# Patient Record
Sex: FEMALE | Race: WHITE | NOT HISPANIC OR LATINO | Employment: UNEMPLOYED | URBAN - METROPOLITAN AREA
[De-identification: names, ages, dates, MRNs, and addresses within clinical notes are randomized per-mention and may not be internally consistent; named-entity substitution may affect disease eponyms.]

---

## 2017-02-17 ENCOUNTER — OFFICE VISIT (OUTPATIENT)
Dept: FAMILY MEDICINE | Facility: CLINIC | Age: 57
End: 2017-02-17
Payer: COMMERCIAL

## 2017-02-17 VITALS
BODY MASS INDEX: 28 KG/M2 | RESPIRATION RATE: 12 BRPM | TEMPERATURE: 98.1 F | WEIGHT: 164 LBS | DIASTOLIC BLOOD PRESSURE: 62 MMHG | HEART RATE: 74 BPM | HEIGHT: 64 IN | OXYGEN SATURATION: 98 % | SYSTOLIC BLOOD PRESSURE: 112 MMHG

## 2017-02-17 DIAGNOSIS — G47.00 INSOMNIA, UNSPECIFIED TYPE: ICD-10-CM

## 2017-02-17 DIAGNOSIS — E78.5 HYPERLIPIDEMIA LDL GOAL <160: ICD-10-CM

## 2017-02-17 DIAGNOSIS — R63.5 WEIGHT GAIN: ICD-10-CM

## 2017-02-17 DIAGNOSIS — M25.561 PAIN IN LATERAL PORTION OF RIGHT KNEE: ICD-10-CM

## 2017-02-17 DIAGNOSIS — L30.8 OTHER ECZEMA: ICD-10-CM

## 2017-02-17 DIAGNOSIS — H61.23 BILATERAL IMPACTED CERUMEN: ICD-10-CM

## 2017-02-17 DIAGNOSIS — Z00.01 ENCOUNTER FOR ROUTINE ADULT HEALTH EXAMINATION WITH ABNORMAL FINDINGS: Primary | ICD-10-CM

## 2017-02-17 DIAGNOSIS — M79.641 PAIN OF RIGHT HAND: ICD-10-CM

## 2017-02-17 DIAGNOSIS — K21.9 GASTROESOPHAGEAL REFLUX DISEASE WITHOUT ESOPHAGITIS: ICD-10-CM

## 2017-02-17 DIAGNOSIS — Z11.59 NEED FOR HEPATITIS C SCREENING TEST: ICD-10-CM

## 2017-02-17 DIAGNOSIS — J01.11 ACUTE RECURRENT FRONTAL SINUSITIS: ICD-10-CM

## 2017-02-17 LAB
ERYTHROCYTE [DISTWIDTH] IN BLOOD BY AUTOMATED COUNT: 13.3 % (ref 10–15)
HCT VFR BLD AUTO: 42 % (ref 35–47)
HGB BLD-MCNC: 14.1 G/DL (ref 11.7–15.7)
MCH RBC QN AUTO: 29.9 PG (ref 26.5–33)
MCHC RBC AUTO-ENTMCNC: 33.6 G/DL (ref 31.5–36.5)
MCV RBC AUTO: 89 FL (ref 78–100)
PLATELET # BLD AUTO: 224 10E9/L (ref 150–450)
RBC # BLD AUTO: 4.71 10E12/L (ref 3.8–5.2)
WBC # BLD AUTO: 5.3 10E9/L (ref 4–11)

## 2017-02-17 PROCEDURE — 99396 PREV VISIT EST AGE 40-64: CPT | Performed by: FAMILY MEDICINE

## 2017-02-17 PROCEDURE — 80061 LIPID PANEL: CPT | Performed by: FAMILY MEDICINE

## 2017-02-17 PROCEDURE — 80053 COMPREHEN METABOLIC PANEL: CPT | Performed by: FAMILY MEDICINE

## 2017-02-17 PROCEDURE — 86803 HEPATITIS C AB TEST: CPT | Performed by: FAMILY MEDICINE

## 2017-02-17 PROCEDURE — 99214 OFFICE O/P EST MOD 30 MIN: CPT | Mod: 25 | Performed by: FAMILY MEDICINE

## 2017-02-17 PROCEDURE — 84443 ASSAY THYROID STIM HORMONE: CPT | Performed by: FAMILY MEDICINE

## 2017-02-17 PROCEDURE — 36415 COLL VENOUS BLD VENIPUNCTURE: CPT | Performed by: FAMILY MEDICINE

## 2017-02-17 PROCEDURE — 85027 COMPLETE CBC AUTOMATED: CPT | Performed by: FAMILY MEDICINE

## 2017-02-17 RX ORDER — ZOLPIDEM TARTRATE 10 MG/1
5 TABLET ORAL
Qty: 30 TABLET | Refills: 0 | Status: SHIPPED | OUTPATIENT
Start: 2017-02-17 | End: 2017-08-28

## 2017-02-17 RX ORDER — CLOBETASOL PROPIONATE 0.5 MG/G
AEROSOL, FOAM TOPICAL
Qty: 50 G | Refills: 1 | Status: SHIPPED | OUTPATIENT
Start: 2017-02-17 | End: 2017-09-29

## 2017-02-17 RX ORDER — AZITHROMYCIN 250 MG/1
TABLET, FILM COATED ORAL
Qty: 6 TABLET | Refills: 2 | Status: SHIPPED | OUTPATIENT
Start: 2017-02-17 | End: 2018-05-18

## 2017-02-17 ASSESSMENT — ANXIETY QUESTIONNAIRES
1. FEELING NERVOUS, ANXIOUS, OR ON EDGE: NOT AT ALL
3. WORRYING TOO MUCH ABOUT DIFFERENT THINGS: NOT AT ALL
IF YOU CHECKED OFF ANY PROBLEMS ON THIS QUESTIONNAIRE, HOW DIFFICULT HAVE THESE PROBLEMS MADE IT FOR YOU TO DO YOUR WORK, TAKE CARE OF THINGS AT HOME, OR GET ALONG WITH OTHER PEOPLE: SOMEWHAT DIFFICULT
2. NOT BEING ABLE TO STOP OR CONTROL WORRYING: NOT AT ALL
6. BECOMING EASILY ANNOYED OR IRRITABLE: NOT AT ALL
GAD7 TOTAL SCORE: 1
5. BEING SO RESTLESS THAT IT IS HARD TO SIT STILL: NOT AT ALL
7. FEELING AFRAID AS IF SOMETHING AWFUL MIGHT HAPPEN: NOT AT ALL

## 2017-02-17 ASSESSMENT — PATIENT HEALTH QUESTIONNAIRE - PHQ9: 5. POOR APPETITE OR OVEREATING: SEVERAL DAYS

## 2017-02-17 NOTE — PATIENT INSTRUCTIONS
"Establish an exercise regimen. Activity goal: 45 minutes 5 days a week. New exercise routine: cardiovascular workout on exercise equipment, walking and weightlifting. Diet regimen was discussed and plan is self-directed dieting: reduce calories, reduce portions, reduce processed  carbs, increase fruits/vegetables and avoid sweets and supervised diet program. Avoid artificial sweeteners and \"diet\" drinks and sodas.       Increase hydration while in the air for travel - drink 8 oz of water for every 1 hours in the air.     For your ear wax - use the carbamide peroxide drop for 3 days , then use Murine Ear wax removal system over the counter to flush out the wax - do this once a month.                   Heartburn/GERD   What is heartburn?   Heartburn refers to the symptoms you feel when acids in your stomach flow back into the esophagus. (The esophagus is the tube that carries food from your throat to your stomach.) This backward movement of stomach acid is called reflux. The acid can burn and irritate the esophagus, throat, and vocal cords.   Heartburn is a common problem. Despite its name, it has nothing to do with the heart.   When you have heartburn often, you may have a condition called gastroesophageal reflux disease, or GERD.   How does it occur?   At the bottom of the esophagus there is a ring of muscle called a sphincter. It acts like a valve. When you swallow food, the sphincter opens to let the food pass into the stomach. The ring then closes to keep the stomach contents from going back into the esophagus. If the sphincter is weak or too relaxed, stomach acid and food flow backward into the esophagus. Because the esophagus does not have the protective lining that the stomach has, the acid causes pain.   The sphincter muscle sometimes does not work properly if:   You are overweight.   You are pregnant.   You have a hiatal hernia (a condition in which part of the stomach protrudes through the diaphragm into " the chest).   You eat too much.   You lie down soon after eating.   You wear tight clothes that push on your stomach.   Foods that may make heartburn worse are:   foods high in fat   sugar   chocolate   peppermint   onions   citrus foods such as orange juice   tomato-based foods   spicy foods   coffee and other drinks with caffeine, such as tea and maria eugenia   alcohol.   Heartburn can also be made worse by:   taking certain medicines, such as aspirin   smoking cigarettes.   Anyone can have an attack of heartburn from overeating or eating foods that are high in acid. Most of the time heartburn is mild and lasts for a short time. There is usually not a problem when heartburn occurs just once in a while. You should see your healthcare provider if:   You have heartburn nearly every day for 2 weeks.   The heartburn comes back when the antacid wears off.   Heartburn wakes you up at night.   What are the symptoms?   The main symptom of heartburn is a burning pain in the lower chest, usually close to the bottom of the breastbone. Other symptoms you may have are:   acid or sour taste in your mouth   belching   a feeling of bloating or fullness in the stomach.   These symptoms tend to happen after very large meals and especially with activity such as bending or lifting after meals. The symptoms may be made worse by lying down or by wearing tight clothing.   Heartburn is very common during the last few months of pregnancy. The weight of the baby pushes on the stomach and can cause the sphincter to relax and let acid to flow back into the esophagus.   How is it diagnosed?   Usually heartburn can be diagnosed from your medical history.   If there is any question about the diagnosis, you may have the following tests to check for ulcers or other problems that might cause your symptoms:   barium swallow X-ray study of the esophagus   complete upper GI (gastrointestinal) barium X-ray study of the esophagus, stomach, and upper intestine    endoscopy, a procedure in which a thin flexible tube with a tiny camera is placed in your mouth and down into your stomach so your provider can see your esophagus and stomach.   How is it treated?   To help reduce the symptoms of heartburn you can:   Try not to put a lot of pressure on the sphincter muscle. Eating light meals and wearing loose clothing will help.   Lose weight if you are overweight.   Take nonprescription antacids (tablets or liquid) after meals and at bedtime.   Raise the head of your bed or use more than one pillow so your head is higher than your stomach. This may allow gravity to help keep food from backing up.   If you find that certain foods or drinks seem to cause your symptoms or make them worse, avoid those foods.   If the simple measures described above do not relieve the symptoms, your healthcare provider may prescribe medicine. The prescription medicines help reduce stomach acid. They also help stomach emptying. A very few people who are not helped with medicines may need surgery.   Get emergency care if the following symptoms occur with the heartburn and do not go away within 15 minutes of treatment for heartburn: shortness of breath; sweating; light-headedness, weakness; or jaw, arm, back, or chest pain.   How long will the effects last?   Heartburn symptoms are usually relieved by treatment in just a few hours or less. If you are having heartburn every day, starting treatment will usually relieve the symptoms in a few days. However, the symptoms may come back from time to time, especially if you gain weight.   Heartburn can sometimes make asthma worse. If you have asthma, preventing or controlling heartburn may help control your asthma symptoms.   How can I help prevent heartburn?   The best prevention is to:   Keep a healthy weight. Lose weight if you are overweight.   Sleep with your head elevated at least 4 to 6 inches. (It's usually most comfortable to put the head of your bed  on blocks.)   It may also help if you:   Wait an hour or longer after eating before you lie down. If you have to lie down after a meal, lie on your left side. Keep your head and shoulders slightly higher than the rest of your body. It's best to not eat for 2 to 3 hours before you go to bed.   Eat smaller, more frequent meals.   Avoid wearing tight clothing or belts.   Don't smoke. Smoking relaxes the sphincter leading to your stomach.   Avoid foods and other things that seem to cause heartburn or make it worse.   Developed by [employer].   Published by [employer].   Last modified: 2009-01-14   Last reviewed: 2008-12-02     Preventive Health Recommendations  Female Ages 50 - 64    Yearly exam: See your health care provider every year in order to  o Review health changes.   o Discuss preventive care.    o Review your medicines if your doctor has prescribed any.      Get a Pap test every three years (unless you have an abnormal result and your provider advises testing more often).    If you get Pap tests with HPV test, you only need to test every 5 years, unless you have an abnormal result.     You do not need a Pap test if your uterus was removed (hysterectomy) and you have not had cancer.    You should be tested each year for STDs (sexually transmitted diseases) if you're at risk.     Have a mammogram every 1 to 2 years.    Have a colonoscopy at age 50, or have a yearly FIT test (stool test). These exams screen for colon cancer.      Have a cholesterol test every 5 years, or more often if advised.    Have a diabetes test (fasting glucose) every three years. If you are at risk for diabetes, you should have this test more often.     If you are at risk for osteoporosis (brittle bone disease), think about having a bone density scan (DEXA).    Shots: Get a flu shot each year. Get a tetanus shot every 10 years.    Nutrition:     Eat at least 5 servings of fruits and vegetables each day.    Eat whole-grain bread,  whole-wheat pasta and brown rice instead of white grains and rice.    Talk to your provider about Calcium and Vitamin D.     Lifestyle    Exercise at least 150 minutes a week (30 minutes a day, 5 days a week). This will help you control your weight and prevent disease.    Limit alcohol to one drink per day.    No smoking.     Wear sunscreen to prevent skin cancer.     See your dentist every six months for an exam and cleaning.    See your eye doctor every 1 to 2 years.               Thank you for choosing [facility]  for your Health Care. It was a pleasure seeing you at your visit today. Please contact us with any questions or concerns you may have.                   Lisbeth Gonzalez MD                                  To reach your [facility] care team after hours call:   [phone]    Our clinic hours are:     Monday- 7:30 am - 7:00 pm                             Tuesday through Friday- 7:30 am - 5:00 pm                                        Saturday- 8:00 am - 12:00 pm                  Phone:  [phone]    Our pharmacy hours are:     Monday  8:00 am to 7:00 pm      Tuesday through Friday 8:00am to 6:00pm                        Saturday - 9:00 am to 1:00 pm      Sunday : Closed.              Phone:  [phone]      There is also information available at our web site:  [email or web address]    If your provider ordered any lab tests and you do not receive the results within 10 business days, please call the clinic.    If you need a medication refill please contact your pharmacy.  Please allow 2 business days for your refill to be completed.    Our clinic offers telephone visits and e visits.  Please ask one of your team members to explain more.      Use [facility] (secure email communication and access to your chart) to send your primary care provider a message or make an appointment. Ask someone on your Team how to sign up for [email or web address].

## 2017-02-17 NOTE — MR AVS SNAPSHOT
"              After Visit Summary   2/17/2017    Whitney Aceves    MRN: [id number]           Patient Information     Date Of Birth          1960        Visit Information        Provider Department      2/17/2017 8:15 AM Lisbeth Gonzalez MD [facility] [place]        Today's Diagnoses     Encounter for routine adult health examination with abnormal findings    -  1    Acute recurrent frontal sinusitis        Pain in lateral portion of right knee        Pain of right hand        Weight gain        Hyperlipidemia LDL goal <160        Insomnia, unspecified type        Need for hepatitis C screening test        Other eczema        Bilateral impacted cerumen          Care Instructions    Establish an exercise regimen. Activity goal: 45 minutes 5 days a week. New exercise routine: cardiovascular workout on exercise equipment, walking and weightlifting. Diet regimen was discussed and plan is self-directed dieting: reduce calories, reduce portions, reduce processed  carbs, increase fruits/vegetables and avoid sweets and supervised diet program. Avoid artificial sweeteners and \"diet\" drinks and sodas.       Increase hydration while in the air for travel - drink 8 oz of water for every 1 hours in the air.     For your ear wax - use the carbamide peroxide drop for 3 days , then use Murine Ear wax removal system over the counter to flush out the wax - do this once a month.                   Heartburn/GERD   What is heartburn?   Heartburn refers to the symptoms you feel when acids in your stomach flow back into the esophagus. (The esophagus is the tube that carries food from your throat to your stomach.) This backward movement of stomach acid is called reflux. The acid can burn and irritate the esophagus, throat, and vocal cords.   Heartburn is a common problem. Despite its name, it has nothing to do with the heart.   When you have heartburn often, you may have a condition called gastroesophageal reflux disease, " or GERD.   How does it occur?   At the bottom of the esophagus there is a ring of muscle called a sphincter. It acts like a valve. When you swallow food, the sphincter opens to let the food pass into the stomach. The ring then closes to keep the stomach contents from going back into the esophagus. If the sphincter is weak or too relaxed, stomach acid and food flow backward into the esophagus. Because the esophagus does not have the protective lining that the stomach has, the acid causes pain.   The sphincter muscle sometimes does not work properly if:   You are overweight.   You are pregnant.   You have a hiatal hernia (a condition in which part of the stomach protrudes through the diaphragm into the chest).   You eat too much.   You lie down soon after eating.   You wear tight clothes that push on your stomach.   Foods that may make heartburn worse are:   foods high in fat   sugar   chocolate   peppermint   onions   citrus foods such as orange juice   tomato-based foods   spicy foods   coffee and other drinks with caffeine, such as tea and maria eugenia   alcohol.   Heartburn can also be made worse by:   taking certain medicines, such as aspirin   smoking cigarettes.   Anyone can have an attack of heartburn from overeating or eating foods that are high in acid. Most of the time heartburn is mild and lasts for a short time. There is usually not a problem when heartburn occurs just once in a while. You should see your healthcare provider if:   You have heartburn nearly every day for 2 weeks.   The heartburn comes back when the antacid wears off.   Heartburn wakes you up at night.   What are the symptoms?   The main symptom of heartburn is a burning pain in the lower chest, usually close to the bottom of the breastbone. Other symptoms you may have are:   acid or sour taste in your mouth   belching   a feeling of bloating or fullness in the stomach.   These symptoms tend to happen after very large meals and especially with  activity such as bending or lifting after meals. The symptoms may be made worse by lying down or by wearing tight clothing.   Heartburn is very common during the last few months of pregnancy. The weight of the baby pushes on the stomach and can cause the sphincter to relax and let acid to flow back into the esophagus.   How is it diagnosed?   Usually heartburn can be diagnosed from your medical history.   If there is any question about the diagnosis, you may have the following tests to check for ulcers or other problems that might cause your symptoms:   barium swallow X-ray study of the esophagus   complete upper GI (gastrointestinal) barium X-ray study of the esophagus, stomach, and upper intestine   endoscopy, a procedure in which a thin flexible tube with a tiny camera is placed in your mouth and down into your stomach so your provider can see your esophagus and stomach.   How is it treated?   To help reduce the symptoms of heartburn you can:   Try not to put a lot of pressure on the sphincter muscle. Eating light meals and wearing loose clothing will help.   Lose weight if you are overweight.   Take nonprescription antacids (tablets or liquid) after meals and at bedtime.   Raise the head of your bed or use more than one pillow so your head is higher than your stomach. This may allow gravity to help keep food from backing up.   If you find that certain foods or drinks seem to cause your symptoms or make them worse, avoid those foods.   If the simple measures described above do not relieve the symptoms, your healthcare provider may prescribe medicine. The prescription medicines help reduce stomach acid. They also help stomach emptying. A very few people who are not helped with medicines may need surgery.   Get emergency care if the following symptoms occur with the heartburn and do not go away within 15 minutes of treatment for heartburn: shortness of breath; sweating; light-headedness, weakness; or jaw, arm,  back, or chest pain.   How long will the effects last?   Heartburn symptoms are usually relieved by treatment in just a few hours or less. If you are having heartburn every day, starting treatment will usually relieve the symptoms in a few days. However, the symptoms may come back from time to time, especially if you gain weight.   Heartburn can sometimes make asthma worse. If you have asthma, preventing or controlling heartburn may help control your asthma symptoms.   How can I help prevent heartburn?   The best prevention is to:   Keep a healthy weight. Lose weight if you are overweight.   Sleep with your head elevated at least 4 to 6 inches. (It's usually most comfortable to put the head of your bed on blocks.)   It may also help if you:   Wait an hour or longer after eating before you lie down. If you have to lie down after a meal, lie on your left side. Keep your head and shoulders slightly higher than the rest of your body. It's best to not eat for 2 to 3 hours before you go to bed.   Eat smaller, more frequent meals.   Avoid wearing tight clothing or belts.   Don't smoke. Smoking relaxes the sphincter leading to your stomach.   Avoid foods and other things that seem to cause heartburn or make it worse.   Developed by [employer].   Published by [employer].   Last modified: 2009-01-14   Last reviewed: 2008-12-02     Preventive Health Recommendations  Female Ages 50 - 64    Yearly exam: See your health care provider every year in order to  o Review health changes.   o Discuss preventive care.    o Review your medicines if your doctor has prescribed any.      Get a Pap test every three years (unless you have an abnormal result and your provider advises testing more often).    If you get Pap tests with HPV test, you only need to test every 5 years, unless you have an abnormal result.     You do not need a Pap test if your uterus was removed (hysterectomy) and you have not had cancer.    You should be tested each  year for STDs (sexually transmitted diseases) if you're at risk.     Have a mammogram every 1 to 2 years.    Have a colonoscopy at age 50, or have a yearly FIT test (stool test). These exams screen for colon cancer.      Have a cholesterol test every 5 years, or more often if advised.    Have a diabetes test (fasting glucose) every three years. If you are at risk for diabetes, you should have this test more often.     If you are at risk for osteoporosis (brittle bone disease), think about having a bone density scan (DEXA).    Shots: Get a flu shot each year. Get a tetanus shot every 10 years.    Nutrition:     Eat at least 5 servings of fruits and vegetables each day.    Eat whole-grain bread, whole-wheat pasta and brown rice instead of white grains and rice.    Talk to your provider about Calcium and Vitamin D.     Lifestyle    Exercise at least 150 minutes a week (30 minutes a day, 5 days a week). This will help you control your weight and prevent disease.    Limit alcohol to one drink per day.    No smoking.     Wear sunscreen to prevent skin cancer.     See your dentist every six months for an exam and cleaning.    See your eye doctor every 1 to 2 years.               Thank you for choosing [facility]  for your Health Care. It was a pleasure seeing you at your visit today. Please contact us with any questions or concerns you may have.                   Lisbeth Gonzalez MD                                  To reach your [facility] care team after hours call:   [phone]    Our clinic hours are:     Monday- 7:30 am - 7:00 pm                             Tuesday through Friday- 7:30 am - 5:00 pm                                        Saturday- 8:00 am - 12:00 pm                  Phone:  [phone]    Our pharmacy hours are:     Monday  8:00 am to 7:00 pm      Tuesday through Friday 8:00am to 6:00pm                        Saturday - 9:00 am to 1:00 pm      Sunday :  Closed.              Phone:  [phone]      There is also information available at our web site:  [email or web address]    If your provider ordered any lab tests and you do not receive the results within 10 business days, please call the clinic.    If you need a medication refill please contact your pharmacy.  Please allow 2 business days for your refill to be completed.    Our clinic offers telephone visits and e visits.  Please ask one of your team members to explain more.      Use StyleQt (secure email communication and access to your chart) to send your primary care provider a message or make an appointment. Ask someone on your Team how to sign up for travelmobhart.                     Follow-ups after your visit        Additional Services     ORTHO  REFERRAL       [facility] is referring you to the Orthopedic  Services at [facility].       The  Representative will assist you in the coordination of your Orthopedic and Musculoskeletal Care as prescribed by your physician.    The  Representative will call you within 1 business day to help schedule your appointment, or you may contact the  Representative at:    All areas ~ [phone]     Type of Referral : Non Surgical  Surgical / Specialist       Timeframe requested: Within 2 weeks    Coverage of these services is subject to the terms and limitations of your health insurance plan.  Please call member services at your health plan with any benefit or coverage questions.      If X-rays, CT or MRI's have been performed, please contact the facility where they were done to arrange for , prior to your scheduled appointment.  Please bring this referral request to your appointment and present it to your specialist.            PHYSICAL THERAPY REFERRAL       *This therapy referral will be filtered to a centralized scheduling office at [facility] and the patient will  receive a call to schedule an appointment at a [facility] location most convenient for them. *     [facility] provides Physical Therapy evaluation and treatment and many specialty services across the [facility] system.  If requesting a specialty program, please choose from the list below.    If you have not heard from the scheduling office within 2 business days, please call [phone] for all locations, with the exception of [place], please call [phone].  Treatment: Evaluation & Treatment  Special Instructions/Modalities: eval and treat   Special Programs: hand therapy     Please be aware that coverage of these services is subject to the terms and limitations of your health insurance plan.  Call member services at your health plan with any benefit or coverage questions.      **Note to Provider:  If you are referring outside of [facility] for the therapy appointment, please list the name of the location in the  special instructions  above, print the referral and give to the patient to schedule the appointment.                  Your next 10 appointments already scheduled     Feb 21, 2017   Procedure with Silverio Arrieta MD   [facility])    [address]  [place] [address] [phone]           [facility] is located at [address] [place]              Who to contact     If you have questions or need follow up information about today's clinic visit or your schedule please contact [facility] directly at [phone].  Normal or non-critical lab and imaging results will be communicated to you by MyChart, letter or phone within 4 business days after the clinic has received the results. If you do not hear from us within 7 days, please contact the clinic through MyChart or phone. If you have a critical or abnormal lab result, we will notify you by phone as soon as possible.  Submit refill requests  "through Bl[employer] or call your pharmacy and they will forward the refill request to us. Please allow 3 business days for your refill to be completed.          Additional Information About Your Visit        Fatwirehart Information     Blabroom gives you secure access to your electronic health record. If you see a primary care provider, you can also send messages to your care team and make appointments. If you have questions, please call your primary care clinic.  If you do not have a primary care provider, please call [phone] and they will assist you.        Care EveryWhere ID     This is your Care EveryWhere ID. This could be used by other organizations to access your [facility] medical records  [id number]        Your Vitals Were     Pulse Temperature Respirations Height Pulse Oximetry Breastfeeding?    74 98.1  F (36.7  C) (Tympanic) 12 5' 4\" (1.626 m) 98% No    BMI (Body Mass Index)                   28.15 kg/m2            Blood Pressure from Last 3 Encounters:   02/17/17 112/62   09/10/16 114/64   08/05/16 120/76    Weight from Last 3 Encounters:   02/17/17 164 lb (74.4 kg)   09/10/16 156 lb 6.4 oz (70.9 kg)   08/05/16 161 lb (73 kg)              We Performed the Following     CBC with platelets     Comprehensive metabolic panel     Hepatitis C Screen Reflex to HCV RNA Quant and Genotype     Lipid panel reflex to direct LDL     ORTHO  REFERRAL     PHYSICAL THERAPY REFERRAL     TSH with free T4 reflex          Today's Medication Changes          These changes are accurate as of: 2/17/17  9:41 AM.  If you have any questions, ask your nurse or doctor.               Start taking these medicines.        Dose/Directions    azithromycin 250 MG tablet   Commonly known as:  ZITHROMAX   Used for:  Acute recurrent frontal sinusitis   Started by:  Lisbeth Gonzalez MD        2 tabs first day, then 1 tab by mouth daily for 4 additional days   Quantity:  6 tablet   Refills:  2       carbamide peroxide 6.5 % otic " solution   Commonly known as:  DEBROX   Used for:  Bilateral impacted cerumen   Started by:  Lisbeth Gonzalez MD        Dose:  5-10 drop   Place 5-10 drops into both ears 2 times daily for 3 days   Quantity:  3 mL   Refills:  0       clobetasol propionate 0.05 % Foam   Commonly known as:  OLUX   Used for:  Other eczema   Started by:  Lisbeth Gonzalez MD        Apply to outer part of ear and scalp 1-2x/day for up to 2 weeks at a time   Quantity:  50 g   Refills:  1            Where to get your medicines      These medications were sent to [facility] [address] [facility] [address] [address] AT [facility] [address] [address], [facility] [address]    Hours:  24-hours Phone:  [phone]     azithromycin 250 MG tablet    carbamide peroxide 6.5 % otic solution    clobetasol propionate 0.05 % Foam         Some of these will need a paper prescription and others can be bought over the counter.  Ask your nurse if you have questions.     Bring a paper prescription for each of these medications     zolpidem 10 MG tablet                Primary Care Provider Office Phone # Fax #    Lisbeth Gonzalez [phone]       [address] [address]        Thank you!     Thank you for choosing [facility]  for your care. Our goal is always to provide you with excellent care. Hearing back from our patients is one way we can continue to improve our services. Please take a few minutes to complete the written survey that you may receive in the mail after your visit with us. Thank you!             Your Updated Medication List - Protect others around you: Learn how to safely use, store and throw away your medicines at www.disposemymeds.org.          This list is accurate as of: 2/17/17  9:41 AM.  Always use your most recent med list.                   Brand Name Dispense Instructions for use    azithromycin 250 MG  tablet    ZITHROMAX    6 tablet    2 tabs first day, then 1 tab by mouth daily for 4 additional days       carbamide peroxide 6.5 % otic solution    DEBROX    3 mL    Place 5-10 drops into both ears 2 times daily for 3 days       cetirizine 10 MG tablet    zyrTEC    30 tablet    Take 1 tablet (10 mg) by mouth every evening       clobetasol propionate 0.05 % Foam    OLUX    50 g    Apply to outer part of ear and scalp 1-2x/day for up to 2 weeks at a time       estradiol 0.1 MG/24HR BIW patch    VIVELLE-DOT    24 patch    Place 1 patch onto the skin twice a week       fluticasone 50 MCG/ACT spray    FLONASE    1 Bottle    Spray 1-2 sprays into both nostrils daily       ibuprofen 800 MG tablet    ADVIL/MOTRIN    90 tablet    TAKE 1 TABLET EVERY 8 HOURSAS NEEDED FOR MODERATE     PAIN (FOR BURSITIS)       zolpidem 10 MG tablet    AMBIEN    30 tablet    Take 0.5 tablets (5 mg) by mouth nightly as needed for sleep

## 2017-02-17 NOTE — PROGRESS NOTES
SUBJECTIVE:     CC: Whitney Aceves is an 56 year old woman who presents for preventive health visit.     Physical   Annual:     Getting at least 3 servings of Calcium per day::  Yes    Bi-annual eye exam::  Yes    Dental care twice a year::  Yes    Sleep apnea or symptoms of sleep apnea::  Sleep apnea    Frequency of exercise::  1 day/week    Duration of exercise::  15-30 minutes    Taking medications regularly::  Yes    Additional concerns today::  YES  having some debilitating headache that started last night - bilateral maxillary sinus area and frontal area as well. -lots of pressure.   No numbness, tingling, or weakness in upper or lower extremities. No bowel or bladder control problems.     Also has been gaining weight in the last 6 months. Has been high power stress mode that same time frame - not eating well or exercising as much.  Thyroid issues in sister.  Sister also had 3 MI's at age 55.  Dad  at age 45 from MI and CHF.    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      Getting some eczema in her ears.     A few years ago slipped and tore a ligament in her right lateral knee. Noticing signif. Pain in her right lateral knee - dull pain almost all the time. Hasn't been giving out on her.     Sees Dr. Goodwin at [facility] ob[facility] every year in  - saw her last 2016 for pap, pelvic , breast exam. Has her colonoscopy scheduled next week.       Today's PHQ-2 Score:   PHQ-2 (  [employer]) 2/15/2017   Q1: Little interest or pleasure in doing things -   Q2: Feeling down, depressed or hopeless -   PHQ-2 Score -   Little interest or pleasure in doing things Not at all   Feeling down, depressed or hopeless Not at all   PHQ-2 Score 0        Abuse: Current or Past(Physical, Sexual or Emotional)- No  Do you feel safe in your environment - Yes.     Social History   Substance Use Topics     Smoking status: Never Smoker     Smokeless tobacco: Never Used     Alcohol use No     The patient does not drink >3 drinks per day nor >7 drinks per week.    Recent Labs   Lab Test  06/13/16   0901 06/04/15   12/13/10   1026  06/21/10   1022   CHOL  217*  223*   < >  215*  238*   HDL  67  69   < >  61  63   LDL  131*  136   < >  135*  143*   TRIG  93  92   < >  96  164*   CHOLHDLRATIO   --    --    --   3.5  3.8   NHDL  150*  154   < >   --    --     < > = values in this interval not displayed.       Reviewed orders with patient.  Reviewed health maintenance and updated orders accordingly - Yes    Mammo Decision Support:  Patient over age 50, mutual decision to screen reflected in health maintenance.    Pertinent mammograms are reviewed under the imaging tab.  History of abnormal Pap smear: NO - age 30- 65 PAP every 3 years recommended  S/p benign hysterectomy.     All Histories reviewed and updated in [employer].  Health Maintenance   Topic Date Due     WELLNESS VISIT Q1 YR (NO INBASKET)  1960     HEPATITIS C SCREENING  11/08/1978     LIPID MONITORING Q1 YEAR( NO INBASKET)  06/13/2017     MAMMO Q1 YR [facility] MESSAGE  06/13/2017     PAP SCREENING Q3 YR (SYSTEM ASSIGNED)  06/20/2018     TETANUS IMMUNIZATION (SYSTEM ASSIGNED)  08/05/2019     ADVANCE DIRECTIVE PLANNING Q5 YRS (NO INBASKET)  01/15/2021     COLONOSCOPY Q10 YEARS NO [facility] MESSAGE  04/30/2022     INFLUENZA VACCINE (SYSTEM ASSIGNED)  Addressed       Patient Active Problem List   Diagnosis     Panic disorder without agoraphobia     Anxiety     Overactive bladder     HYPERLIPIDEMIA LDL GOAL <160     Advanced directives, counseling/discussion       Past Medical History   Diagnosis Date     Anxiety      on effexor in past. Off since 2010     Edema      Hormone replacement therapy      IBS (irritable bowel  syndrome)      Diarrhea predominant     Insomnia, unspecified      On Ambien     Lichen sclerosus      Pap smear of cervix not needed      Urge incontinence        Past Surgical History   Procedure Laterality Date     Colonoscopy  2012     Procedure:COLONOSCOPY; Colonoscopy ; Surgeon:DEANDRE SHORT; Location:[facility]      section       Fissurectomy rectum  01     Colposcopy, biopsy, combined  1991     Mild squamous epithelial dysplasia (SABIHA 1)     Hysterectomy total abdominal  3/4/1998     Myomatous uterus; pelvic pain. Started HRT right afterward with vasomotor sx.     Liposuction (location)         Social History     Social History     Marital status:      Spouse name: Teo     Number of children: 2     Years of education: N/A     Occupational History     [employer]     Early Childhood Education     Social History Main Topics     Smoking status: Never Smoker     Smokeless tobacco: Never Used     Alcohol use No     Drug use: No     Sexual activity: Yes     Partners: Male     Birth control/ protection: Post-menopausal     Other Topics Concern     Parent/Sibling W/ Cabg, Mi Or Angioplasty Before 65f 55m? No     Social History Narrative       Family History   Problem Relation Age of Onset     Hypertension Mother      Hyperlipidemia Mother      C.A.D. Father 45       age 45 massive MI      Heart Failure Father      C.A.D. Maternal Grandmother      C.A.D. Maternal Grandfather      C.A.D. Paternal Grandmother      C.A.D. Paternal Grandfather      CANCER Other      9 members on maternal side. Bone CA, Breast CA (m. aunt),       Prostate Cancer Brother      age 37     C.A.D. Sister 57     MI age 50- CABG     Breast Cancer Maternal Aunt      50s-Mom had 9 sisters. MAunt->50's     Current Outpatient Prescriptions   Medication Sig Dispense Refill     estradiol (VIVELLE-DOT) 0.1 MG/24HR patch Place 1 patch onto the skin twice a week 24 patch 3     ibuprofen  "(ADVIL/MOTRIN) 800 MG tablet TAKE 1 TABLET EVERY 8 HOURSAS NEEDED FOR MODERATE     PAIN (FOR BURSITIS) 90 tablet 3     fluticasone (FLONASE) 50 MCG/ACT nasal spray Spray 1-2 sprays into both nostrils daily 1 Bottle 11     zolpidem (AMBIEN) 10 MG tablet Take 0.5 tablets (5 mg) by mouth nightly as needed for sleep 30 tablet 0     cetirizine (ZYRTEC) 10 MG tablet Take 1 tablet (10 mg) by mouth every evening 30 tablet 1        Allergies   Allergen Reactions     Septra [Sulfamethoxazole W/Trimethoprim]      Sulfa Drugs Hives        ROS:  C: NEGATIVE for fever, chills, but positive for increased change in weight and fatigue.   I: NEGATIVE for worrisome rashes, moles or lesions  E: NEGATIVE for vision changes or irritation  ENT: NEGATIVE for ear, mouth and throat problems  R: NEGATIVE for significant cough or SOB  B: NEGATIVE for masses, tenderness or discharge  CV: NEGATIVE for chest pain, palpitations or peripheral edema  GI: NEGATIVE for nausea, abdominal pain,  or change in bowel habits. Has a lot of heartburn the last two months.   : NEGATIVE for unusual urinary or vaginal symptoms. No vaginal bleeding.  M: NEGATIVE for significant arthralgias or myalgia  N: NEGATIVE for weakness, dizziness or paresthesias  P: NEGATIVE for changes in mood or affect       Problem list, Medication list, Allergies, and Medical/Social/Surgical histories reviewed in [facility] and updated as appropriate.  Labs reviewed in [facility]  BP Readings from Last 3 Encounters:   02/17/17 112/62   09/10/16 114/64   08/05/16 120/76    Wt Readings from Last 3 Encounters:   02/17/17 164 lb (74.4 kg)   09/10/16 156 lb 6.4 oz (70.9 kg)   08/05/16 161 lb (73 kg)              OBJECTIVE:     /62  Pulse 74  Temp 98.1  F (36.7  C) (Tympanic)  Resp 12  Ht 5' 4\" (1.626 m)  Wt 164 lb (74.4 kg)  SpO2 98%  Breastfeeding? No  BMI 28.15 kg/m2  EXAM:  GENERAL APPEARANCE: healthy, alert and no distress  EYES: Eyes grossly normal to inspection, PERRL and conjunctivae and " sclerae normal  HENT: ear canals and TM's normal, nose= congested  And bilateral frontal sinus pain  and mouth without ulcers or lesions, oropharynx clear and oral mucous membranes moist  NECK: no adenopathy, no asymmetry, masses, or scars and thyroid normal to palpation  RESP: lungs clear to auscultation - no rales, rhonchi or wheezes  CV: regular rate and rhythm, normal S1 S2, no S3 or S4, no murmur, click or rub, no peripheral edema and peripheral pulses strong  ABDOMEN: soft, nontender, no hepatosplenomegaly, no masses and bowel sounds normal  Pelvic and breast exam done with Dr. Goodwin in 6/2016.   MS: no musculoskeletal defects are noted and gait is age appropriate without ataxia  SKIN: no suspicious lesions , but there is a slightly red, maculopapular rash around the right ear and right lower occipital scalp - no silvery or other scale at all.    NEURO: Normal strength and tone, sensory exam grossly normal, mentation intact and speech normal  PSYCH: mentation appears normal and affect normal/bright.     ASSESSMENT/PLAN:         ICD-10-CM    1. Encounter for routine adult health examination with abnormal findings Z00.01 CBC with platelets   2. Acute recurrent frontal sinusitis J01.11 azithromycin (ZITHROMAX) 250 MG tablet   3. Pain in lateral portion of right knee M25.561 ORTHO  REFERRAL   4. Pain of right hand M79.641 ORTHO  REFERRAL     PHYSICAL THERAPY REFERRAL   5. Weight gain R63.5 TSH with free T4 reflex   6. Hyperlipidemia LDL goal <160 E78.5 Comprehensive metabolic panel     Lipid panel reflex to direct LDL   7. Insomnia, unspecified type G47.00 zolpidem (AMBIEN) 10 MG tablet   8. Need for hepatitis C screening test Z11.59 Hepatitis C Screen Reflex to HCV RNA Quant and Genotype   9. Other eczema L30.8 clobetasol propionate (OLUX) 0.05 % FOAM   10. Bilateral impacted cerumen H61.23 carbamide peroxide (DEBROX) 6.5 % otic solution   11. Gastroesophageal reflux disease without  "esophagitis K21.9        COUNSELING:  Reviewed preventive health counseling, as reflected in patient instructions     reports that she has never smoked. She has never used smokeless tobacco.    Estimated body mass index is 26.43 kg/(m^2) as calculated from the following:    Height as of 9/10/16: 5' 4.5\" (1.638 m).    Weight as of 9/10/16: 156 lb 6.4 oz (70.9 kg).   Weight management plan: see next   Establish an exercise regimen. Activity goal: 45 minutes 5 days a week. New exercise routine: cardiovascular workout on exercise equipment, walking and weightlifting. Diet regimen was discussed and plan is self-directed dieting: reduce calories, reduce portions, reduce processed  carbs, increase fruits/vegetables and avoid sweets and supervised diet program. Avoid artificial sweeteners and \"diet\" drinks and sodas.        Spent 25 extra  minutes on pt care today outside of preventative care. All face to face time from 0900  to 0925 .  Greater than 50% of time spent in coordination of care/counseling today re:   Acute recurrent frontal sinusitis    Pain in lateral portion of right knee    Pain of right hand    Weight gain    Insomnia, unspecified type    Eczema , other.   gerd without esophagitis     Increase hydration while in the air for travel - drink 8 oz of water for every 1 hours in the air.   Do use your flonase nasal spray for the next 2 weeks to help with sinus symptoms.     Counseling Resources:  ATP IV Guidelines  Pooled Cohorts Equation Calculator  Breast Cancer Risk Calculator  FRAX Risk Assessment  ICSI Preventive Guidelines  Dietary Guidelines for Americans, 2010  USDA's MyPlate  ASA Prophylaxis  Lung CA Screening    Lisbeth Gonzalez MD  [facility] [place]  Answers for HPI/ROS submitted by the patient on 2/15/2017   PHQ-2 Depressed: Not at all, Not at all  PHQ-2 Score: 0    "

## 2017-02-18 ENCOUNTER — OFFICE VISIT (OUTPATIENT)
Dept: FAMILY MEDICINE | Facility: CLINIC | Age: 57
End: 2017-02-18
Payer: COMMERCIAL

## 2017-02-18 ENCOUNTER — TELEPHONE (OUTPATIENT)
Dept: FAMILY MEDICINE | Facility: CLINIC | Age: 57
End: 2017-02-18

## 2017-02-18 VITALS
OXYGEN SATURATION: 98 % | DIASTOLIC BLOOD PRESSURE: 76 MMHG | HEIGHT: 64 IN | WEIGHT: 164 LBS | TEMPERATURE: 97.6 F | SYSTOLIC BLOOD PRESSURE: 132 MMHG | RESPIRATION RATE: 14 BRPM | BODY MASS INDEX: 28 KG/M2 | HEART RATE: 88 BPM

## 2017-02-18 DIAGNOSIS — T78.40XA ALLERGIC REACTION, INITIAL ENCOUNTER: ICD-10-CM

## 2017-02-18 DIAGNOSIS — H92.03 EAR PAIN, BILATERAL: Primary | ICD-10-CM

## 2017-02-18 LAB
ALBUMIN SERPL-MCNC: 4.1 G/DL (ref 3.4–5)
ALP SERPL-CCNC: 67 U/L (ref 40–150)
ALT SERPL W P-5'-P-CCNC: 33 U/L (ref 0–50)
ANION GAP SERPL CALCULATED.3IONS-SCNC: 11 MMOL/L (ref 3–14)
AST SERPL W P-5'-P-CCNC: 14 U/L (ref 0–45)
BILIRUB SERPL-MCNC: 1 MG/DL (ref 0.2–1.3)
BUN SERPL-MCNC: 15 MG/DL (ref 7–30)
CALCIUM SERPL-MCNC: 9 MG/DL (ref 8.5–10.1)
CHLORIDE SERPL-SCNC: 108 MMOL/L (ref 94–109)
CHOLEST SERPL-MCNC: 235 MG/DL
CO2 SERPL-SCNC: 25 MMOL/L (ref 20–32)
CREAT SERPL-MCNC: 0.65 MG/DL (ref 0.52–1.04)
GFR SERPL CREATININE-BSD FRML MDRD: NORMAL ML/MIN/1.7M2
GLUCOSE SERPL-MCNC: 77 MG/DL (ref 70–99)
HCV AB SERPL QL IA: NORMAL
HDLC SERPL-MCNC: 57 MG/DL
LDLC SERPL CALC-MCNC: 136 MG/DL
NONHDLC SERPL-MCNC: 178 MG/DL
POTASSIUM SERPL-SCNC: 4.6 MMOL/L (ref 3.4–5.3)
PROT SERPL-MCNC: 7.3 G/DL (ref 6.8–8.8)
SODIUM SERPL-SCNC: 144 MMOL/L (ref 133–144)
TRIGL SERPL-MCNC: 210 MG/DL
TSH SERPL DL<=0.005 MIU/L-ACNC: 1.36 MU/L (ref 0.4–4)

## 2017-02-18 PROCEDURE — 96372 THER/PROPH/DIAG INJ SC/IM: CPT | Performed by: FAMILY MEDICINE

## 2017-02-18 PROCEDURE — 99213 OFFICE O/P EST LOW 20 MIN: CPT | Mod: 25 | Performed by: FAMILY MEDICINE

## 2017-02-18 RX ORDER — KETOROLAC TROMETHAMINE 30 MG/ML
30 INJECTION, SOLUTION INTRAMUSCULAR; INTRAVENOUS ONCE
Qty: 1 ML | Refills: 0 | OUTPATIENT
Start: 2017-02-18 | End: 2017-02-18

## 2017-02-18 RX ORDER — HYDROCODONE BITARTRATE AND ACETAMINOPHEN 5; 325 MG/1; MG/1
1-2 TABLET ORAL EVERY 4 HOURS PRN
Qty: 20 TABLET | Refills: 0 | Status: SHIPPED | OUTPATIENT
Start: 2017-02-18 | End: 2017-08-28

## 2017-02-18 RX ORDER — PREDNISONE 20 MG/1
20 TABLET ORAL DAILY
Qty: 5 TABLET | Refills: 0 | Status: SHIPPED | OUTPATIENT
Start: 2017-02-18 | End: 2017-03-01

## 2017-02-18 ASSESSMENT — PATIENT HEALTH QUESTIONNAIRE - PHQ9: SUM OF ALL RESPONSES TO PHQ QUESTIONS 1-9: 5

## 2017-02-18 ASSESSMENT — ANXIETY QUESTIONNAIRES: GAD7 TOTAL SCORE: 1

## 2017-02-18 NOTE — PROGRESS NOTES
"  SUBJECTIVE:                                                    Whitney Aceves is a 56 year old female who presents to clinic today for the following health issues:      Acute Illness   Acute illness concerns: /Ear pain  Onset: x 1 day    Fever: no    Chills/Sweats: no    Headache (location?): no    Sinus Pressure:no    Conjunctivitis:  no    Ear Pain: YES: bilateral    Rhinorrhea: no    Congestion: no    Sore Throat: no     Cough: no    Wheeze: no    Decreased Appetite: no    Nausea: no    Vomiting: no    Diarrhea:  no    Dysuria/Freq.: no    Fatigue/Achiness: no    Sick/Strep Exposure: no     Therapies Tried and outcome: Tylenol, Used Murine Ear System    Patient used Murine Ear drops this AM and had the sudden onset of severe ear pain and burning  Severe pain.    Problem list and histories reviewed & adjusted, as indicated.  Additional history: as documented    ROS:  Constitutional, HEENT, cardiovascular, pulmonary, gi and gu systems are negative, except as otherwise noted.    OBJECTIVE:                                                    /76  Pulse 88  Temp 97.6  F (36.4  C) (Tympanic)  Resp 14  Ht 5' 4\" (1.626 m)  Wt 164 lb (74.4 kg)  SpO2 98%  Breastfeeding? No  BMI 28.15 kg/m2  Body mass index is 28.15 kg/(m^2).  GENERAL: healthy, alert and no distress. Looks uncomfortable  HEENT:  TM's clear bilaterally. Ear canals erythematous and swollen.  Irrigated with water Mucous Membranes pink, moist.  Sclera nonicteric.  NECK: no adenopathy, no asymmetry, masses, or scars and thyroid normal to palpation  RESP: lungs clear to auscultation - no rales, rhonchi or wheezes  CV: regular rate and rhythm, normal S1 S2, no S3 or S4, no murmur, click or rub, no peripheral edema and peripheral pulses strong  ABDOMEN: soft, nontender, no hepatosplenomegaly, no masses and bowel sounds normal  MS: no gross musculoskeletal defects noted, no edema    Diagnostic Test Results:  none      ASSESSMENT/PLAN:                            "                           (H92.03) Ear pain, bilateral  (primary encounter diagnosis)  Comment: ?related to allergic reaction to Murine.  Patient in extreme pain. Dose of Toradol given here.    Plan: ketorolac (TORADOL) 30 MG/ML injection,         HYDROcodone-acetaminophen (NORCO) 5-325 MG per         tablet, predniSONE (DELTASONE) 20 MG tablet,         KETOROLAC TROMETHAMINE 15MG, INJECTION         INTRAMUSCULAR OR SUB-Q          (T78.40XA) Allergic reaction, initial encounter  Plan: predniSONE (DELTASONE) 20 MG tablet          Follow up if not improving.   Karen Weiler, MD  [facility] [place]

## 2017-02-18 NOTE — TELEPHONE ENCOUNTER
Pt states was seen yesterday and was given drops for here ears- she states she is having extreme pain and is hoping to be fit into any schedule today. She decline speaking with FNA. Please call her at [phone] OK to [place] Thank You.    Toshia Lucero Scheduling

## 2017-02-18 NOTE — NURSING NOTE
"Chief Complaint   Patient presents with     Ear Problem       Initial /76  Pulse 88  Temp 97.6  F (36.4  C) (Tympanic)  Resp 14  Ht 5' 4\" (1.626 m)  Wt 164 lb (74.4 kg)  SpO2 98%  Breastfeeding? No  BMI 28.15 kg/m2 Estimated body mass index is 28.15 kg/(m^2) as calculated from the following:    Height as of this encounter: 5' 4\" (1.626 m).    Weight as of this encounter: 164 lb (74.4 kg).  Medication Reconciliation: complete   Yocasta Bloedow LPN    "

## 2017-02-20 NOTE — TELEPHONE ENCOUNTER
Pt had appt on 2/18/17 with Dr Weiler for ear pain    Sherry Patrick RN, BSN   [facility]

## 2017-02-21 ENCOUNTER — HOSPITAL ENCOUNTER (OUTPATIENT)
Facility: CLINIC | Age: 57
Discharge: HOME | End: 2017-02-21
Attending: COLON & RECTAL SURGERY | Admitting: COLON & RECTAL SURGERY
Payer: COMMERCIAL

## 2017-02-21 ENCOUNTER — SURGERY (OUTPATIENT)
Age: 57
End: 2017-02-21

## 2017-02-21 VITALS
SYSTOLIC BLOOD PRESSURE: 114 MMHG | DIASTOLIC BLOOD PRESSURE: 80 MMHG | RESPIRATION RATE: 13 BRPM | OXYGEN SATURATION: 98 %

## 2017-02-21 LAB — COLONOSCOPY: NORMAL

## 2017-02-21 PROCEDURE — 45378 DIAGNOSTIC COLONOSCOPY: CPT | Performed by: COLON & RECTAL SURGERY

## 2017-02-21 PROCEDURE — G0500 MOD SEDAT ENDO SERVICE >5YRS: HCPCS | Performed by: COLON & RECTAL SURGERY

## 2017-02-21 PROCEDURE — 25000125 ZZHC RX 250: Performed by: COLON & RECTAL SURGERY

## 2017-02-21 PROCEDURE — 25000128 H RX IP 250 OP 636: Performed by: COLON & RECTAL SURGERY

## 2017-02-21 PROCEDURE — G0105 COLORECTAL SCRN; HI RISK IND: HCPCS | Performed by: COLON & RECTAL SURGERY

## 2017-02-21 RX ORDER — ONDANSETRON 2 MG/ML
4 INJECTION INTRAMUSCULAR; INTRAVENOUS
Status: DISCONTINUED | OUTPATIENT
Start: 2017-02-21 | End: 2017-02-21 | Stop reason: HOSPADM

## 2017-02-21 RX ORDER — ONDANSETRON 4 MG/1
4 TABLET, ORALLY DISINTEGRATING ORAL EVERY 6 HOURS PRN
Status: DISCONTINUED | OUTPATIENT
Start: 2017-02-21 | End: 2017-02-21 | Stop reason: HOSPADM

## 2017-02-21 RX ORDER — FLUMAZENIL 0.1 MG/ML
0.2 INJECTION, SOLUTION INTRAVENOUS
Status: DISCONTINUED | OUTPATIENT
Start: 2017-02-21 | End: 2017-02-21 | Stop reason: HOSPADM

## 2017-02-21 RX ORDER — ONDANSETRON 2 MG/ML
4 INJECTION INTRAMUSCULAR; INTRAVENOUS EVERY 6 HOURS PRN
Status: DISCONTINUED | OUTPATIENT
Start: 2017-02-21 | End: 2017-02-21 | Stop reason: HOSPADM

## 2017-02-21 RX ORDER — LIDOCAINE 40 MG/G
CREAM TOPICAL
Status: DISCONTINUED | OUTPATIENT
Start: 2017-02-21 | End: 2017-02-21 | Stop reason: HOSPADM

## 2017-02-21 RX ORDER — FENTANYL CITRATE 50 UG/ML
INJECTION, SOLUTION INTRAMUSCULAR; INTRAVENOUS PRN
Status: DISCONTINUED | OUTPATIENT
Start: 2017-02-21 | End: 2017-02-21 | Stop reason: HOSPADM

## 2017-02-21 RX ORDER — NALOXONE HYDROCHLORIDE 0.4 MG/ML
.1-.4 INJECTION, SOLUTION INTRAMUSCULAR; INTRAVENOUS; SUBCUTANEOUS
Status: DISCONTINUED | OUTPATIENT
Start: 2017-02-21 | End: 2017-02-21 | Stop reason: HOSPADM

## 2017-02-21 RX ADMIN — MIDAZOLAM HYDROCHLORIDE 2 MG: 1 INJECTION, SOLUTION INTRAMUSCULAR; INTRAVENOUS at 15:01

## 2017-02-21 RX ADMIN — FENTANYL CITRATE 100 MCG: 50 INJECTION, SOLUTION INTRAMUSCULAR; INTRAVENOUS at 15:01

## 2017-02-21 RX ADMIN — MIDAZOLAM HYDROCHLORIDE 2 MG: 1 INJECTION, SOLUTION INTRAMUSCULAR; INTRAVENOUS at 15:03

## 2017-02-21 NOTE — H&P
[facility]    History and Physical  Colon and Rectal Surgery     Date of Admission:  2017      Assessment & Plan   Whitney Aceves is a 56 year old female who presents for colonoscopy.    Indication: family history of colon polyps  Plan for Colonoscopy with possible biopsy, possible polypectomy. We discussed the risks, benefits and alternatives and the patient wished to proceed.    The above has been forwarded to the consulting provider.      Deandre Arrieta MD  [facility], [facility]  [phone]        Code Status   Full Code    Primary Care Physician   Lisbeth Gonzalez      History is obtained from the patient    History of Present Illness   Whitney Aceves is a 56 year old female who presents with a family history of colon polyps    Past Medical History    I have reviewed this patient's medical history and updated it with pertinent information if needed.   Past Medical History   Diagnosis Date     Anxiety      on effexor in past. Off since      Edema      Hormone replacement therapy      IBS (irritable bowel syndrome)      Diarrhea predominant     Insomnia, unspecified      On Ambien     Lichen sclerosus      Pap smear of cervix not needed      Urge incontinence        Past Surgical History   I have reviewed this patient's surgical history and updated it with pertinent information if needed.  Past Surgical History   Procedure Laterality Date     Colonoscopy  2012     Procedure:COLONOSCOPY; Colonoscopy ; Surgeon:DEANDRE ARRIETA; Location:[facility]      section       Fissurectomy rectum  01     Colposcopy, biopsy, combined  1991     Mild squamous epithelial dysplasia (SABIHA 1)     Hysterectomy total abdominal  3/4/1998     Myomatous uterus; pelvic pain. Started HRT right afterward with vasomotor sx.     Liposuction (location)       Hysterectomy, pap no longer indicated N/A      had for large fibroid       Prior to Admission Medications    Prior to Admission Medications   Prescriptions Last Dose Informant Patient Reported? Taking?   HYDROcodone-acetaminophen (NORCO) 5-325 MG per tablet   No No   Sig: Take 1-2 tablets by mouth every 4 hours as needed for moderate to severe pain maximum 3 tablet(s) per day   azithromycin (ZITHROMAX) 250 MG tablet   No No   Si tabs first day, then 1 tab by mouth daily for 4 additional days   carbamide peroxide (DEBROX) 6.5 % otic solution   No No   Sig: Place 5-10 drops into both ears 2 times daily for 3 days   cetirizine (ZYRTEC) 10 MG tablet prn  No No   Sig: Take 1 tablet (10 mg) by mouth every evening   clobetasol propionate (OLUX) 0.05 % FOAM   No No   Sig: Apply to outer part of ear and scalp 1-2x/day for up to 2 weeks at a time   estradiol (VIVELLE-DOT) 0.1 MG/24HR patch 2017  No Yes   Sig: Place 1 patch onto the skin twice a week   fluticasone (FLONASE) 50 MCG/ACT nasal spray prn  No No   Sig: Spray 1-2 sprays into both nostrils daily   ibuprofen (ADVIL/MOTRIN) 800 MG tablet   No No   Sig: TAKE 1 TABLET EVERY 8 HOURSAS NEEDED FOR MODERATE     PAIN (FOR BURSITIS)   predniSONE (DELTASONE) 20 MG tablet   No No   Sig: Take 1 tablet (20 mg) by mouth daily   zolpidem (AMBIEN) 10 MG tablet prn  No No   Sig: Take 0.5 tablets (5 mg) by mouth nightly as needed for sleep      Facility-Administered Medications: None     Allergies   Allergies   Allergen Reactions     Septra [Sulfamethoxazole W/Trimethoprim]      Sulfa Drugs Hives     Murine Ear [Carbamide Peroxide] Rash       Social History   I have reviewed this patient's social history and updated it with pertinent information if needed. Whitney CARRANZA Ketan  reports that she has never smoked. She has never used smokeless tobacco. She reports that she does not drink alcohol or use illicit drugs.    Family History   I have reviewed this patient's family history and updated it with pertinent information if needed.   Family History   Problem Relation Age of Onset      Hypertension Mother      Hyperlipidemia Mother      C.A.D. Father 45       age 45 massive MI      Heart Failure Father      C.A.D. Maternal Grandmother      CATERINAAEVANGELIST. Maternal Grandfather      CATERINAAEVANGELIST. Paternal Grandmother      C.AEVNAGELIST. Paternal Grandfather      CANCER Other      9 members on maternal side. Bone CA, Breast CA (m. aunt),       Prostate Cancer Brother      age 37     C.A.D. Sister 57     MI age 50- CABG     Breast Cancer Maternal Aunt      50s-Mom had 9 sisters. MAunt->50's       Review of Systems   C: NEGATIVE for fever, chills, change in weight  E/M: NEGATIVE for ear, mouth and throat problems  R: NEGATIVE for significant cough or SOB  CV: NEGATIVE for chest pain, palpitations or peripheral edema    Physical Exam       BP: 122/87   Heart Rate: 67 Resp: 16 SpO2: 97 % O2 Device: None (Room air)    Vital Signs with Ranges  Heart Rate:  [67] 67  Resp:  [16] 16  BP: (122)/(87) 122/87  SpO2:  [97 %] 97 %  0 lbs 0 oz    Constitutional: awake, alert, cooperative, no apparent distress, and appears stated age  AIRWAY EXAM: Mallampatti Class I (visualization of the soft palate, fauces, uvula, anterior and posterior pillars)  Respiratory: No increased work of breathing, good air exchange, clear to auscultation bilaterally, no crackles or wheezing  Cardiovascular: Normal apical impulse, regular rate and rhythm, normal S1 and S2, no S3 or S4, and no murmur noted  ASA Class: 2 - Mild systemic disease

## 2017-02-21 NOTE — BRIEF OP NOTE
[facility] Brief Operative Note    Pre-operative diagnosis: FAMILY HISTORY OF COLON POLYPS   Post-operative diagnosis normal colonoscopy   Procedure: Procedure(s):  COLONOSCOPY - Wound Class: II-Clean Contaminated   Surgeon(s): Surgeon(s) and Role:     * Silverio Arrieta MD - Primary   Estimated blood loss: * No values recorded between 2/21/2017 12:00 AM and 2/21/2017  3:18 PM *    Specimens: * No specimens in log *   Findings: normal colonoscopy  See provation procedure note in chart review.    Silverio Arrieta MD  [facility]  [phone]

## 2017-03-01 ENCOUNTER — OFFICE VISIT (OUTPATIENT)
Dept: ORTHOPEDICS | Facility: CLINIC | Age: 57
End: 2017-03-01
Payer: COMMERCIAL

## 2017-03-01 ENCOUNTER — RADIANT APPOINTMENT (OUTPATIENT)
Dept: GENERAL RADIOLOGY | Facility: CLINIC | Age: 57
End: 2017-03-01
Attending: ORTHOPAEDIC SURGERY
Payer: COMMERCIAL

## 2017-03-01 VITALS
WEIGHT: 164 LBS | BODY MASS INDEX: 28 KG/M2 | DIASTOLIC BLOOD PRESSURE: 60 MMHG | SYSTOLIC BLOOD PRESSURE: 102 MMHG | HEIGHT: 64 IN

## 2017-03-01 DIAGNOSIS — M79.641 RIGHT HAND PAIN: ICD-10-CM

## 2017-03-01 DIAGNOSIS — M25.561 CHRONIC PAIN OF RIGHT KNEE: ICD-10-CM

## 2017-03-01 DIAGNOSIS — G89.29 CHRONIC PAIN OF RIGHT KNEE: ICD-10-CM

## 2017-03-01 DIAGNOSIS — G89.29 CHRONIC PAIN OF RIGHT KNEE: Primary | ICD-10-CM

## 2017-03-01 DIAGNOSIS — M25.561 CHRONIC PAIN OF RIGHT KNEE: Primary | ICD-10-CM

## 2017-03-01 PROCEDURE — 73562 X-RAY EXAM OF KNEE 3: CPT | Mod: RT | Performed by: ORTHOPAEDIC SURGERY

## 2017-03-01 PROCEDURE — 73130 X-RAY EXAM OF HAND: CPT | Mod: RT | Performed by: ORTHOPAEDIC SURGERY

## 2017-03-01 PROCEDURE — 99204 OFFICE O/P NEW MOD 45 MIN: CPT | Performed by: ORTHOPAEDIC SURGERY

## 2017-03-01 NOTE — MR AVS SNAPSHOT
"              After Visit Summary   3/1/2017    Whitney Aceves    MRN: [id number]           Patient Information     Date Of Birth          1960        Visit Information        Provider Department      3/1/2017 8:00 AM Domenic Agustin MD [facility]        Today's Diagnoses     Chronic pain of right knee    -  1      Care Instructions    We discussed the problem of arthritis today. Arthritis means that the joint surfaces that were once smooth are getting rough. When the rough joint surfaces are loaded and gliding, you often have pain, swelling, catching/popping, and locking type of symptoms. It can be episodic or constant. Even though the process is slow developing and chronic in nature, the symptoms can come on rather suddenly sometimes triggered by an injury or at other times without any identifiable event.  Typically, even with arthritis, most people can  function quite well with a common sense management which include: activity modifications, OTC medications (e.g.. Acetaminophen and Ibuprofen or Naproxen), icing, stretching and muscle strengthening. In general, staying active helps because it will help maintaining joint flexibility and muscle strength although \"overdoing\" and doing repetitively loading activities could worsen the symptoms. If you carry extra weight, losing weight should be a part of management of arthritis. I recommend diet along with gentle aerobic exercises such as walking, elliptical, swimming and stationary biking. Activities like jumping, pivoting, squatting, lunging, stair climbing, and kneeling are better to be avoided.  You can also try over- the- counter braces (especially for the knee arthritis) but since the problem is an internal issue, it is not always helpful.  Formal PT is not always necessary but can be helpful if you are not sure about what exercises to do. A discussion with a dietician can be very helpful if you decided to include weight loss as a " "part of the treatment.  If these measures are not effective, we often resort to the injection treatment, either cortisone or viscous joint supplement. It has been shown that viscous joint injections are not very effective if arthritis is advanced and at this point is approved only for knee arthritis. The potential benefit from injections are not permanent and for that reason they can be repeated at a reasonable frequency. The duration of benefit is impossible to predict. Sometimes, it does not provide any noticeable benefit at all.  As you can imagine, surgical intervention is not the first line of management. It is important to remember that even with surgery one cannot cure arthritis unless joint replacement is considered. As was the case with the injection treatment, the response from arthroscopic surgeries  is unpredictable since we cannot make the joint surfaces back to perfectly smooth. It would be an option for someone who has tried all appropriate non-operative treatment modalities and is not quite ready for replacement type of permanent procedure. Arthroscopic procedures are more applicable to the knees, the shoulders and the ankles as opposed to the hips and fingers.          The diagnosis of \"patella-femoral pain syndrome\" or \"patella-femoral dysfunction\" is a broad inclusive condition that describes pain localized in  the front aspect of a knee. These terms are often interchanged with \"patella chondromalacia\" although chondromalacia specifically refers to softening of  joint surface cartilage. Often, pain is caused by chondromalacia or early arthritis, however, one can have pain even though there are no apparent damages that can be identified on the joint surfaces. In this case the pain is most likely due to mal-tracking of the patella on the femur. In this case, the pressure on the knee cap is not evenly distributed and some part of the patella gets overloaded and thus the pain. This problem is not " always easy to identify because mal-tracking is happening only when one is engaged in an activity such as jumping, running or pivoting. Static examination can be totally normal. There are many potential causes for the mal-tracking: prior injuries, knee mis-alignment, muscle imbalance/weakness, in-toeing gait, hyper-flexibility and shallow groove for the patella.    Pain is typically noted with kneeling, squatting, climbing, going up and down steps and long drives. At an early stage, pain is worse in the late afternoon or in the evening not while one is doing the activities. One can feel and hear cracking, popping and grinding with these activities.   Pain is very often felt in the back of the knee even though the pathology is in the front. This is because of muscle/tendon tightness/spasm resulting from protective compensation by hamstring structures.     Obesity is a big contributing factor to pain related to this issue. The stress under the patella is not in an one-to-one relationship. In other words, 10 Lbs extra weight is not 10 Lbs extra pressure in the patella-femoral joint. With regular activities, the extra pressure in the patella-femoral area is easily 20-40 Lbs. Thus, lies the importance of reducing weight if you carry extra weight.     As far as treatment goes, usual RIM (rest, ice, and medication) should be tried first. Patella sleeve is often helpful but not always. In general, gliding activities such as stationary biking and elliptical are better than the activities that put extra stress to the patella-femoral joint space. These include jumping, lunging, stair climbing, hiking, pivoting and kneeling etc.    In order to better control the movement of the patella on the femur, strong quadriceps muscles are very important. One can have an imbalance of the quad muscle: stronger outer quad (lateralis) compared to inner quad (medialis). In this situation, strengthening of the inner thigh muscle should be main  emphasis of the treatment. Working with a physical therapist is often utilized for this purpose.    If all appropriate non-surgical options did not result in satisfactory pain relief, surgery can be considered. Unfortunately, not all surgeries result in desired outcome. Depending on the type of operation, healing process is widely different: a couple of weeks to several months. One should remember surgery for this particular diagnosis should not be the first line of treatment armamentarium.        Follow-ups after your visit        Your next 10 appointments already scheduled     Mar 02, 2017  8:30 AM [facility]  )    [address]  [address]  [facility] [address]   [phone]              Who to contact     If you have questions or need follow up information about today's clinic visit or your schedule please contact [facility] SURG[facility] directly at [phone].  Normal or non-critical lab and imaging results will be communicated to you by EletrogÃƒÂ³eshart, letter or phone within 4 business days after the clinic has received the results. If you do not hear from us within 7 days, please contact the clinic through EletrogÃƒÂ³eshart or phone. If you have a critical or abnormal lab result, we will notify you by phone as soon as possible.  Submit refill requests through PoachIt or call your pharmacy and they will forward the refill request to us. Please allow 3 business days for your refill to be completed.          Additional Information About Your Visit        EletrogÃƒÂ³esharReferBright Information     PoachIt gives you secure access to your electronic health record. If you see a primary care provider, you can also send messages to your care team and make appointments. If you have questions, please call your primary care clinic.  If you do not have a primary care provider, please call [phone] and they will assist you.        Care EveryWhere ID     This is your Care  "EveryWhere ID. This could be used by other organizations to access your [facility] medical records  [id number]        Your Vitals Were     Height BMI (Body Mass Index)                5' 4\" (1.626 m) 28.15 kg/m2           Blood Pressure from Last 3 Encounters:   03/01/17 102/60   02/21/17 114/80   02/18/17 132/76    Weight from Last 3 Encounters:   03/01/17 164 lb (74.4 kg)   02/18/17 164 lb (74.4 kg)   02/17/17 164 lb (74.4 kg)               Primary Care Provider Office Phone # Fax #    Lisbeth Gonzalez [phone]       [address] [address]        Thank you!     Thank you for choosing [facility]  for your care. Our goal is always to provide you with excellent care. Hearing back from our patients is one way we can continue to improve our services. Please take a few minutes to complete the written survey that you may receive in the mail after your visit with us. Thank you!             Your Updated Medication List - Protect others around you: Learn how to safely use, store and throw away your medicines at www.disposemymeds.org.          This list is accurate as of: 3/1/17  8:40 AM.  Always use your most recent med list.                   Brand Name Dispense Instructions for use    azithromycin 250 MG tablet    ZITHROMAX    6 tablet    2 tabs first day, then 1 tab by mouth daily for 4 additional days       cetirizine 10 MG tablet    zyrTEC    30 tablet    Take 1 tablet (10 mg) by mouth every evening       clobetasol propionate 0.05 % Foam    OLUX    50 g    Apply to outer part of ear and scalp 1-2x/day for up to 2 weeks at a time       estradiol 0.1 MG/24HR BIW patch    VIVELLE-DOT    24 patch    Place 1 patch onto the skin twice a week       fluticasone 50 MCG/ACT spray    FLONASE    1 Bottle    Spray 1-2 sprays into both nostrils daily       HYDROcodone-acetaminophen 5-325 MG per tablet    NORCO    20 tablet    Take 1-2 " tablets by mouth every 4 hours as needed for moderate to severe pain maximum 3 tablet(s) per day       ibuprofen 800 MG tablet    ADVIL/MOTRIN    90 tablet    TAKE 1 TABLET EVERY 8 HOURSAS NEEDED FOR MODERATE     PAIN (FOR BURSITIS)       zolpidem 10 MG tablet    AMBIEN    30 tablet    Take 0.5 tablets (5 mg) by mouth nightly as needed for sleep

## 2017-03-01 NOTE — PROGRESS NOTES
"HISTORY OF PRESENT ILLNESS:    Whitney Aceves is a 56 year old female who is seen in consultation at the request of Dr. Gonzalezfor right knee pain.    Present symptoms: Pt states pain has been present for a couple years, pain started with an injury to the knee, states she tore \"something\". Subsequently, she had MRI done at the time. Degenerative medial meniscus changes were noted.  Pt states pain is over the lateral aspect of the knee, dull pain, tender to touch.  Pt states at times knee feels disconnected.  She denies swelling, loose body sensation, locking or radiculopathy symptoms  Even though she denied having heard any crepitus, it turned out that she does have crepitus at the patellofemoral joint.  She has been waking up at night occasionally. She feels unsteady when she goes up and down the steps, especially when she carries weights.    As a separate problem, she complains of inability of squeezing things with the right hand.  She denies any preceding trauma. She localizes the pain at the base of the thumb.  No specific paresthesia symptoms in the hands.  She is right-hand dominant.    Treatments tried to this point: MRI ()  Orthopedic PMH: no ortho surgeries     Past Medical History   Diagnosis Date     Anxiety      on effexor in past. Off since      Edema      Hormone replacement therapy      IBS (irritable bowel syndrome)      Diarrhea predominant     Insomnia, unspecified      On Ambien     Lichen sclerosus      Pap smear of cervix not needed      Urge incontinence        Past Surgical History   Procedure Laterality Date     Colonoscopy  2012     Procedure:COLONOSCOPY; Colonoscopy ; Surgeon:DEANDRE SHORT; Location:[facility]      section       Fissurectomy rectum  01     Colposcopy, biopsy, combined  1991     Mild squamous epithelial dysplasia (SABIHA 1)     Hysterectomy total abdominal  3/4/1998     Myomatous uterus; pelvic pain. Started HRT right afterward with " vasomotor sx.     Liposuction (location)       Hysterectomy, pap no longer indicated N/A      had for large fibroid     Colonoscopy N/A 2017     Procedure: COLONOSCOPY;  Surgeon: Silverio Arrieta MD;  Location:  GI       Family History   Problem Relation Age of Onset     Hypertension Mother      Hyperlipidemia Mother      DIABETES Mother      C.A.D. Father 45       age 45 massive MI      Heart Failure Father      C.A.D. Maternal Grandmother      C.A.D. Maternal Grandfather      C.A.D. Paternal Grandmother      C.A.D. Paternal Grandfather      CANCER Other      9 members on maternal side. Bone CA, Breast CA (m. aunt),       Prostate Cancer Brother      age 37     C.A.D. Sister 57     MI age 50- CABG     DIABETES Sister      Breast Cancer Maternal Aunt      50s-Mom had 9 sisters. 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 No     Social History Narrative       Current Outpatient Prescriptions   Medication Sig Dispense Refill     clobetasol propionate (OLUX) 0.05 % FOAM Apply to outer part of ear and scalp 1-2x/day for up to 2 weeks at a time 50 g 1     estradiol (VIVELLE-DOT) 0.1 MG/24HR patch Place 1 patch onto the skin twice a week 24 patch 3     HYDROcodone-acetaminophen (NORCO) 5-325 MG per tablet Take 1-2 tablets by mouth every 4 hours as needed for moderate to severe pain maximum 3 tablet(s) per day (Patient not taking: Reported on  "3/1/2017) 20 tablet 0     zolpidem (AMBIEN) 10 MG tablet Take 0.5 tablets (5 mg) by mouth nightly as needed for sleep (Patient not taking: Reported on 3/1/2017) 30 tablet 0     azithromycin (ZITHROMAX) 250 MG tablet 2 tabs first day, then 1 tab by mouth daily for 4 additional days (Patient not taking: Reported on 3/1/2017) 6 tablet 2     ibuprofen (ADVIL/MOTRIN) 800 MG tablet TAKE 1 TABLET EVERY 8 HOURSAS NEEDED FOR MODERATE     PAIN (FOR BURSITIS) (Patient not taking: Reported on 3/1/2017) 90 tablet 3     fluticasone (FLONASE) 50 MCG/ACT nasal spray Spray 1-2 sprays into both nostrils daily (Patient not taking: Reported on 3/1/2017) 1 Bottle 11     cetirizine (ZYRTEC) 10 MG tablet Take 1 tablet (10 mg) by mouth every evening (Patient not taking: Reported on 3/1/2017) 30 tablet 1       Allergies   Allergen Reactions     Septra [Sulfamethoxazole W/Trimethoprim]      Sulfa Drugs Hives     Murine Ear [Carbamide Peroxide] Rash       REVIEW OF SYSTEMS:  CONSTITUTIONAL:  NEGATIVE for fever, chills, change in weight  INTEGUMENTARY/SKIN:  NEGATIVE for worrisome rashes, moles or lesions  EYES:  NEGATIVE for vision changes or irritation  ENT/MOUTH:  NEGATIVE for ear, mouth and throat problems  RESP:  NEGATIVE for significant cough or SOB  BREAST:  NEGATIVE for masses, tenderness or discharge  CV:  NEGATIVE for chest pain, palpitations or peripheral edema  GI:  NEGATIVE for nausea, abdominal pain, heartburn, or change in bowel habits  :  Negative   MUSCULOSKELETAL:  See HPI above  NEURO:  Paresthesias - knee  ENDOCRINE:  NEGATIVE for temperature intolerance, skin/hair changes  HEME/ALLERGY/IMMUNE:  NEGATIVE for bleeding problems  PSYCHIATRIC:  NEGATIVE for changes in mood or affect      PHYSICAL EXAM:  /60 (BP Location: Right arm, Patient Position: Chair, Cuff Size: Adult Regular)  Ht 5' 4\" (1.626 m)  Wt 164 lb (74.4 kg)  BMI 28.15 kg/m2  Body mass index is 28.15 kg/(m^2).   GENERAL APPEARANCE: healthy, alert and no " distress   HEENT: No apparent thyroid megaly. Clear sclera with normal ocular movement  RESPIRATORY: No labored breathing  SKIN: no suspicious lesions or rashes  NEURO: Normal strength and tone, mentation intact and speech normal  VASCULAR: Good pulses, and capillary refill   LYMPH: no lymphadenopathy   PSYCH:  mentation appears normal and affect normal/bright    MUSCULOSKELETAL:  Normal gait  No significant difficulty of getting up from sitting  No effusion bilaterally  Full range of motion in the knees bilaterally  Minimal crepitus at the patellofemoral joint, left knee  Moderate crepitus on the right patellofemoral joint  Ligaments are stable throughout bilaterally   No specific medial or lateral joint line tenderness bilaterally  She has tenderness along the lateral hamstring structure of the right knee  Significant pain with patellofemoral compression    Hand examination shows slight prominence at the CMC joint of both thumbs, more noticeably on the right  Significant pain with a circumduction maneuver, hence, positive grinding test at the CMC joint of the right thumb in particular  Other digit movement is full  Interlocking with the finger movement is noted  No focal tenderness at the A1 pulleys of all digits  Gross sensations intact    ASSESSMENT:  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 We also visualized the MRI scan from November 12, 2014. She does have chondromalacia/irregularity of the lateral patella facet which corresponds to the sclerosis of the lateral facet of the patella noted on today's x-rays.  The nature of chondromalacia/DJD was thoroughly explained. Options were discussed.  No immediate invasive intervention was felt to be needed.  Quadriceps strengthening and exercise biking are  recommended.  Over-the-counter medications would be reasonable.  Future options of cortisone injection and the arthroscopy were also explained.    As far as the hand problem is concerned, she is scheduled to see a hand therapist tomorrow.  Intermittent bracing and exercises would be most appropriate at this point.  We also talked about possible cortisone injections in the future and as a last resort CMC joint arthroplasty of the thumb.    All the questions were answered.  Follow-up as needed.      Imaging Interpretation:     Three views of the right knee are essentially negative other than sclerosis of the lateral facet of the patella. Patellofemoral tracking was noted with normal limits. No fractures or other osseous pathology noted.    Three views of the right hand demonstrate mild degenerative changes at the carpometacarpal joint of the thumb along with mild subluxation at that joint and mild degenerative changes of the metacarpal phalangeal joint of the thumb. Otherwise, no fractures or other osseous pathology noted.    Domenic Agustin MD  Department of Orthopedic Surgery        Disclaimer: This note consists of symbols derived from keyboarding, dictation and/or voice recognition software. As a result, there may be errors in the script that have gone undetected. Please consider this when interpreting information found in this chart.

## 2017-03-01 NOTE — Clinical Note
Thank you very much for the opportunity of evaluating  your patient. If you have any questions about the visit,  please do not hesitate to call me at [phone] or page me at [phone]. Asa

## 2017-03-01 NOTE — NURSING NOTE
"Chief Complaint   Patient presents with     Knee Pain     Right knee pain       Initial /60 (BP Location: Right arm, Patient Position: Chair, Cuff Size: Adult Regular)  Ht 5' 4\" (1.626 m)  Wt 164 lb (74.4 kg)  BMI 28.15 kg/m2 Estimated body mass index is 28.15 kg/(m^2) as calculated from the following:    Height as of this encounter: 5' 4\" (1.626 m).    Weight as of this encounter: 164 lb (74.4 kg).  Medication Reconciliation: complete    "

## 2017-03-01 NOTE — PATIENT INSTRUCTIONS
"We discussed the problem of arthritis today. Arthritis means that the joint surfaces that were once smooth are getting rough. When the rough joint surfaces are loaded and gliding, you often have pain, swelling, catching/popping, and locking type of symptoms. It can be episodic or constant. Even though the process is slow developing and chronic in nature, the symptoms can come on rather suddenly sometimes triggered by an injury or at other times without any identifiable event.  Typically, even with arthritis, most people can  function quite well with a common sense management which include: activity modifications, OTC medications (e.g.. Acetaminophen and Ibuprofen or Naproxen), icing, stretching and muscle strengthening. In general, staying active helps because it will help maintaining joint flexibility and muscle strength although \"overdoing\" and doing repetitively loading activities could worsen the symptoms. If you carry extra weight, losing weight should be a part of management of arthritis. I recommend diet along with gentle aerobic exercises such as walking, elliptical, swimming and stationary biking. Activities like jumping, pivoting, squatting, lunging, stair climbing, and kneeling are better to be avoided.  You can also try over- the- counter braces (especially for the knee arthritis) but since the problem is an internal issue, it is not always helpful.  Formal PT is not always necessary but can be helpful if you are not sure about what exercises to do. A discussion with a dietician can be very helpful if you decided to include weight loss as a part of the treatment.  If these measures are not effective, we often resort to the injection treatment, either cortisone or viscous joint supplement. It has been shown that viscous joint injections are not very effective if arthritis is advanced and at this point is approved only for knee arthritis. The potential benefit from injections are not permanent and for that " "reason they can be repeated at a reasonable frequency. The duration of benefit is impossible to predict. Sometimes, it does not provide any noticeable benefit at all.  As you can imagine, surgical intervention is not the first line of management. It is important to remember that even with surgery one cannot cure arthritis unless joint replacement is considered. As was the case with the injection treatment, the response from arthroscopic surgeries  is unpredictable since we cannot make the joint surfaces back to perfectly smooth. It would be an option for someone who has tried all appropriate non-operative treatment modalities and is not quite ready for replacement type of permanent procedure. Arthroscopic procedures are more applicable to the knees, the shoulders and the ankles as opposed to the hips and fingers.          The diagnosis of \"patella-femoral pain syndrome\" or \"patella-femoral dysfunction\" is a broad inclusive condition that describes pain localized in  the front aspect of a knee. These terms are often interchanged with \"patella chondromalacia\" although chondromalacia specifically refers to softening of  joint surface cartilage. Often, pain is caused by chondromalacia or early arthritis, however, one can have pain even though there are no apparent damages that can be identified on the joint surfaces. In this case the pain is most likely due to mal-tracking of the patella on the femur. In this case, the pressure on the knee cap is not evenly distributed and some part of the patella gets overloaded and thus the pain. This problem is not always easy to identify because mal-tracking is happening only when one is engaged in an activity such as jumping, running or pivoting. Static examination can be totally normal. There are many potential causes for the mal-tracking: prior injuries, knee mis-alignment, muscle imbalance/weakness, in-toeing gait, hyper-flexibility and shallow groove for the patella.    Pain is " typically noted with kneeling, squatting, climbing, going up and down steps and long drives. At an early stage, pain is worse in the late afternoon or in the evening not while one is doing the activities. One can feel and hear cracking, popping and grinding with these activities.   Pain is very often felt in the back of the knee even though the pathology is in the front. This is because of muscle/tendon tightness/spasm resulting from protective compensation by hamstring structures.     Obesity is a big contributing factor to pain related to this issue. The stress under the patella is not in an one-to-one relationship. In other words, 10 Lbs extra weight is not 10 Lbs extra pressure in the patella-femoral joint. With regular activities, the extra pressure in the patella-femoral area is easily 20-40 Lbs. Thus, lies the importance of reducing weight if you carry extra weight.     As far as treatment goes, usual RIM (rest, ice, and medication) should be tried first. Patella sleeve is often helpful but not always. In general, gliding activities such as stationary biking and elliptical are better than the activities that put extra stress to the patella-femoral joint space. These include jumping, lunging, stair climbing, hiking, pivoting and kneeling etc.    In order to better control the movement of the patella on the femur, strong quadriceps muscles are very important. One can have an imbalance of the quad muscle: stronger outer quad (lateralis) compared to inner quad (medialis). In this situation, strengthening of the inner thigh muscle should be main emphasis of the treatment. Working with a physical therapist is often utilized for this purpose.    If all appropriate non-surgical options did not result in satisfactory pain relief, surgery can be considered. Unfortunately, not all surgeries result in desired outcome. Depending on the type of operation, healing process is widely different: a couple of weeks to several  months. One should remember surgery for this particular diagnosis should not be the first line of treatment armamentarium.

## 2017-03-02 ENCOUNTER — THERAPY VISIT (OUTPATIENT)
Dept: OCCUPATIONAL THERAPY | Facility: CLINIC | Age: 57
End: 2017-03-02
Payer: COMMERCIAL

## 2017-03-02 DIAGNOSIS — M79.644 PAIN OF RIGHT THUMB: Primary | ICD-10-CM

## 2017-03-02 PROBLEM — M19.049 CMC DJD(CARPOMETACARPAL DEGENERATIVE JOINT DISEASE), LOCALIZED PRIMARY: Status: ACTIVE | Noted: 2017-03-02

## 2017-03-02 PROBLEM — M19.049 CMC DJD(CARPOMETACARPAL DEGENERATIVE JOINT DISEASE), LOCALIZED PRIMARY: Status: RESOLVED | Noted: 2017-03-02 | Resolved: 2017-03-02

## 2017-03-02 PROCEDURE — 97165 OT EVAL LOW COMPLEX 30 MIN: CPT | Mod: GO | Performed by: OCCUPATIONAL THERAPIST

## 2017-03-02 PROCEDURE — 97110 THERAPEUTIC EXERCISES: CPT | Mod: GO | Performed by: OCCUPATIONAL THERAPIST

## 2017-03-02 PROCEDURE — 29130 APPL FINGER SPLINT STATIC: CPT | Mod: GO | Performed by: OCCUPATIONAL THERAPIST

## 2017-03-02 NOTE — PROGRESS NOTES
Hand Therapy Initial Evaluation  Current Date:  3/2/2017    Subjective:  Whitney Aceves is a 56 year old R hand dominant female.    Diagnosis: R thumb pain  DOI:  A year ago    Patient reports symptoms of pain, stiffness/loss of motion, weakness/loss of strength, numbness and tingling  of the R thumb which occurred due to Overuse. Since onset symptoms are Gradually getting worse. Special tests:  X-ray: mild degenerative changes at the carpometacarpal joint of the thumb along with mild subluxation at that joint and mild degenerative changes of the metacarpal phalangeal joint of the thumb.  Previous treatment: OTC brace. General health as reported by patient is good.  Pertinent medical history includes: none.  Medical allergies: Septra.  Surgical history: none.  Medication history: none, hormone replacement.  Current occupation is  for early [employer].   Currently working in normal job without restrictions  Job Tasks: lifting/carrying, computer work  Barriers include:none  Prior functional level:  no limitations  Red flags:  none    Additional Occupational Profile Information (patterns of daily living, interests, values and needs): [employer] to tolerate pain, but lifting bags, traveling, lots of typing.     Upper Extremity Functional Index Score:  SCORE:   Column Totals: /80: 48   (A lower score indicates greater disability.)    Objective:  Pain Level Report: On scale 0-10/10  Date 3/2/2017    side R    Overall 6    At Rest 4    With Activity 10      Primary Report: location and description  Date 3/2/2017    Side R    Location Throughout thumb MP, CMC, and wrist    Radiation Present through thenars into wrist    Pain Quality Sharp    Frequency Intermittant    Duration Dependant on activity    Exacerbated by  Lifting, gripping, twisting, pinching, pulling    Relieved by Rest    Progression since onset Gradually getting worse        Tenderness:  Date 3/2/2017   R CMC of the Thumb +   L CMC of  the Thumb NT     Appearance:  Date 3/2/2017   Side R   Shoulder deformity is present over the CMC -   Volar subluxation present -   Edema over the CMC joint -   Noted collapse of MP into hyperextension during pinch +     Thumb Range of Motion:  AROM (PROM)  Date 3/2/2017 3/2/2017   Side: R L   MCP ext -15 0   MCP flex 60 60   IP ext + +   IP flex 65 65   RABD 40 47   PABD 35 43     Special Tests:  Date 3/2/2017   Side R   Grind -   Passive Retroposition -     STRENGTH: (Measured in pounds, pain scale 0-10/10)    Date 3/2/2017        Trials Left Right Left Right Left Right Left Right Left Right Left Right   1 45 15             2               3               Avg               Pain  (stopped before pain)               3 Point Pinch  Date 3/2/2017        Trials Left Right Left Right Left Right Left Right Left Right Left Right   1 14 4             2               3               Avg               Pain  10               Lateral Pinch  Date 3/2/2017        Trials Left Right Left Right Left Right Left Right Left Right Left Right   1 16 7             2               3               Avg               Pain  10               Assessment:  Patient presents with symptoms consistent with diagnosis of R hand pain, with conservative intervention.    Patient s limitations or Problem List includes: Pain, Decreased ROM/motion, weakness, decreased stability of the CMC joint, decreased  and pinch strength of the thumb which interferes with patients ability to perform  Self Care Tasks (dressing, eating), Work Tasks, Recreational Activities and Household Chores  as compared to previous level of function.    Rehab Potential: Good- Return to full activity, some limitations.    Patient will benefit from skilled Occupational Therapy to increase ROM, flexibility, pinch strength, and stability of the thumb and decrease pain to return to previous activity level and resume normal daily tasks and to reach their rehab potential.    Barriers  to Learning:  No barrier    Communication Issues: Patient appears to be able to clearly communicate and understand verbal and written communication and follow directions correctly.    Assessment of Occupational Performance:  3-5 Performance Deficits  Identified Performance Deficits: dressing, meal preparation and cleanup, work and leisure activities      Clinical Decision Making (Complexity): Low complexity    Treatment Explanations:  The following has been discussed with the patient,  Rx ordered/plan of care  Anticipated outcomes  Possible risks and side effects    Treatment Plan:  Frequency:  1 X week, every other week, once daily  Duration:  for 8 weeks    Treatment Plan:  Therapeutic Exercise: AROM, Isometrics, and Stabilization exercises of the Thumb CMC, including  active and resisted abduction, 1st DI strengthening.  Manual Techniques: Joint Mobilization or reseating of the trapezium  Orthosis:  Hand based Thumb Spica, CMC support (allowing more thumb mobility)  Education: anatomy of CMC, joint protection principles, adaptive equipment as needed.    Home Program:  Exercise: Place and hold of the Stabilization Program 1-2 times a day, Adductor release in web space with self massage or chip clip.  Orthosis: Hand based Thumb Spica initially during the day progressing to at night  Activity: incorporate joint protection into daily functional activities, adaptive equipment as needed.    Discharge Plan:  Achieve all LTG  [place] in home treatment program.  Reach maximal therapeutic benefit.  Please refer to the daily flowsheet for treatment today and total treatment time.      Next Visit:  Review exercises  Check splint fit  MFR  Progress exercises if appropriate

## 2017-04-27 PROBLEM — M79.644 PAIN OF RIGHT THUMB: Status: RESOLVED | Noted: 2017-03-02 | Resolved: 2017-04-27

## 2017-04-27 NOTE — PROGRESS NOTES
Pt has not returned for therapy since 3/2/17.  Assume all goals are met to pt satisfaction.  D/C [facility]

## 2017-07-29 DIAGNOSIS — R23.2 HOT FLASHES: ICD-10-CM

## 2017-07-29 DIAGNOSIS — Z01.419 ENCOUNTER FOR GYNECOLOGICAL EXAMINATION WITHOUT ABNORMAL FINDING: ICD-10-CM

## 2017-07-31 RX ORDER — ESTRADIOL 0.1 MG/D
PATCH, EXTENDED RELEASE TRANSDERMAL
Qty: 8 PATCH | Refills: 0 | Status: SHIPPED | OUTPATIENT
Start: 2017-07-31 | End: 2017-08-08

## 2017-08-08 DIAGNOSIS — Z12.31 VISIT FOR SCREENING MAMMOGRAM: ICD-10-CM

## 2017-08-08 DIAGNOSIS — N95.1 SYMPTOMATIC MENOPAUSAL OR FEMALE CLIMACTERIC STATES: Primary | ICD-10-CM

## 2017-08-08 DIAGNOSIS — N60.12 FIBROCYSTIC BREAST CHANGES OF BOTH BREASTS: ICD-10-CM

## 2017-08-08 DIAGNOSIS — N60.11 FIBROCYSTIC BREAST CHANGES OF BOTH BREASTS: ICD-10-CM

## 2017-08-08 DIAGNOSIS — R23.2 HOT FLASHES: ICD-10-CM

## 2017-08-08 NOTE — TELEPHONE ENCOUNTER
VIVELLE-DOT 0.1 MG/24HR BIW patch      Last Written Prescription Date: 7/31/2017  Last Fill Quantity: 8 patch, # refills: 0  Last Office Visit with [facility], [facility] or [facility] prescribing provider: 2/18/2017       BP Readings from Last 3 Encounters:   03/01/17 102/60   02/21/17 114/80   02/18/17 132/76     Date of last Breast Exam: 6/13/2016

## 2017-08-08 NOTE — LETTER
[facility]  [address], [place] [address] [phone]    August 18, 2017    Whitney Aceves  [address] [facility] [id number]      To Whom it May Concern:    My staff have been attempting to reach you in regards to a recent refill request for: VIVELLE-DOT 0.1 MG/24HR BIW patch        Please contact my office at [phone]    Thank you for your time             Sincerely,       Lisbeth Gonzalez M.D./RUSTY, RN

## 2017-08-08 NOTE — TELEPHONE ENCOUNTER
Reason for Call:  Vivelle Dot refill. Pt requests, NAME BRAND ONLY. Whitney is transitioning between her OB/GYN doctors to having a PCP doing her physicals. She needs a new Rx for the Vivelle Dot as the one from her OB/GYN has . Whitney saw Dr Gonzalez for a physical 2017. At that time she still had refills left on old Rx. She will now have Dr Gonzalez refill this Rx.    Best phone number to reach pt at is: [phone]  Ok to leave a message with medical info? yes    Pharmacy preferred (if calling for a refill): [facility] - updated    Jenny Roman  Patient Representative

## 2017-08-08 NOTE — TELEPHONE ENCOUNTER
Md SAEID never filled this medication     Please review and advise on refill     Thank you     Marisabel Farmer RN, BSN  [place] [facility]

## 2017-08-09 PROBLEM — N60.11 FIBROCYSTIC BREAST CHANGES OF BOTH BREASTS: Status: ACTIVE | Noted: 2017-08-09

## 2017-08-09 PROBLEM — N60.12 FIBROCYSTIC BREAST CHANGES OF BOTH BREASTS: Status: ACTIVE | Noted: 2017-08-09

## 2017-08-09 PROBLEM — N95.1 SYMPTOMATIC MENOPAUSAL OR FEMALE CLIMACTERIC STATES: Status: ACTIVE | Noted: 2017-08-09

## 2017-08-09 RX ORDER — ESTRADIOL 0.1 MG/D
PATCH, EXTENDED RELEASE TRANSDERMAL
Qty: 8 PATCH | Refills: 0 | Status: SHIPPED | OUTPATIENT
Start: 2017-08-28 | End: 2017-08-28

## 2017-08-09 NOTE — TELEPHONE ENCOUNTER
Attempt #1.  Isabella Dean contacted Whitney on 08/09/17 and left a message. If patient calls back please schedule appointment as soon as possible and contact RN team.  Sherry Dean RN

## 2017-08-09 NOTE — TELEPHONE ENCOUNTER
"Per my notes from2/2017 px with me  - \"Pelvic and breast exam done with Dr. Goodwin in 6/2016.\"  I did not do a pelvic or breast exam at pt was going to have this done with Dr. Goodwin ob/gyn 6/2017.   Dr. Goodwin office refilled pt's vivelle-dot on 7/31/2017:   VIVELLE-DOT 0.1 MG/24HR BIW patch 8 patch 0 7/31/2017  Yes      Sig: PLACE 1 PATCH ONTO THE SKIN TWICE A WEEK     Class: E-Prescribe     Notes to Pharmacy: Overdue for annual exam. No further refills until seen.     Order: [id number]     E-Prescribing Status: Receipt confirmed by pharmacy (7/31/2017  9:18 AM CDT)         8 additional patches should get pt through to be seen by me.   Ok for additional 8 patches until can get in to see me end of 9/2017 - order placed to start 8/28/2017- - my next available appt for routine, non-urgent appts. . Pt also needs a mammogram with shandra (3-D Mammo). Ordered. Please have pt bring a CD of her previous images from [facility] with her to that appt for the mammo.     Please assist pt in making appt to be seen with me for routine gyn exam only. Doesn't need full other routine px as I saw her for this 2/2017.   No further refills without pt being seen and mammography completed secondary to hormone use in post-menopausal woman.   "

## 2017-08-15 NOTE — TELEPHONE ENCOUNTER
Second attempt - Left non-detailed message for patient to call back.  (see message below)    Bijal Holguin

## 2017-08-18 ENCOUNTER — TELEPHONE (OUTPATIENT)
Dept: FAMILY MEDICINE | Facility: CLINIC | Age: 57
End: 2017-08-18

## 2017-08-18 DIAGNOSIS — E78.2 MIXED HYPERLIPIDEMIA: Primary | ICD-10-CM

## 2017-08-18 NOTE — TELEPHONE ENCOUNTER
Attempt #3  Called   Telephone Information:   Mobile [phone]   Left a nondetailed message    Letter Sent    Encounter Closed    Ethel Winston RN  [place] [facility]

## 2017-08-18 NOTE — TELEPHONE ENCOUNTER
Reason for Call:  Other returning call    Detailed comments: Patient returning call. She says she was just seen in February by Dr. Gonzalez and wants to know why she needs to be seen again. Please advise.    Phone Number Patient can be reached at: Cell number on file:    Telephone Information:   Mobile [phone]     Best Time: Anytime    Can we leave a detailed message on this number? YES    Call taken on 8/18/2017 at 12:28 PM by Sofi Templeton

## 2017-08-18 NOTE — TELEPHONE ENCOUNTER
"Per 02/17/2017 Lab results - \"Rechecking your fasting cholesterol panel in 6 months is recommended (Lipid w/ LDL reflex, DX: hyperlipidemia)\"  Order placed    Called # below - informed    Ethel Winston RN  [place] [facility]      "

## 2017-08-18 NOTE — TELEPHONE ENCOUNTER
Patient returning call - informed of MD SAEID's message below.     Routing GYN Exam Only scheduled for 09/29/2017.     Ethel Winston RN  [place] [facility]

## 2017-08-19 ENCOUNTER — HEALTH MAINTENANCE LETTER (OUTPATIENT)
Age: 57
End: 2017-08-19

## 2017-08-28 ENCOUNTER — HOSPITAL ENCOUNTER (OUTPATIENT)
Dept: MAMMOGRAPHY | Facility: CLINIC | Age: 57
Discharge: HOME | End: 2017-08-28
Attending: OBSTETRICS & GYNECOLOGY | Admitting: OBSTETRICS & GYNECOLOGY
Payer: COMMERCIAL

## 2017-08-28 ENCOUNTER — OFFICE VISIT (OUTPATIENT)
Dept: OBGYN | Facility: CLINIC | Age: 57
End: 2017-08-28
Attending: OBSTETRICS & GYNECOLOGY
Payer: COMMERCIAL

## 2017-08-28 VITALS
WEIGHT: 163 LBS | SYSTOLIC BLOOD PRESSURE: 118 MMHG | HEIGHT: 64 IN | BODY MASS INDEX: 27.83 KG/M2 | DIASTOLIC BLOOD PRESSURE: 70 MMHG

## 2017-08-28 DIAGNOSIS — Z13.220 ENCOUNTER FOR LIPID SCREENING FOR CARDIOVASCULAR DISEASE: ICD-10-CM

## 2017-08-28 DIAGNOSIS — Z13.6 ENCOUNTER FOR LIPID SCREENING FOR CARDIOVASCULAR DISEASE: ICD-10-CM

## 2017-08-28 DIAGNOSIS — N95.1 SYMPTOMATIC MENOPAUSAL OR FEMALE CLIMACTERIC STATES: ICD-10-CM

## 2017-08-28 DIAGNOSIS — R23.2 HOT FLASHES: ICD-10-CM

## 2017-08-28 DIAGNOSIS — Z23 NEED FOR SHINGLES VACCINE: ICD-10-CM

## 2017-08-28 DIAGNOSIS — G47.00 INSOMNIA, UNSPECIFIED TYPE: ICD-10-CM

## 2017-08-28 DIAGNOSIS — Z01.419 ENCOUNTER FOR GYNECOLOGICAL EXAMINATION WITHOUT ABNORMAL FINDING: Primary | ICD-10-CM

## 2017-08-28 LAB
CHOLEST SERPL-MCNC: 247 MG/DL
HDLC SERPL-MCNC: 64 MG/DL
LDLC SERPL CALC-MCNC: 151 MG/DL
NONHDLC SERPL-MCNC: 183 MG/DL
TRIGL SERPL-MCNC: 162 MG/DL

## 2017-08-28 PROCEDURE — 90471 IMMUNIZATION ADMIN: CPT | Performed by: OBSTETRICS & GYNECOLOGY

## 2017-08-28 PROCEDURE — 99396 PREV VISIT EST AGE 40-64: CPT | Mod: 25 | Performed by: OBSTETRICS & GYNECOLOGY

## 2017-08-28 PROCEDURE — 36415 COLL VENOUS BLD VENIPUNCTURE: CPT | Performed by: OBSTETRICS & GYNECOLOGY

## 2017-08-28 PROCEDURE — 80061 LIPID PANEL: CPT | Performed by: OBSTETRICS & GYNECOLOGY

## 2017-08-28 PROCEDURE — G0202 SCR MAMMO BI INCL CAD: HCPCS

## 2017-08-28 PROCEDURE — 90736 HZV VACCINE LIVE SUBQ: CPT | Performed by: OBSTETRICS & GYNECOLOGY

## 2017-08-28 RX ORDER — ZOLPIDEM TARTRATE 10 MG/1
5 TABLET ORAL
Qty: 30 TABLET | Refills: 1 | Status: SHIPPED | OUTPATIENT
Start: 2017-08-28 | End: 2017-08-30

## 2017-08-28 RX ORDER — ESTRADIOL 0.1 MG/D
PATCH, EXTENDED RELEASE TRANSDERMAL
Qty: 8 PATCH | Refills: 3 | Status: SHIPPED | OUTPATIENT
Start: 2017-08-28 | End: 2017-09-18

## 2017-08-28 ASSESSMENT — ANXIETY QUESTIONNAIRES
1. FEELING NERVOUS, ANXIOUS, OR ON EDGE: NOT AT ALL
6. BECOMING EASILY ANNOYED OR IRRITABLE: NOT AT ALL
5. BEING SO RESTLESS THAT IT IS HARD TO SIT STILL: NOT AT ALL
7. FEELING AFRAID AS IF SOMETHING AWFUL MIGHT HAPPEN: NOT AT ALL
3. WORRYING TOO MUCH ABOUT DIFFERENT THINGS: NOT AT ALL
GAD7 TOTAL SCORE: 1
2. NOT BEING ABLE TO STOP OR CONTROL WORRYING: NOT AT ALL
IF YOU CHECKED OFF ANY PROBLEMS ON THIS QUESTIONNAIRE, HOW DIFFICULT HAVE THESE PROBLEMS MADE IT FOR YOU TO DO YOUR WORK, TAKE CARE OF THINGS AT HOME, OR GET ALONG WITH OTHER PEOPLE: NOT DIFFICULT AT ALL

## 2017-08-28 ASSESSMENT — PATIENT HEALTH QUESTIONNAIRE - PHQ9
SUM OF ALL RESPONSES TO PHQ QUESTIONS 1-9: 2
5. POOR APPETITE OR OVEREATING: SEVERAL DAYS

## 2017-08-28 NOTE — PROGRESS NOTES
Whitney is a 56 year old  female who presents for annual exam.     Besides routine health maintenance, she has no other health concerns today .    HPI:  The patient's PCP is Lisbeth Gonzalez MD. Did go last Feb.     Feels like has been gaining wt for last 2-3yrs without relief.  Sleeps 5hrs or less per day. Walking 1.5-2 miles per day. Eats dinner late evening.     Happy with estrogen dose, does not want to change. occ with have big hot flash.   Every once in a while will get a huge HA. Nausea.  NSAIDs don't help    Fasting today. Wants cholesterol rechecked. Did go to fair yesterday    Would like shingles vaccine, brother got it really badly.      GYNECOLOGIC HISTORY:    No LMP recorded. Patient has had a hysterectomy.  Her current contraception method is: hysterectomy.  She  reports that she has never smoked. She has never used smokeless tobacco.      Patient is sexually active.  STD testing offered?  Declined  Last PHQ-9 score on record =   PHQ-9 SCORE 2017   Total Score -   Total Score 2     Last GAD7 score on record =   JOSE ANTONIO-7 SCORE 2017   Total Score -   Total Score 1     Alcohol Score = 0    HEALTH MAINTENANCE:  Cholesterol:   Cholesterol   Date Value Ref Range Status   2017 235 (H) <200 mg/dL Final     Comment:     Desirable:       <200 mg/dl   2016 217 (H) <200 mg/dL Final     Comment:     Desirable:       <200 mg/dl    NA  Last Mammo: one year ago, Result: normal, Next Mammo: today   Pap: (  Lab Results   Component Value Date    PAP Normal 2015    NA, total hysterectomy  Colonoscopy:  17, Result: normal, Next Colonoscopy: 5 years.  Dexa:  NA    Health maintenance updated:  yes    HISTORY:  Obstetric History       T2      L2     SAB0   TAB0   Ectopic0   Multiple0   Live Births2       # Outcome Date GA Lbr Nick/2nd Weight Sex Delivery Anes PTL Lv   2 Term 91   5 lb 11.5 oz (2.594 kg) M -SEC   TERELL      Name: Tj Velazquez Term 87    6 lb 10 oz (3.005 kg) F Vag-Vacuum   TERELL      Name: Sam          Patient Active Problem List   Diagnosis     Panic disorder without agoraphobia     Anxiety     Overactive bladder     HYPERLIPIDEMIA LDL GOAL <160     Advanced directives, counseling/discussion     Gastroesophageal reflux disease without esophagitis     Symptomatic menopausal or female climacteric states     Fibrocystic breast changes of both breasts     Past Surgical History:   Procedure Laterality Date      SECTION       COLONOSCOPY  2012    Procedure:COLONOSCOPY; Colonoscopy ; Surgeon:DEANDRE ARRIETA; Location: GI     COLONOSCOPY N/A 2017    Procedure: COLONOSCOPY;  Surgeon: Deandre Arrieta MD;  Location:  GI     COLPOSCOPY, BIOPSY, COMBINED  1991    Mild squamous epithelial dysplasia (SABIHA 1)     FISSURECTOMY RECTUM  01     HYSTERECTOMY TOTAL ABDOMINAL  3/4/1998    Myomatous uterus; pelvic pain. Started HRT right afterward with vasomotor sx.     HYSTERECTOMY, PAP NO LONGER INDICATED N/A     had for large fibroid     LIPOSUCTION (LOCATION)        Social History   Substance Use Topics     Smoking status: Never Smoker     Smokeless tobacco: Never Used     Alcohol use No      Problem (# of Occurrences) Relation (Name,Age of Onset)    Breast Cancer (1) Maternal Aunt: 50s-Mom had 9 sisters. MAunt->50's    C.A.D. (6) Father (45):   age 45 massive MI , Maternal Grandmother, Maternal Grandfather, Paternal Grandmother, Paternal Grandfather, Sister (57): MI age 50- CABG    CANCER (1) Other: 9 members on maternal side. Bone CA, Breast CA (m. aunt),      DIABETES (2) Mother, Sister    Heart Failure (1) Father    Hyperlipidemia (1) Mother    Hypertension (1) Mother    Prostate Cancer (1) Brother: age 37            Current Outpatient Prescriptions   Medication Sig     VIVELLE-DOT 0.1 MG/24HR BIW patch PLACE 1 PATCH ONTO THE SKIN TWICE A WEEK- no further refills without being seen     zolpidem (AMBIEN) 10 MG  "tablet Take 0.5 tablets (5 mg) by mouth nightly as needed for sleep     ibuprofen (ADVIL/MOTRIN) 800 MG tablet TAKE 1 TABLET EVERY 8 HOURSAS NEEDED FOR MODERATE     PAIN (FOR BURSITIS)     [DISCONTINUED] VIVELLE-DOT 0.1 MG/24HR BIW patch PLACE 1 PATCH ONTO THE SKIN TWICE A WEEK- no further refills without being seen     azithromycin (ZITHROMAX) 250 MG tablet 2 tabs first day, then 1 tab by mouth daily for 4 additional days (Patient not taking: Reported on 3/1/2017)     clobetasol propionate (OLUX) 0.05 % FOAM Apply to outer part of ear and scalp 1-2x/day for up to 2 weeks at a time (Patient not taking: Reported on 8/28/2017)     fluticasone (FLONASE) 50 MCG/ACT nasal spray Spray 1-2 sprays into both nostrils daily (Patient not taking: Reported on 3/1/2017)     cetirizine (ZYRTEC) 10 MG tablet Take 1 tablet (10 mg) by mouth every evening (Patient not taking: Reported on 3/1/2017)     No current facility-administered medications for this visit.      Allergies   Allergen Reactions     Septra [Sulfamethoxazole W/Trimethoprim]      Sulfa Drugs Hives     Murine Ear [Carbamide Peroxide] Rash       Past medical, surgical, social and family histories were reviewed and updated in EPIC.    ROS:   Constitutional: Weight Gain  Gastrointestinal: Heartburn  Skin: Skin Dryness  Neurologic: Headaches    EXAM:  /70  Ht 5' 4.25\" (1.632 m)  Wt 163 lb (73.9 kg)  Breastfeeding? No  BMI 27.76 kg/m2   BMI: Body mass index is 27.76 kg/(m^2).    PHYSICAL EXAM:  Constitutional:  Appearance: Well nourished, well developed, alert, in no acute distress  Neck:  Lymph Nodes:  No lymphadenopathy present    Thyroid:  Gland size normal, nontender, no nodules or masses present  on palpation  Chest:  Respiratory Effort:  Breathing unlabored  Cardiovascular:    Heart: Auscultation:  Regular rate, normal rhythm, no murmurs present  Breasts: Inspection of Breasts:  No lymphadenopathy present., Palpation of Breasts and Axillae:  No masses present " on palpation, no breast tenderness., Axillary Lymph Nodes:  No lymphadenopathy present. and No nodularity, asymmetry or nipple discharge bilaterally.  Gastrointestinal:   Abdominal Examination:  Abdomen nontender to palpation, tone normal without rigidity or guarding, no masses present, umbilicus without lesions   Liver and Spleen:  No hepatomegaly present, liver nontender to palpation    Hernias:  No hernias present  Lymphatic: Lymph Nodes:  No other lymphadenopathy present  Skin:  General Inspection:  No rashes present, no lesions present, no areas of  discoloration    Genitalia and Groin:  No rashes present, no lesions present, no areas of  discoloration, no masses present  Neurologic/Psychiatric:    Mental Status:  Oriented X3     Pelvic Exam:  External Genitalia:     Normal appearance for age, no discharge present, no tenderness present, no inflammatory lesions present, color normal  Vagina:     Normal vaginal vault without central or paravaginal defects, no discharge present, no inflammatory lesions present, no masses present  Bladder:     Nontender to palpation  Urethra:   Urethral Body:  Urethra palpation normal, urethra structural support normal   Urethral Meatus:  No erythema or lesions present  Cervix:     Surgically absent  Uterus:     Surgically absent  Adnexa:     No adnexal tenderness present, no adnexal masses present  Perineum:     Perineum within normal limits, no evidence of trauma, no rashes or skin lesions present  Anus:     Anus within normal limits, no hemorrhoids present  Inguinal Lymph Nodes:     No lymphadenopathy present  Pubic Hair:     Normal pubic hair distribution for age  Genitalia and Groin:     No rashes present, no lesions present, no areas of discoloration, no masses present      COUNSELING:   Reviewed preventive health counseling, as reflected in patient instructions       Regular exercise       Healthy diet/nutrition       Osteoporosis Prevention/Bone Health      BMI: Body mass  index is 27.76 kg/(m^2).  Weight management plan: Discussed healthy diet and exercise guidelines and patient will follow up in 12 months in clinic to re-evaluate.    ASSESSMENT:  56 year old female with satisfactory annual exam.    ICD-10-CM    1. Encounter for gynecological examination without abnormal finding Z01.419    2. Hot flashes R23.2 VIVELLE-DOT 0.1 MG/24HR BIW patch   3. Symptomatic menopausal or female climacteric states N95.1 VIVELLE-DOT 0.1 MG/24HR BIW patch   4. Insomnia, unspecified type G47.00 zolpidem (AMBIEN) 10 MG tablet   5. Encounter for lipid screening for cardiovascular disease Z13.220 Lipid Profile    Z13.6    6. Need for shingles vaccine Z23 CANCELED: Varicella Zoster Virus Antibody IgG     CANCELED: VACCINE ADMINISTRATION, INITIAL       PLAN:  -UTD for cervical cancer screening. No further  -Breast self awareness discussed. Due for mammogram.  -Discussed exercise-making plan, strength training. Nutrition encouraged.  -Colonoscopy UTD  -Osteoporosis prevention discussed.  -Fasting labs requested  -Menopausal sx. Happy with estrogen patches. Refilled.  -Insomnia and travel for work, refill ambien. Caution advised. Uses sparingly  -Return one year for next annual exam      Kala Feliciano Masters, DO

## 2017-08-28 NOTE — MR AVS SNAPSHOT
After Visit Summary   8/28/2017    Whitney Aceves    MRN: [id number]           Patient Information     Date Of Birth          1960        Visit Information        Provider Department      8/28/2017 8:40 AM Kala Goodwin DO [facility]        Today's Diagnoses     Encounter for gynecological examination without abnormal finding    -  1    Hot flashes        Symptomatic menopausal or female climacteric states        Insomnia, unspecified type        Encounter for lipid screening for cardiovascular disease        Need for shingles vaccine           Follow-ups after your visit        Follow-up notes from your care team     Return in about 1 year (around 8/28/2018).      Your next 10 appointments already scheduled     Sep 29, 2017  3:15 PM CDT   [facility]    [address] [place] [address] [phone]           Bring a current list of meds and any records pertaining to this visit. For Physicals, please bring immunization records and any forms needing to be filled out. Please arrive 10 minutes early to complete paperwork.              Who to contact     If you have questions or need follow up information about today's clinic visit or your schedule please contact [facility] directly at [phone].  Normal or non-critical lab and imaging results will be communicated to you by MyChart, letter or phone within 4 business days after the clinic has received the results. If you do not hear from us within 7 days, please contact the clinic through MyChart or phone. If you have a critical or abnormal lab result, we will notify you by phone as soon as possible.  Submit refill requests through [employer] or call your pharmacy and they will forward the refill request to us. Please allow 3 business days for your refill to be completed.           "Additional Information About Your Visit        MyChart Information     [employer] gives you secure access to your electronic health record. If you see a primary care provider, you can also send messages to your care team and make appointments. If you have questions, please call your primary care clinic.  If you do not have a primary care provider, please call [phone] and they will assist you.        Care EveryWhere ID     This is your Care EveryWhere ID. This could be used by other organizations to access your [place] medical records  [id number]        Your Vitals Were     Height Breastfeeding? BMI (Body Mass Index)             5' 4.25\" (1.632 m) No 27.76 kg/m2          Blood Pressure from Last 3 Encounters:   08/28/17 118/70   03/01/17 102/60   02/21/17 114/80    Weight from Last 3 Encounters:   08/28/17 163 lb (73.9 kg)   03/01/17 164 lb (74.4 kg)   02/18/17 164 lb (74.4 kg)              We Performed the Following     Lipid Profile          Where to get your medicines      These medications were sent to [facility] - [place], [place] - [address] AT [address] [place] [address] [address] [facility], [facility] [address]     Phone:  [phone]     VIVELLE-DOT 0.1 MG/24HR BIW patch         Some of these will need a paper prescription and others can be bought over the counter.  Ask your nurse if you have questions.     Bring a paper prescription for each of these medications     zolpidem 10 MG tablet          Primary Care Provider Office Phone # Fax #    Lisbeth Gonzalez [phone] [address] [facility] [address]        Equal Access to Services     [facility] Hospital[facility] AH: Hadii toney Allen, wakenroyda luqadaha, qaybta kaalmada kurtis miles. So [facility] [phone].    ATENCIÓN: Si habla español, tiene a romero disposición servicios gratuitos de asistencia lingüística. Llame al [phone].    We comply with " applicable federal civil rights laws and [place] laws. We do not discriminate on the basis of race, color, national origin, age, disability sex, sexual orientation or gender identity.            Thank you!     Thank you for choosing [facility]  for your care. Our goal is always to provide you with excellent care. Hearing back from our patients is one way we can continue to improve our services. Please take a few minutes to complete the written survey that you may receive in the mail after your visit with us. Thank you!             Your Updated Medication List - Protect others around you: Learn how to safely use, store and throw away your medicines at www.disposemymeds.org.          This list is accurate as of: 8/28/17  9:15 AM.  Always use your most recent med list.                   Brand Name Dispense Instructions for use Diagnosis    azithromycin 250 MG tablet    ZITHROMAX    6 tablet    2 tabs first day, then 1 tab by mouth daily for 4 additional days    Acute recurrent frontal sinusitis       cetirizine 10 MG tablet    zyrTEC    30 tablet    Take 1 tablet (10 mg) by mouth every evening    Otalgia, unspecified       clobetasol propionate 0.05 % Foam    OLUX    50 g    Apply to outer part of ear and scalp 1-2x/day for up to 2 weeks at a time    Other eczema       fluticasone 50 MCG/ACT spray    FLONASE    1 Bottle    Spray 1-2 sprays into both nostrils daily    Acute recurrent maxillary sinusitis       ibuprofen 800 MG tablet    ADVIL/MOTRIN    90 tablet    TAKE 1 TABLET EVERY 8 HOURSAS NEEDED FOR MODERATE     PAIN (FOR BURSITIS)    Generalized pain       VIVELLE-DOT 0.1 MG/24HR BIW patch   Generic drug:  estradiol     8 patch    PLACE 1 PATCH ONTO THE SKIN TWICE A WEEK- no further refills without being seen    Hot flashes, Symptomatic menopausal or female climacteric states       zolpidem 10 MG tablet    AMBIEN    30 tablet    Take 0.5 tablets (5 mg) by mouth nightly as needed for sleep     Insomnia, unspecified type

## 2017-08-29 ASSESSMENT — ANXIETY QUESTIONNAIRES: GAD7 TOTAL SCORE: 1

## 2017-08-30 DIAGNOSIS — G47.00 INSOMNIA, UNSPECIFIED TYPE: ICD-10-CM

## 2017-08-30 RX ORDER — ZOLPIDEM TARTRATE 10 MG/1
TABLET ORAL
Qty: 15 TABLET | Refills: 0 | Status: SHIPPED | OUTPATIENT
Start: 2017-08-30 | End: 2018-03-08

## 2017-08-30 NOTE — TELEPHONE ENCOUNTER
zolpidem (AMBIEN) 10 MG tablet      Last Written Prescription Date:  8/28/2017  Last Fill Quantity: 30 tablet,   # refills: 1  Last Office Visit with [facility], [facility] or [facility] prescribing provider: 2/18/2017  Future Office visit:    Next 5 appointments (look out 90 days)     Sep 29, 2017  3:15 PM CDT   [facility]    [address] [place] [id number] [phone]                   Routing refill request to provider for review/approval because:  Drug not on the [facility], [facility] or [facility] refill protocol or controlled substance

## 2017-08-30 NOTE — TELEPHONE ENCOUNTER
Attempt #1.    Isabella Dean contacted Whitney on 08/30/17 and left a message. If patient calls back please send high priority message.  Sherry Dean RN  [facility]

## 2017-09-18 DIAGNOSIS — N95.1 SYMPTOMATIC MENOPAUSAL OR FEMALE CLIMACTERIC STATES: ICD-10-CM

## 2017-09-18 DIAGNOSIS — R23.2 HOT FLASHES: ICD-10-CM

## 2017-09-18 NOTE — TELEPHONE ENCOUNTER
Annual 8/28/17. Received request from [employer] for 90 day supply. Pt sent 30 day with 3 refills. Routing to Dr. Goodwin. OK to send for one year?      8/28/17 Menopausal sx. Happy with estrogen patches. Refilled.

## 2017-09-20 RX ORDER — ESTRADIOL 0.1 MG/D
PATCH, EXTENDED RELEASE TRANSDERMAL
Qty: 24 PATCH | Refills: 3 | Status: SHIPPED | OUTPATIENT
Start: 2017-09-20 | End: 2018-09-17 | Stop reason: DRUGHIGH

## 2017-09-29 ENCOUNTER — OFFICE VISIT (OUTPATIENT)
Dept: FAMILY MEDICINE | Facility: CLINIC | Age: 57
End: 2017-09-29
Payer: COMMERCIAL

## 2017-09-29 VITALS
DIASTOLIC BLOOD PRESSURE: 76 MMHG | HEART RATE: 70 BPM | TEMPERATURE: 97.8 F | SYSTOLIC BLOOD PRESSURE: 112 MMHG | WEIGHT: 164 LBS | OXYGEN SATURATION: 98 % | BODY MASS INDEX: 27.93 KG/M2

## 2017-09-29 DIAGNOSIS — M79.622 PAIN OF LEFT UPPER ARM: ICD-10-CM

## 2017-09-29 DIAGNOSIS — Z23 NEED FOR TDAP VACCINATION: ICD-10-CM

## 2017-09-29 DIAGNOSIS — Z82.49 FAMILY HISTORY OF EARLY CAD: ICD-10-CM

## 2017-09-29 DIAGNOSIS — M19.041 PRIMARY OSTEOARTHRITIS OF BOTH HANDS: ICD-10-CM

## 2017-09-29 DIAGNOSIS — M19.042 PRIMARY OSTEOARTHRITIS OF BOTH HANDS: ICD-10-CM

## 2017-09-29 DIAGNOSIS — E78.5 HYPERLIPIDEMIA LDL GOAL <100: Primary | ICD-10-CM

## 2017-09-29 DIAGNOSIS — Z23 NEED FOR PROPHYLACTIC VACCINATION AND INOCULATION AGAINST INFLUENZA: ICD-10-CM

## 2017-09-29 PROCEDURE — 90472 IMMUNIZATION ADMIN EACH ADD: CPT | Performed by: FAMILY MEDICINE

## 2017-09-29 PROCEDURE — 90686 IIV4 VACC NO PRSV 0.5 ML IM: CPT | Performed by: FAMILY MEDICINE

## 2017-09-29 PROCEDURE — 99214 OFFICE O/P EST MOD 30 MIN: CPT | Mod: 25 | Performed by: FAMILY MEDICINE

## 2017-09-29 PROCEDURE — 90471 IMMUNIZATION ADMIN: CPT | Performed by: FAMILY MEDICINE

## 2017-09-29 PROCEDURE — 90715 TDAP VACCINE 7 YRS/> IM: CPT | Performed by: FAMILY MEDICINE

## 2017-09-29 RX ORDER — ATORVASTATIN CALCIUM 10 MG/1
10 TABLET, FILM COATED ORAL DAILY
Qty: 90 TABLET | Refills: 1 | Status: SHIPPED | OUTPATIENT
Start: 2017-09-29 | End: 2018-02-08

## 2017-09-29 NOTE — NURSING NOTE
"Chief Complaint   Patient presents with     Lipids       Initial /76 (BP Location: Left arm, Patient Position: Chair, Cuff Size: Adult Large)  Pulse 70  Temp 97.8  F (36.6  C) (Oral)  Wt 164 lb (74.4 kg)  SpO2 98%  BMI 27.93 kg/m2 Estimated body mass index is 27.93 kg/(m^2) as calculated from the following:    Height as of 8/28/17: 5' 4.25\" (1.632 m).    Weight as of this encounter: 164 lb (74.4 kg).  Medication Reconciliation: complete   Maribel Thorne CMA  "

## 2017-09-29 NOTE — NURSING NOTE
Injectable Influenza Immunization Documentation    1.  Are you sick today? (Fever of 100.5 or higher on the day of the clinic)   No    2.  Have you ever had Guillain-[place] Syndrome within 6 weeks of an influenza vaccionation?  No    3. Do you have a life-threatening allergy to eggs?  No    4. Do you have a life-threatening allergy to a component of the vaccine? May include antibiotics, gelatin or latex.  No     5. Have you ever had a reaction to a dose of flu vaccine that needed immediate medical attention?  No     Form completed by Maribel Thorne CMA

## 2017-09-29 NOTE — MR AVS SNAPSHOT
"              After Visit Summary   9/29/2017    Whitney Aceves    MRN: [id number]           Patient Information     Date Of Birth          1960        Visit Information        Provider Department      9/29/2017 3:15 PM Lisbeth Gonzalez MD [facility] [place]        Today's Diagnoses     Hyperlipidemia LDL goal <100        Family history of early CAD        Primary osteoarthritis of both hands        Need for prophylactic vaccination and inoculation against influenza        Need for Tdap vaccination          Care Instructions    Increase your omega-3's to 3000mg daily.   Start 81mg aspirin daily with food and warm liquid.     Increase your walking to 3 miles daily, if possible.   Establish an exercise regimen. Activity goal: 45 minutes 5 days a week. New exercise routine: cardiovascular workout on exercise equipment, walking and weightlifting. Diet regimen was discussed and plan is self-directed dieting: reduce calories, reduce portions, reduce processed  carbs, increase fruits/vegetables and avoid sweets and supervised diet program. Avoid artificial sweeteners and \"diet\" drinks and sodas.    Start atorvastatin 10mg nightly - recheck fasting lipids, ast , alt (liver function tests)  in 6-8 weeks.  I've entered future orders for this and you can do this as a lab only appointment.        Recheck with me in person in 6 months or sooner , if needed.  Either ice or heat to area of left arm pain for comfort- never sleep on a heating pad.  Ok for Ibuprofen 400mg with food every 4-6 hours as needed for pain . Please, call or return to clinic or go to the ER immediately if signs or symptoms worsen or fail to improve as anticipated.     gave stretching and strengthening handouts for the left upper arm.   See Patient Instructions.                      Warning Signs of Heart Disease  Why are warning signs of heart disease important?   Heart disease is the leading cause of death in the [place]. Often " there are symptoms of heart disease years before you have major heart problems. Ignoring warning signs of heart disease can be fatal. It can also lead to years of unnecessary disability.   The signs of having a heart attack are not the same for everyone, and symptoms of a heart attack can be a lot like symptoms of other problems. However, some common warning signs are chest pain, shortness of breath, swelling in the legs and feet, leg pain with walking, high blood pressure, and high cholesterol. If you pay attention to these possible signs of heart disease and get treatment, you may prevent a serious problem later.   Chest pain (angina)   Angina is pain, tightness, or pressure in your chest. It happens when your heart muscles are not getting enough oxygen. The symptoms of angina may vary from person to person. It can be a discomfort in the chest that lasts for several minutes, or that goes away but keeps coming back. It can feel like uncomfortable pressure, squeezing, or viselike pain. There may be discomfort in other areas of the upper body, such as in one or both upper arms, the back, neck, or jaw. Angina can also feel like stomach indigestion or heartburn.   Most angina happens when you exert yourself physically and goes away when you rest. A type of angina that comes on unexpectedly, particularly when you are at rest, is called unstable angina. Unstable angina is much more serious. It may mean that without immediate medical attention a heart attack will soon occur.   It is possible to have a heart attack with no warning and no pain, but many people have angina for days, weeks, or months before a heart attack. If you think you have been having angina, you should see your healthcare provider right away. Follow your provider's advice for control of blood pressure, cholesterol, smoking, diet, exercise, weight, and stress. This may help prevent a heart attack.   You need to call 911 for emergency help if:   You are  "having chest pain at rest.   You are having chest pain that doesn't stop when you quit your activity.   The pain is getting worse over a period of 5 minutes or goes away and then quickly comes back again.   Getting emergency help is especially urgent if you are having other symptoms such as nausea, lightheadedness, sweating, and shortness of breath as well as chest pain.   Shortness of breath   Shortness of breath is the most common symptom of heart failure. Heart failure occurs when the heart muscle is unable to (\"fails to\") pump enough blood to meet the body's needs. The blood begins to back up because the heart is not pumping well. The veins, tissues, and lungs become congested with fluid. This can make it hard for you to breathe.   If heart failure is not treated, it will get worse. If you begin to get breathless going upstairs, or after less and less exercise, or if you need to be propped by more pillows to breathe comfortably in bed, see your provider as soon as possible.   Shortness of breath from heart failure usually happens gradually over a long period of time. Sudden shortness of breath, even without chest pain, can mean a heart attack. There are usually other symptoms with a heart attack, such as nausea, sweating, and lightheadedness, but sudden shortness of breath is an emergency, even when there are no other symptoms.   Swelling (edema) in the legs and feet   Many people have leg swelling from causes other than heart disease. However, the collection of fluid in your legs can be a warning sign of heart problems. This is especially true if you have other symptoms, such as shortness of breath, especially with activity. You may have swelling in your abdomen, too. Tell your healthcare provider if your legs, ankles, or feet have become swollen.   Pain in the legs with walking (claudication)   Pain that occurs in the calf muscles when you walk can be a sign of heart and blood vessel disease. This type of pain " happens only with activity and stops a minute or two after you stop the activity. It occurs when your muscles are not getting enough oxygen because of blocked arteries. Blocked leg arteries may mean there are also blockages in the heart (coronary) arteries.   High blood pressure and high blood cholesterol   High blood pressure and high blood cholesterol are both warning signs of possible heart problems. You usually cannot tell if you have high blood pressure or high blood cholesterol without measuring your blood pressure or testing your blood. Both measurements may be done at health checkups. A blood pressure machine may be available in your local pharmacy. High blood pressure and high blood cholesterol can be treated by your healthcare provider.   High blood pressure also greatly increases your chances of having a stroke. Lowering your blood pressure decreases your risk of having a stroke.   What should I do if have warning signs of heart disease?   If you have any of these warning signs, see your healthcare provider. Your provider will ask about your personal and family medical history, examine you, and do the recommended tests to check for heart disease. You can reduce your risk of heart attack or other problems caused by heart disease by following the treatment recommended by your provider.     Published by [employer].  This content is reviewed periodically and is subject to change as new health information becomes available. The information is intended to inform and educate and is not a replacement for medical evaluation, advice, diagnosis or treatment by a healthcare professional.   Developed by Leslie Spence MD.   ? 2010 [employer] and/or its affiliates. All Rights Reserved.   Copyright   [employer] 2011    Taking Aspirin Every Day  The basics  Taking aspirin every day can lower your risk of heart attack and stroke. Ask your doctor about taking aspirin if you:    Are a man age 45 or  "older    Are a woman age 55 or older    Smoke    Have high blood pressure, high cholesterol or diabetes    Have a family history of heart disease    Have already had a heart attack or stroke  For most people, aspirin is safe. But it's not right for everyone. Talk to your doctor before you start taking aspirin every day.  The benefits  Aspirin can reduce your risk of heart attack or stroke. It can:    Improve the flow of blood to the heart and brain    Help keep your arteries open if you have had a stroke or angioplasty  If you have already had a heart attack or stroke, daily aspirin can lower your risk of having another one.  Take action!  Your doctor can help you decide if aspirin is the right choice for you. Talk to your doctor about:    Your risk of heart attack    What kind of aspirin to take    How much to take    How often to take it  Be sure to tell your doctor about all the other medicines that you take, including vitamins.   For informational purposes only. Not to replace the advice of your health care provider. From \"Talk with Your Doctor about Taking Aspirin Every Day,\" by the U.S. Dept. of Health and Human Services (www.healthfinder.gov). [employer] 541971 - Rev 03/16.    Eating Heart-Healthy Foods  Eating has a big impact on your heart health. In fact, eating healthier can improve several of your heart risks at once. For instance, it helps you manage weight, cholesterol, and blood pressure. Here are ideas to help you make heart-healthy changes without giving up all the foods and flavors you love.  Getting started    Talk with your health care provider about eating plans, such as the DASH or Mediterranean diet. You may also be referred to a dietitian.    Change a few things at a time. Give yourself time to get used to a few eating changes before adding more.    Work to create a tasty, healthy eating plan that you can stick to for the rest of your life.    Goals for healthy eating  Below are some tips to " improve your eating habits:    Limit saturated fats and trans fats. Saturated fats raise your levels of cholesterol, so keep these fats to a minimum. They are found in foods such as fatty meats, whole milk, cheese, and palm and coconut oils. Avoid trans fats because they lower good cholesterol as well as raise bad cholesterol. Trans fats are most often found in processed foods.    Reduce sodium (salt) intake. Eating too much salt may increase your blood pressure. Limit your sodium intake to 2,300 milligrams (mg) per day, or less if your health care provider recommends it. Dining out less often and eating fewer processed foods are two great ways to decrease the amount of salt you consume.    Managing calories. A calorie is a unit of energy. Your body burns calories for fuel, but if you eat more calories than your body burns, the extras are stored as fat. Your health care provider can help you create a diet plan to manage your calories. This will likely include eating healthier foods as well as exercising regularly. To help you track your progress, keep a diary to record what you eat and how often you exercise.  Choose the right foods  Aim to make these foods staples of your diet. If you have diabetes, you may have different recommendations than what is listed here:    Fruits and vegetable provide plenty of nutrients without a lot of calories. At meals, fill half your plate with these foods. Split the other half of your plate between whole grains and lean protein.    Whole grains are high in fiber and rich in vitamins and nutrients. Good choices include whole-wheat bread, pasta, and brown rice.    Lean proteins give you nutrition with less fat. Good choices include fish, skinless chicken, and beans.    Low-fat or nonfat dairy provides nutrients without a lot of fat. Try low-fat or nonfat milk, cheese, or yogurt.    Healthy fats can be good for you in small amounts. These are unsaturated fats, such as olive oil, nuts,  and fish. Try to have at least 2 servings per week of fatty fish such as salmon, sardines, mackerel, rainbow trout, and albacore tuna. These contain omega-3 fatty acids, which are good for your heart. Flaxseed is another source of a heart-healthy fat.  More on heart healthy eating    Read food labels  Healthy eating starts at the grocery store. Be sure to pay attention to food labels on packaged foods. Look for products that are high in fiber and protein, and low in saturated fat, cholesterol, and sodium. Avoid products that contain trans fat. And pay close attention to serving size. For instance, if you plan to eat two servings, double all the numbers on the label.  Prepare food right  A key part of healthy cooking is cutting down on added fat and salt. Look on the internet for lower-fat, lower-sodium recipes. Also, try these tips:    Remove fat from meat and skin from poultry before cooking.    Skim fat from the surface of soups and sauces.    Broil, boil, bake, steam, grill, and microwave food without added fats.    Choose ingredients that spice up your food without adding calories, fat, or sodium. Try these items: horseradish, hot sauce, lemon, mustard, nonfat salad dressings, and vinegar. For salt-free herbs and spices, try basil, cilantro, cinnamon, pepper, and rosemary.  Date Last Reviewed: 6/25/2015 2000-2017 [employer]. [address] [place] [address], [place] [address]. All rights reserved. This information is not intended as a substitute for professional medical care. Always follow your healthcare professional's instructions.        Women and Heart Disease: Tips for Making Small Changes  Making even one lifestyle change for your heart reduces your risk for heart disease. Change is hard for everyone, so take it one step at a time. Here are some tips to help you get started on making changes that are good for your heart.    Make a plan  Trying to do too much too fast can end in failure:    Start by  writing down all the things you d like to do to lower your risks.    Break each one into small steps. If you said,  Cut down on fat,  a small step could be to use fruit spread instead of butter on your toast. Or to take soup and a roll for lunch instead of going out for a hamburger and fries.    Decide which step you d like to take first. Then choose a second and a third step.    Check off each step as you achieve it. Add new steps as you go along.    Set a deadline to meet each of your steps and help you stick with the new rule you have made for yourself.    If a step isn t working, try another. Come back to the first one later.  Keep records  Keeping records helps you know your habits and see your successes:    Keeping an exercise record can help you see your progress and keep you going. Try logging your activities every week. This will give you a chance to look back at the end of the week and change as you need to.    Keeping food records can help you see your eating patterns and plan ways to make small changes. Try writing down the foods you eat each day. You will find it easier to be more consistent in making healthy choices.    Noting when you feel stressed or want to smoke can help you think of ways to avoid these triggers. Write down a list of activities you would rather do to not give into these impulses.  Reward yourself  Making changes isn t easy. You deserve to reward yourself when you succeed. Just making the change may be its own reward. But why not give yourself an extra pat on the back?    Give yourself something special you ve been wanting.    Do something that you ve always promised yourself you d do, such as going dancing.    Treat yourself to an activity you'll enjoy after a successful week.  Date Last Reviewed: 2/1/2017 2000-2017 [employer]. [address] [place] [address], [place] [address]. All rights reserved. This information is not intended as a substitute for professional medical  care. Always follow your healthcare professional's instructions.        Women and Heart Disease: Understanding the Risks  Risk factors are habits and conditions that make heart disease more likely. The more you have, the higher your chances of heart attack, also known as acute myocardial infarction, or AMI, and other problems. You can manage most risk factors to help make your heart healthier. Below are factors that increase your risk for having heart disease.    Smoking  This is the most important risk factor you can change. Smoking causes inflammation and damages the smooth muscle that lines the arteries making them less flexible. It also raises your blood pressure, causing further damage to the artery lining. Smoking also increased your risk that your blood may clot, block an artery, and lead to a heart attack or stroke. Smoking also damages your lungs, which can affect the delivery of oxygen to the body. Research shows that smoking makes women up to 6 times more likely to have a heart attack. It's also important to avoid secondhand smoke (smoke from other people s tobacco products). If you smoke, it's never too late to improve your heart. Ask your doctor about nicotine replacement products and smoking cessation counseling.  Diabetes  Diabetes causes high blood sugar, which can damage blood vessels if not kept under control. Having diabetes also makes you more likely to have a silent heart attack--one without any symptoms. This occurs because long periods of high sugar levels in your blood eventually degrade nerve conduction which reduces your sensation. This translates to decreased chest pain than would be felt in a person without diabetes during a heart attack. You re at risk if your blood sugar level is above 100 mg/dL.  High cholesterol  Cholesterol is a fat-like substance in your blood. It can build up along the artery walls. This is called plaque. Over time, plaque narrows the arteries and reduces blood  "flow to your heart or brain. If a blood clot forms or a piece of the plaque breaks off, it can completely block the artery and cause a heart attack or stroke. Your risk of heart disease goes up if you have high levels of LDL (\"bad\") cholesterol or triglycerides (another fatty substance that can build up). You re also at risk if you have low HDL cholesterol (\"good\") cholesterol. HDL helps clear the bad cholesterol away. You re at risk if you have:  HDL cholesterol 50 mg/dL or lower; LDL cholesterol 100 mg/dL or higher; triglycerides of 150 mg/dL or higher.  High blood pressure  High blood pressure occurs when blood pushes too hard against artery walls. This causes damage to the artery walls and then the formation of scar tissue as it heals. This makes the arteries stiff and weak. Plaque sticks to the scarred tissue narrowing and hardening the arteries. High blood pressure also causes your heart to work harder to get blood out to the body. High blood pressure raises your risk of heart attack, also known as acute myocardial infarction, or AMI, and especially stroke. The brain tissue is especially sensitive to high blood pressure damage. You re at risk if your blood pressure is 120/80 or higher.  Excess weight  Excess weight makes your heart work harder. This raises your risk of a heart attack. Being overweight also puts you at risk of developing diabetes and high blood pressure. Excess weight around the waist or stomach increases your risk the most. Being obese puts you at risk for developing heart disease.  Lack of exercise  Without regular exercise, you re more likely to develop other risk factors, such as being overweight and developing diabetes. High blood pressure and unhealthy lipid levels are also more likely. Exercise promotes good blood flow and ensures your heart is able to meet the demands placed on it.  Negative emotions  Emotions such as stress and pent-up anger have been linked to heart disease. Over " time, these emotions could raise your heart disease risk. If you have heart disease, emotion such as anxiety and depression can make it worse. Managing these emotions is important as they have been shown to reduce hormones that increase stress on the heart in the long run.  Metabolic syndrome  This is caused by a combination of certain risk factors. It puts you at extra high risk of heart disease, stroke, and diabetes. You have metabolic syndrome if you have 3 or more of the following: low HDL cholesterol; high triglycerides; high blood pressure; high blood sugar; extra weight around the waist.  Risks you can t control  A few risk factors can t be changed. But they still raise your heart disease risk.    Family history. If your mother or sister had heart trouble before age 65 or your father or brother before age 55, you re at higher risk of having a heart attack.    Age. The older you are, the higher your heart disease risk.     For more information    www.smokefree.gov/wjol-rt-zo-expert    www.women.smokefree.gov    National Cancer [place] Smoking Quitline: 877-44U-QUIT (216-439-6997)      Date Last Reviewed: 4/8/2016 2000-2017 [employer]. [address] [place] [address]. All rights reserved. This information is not intended as a substitute for professional medical care. Always follow your healthcare professional's instructions.        Women and Heart Disease: What Women Need to Know  You may be surprised to learn that heart disease is the biggest threat to your health--even more so than breast cancer. And the same factors that put you at risk of a heart attack, also known as acute myocardial infarction, or AMI, also increase your chances of stroke and other health problems. The main risk factors include high blood pressure, smoking, poor diet, diabetes, family history, obesity, and sedentary lifestyle. If your heart is in trouble, your body may send you warning signs. It s up to you  to notice these and talk to your healthcare provider about them. Your health--and your life--could depend on it.    Learn to read the signs  When your heart isn t getting enough oxygen, you may experience a feeling called angina. It can be a sign that you are at risk for having a heart attack.  Angina is often referred to as  chest pain,  but this can be misleading. It s not always painful, and it s not always in the chest. Many women have other symptoms along with--or instead of--chest pain or discomfort. Talk to your healthcare provider if you notice any of the following:    Discomfort, aching, tightness, or pressure that comes and goes, in the back, abdomen, arm, shoulder, neck, or jaw or chest    Feeling much more tired than usual, for no clear reason    Becoming breathless while doing something that used to be easy    Heartburn, nausea, or a burning feeling that seems unrelated to food    Lightheadedness or faintness  Get to the heart of the problem  Women often don t realize their symptoms could be related to heart trouble. Even some healthcare providers don t make the connection. If you feel any of the symptoms listed here, see your healthcare provider and ask to be tested for heart disease--even if you re not sure that s the cause. Tests, such as a stress echocardiogram and nuclear imaging, will reveal more about the problem. If your symptoms are heart-related, your healthcare provider will start treatment.  Hormone therapy is not the answer  Healthcare providers used to think that older women could reduce their heart disease risk by taking hormones in pill form (hormone therapy, or HT). It turns out that s not true. In fact, HT could actually increase your risk of having a heart attack or stroke. Talk to your healthcare provider about the risks and benefits of HT. It may be prescribed for other health problems. But it should not be taken to prevent or treat heart disease.     Is it angina or a heart  attack?  Angina that occurs on exertion and goes away after a few minutes of rest or with medicine is considered stable angina. Unstable angina is unpredictable or unexpected angina symptoms that do not resolve, or go away and come back. This is a medical emergency and could be a sign of an active heart attack. Call 911 right away!   Date Last Reviewed: 4/8/2016 2000-2017 [employer]. [address] [place] [address], [place] [address]. All rights reserved. This information is not intended as a substitute for professional medical care. Always follow your healthcare professional's instructions.               Thank you for choosing [facility]  for your Health Care. It was a pleasure seeing you at your visit today. Please contact us with any questions or concerns you may have.                   Lisbeth Gonzalez MD                                  To reach your [facility] care team after hours call:   [phone]    Our clinic hours are:     Monday- 7:30 am - 7:00 pm                             Tuesday through Friday- 7:30 am - 5:00 pm                                        Saturday- 8:00 am - 12:00 pm                  Phone:  [phone]    Our pharmacy hours are:     Monday  8:00 am to 7:00 pm      Tuesday through Friday 8:00am to 6:00pm                        Saturday - 9:00 am to 1:00 pm      Sunday : Closed.              Phone:  [phone]      There is also information available at our web site:  [email or web address]    If your provider ordered any lab tests and you do not receive the results within 10 business days, please call the clinic.    If you need a medication refill please contact your pharmacy.  Please allow 2 business days for your refill to be completed.    Our clinic offers telephone visits and e visits.  Please ask one of your team members to explain more.      Use [employer] (secure email communication and access to your chart) to send your primary care  provider a message or make an appointment. Ask someone on your Team how to sign up for [employer].                             Follow-ups after your visit        Future tests that were ordered for you today     Open Future Orders        Priority Expected Expires Ordered    AST Routine 11/7/2017 2/28/2018 9/29/2017    ALT Routine 11/7/2017 2/28/2018 9/29/2017    CK total Routine 11/7/2017 2/28/2018 9/29/2017    Lipid panel reflex to direct LDL Routine 11/7/2017 2/28/2018 9/29/2017            Who to contact     If you have questions or need follow up information about today's clinic visit or your schedule please contact [facility] directly at [phone].  Normal or non-critical lab and imaging results will be communicated to you by [employer], letter or phone within 4 business days after the clinic has received the results. If you do not hear from us within 7 days, please contact the clinic through [email or web address] or phone. If you have a critical or abnormal lab result, we will notify you by phone as soon as possible.  Submit refill requests through [employer] or call your pharmacy and they will forward the refill request to us. Please allow 3 business days for your refill to be completed.          Additional Information About Your Visit        [employer] Information     [employer] gives you secure access to your electronic health record. If you see a primary care provider, you can also send messages to your care team and make appointments. If you have questions, please call your primary care clinic.  If you do not have a primary care provider, please call [phone] and they will assist you.        Care EveryWhere ID     This is your Care EveryWhere ID. This could be used by other organizations to access your [facility] medical records  [id number]        Your Vitals Were     Pulse Temperature Pulse Oximetry BMI (Body Mass Index)          70 97.8  F (36.6  C) (Oral) 98% 27.93 kg/m2         Blood Pressure from Last 3  Encounters:   09/29/17 112/76   08/28/17 118/70   03/01/17 102/60    Weight from Last 3 Encounters:   09/29/17 164 lb (74.4 kg)   08/28/17 163 lb (73.9 kg)   03/01/17 164 lb (74.4 kg)              We Performed the Following          ADMIN VACCINE, FIRST [85756]     EA ADD'L VACCINE     HC FLU VAC PRESRV FREE QUAD SPLIT VIR 3+YRS IM  [73145]     TDAP VACCINE (ADACEL)          Today's Medication Changes          These changes are accurate as of: 9/29/17  4:22 PM.  If you have any questions, ask your nurse or doctor.               Start taking these medicines.        Dose/Directions    atorvastatin 10 MG tablet   Commonly known as:  LIPITOR   Used for:  Hyperlipidemia LDL goal <100, Family history of early CAD   Started by:  Lisbeth Gonzalez MD        Dose:  10 mg   Take 1 tablet (10 mg) by mouth daily   Quantity:  90 tablet   Refills:  1         Stop taking these medicines if you haven't already. Please contact your care team if you have questions.     clobetasol propionate 0.05 % Foam   Commonly known as:  OLUX   Stopped by:  Lisbeth Gonzalez MD           fluticasone 50 MCG/ACT spray   Commonly known as:  FLONASE   Stopped by:  Lisbeth Gonzalez MD                Where to get your medicines      [facility] [address] [place] [address] AT [place] [address] & [address] [address]    Hours:  24-hours Phone:  [phone]     atorvastatin 10 MG tablet                Primary Care Provider Office Phone # Fax #    Lisbeth Gonzalez [phone]       [address] [place] [address]        Equal Access to Services     MILTON GLASS AH: Alexa Allen, samara villatoro, kurtis gonzalez. [facility] [phone].    ATENCIÓN: Si habla español, tiene a romero disposición servicios gratuitos de asistencia lingüística. Llame al  [phone].    We comply with applicable federal civil rights laws and [place] laws. We do not discriminate on the basis of race, color, national origin, age, disability, sex, sexual orientation, or gender identity.            Thank you!     Thank you for choosing [facility]  for your care. Our goal is always to provide you with excellent care. Hearing back from our patients is one way we can continue to improve our services. Please take a few minutes to complete the written survey that you may receive in the mail after your visit with us. Thank you!             Your Updated Medication List - Protect others around you: Learn how to safely use, store and throw away your medicines at www.disposemymeds.org.          This list is accurate as of: 9/29/17  4:22 PM.  Always use your most recent med list.                   Brand Name Dispense Instructions for use Diagnosis    atorvastatin 10 MG tablet    LIPITOR    90 tablet    Take 1 tablet (10 mg) by mouth daily    Hyperlipidemia LDL goal <100, Family history of early CAD       azithromycin 250 MG tablet    ZITHROMAX    6 tablet    2 tabs first day, then 1 tab by mouth daily for 4 additional days    Acute recurrent frontal sinusitis       cetirizine 10 MG tablet    zyrTEC    30 tablet    Take 1 tablet (10 mg) by mouth every evening    Otalgia, unspecified       ibuprofen 800 MG tablet    ADVIL/MOTRIN    90 tablet    TAKE 1 TABLET EVERY 8 HOURSAS NEEDED FOR MODERATE     PAIN (FOR BURSITIS)    Generalized pain       MULTIVITAMIN ADULT PO      Take by mouth daily        OMEGA 3 PO           TURMERIC PO      Take 2 tablets by mouth daily        VIVELLE-DOT 0.1 MG/24HR BIW patch   Generic drug:  estradiol     24 patch    PLACE 1 PATCH ONTO THE SKIN TWICE A WEEK    Hot flashes, Symptomatic menopausal or female climacteric states       zolpidem 10 MG tablet    AMBIEN    15 tablet    TAKE ONE-HALF TABLET BY MOUTH EVERY EVENING AS NEEDED FOR SLEEP    Insomnia,  unspecified type

## 2017-09-29 NOTE — PROGRESS NOTES
SUBJECTIVE:                                                    Whitney Aceves is a 56 year old female who presents to clinic today for the following health issues:    Hyperlipidemia Follow-Up      Rate your low fat/cholesterol diet?: good - usually a pretty good eater    Taking statin?  No    Other lipid medications/supplements?:  Fish oil/Omega 3, without side effects  Recent Labs   Lab Test  17   0847  17   0943   12/13/10   1026  06/21/10   1022   CHOL  247*  235*   < >  215*  238*   HDL  64  57   < >  61  63   LDL  151*  136*   < >  135*  143*   TRIG  162*  210*   < >  96  164*   CHOLHDLRATIO   --    --    --   3.5  3.8    < > = values in this interval not displayed.       Lab Results   Component Value Date    CHOL 247 2017    CHOL 235 2017     Lab Results   Component Value Date    HDL 64 2017    HDL 57 2017     Lab Results   Component Value Date     2017     2017     Lab Results   Component Value Date    TRIG 162 2017    TRIG 210 2017     Lab Results   Component Value Date    CHOLHDLRATIO 3.5 2010    CHOLHDLRATIO 3.8 2010     Sister had 2 vessel cabg at age 55 and 3 different MI's since age 55 and just yesterday had 3 new stents placed.   Strong family hx of hypercholesterolemia and early CAD - dad  of MI at age 45.       Joint Pain:      Onset: x2-3 years, pain has been worse over the past month since shingles SQ shot in posterior left deltoid area on 2017.     Description:   Location: left shoulder and back of left arm.   Character: Dull ache    Intensity: usually mild but sometimes is severe    Progression of Symptoms: worse    Accompanying Signs & Symptoms:  Other symptoms: radiation of pain to left elbow    History:   Previous similar pain: no       Precipitating factors:   Trauma or overuse: YES- with working, is always in airports with luggage. Also got a shingle shot last month and feels like the pain has been  worse since then.     Alleviating factors:  Improved by: Ibuprofen    Therapies Tried and outcome: Ibuprofen    Patient Active Problem List   Diagnosis     Panic disorder without agoraphobia     Anxiety     Overactive bladder     Hyperlipidemia LDL goal <100     Advanced directives, counseling/discussion     Gastroesophageal reflux disease without esophagitis     Symptomatic menopausal or female climacteric states     Fibrocystic breast changes of both breasts     Primary osteoarthritis of both hands       Current Outpatient Prescriptions   Medication Sig Dispense Refill     Multiple Vitamins-Minerals (MULTIVITAMIN ADULT PO) Take by mouth daily       Omega-3 Fatty Acids (OMEGA 3 PO)        TURMERIC PO Take 2 tablets by mouth daily       atorvastatin (LIPITOR) 10 MG tablet Take 1 tablet (10 mg) by mouth daily 90 tablet 1     VIVELLE-DOT 0.1 MG/24HR BIW patch PLACE 1 PATCH ONTO THE SKIN TWICE A WEEK 24 patch 3     zolpidem (AMBIEN) 10 MG tablet TAKE ONE-HALF TABLET BY MOUTH EVERY EVENING AS NEEDED FOR SLEEP 15 tablet 0     ibuprofen (ADVIL/MOTRIN) 800 MG tablet TAKE 1 TABLET EVERY 8 HOURSAS NEEDED FOR MODERATE     PAIN (FOR BURSITIS) 90 tablet 3     cetirizine (ZYRTEC) 10 MG tablet Take 1 tablet (10 mg) by mouth every evening 30 tablet 1     azithromycin (ZITHROMAX) 250 MG tablet 2 tabs first day, then 1 tab by mouth daily for 4 additional days (Patient not taking: Reported on 3/1/2017) 6 tablet 2          Allergies   Allergen Reactions     Septra [Sulfamethoxazole W/Trimethoprim]      Sulfa Drugs Hives     Murine Ear [Carbamide Peroxide] Rash              Amount of exercise or physical activity: 2.5 miles every day and is going to start strength training soon    Problems taking medications regularly: No    Medication side effects: none    Diet: eating healthier       Problem list and histories reviewed & adjusted, as indicated.  Additional history: as documented    Reviewed and updated as needed this visit by  clinical staffTobacco  Allergies  Meds       Reviewed and updated as needed this visit by Provider         ROS:   ROS: 12 point ROS neg other than the symptoms noted above    OBJECTIVE:                                                    /76 (BP Location: Left arm, Patient Position: Chair, Cuff Size: Adult Large)  Pulse 70  Temp 97.8  F (36.6  C) (Oral)  Wt 164 lb (74.4 kg)  SpO2 98%  BMI 27.93 kg/m2  Body mass index is 27.93 kg/(m^2).   GENERAL: healthy, alert, well nourished, well hydrated, no distress  HENT: ear canals- normal; TMs- normal; Nose- normal; Mouth- no ulcers, no lesions  NECK: no tenderness, no adenopathy, no asymmetry, no masses, no stiffness; thyroid- normal to palpation  RESP: lungs clear to auscultation - no rales, no rhonchi, no wheezes  CV: regular rates and rhythm, normal S1 S2, no S3 or S4 and no murmur, no click or rub -  ABDOMEN: soft, no tenderness, no  hepatosplenomegaly, no masses, normal bowel sounds  MS: extremities- no gross deformities noted, no edema, mildly tender in the sulcus soft-tissues posterior to the left deltoid muscle. No mass is palpated.   Shoulder exam - both sides normal; full range of motion, no pain on motion, no tenderness or deformity noted. No scapular spine or clavicular tenderness or crepitus. No impingement or apprehension is noted. Muscle strength is 5/5 at the deltoid, biceps, supraspinatus and subscapularis, internal and external shoulder rotators,  strength and finger spread bilaterally.     Diagnostic test results:  No results found for this or any previous visit (from the past 24 hour(s)).  See [facility] orders.        ASSESSMENT/PLAN:                                                        ICD-10-CM    1. Hyperlipidemia LDL goal <100 E78.5 atorvastatin (LIPITOR) 10 MG tablet     AST     ALT     CK total     Lipid panel reflex to direct LDL   2. Family history of early CAD Z82.49 atorvastatin (LIPITOR) 10 MG tablet     Lipid panel reflex to  "direct LDL   3. Primary osteoarthritis of both hands M19.041     M19.042    4. Need for prophylactic vaccination and inoculation against influenza Z23 HC FLU VAC PRESRV FREE QUAD SPLIT VIR 3+YRS IM  [22404]          ADMIN VACCINE, FIRST [76564]   5. Need for Tdap vaccination Z23 TDAP VACCINE (ADACEL)     EA ADD'L VACCINE   6. Pain of left upper arm M79.622      Increase your omega-3's to 3000mg daily.   Increase your walking to 3 miles daily, if possible.    Start 81mg aspirin daily.   Establish an exercise regimen. Activity goal: 45 minutes 5 days a week. New exercise routine: cardiovascular workout on exercise equipment, walking and weightlifting. Diet regimen was discussed and plan is self-directed dieting: reduce calories, reduce portions, reduce processed  carbs, increase fruits/vegetables and avoid sweets and supervised diet program. Avoid artificial sweeteners and \"diet\" drinks and sodas.    Start atorvastatin 10mg nightly - recheck fasting lipids, ast , alt (liver function tests)  in 6-8 weeks.  I've entered future orders for this and you can do this as a lab only appointment.        Recheck with me in person in 6 months or sooner , if needed.  Either ice or heat to area of left arm pain for comfort- never sleep on a heating pad.  Ok for Ibuprofen 400mg with food every 4-6 hours as needed for pain . Please, call or return to clinic or go to the ER immediately if signs or symptoms worsen or fail to improve as anticipated.     gave stretching and strengthening handouts for the left upper arm.   See Patient Instructions    Spent  37 minutes on pt care today outside of preventative care. All face to face time from 3:45pm  to 4:22pm.  Greater than 50% of time spent in coordination of care/counseling today re:  1. Pain of left upper arm    2. Hyperlipidemia LDL goal <100    3. Family history of early CAD    4. Primary osteoarthritis of both hands    5. Need for prophylactic vaccination and inoculation against " influenza    6. Need for Tdap vaccination         .         Lisbeth Gonzalez MD    [facility]

## 2017-09-29 NOTE — PATIENT INSTRUCTIONS
"Increase your omega-3's to 3000mg daily.   Start 81mg aspirin daily with food and warm liquid.     Increase your walking to 3 miles daily, if possible.   Establish an exercise regimen. Activity goal: 45 minutes 5 days a week. New exercise routine: cardiovascular workout on exercise equipment, walking and weightlifting. Diet regimen was discussed and plan is self-directed dieting: reduce calories, reduce portions, reduce processed  carbs, increase fruits/vegetables and avoid sweets and supervised diet program. Avoid artificial sweeteners and \"diet\" drinks and sodas.    Start atorvastatin 10mg nightly - recheck fasting lipids, ast , alt (liver function tests)  in 6-8 weeks.  I've entered future orders for this and you can do this as a lab only appointment.        Recheck with me in person in 6 months or sooner , if needed.  Either ice or heat to area of left arm pain for comfort- never sleep on a heating pad.  Ok for Ibuprofen 400mg with food every 4-6 hours as needed for pain . Please, call or return to clinic or go to the ER immediately if signs or symptoms worsen or fail to improve as anticipated.     gave stretching and strengthening handouts for the left upper arm.   See Patient Instructions.                      Warning Signs of Heart Disease  Why are warning signs of heart disease important?   Heart disease is the leading cause of death in the [place]. Often there are symptoms of heart disease years before you have major heart problems. Ignoring warning signs of heart disease can be fatal. It can also lead to years of unnecessary disability.   The signs of having a heart attack are not the same for everyone, and symptoms of a heart attack can be a lot like symptoms of other problems. However, some common warning signs are chest pain, shortness of breath, swelling in the legs and feet, leg pain with walking, high blood pressure, and high cholesterol. If you pay attention to these possible signs of heart " "disease and get treatment, you may prevent a serious problem later.   Chest pain (angina)   Angina is pain, tightness, or pressure in your chest. It happens when your heart muscles are not getting enough oxygen. The symptoms of angina may vary from person to person. It can be a discomfort in the chest that lasts for several minutes, or that goes away but keeps coming back. It can feel like uncomfortable pressure, squeezing, or viselike pain. There may be discomfort in other areas of the upper body, such as in one or both upper arms, the back, neck, or jaw. Angina can also feel like stomach indigestion or heartburn.   Most angina happens when you exert yourself physically and goes away when you rest. A type of angina that comes on unexpectedly, particularly when you are at rest, is called unstable angina. Unstable angina is much more serious. It may mean that without immediate medical attention a heart attack will soon occur.   It is possible to have a heart attack with no warning and no pain, but many people have angina for days, weeks, or months before a heart attack. If you think you have been having angina, you should see your healthcare provider right away. Follow your provider's advice for control of blood pressure, cholesterol, smoking, diet, exercise, weight, and stress. This may help prevent a heart attack.   You need to call 911 for emergency help if:   You are having chest pain at rest.   You are having chest pain that doesn't stop when you quit your activity.   The pain is getting worse over a period of 5 minutes or goes away and then quickly comes back again.   Getting emergency help is especially urgent if you are having other symptoms such as nausea, lightheadedness, sweating, and shortness of breath as well as chest pain.   Shortness of breath   Shortness of breath is the most common symptom of heart failure. Heart failure occurs when the heart muscle is unable to (\"fails to\") pump enough blood to " meet the body's needs. The blood begins to back up because the heart is not pumping well. The veins, tissues, and lungs become congested with fluid. This can make it hard for you to breathe.   If heart failure is not treated, it will get worse. If you begin to get breathless going upstairs, or after less and less exercise, or if you need to be propped by more pillows to breathe comfortably in bed, see your provider as soon as possible.   Shortness of breath from heart failure usually happens gradually over a long period of time. Sudden shortness of breath, even without chest pain, can mean a heart attack. There are usually other symptoms with a heart attack, such as nausea, sweating, and lightheadedness, but sudden shortness of breath is an emergency, even when there are no other symptoms.   Swelling (edema) in the legs and feet   Many people have leg swelling from causes other than heart disease. However, the collection of fluid in your legs can be a warning sign of heart problems. This is especially true if you have other symptoms, such as shortness of breath, especially with activity. You may have swelling in your abdomen, too. Tell your healthcare provider if your legs, ankles, or feet have become swollen.   Pain in the legs with walking (claudication)   Pain that occurs in the calf muscles when you walk can be a sign of heart and blood vessel disease. This type of pain happens only with activity and stops a minute or two after you stop the activity. It occurs when your muscles are not getting enough oxygen because of blocked arteries. Blocked leg arteries may mean there are also blockages in the heart (coronary) arteries.   High blood pressure and high blood cholesterol   High blood pressure and high blood cholesterol are both warning signs of possible heart problems. You usually cannot tell if you have high blood pressure or high blood cholesterol without measuring your blood pressure or testing your blood.  Both measurements may be done at health checkups. A blood pressure machine may be available in your local pharmacy. High blood pressure and high blood cholesterol can be treated by your healthcare provider.   High blood pressure also greatly increases your chances of having a stroke. Lowering your blood pressure decreases your risk of having a stroke.   What should I do if have warning signs of heart disease?   If you have any of these warning signs, see your healthcare provider. Your provider will ask about your personal and family medical history, examine you, and do the recommended tests to check for heart disease. You can reduce your risk of heart attack or other problems caused by heart disease by following the treatment recommended by your provider.     Published by [employer].  This content is reviewed periodically and is subject to change as new health information becomes available. The information is intended to inform and educate and is not a replacement for medical evaluation, advice, diagnosis or treatment by a healthcare professional.   Developed by Leslie Spence MD.   ? 2010 [employer] and/or its affiliates. All Rights Reserved.   Copyright   [employer] 2011    Taking Aspirin Every Day  The basics  Taking aspirin every day can lower your risk of heart attack and stroke. Ask your doctor about taking aspirin if you:    Are a man age 45 or older    Are a woman age 55 or older    Smoke    Have high blood pressure, high cholesterol or diabetes    Have a family history of heart disease    Have already had a heart attack or stroke  For most people, aspirin is safe. But it's not right for everyone. Talk to your doctor before you start taking aspirin every day.  The benefits  Aspirin can reduce your risk of heart attack or stroke. It can:    Improve the flow of blood to the heart and brain    Help keep your arteries open if you have had a stroke or angioplasty  If you have already had a heart  "attack or stroke, daily aspirin can lower your risk of having another one.  Take action!  Your doctor can help you decide if aspirin is the right choice for you. Talk to your doctor about:    Your risk of heart attack    What kind of aspirin to take    How much to take    How often to take it  Be sure to tell your doctor about all the other medicines that you take, including vitamins.   For informational purposes only. Not to replace the advice of your health care provider. From \"Talk with Your Doctor about Taking Aspirin Every Day,\" by the U.[place]. Dept. of Health and Human Services (www.healthfinder.gov). [employer] 323904 - Rev 03/16.    Eating Heart-Healthy Foods  Eating has a big impact on your heart health. In fact, eating healthier can improve several of your heart risks at once. For instance, it helps you manage weight, cholesterol, and blood pressure. Here are ideas to help you make heart-healthy changes without giving up all the foods and flavors you love.  Getting started    Talk with your health care provider about eating plans, such as the DASH or Mediterranean diet. You may also be referred to a dietitian.    Change a few things at a time. Give yourself time to get used to a few eating changes before adding more.    Work to create a tasty, healthy eating plan that you can stick to for the rest of your life.    Goals for healthy eating  Below are some tips to improve your eating habits:    Limit saturated fats and trans fats. Saturated fats raise your levels of cholesterol, so keep these fats to a minimum. They are found in foods such as fatty meats, whole milk, cheese, and palm and coconut oils. Avoid trans fats because they lower good cholesterol as well as raise bad cholesterol. Trans fats are most often found in processed foods.    Reduce sodium (salt) intake. Eating too much salt may increase your blood pressure. Limit your sodium intake to 2,300 milligrams (mg) per day, or less if your health care " provider recommends it. Dining out less often and eating fewer processed foods are two great ways to decrease the amount of salt you consume.    Managing calories. A calorie is a unit of energy. Your body burns calories for fuel, but if you eat more calories than your body burns, the extras are stored as fat. Your health care provider can help you create a diet plan to manage your calories. This will likely include eating healthier foods as well as exercising regularly. To help you track your progress, keep a diary to record what you eat and how often you exercise.  Choose the right foods  Aim to make these foods staples of your diet. If you have diabetes, you may have different recommendations than what is listed here:    Fruits and vegetable provide plenty of nutrients without a lot of calories. At meals, fill half your plate with these foods. Split the other half of your plate between whole grains and lean protein.    Whole grains are high in fiber and rich in vitamins and nutrients. Good choices include whole-wheat bread, pasta, and brown rice.    Lean proteins give you nutrition with less fat. Good choices include fish, skinless chicken, and beans.    Low-fat or nonfat dairy provides nutrients without a lot of fat. Try low-fat or nonfat milk, cheese, or yogurt.    Healthy fats can be good for you in small amounts. These are unsaturated fats, such as olive oil, nuts, and fish. Try to have at least 2 servings per week of fatty fish such as salmon, sardines, mackerel, rainbow trout, and albacore tuna. These contain omega-3 fatty acids, which are good for your heart. Flaxseed is another source of a heart-healthy fat.  More on heart healthy eating    Read food labels  Healthy eating starts at the grocery store. Be sure to pay attention to food labels on packaged foods. Look for products that are high in fiber and protein, and low in saturated fat, cholesterol, and sodium. Avoid products that contain trans fat. And  pay close attention to serving size. For instance, if you plan to eat two servings, double all the numbers on the label.  Prepare food right  A key part of healthy cooking is cutting down on added fat and salt. Look on the internet for lower-fat, lower-sodium recipes. Also, try these tips:    Remove fat from meat and skin from poultry before cooking.    Skim fat from the surface of soups and sauces.    Broil, boil, bake, steam, grill, and microwave food without added fats.    Choose ingredients that spice up your food without adding calories, fat, or sodium. Try these items: horseradish, hot sauce, lemon, mustard, nonfat salad dressings, and vinegar. For salt-free herbs and spices, try basil, cilantro, cinnamon, pepper, and rosemary.  Date Last Reviewed: 6/25/2015 2000-2017 [employer]. [address] [place] [address]. All rights reserved. This information is not intended as a substitute for professional medical care. Always follow your healthcare professional's instructions.        Women and Heart Disease: Tips for Making Small Changes  Making even one lifestyle change for your heart reduces your risk for heart disease. Change is hard for everyone, so take it one step at a time. Here are some tips to help you get started on making changes that are good for your heart.    Make a plan  Trying to do too much too fast can end in failure:    Start by writing down all the things you d like to do to lower your risks.    Break each one into small steps. If you said,  Cut down on fat,  a small step could be to use fruit spread instead of butter on your toast. Or to take soup and a roll for lunch instead of going out for a hamburger and fries.    Decide which step you d like to take first. Then choose a second and a third step.    Check off each step as you achieve it. Add new steps as you go along.    Set a deadline to meet each of your steps and help you stick with the new rule you have made for  yourself.    If a step isn t working, try another. Come back to the first one later.  Keep records  Keeping records helps you know your habits and see your successes:    Keeping an exercise record can help you see your progress and keep you going. Try logging your activities every week. This will give you a chance to look back at the end of the week and change as you need to.    Keeping food records can help you see your eating patterns and plan ways to make small changes. Try writing down the foods you eat each day. You will find it easier to be more consistent in making healthy choices.    Noting when you feel stressed or want to smoke can help you think of ways to avoid these triggers. Write down a list of activities you would rather do to not give into these impulses.  Reward yourself  Making changes isn t easy. You deserve to reward yourself when you succeed. Just making the change may be its own reward. But why not give yourself an extra pat on the back?    Give yourself something special you ve been wanting.    Do something that you ve always promised yourself you d do, such as going dancing.    Treat yourself to an activity you'll enjoy after a successful week.  Date Last Reviewed: 2/1/2017 2000-2017 [employer]. [address] [place] [address], [place] [address]. All rights reserved. This information is not intended as a substitute for professional medical care. Always follow your healthcare professional's instructions.        Women and Heart Disease: Understanding the Risks  Risk factors are habits and conditions that make heart disease more likely. The more you have, the higher your chances of heart attack, also known as acute myocardial infarction, or AMI, and other problems. You can manage most risk factors to help make your heart healthier. Below are factors that increase your risk for having heart disease.    Smoking  This is the most important risk factor you can change. Smoking causes  "inflammation and damages the smooth muscle that lines the arteries making them less flexible. It also raises your blood pressure, causing further damage to the artery lining. Smoking also increased your risk that your blood may clot, block an artery, and lead to a heart attack or stroke. Smoking also damages your lungs, which can affect the delivery of oxygen to the body. Research shows that smoking makes women up to 6 times more likely to have a heart attack. It's also important to avoid secondhand smoke (smoke from other people s tobacco products). If you smoke, it's never too late to improve your heart. Ask your doctor about nicotine replacement products and smoking cessation counseling.  Diabetes  Diabetes causes high blood sugar, which can damage blood vessels if not kept under control. Having diabetes also makes you more likely to have a silent heart attack--one without any symptoms. This occurs because long periods of high sugar levels in your blood eventually degrade nerve conduction which reduces your sensation. This translates to decreased chest pain than would be felt in a person without diabetes during a heart attack. You re at risk if your blood sugar level is above 100 mg/dL.  High cholesterol  Cholesterol is a fat-like substance in your blood. It can build up along the artery walls. This is called plaque. Over time, plaque narrows the arteries and reduces blood flow to your heart or brain. If a blood clot forms or a piece of the plaque breaks off, it can completely block the artery and cause a heart attack or stroke. Your risk of heart disease goes up if you have high levels of LDL (\"bad\") cholesterol or triglycerides (another fatty substance that can build up). You re also at risk if you have low HDL cholesterol (\"good\") cholesterol. HDL helps clear the bad cholesterol away. You re at risk if you have:  HDL cholesterol 50 mg/dL or lower; LDL cholesterol 100 mg/dL or higher; triglycerides of 150 mg/dL " or higher.  High blood pressure  High blood pressure occurs when blood pushes too hard against artery walls. This causes damage to the artery walls and then the formation of scar tissue as it heals. This makes the arteries stiff and weak. Plaque sticks to the scarred tissue narrowing and hardening the arteries. High blood pressure also causes your heart to work harder to get blood out to the body. High blood pressure raises your risk of heart attack, also known as acute myocardial infarction, or AMI, and especially stroke. The brain tissue is especially sensitive to high blood pressure damage. You re at risk if your blood pressure is 120/80 or higher.  Excess weight  Excess weight makes your heart work harder. This raises your risk of a heart attack. Being overweight also puts you at risk of developing diabetes and high blood pressure. Excess weight around the waist or stomach increases your risk the most. Being obese puts you at risk for developing heart disease.  Lack of exercise  Without regular exercise, you re more likely to develop other risk factors, such as being overweight and developing diabetes. High blood pressure and unhealthy lipid levels are also more likely. Exercise promotes good blood flow and ensures your heart is able to meet the demands placed on it.  Negative emotions  Emotions such as stress and pent-up anger have been linked to heart disease. Over time, these emotions could raise your heart disease risk. If you have heart disease, emotion such as anxiety and depression can make it worse. Managing these emotions is important as they have been shown to reduce hormones that increase stress on the heart in the long run.  Metabolic syndrome  This is caused by a combination of certain risk factors. It puts you at extra high risk of heart disease, stroke, and diabetes. You have metabolic syndrome if you have 3 or more of the following: low HDL cholesterol; high triglycerides; high blood pressure;  high blood sugar; extra weight around the waist.  Risks you can t control  A few risk factors can t be changed. But they still raise your heart disease risk.    Family history. If your mother or sister had heart trouble before age 65 or your father or brother before age 55, you re at higher risk of having a heart attack.    Age. The older you are, the higher your heart disease risk.     For more information    www.smokefree.gov/esde-fu-hu-expert    www.women.smokefree.gov    National [employer] [place] Smoking Quitline: 877-44U-QUIT (495-967-4134)      Date Last Reviewed: 4/8/2016 2000-2017 [employer]. [address] [place] [address], [place] [id number]. All rights reserved. This information is not intended as a substitute for professional medical care. Always follow your healthcare professional's instructions.        Women and Heart Disease: What Women Need to Know  You may be surprised to learn that heart disease is the biggest threat to your health--even more so than breast cancer. And the same factors that put you at risk of a heart attack, also known as acute myocardial infarction, or AMI, also increase your chances of stroke and other health problems. The main risk factors include high blood pressure, smoking, poor diet, diabetes, family history, obesity, and sedentary lifestyle. If your heart is in trouble, your body may send you warning signs. It s up to you to notice these and talk to your healthcare provider about them. Your health--and your life--could depend on it.    Learn to read the signs  When your heart isn t getting enough oxygen, you may experience a feeling called angina. It can be a sign that you are at risk for having a heart attack.  Angina is often referred to as  chest pain,  but this can be misleading. It s not always painful, and it s not always in the chest. Many women have other symptoms along with--or instead of--chest pain or discomfort. Talk to your healthcare provider if you  notice any of the following:    Discomfort, aching, tightness, or pressure that comes and goes, in the back, abdomen, arm, shoulder, neck, or jaw or chest    Feeling much more tired than usual, for no clear reason    Becoming breathless while doing something that used to be easy    Heartburn, nausea, or a burning feeling that seems unrelated to food    Lightheadedness or faintness  Get to the heart of the problem  Women often don t realize their symptoms could be related to heart trouble. Even some healthcare providers don t make the connection. If you feel any of the symptoms listed here, see your healthcare provider and ask to be tested for heart disease--even if you re not sure that s the cause. Tests, such as a stress echocardiogram and nuclear imaging, will reveal more about the problem. If your symptoms are heart-related, your healthcare provider will start treatment.  Hormone therapy is not the answer  Healthcare providers used to think that older women could reduce their heart disease risk by taking hormones in pill form (hormone therapy, or HT). It turns out that s not true. In fact, HT could actually increase your risk of having a heart attack or stroke. Talk to your healthcare provider about the risks and benefits of HT. It may be prescribed for other health problems. But it should not be taken to prevent or treat heart disease.     Is it angina or a heart attack?  Angina that occurs on exertion and goes away after a few minutes of rest or with medicine is considered stable angina. Unstable angina is unpredictable or unexpected angina symptoms that do not resolve, or go away and come back. This is a medical emergency and could be a sign of an active heart attack. Call 911 right away!   Date Last Reviewed: 4/8/2016 2000-2017 [employer]. [address] [place] [address], [place] [address]. All rights reserved. This information is not intended as a substitute for professional medical care. Always  follow your healthcare professional's instructions.               Thank you for choosing [facility]  for your Health Care. It was a pleasure seeing you at your visit today. Please contact us with any questions or concerns you may have.                   Lisbeth Gonzalez MD                                  To reach your [facility] care team after hours call:   [phone]    Our clinic hours are:     Monday- 7:30 am - 7:00 pm                             Tuesday through Friday- 7:30 am - 5:00 pm                                        Saturday- 8:00 am - 12:00 pm                  Phone:  [phone]    Our pharmacy hours are:     Monday  8:00 am to 7:00 pm      Tuesday through Friday 8:00am to 6:00pm                        Saturday - 9:00 am to 1:00 pm      Sunday : Closed.              Phone:  [phone]      There is also information available at our web site:  [email or web address]    If your provider ordered any lab tests and you do not receive the results within 10 business days, please call the clinic.    If you need a medication refill please contact your pharmacy.  Please allow 2 business days for your refill to be completed.    Our clinic offers telephone visits and e visits.  Please ask one of your team members to explain more.      Use MyChart (secure email communication and access to your chart) to send your primary care provider a message or make an appointment. Ask someone on your Team how to sign up for [employer].

## 2018-01-01 NOTE — TELEPHONE ENCOUNTER
Pt has not received an extension for this Rx yet. No appt currently scheduled. Per [facility], one month Rx sent to pharmacy.   2018 13:41

## 2018-02-19 ENCOUNTER — RADIANT APPOINTMENT (OUTPATIENT)
Dept: GENERAL RADIOLOGY | Facility: CLINIC | Age: 58
End: 2018-02-19
Attending: ORTHOPAEDIC SURGERY
Payer: COMMERCIAL

## 2018-02-19 ENCOUNTER — OFFICE VISIT (OUTPATIENT)
Dept: ORTHOPEDICS | Facility: CLINIC | Age: 58
End: 2018-02-19
Payer: COMMERCIAL

## 2018-02-19 VITALS
DIASTOLIC BLOOD PRESSURE: 72 MMHG | SYSTOLIC BLOOD PRESSURE: 104 MMHG | WEIGHT: 163 LBS | BODY MASS INDEX: 27.83 KG/M2 | HEIGHT: 64 IN

## 2018-02-19 DIAGNOSIS — M25.562 CHRONIC PAIN OF LEFT KNEE: Primary | ICD-10-CM

## 2018-02-19 DIAGNOSIS — G89.29 CHRONIC PAIN OF LEFT KNEE: Primary | ICD-10-CM

## 2018-02-19 DIAGNOSIS — G89.29 CHRONIC PAIN OF LEFT KNEE: ICD-10-CM

## 2018-02-19 DIAGNOSIS — M25.562 CHRONIC PAIN OF LEFT KNEE: ICD-10-CM

## 2018-02-19 PROCEDURE — 99213 OFFICE O/P EST LOW 20 MIN: CPT | Performed by: ORTHOPAEDIC SURGERY

## 2018-02-19 PROCEDURE — 73562 X-RAY EXAM OF KNEE 3: CPT | Mod: LT | Performed by: ORTHOPAEDIC SURGERY

## 2018-02-19 NOTE — NURSING NOTE
"Chief Complaint   Patient presents with     Knee Pain     Left knee       Initial /72 (BP Location: Right arm, Patient Position: Sitting, Cuff Size: Adult Regular)  Ht 5' 4.1\" (1.628 m)  Wt 163 lb (73.9 kg)  BMI 27.89 kg/m2 Estimated body mass index is 27.89 kg/(m^2) as calculated from the following:    Height as of this encounter: 5' 4.1\" (1.628 m).    Weight as of this encounter: 163 lb (73.9 kg).  Medication Reconciliation: complete    "

## 2018-02-19 NOTE — LETTER
"    2/19/2018         RE: Whitney Aceves  [address] [facility] [address]        Dear Colleague,    Thank you for referring your patient, Whitney Aceves, to the [facility]. Please see a copy of my visit note below.    HISTORY OF PRESENT ILLNESS:    Whitney Aceves is a 57 year old female who is seen today for left knee pain.  Present symptoms: Pt states left knee pain has been present daily for the past month.  Pt states stairs are very painful, feels like a stabbing pain, pt states she has lost her balance as a result of the knee pain.  Pt states it is really only when going up stairs.  Treatments tried to this point: ibuprofen, knee brace  Past Medical History: Unchanged from the visit of 3/1/2017. Please refer to that note.    REVIEW OF SYSTEMS:  CONSTITUTIONAL:  NEGATIVE for fever, chills, change in weight  INTEGUMENTARY/SKIN:  NEGATIVE for worrisome rashes, moles or lesions  EYES:  NEGATIVE for vision changes or irritation  ENT/MOUTH:  NEGATIVE for ear, mouth and throat problems  RESP:  NEGATIVE for significant cough or SOB  BREAST:  NEGATIVE for masses, tenderness or discharge  CV:  NEGATIVE for chest pain, palpitations or peripheral edema  GI:  NEGATIVE for nausea, abdominal pain, heartburn, or change in bowel habits  :  Negative   MUSCULOSKELETAL:  See HPI above  NEURO:  Paresthesias - knee  ENDOCRINE:  NEGATIVE for temperature intolerance, skin/hair changes  HEME/ALLERGY/IMMUNE:  NEGATIVE for bleeding problems  PSYCHIATRIC:  NEGATIVE for changes in mood or affect    PHYSICAL EXAM:  /72 (BP Location: Right arm, Patient Position: Sitting, Cuff Size: Adult Regular)  Ht 5' 4.1\" (1.628 m)  Wt 163 lb (73.9 kg)  BMI 27.89 kg/m2  Body mass index is 27.89 kg/(m^2).   GENERAL APPEARANCE: healthy, alert and no distress   SKIN: no suspicious lesions or rashes  NEURO: Normal strength and tone, mentation intact and speech normal  VASCULAR:  good pulses, and cappillary refill "   LYMPH: no lymphadenopathy   PSYCH:  mentation appears normal and affect normal/bright    MSK:  Minimal limping  No ecchymosis, warmth or erythema, left knee  Range of motion is full and symmetrical  Mild patellofemoral crepitus, left knee  Pain with patellofemoral compression  Well defined medial joint line pain  Elly's is negative  Ligaments are stable throughout the knee    IMAGING INTERPRETATION:  Unremarkable other than mild patellofemoral degenerative changes     ASSESSMENT:  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  In addition to the patellofemoral degenerative changes, there is some concern for medial meniscus tear pathology.  We recommended further evaluation with MRI scan this point.  We will talk about possible knee arthroscopy versus cortisone injection to address er symptoms.  Follow-up after MRI scan.           Domenic Agustin MD  Dept. Orthopedic Surgery  [facility]       Disclaimer: This note consists of symbols derived from keyboarding, dictation and/or voice recognition software. As a result, there may be errors in the script that have gone undetected. Please consider this when interpreting information found in this chart.      Again, thank you for allowing me to participate in the care of your patient.        Sincerely,        Dmoenic Agustin MD

## 2018-02-19 NOTE — PROGRESS NOTES
"HISTORY OF PRESENT ILLNESS:    Whitney Aceves is a 57 year old female who is seen today for left knee pain.  Present symptoms: Pt states left knee pain has been present daily for the past month.  Pt states stairs are very painful, feels like a stabbing pain, pt states she has lost her balance as a result of the knee pain.  Pt states it is really only when going up stairs.  Treatments tried to this point: ibuprofen, knee brace  Past Medical History: Unchanged from the visit of 3/1/2017. Please refer to that note.    REVIEW OF SYSTEMS:  CONSTITUTIONAL:  NEGATIVE for fever, chills, change in weight  INTEGUMENTARY/SKIN:  NEGATIVE for worrisome rashes, moles or lesions  EYES:  NEGATIVE for vision changes or irritation  ENT/MOUTH:  NEGATIVE for ear, mouth and throat problems  RESP:  NEGATIVE for significant cough or SOB  BREAST:  NEGATIVE for masses, tenderness or discharge  CV:  NEGATIVE for chest pain, palpitations or peripheral edema  GI:  NEGATIVE for nausea, abdominal pain, heartburn, or change in bowel habits  :  Negative   MUSCULOSKELETAL:  See HPI above  NEURO:  Paresthesias - knee  ENDOCRINE:  NEGATIVE for temperature intolerance, skin/hair changes  HEME/ALLERGY/IMMUNE:  NEGATIVE for bleeding problems  PSYCHIATRIC:  NEGATIVE for changes in mood or affect    PHYSICAL EXAM:  /72 (BP Location: Right arm, Patient Position: Sitting, Cuff Size: Adult Regular)  Ht 5' 4.1\" (1.628 m)  Wt 163 lb (73.9 kg)  BMI 27.89 kg/m2  Body mass index is 27.89 kg/(m^2).   GENERAL APPEARANCE: healthy, alert and no distress   SKIN: no suspicious lesions or rashes  NEURO: Normal strength and tone, mentation intact and speech normal  VASCULAR:  good pulses, and cappillary refill   LYMPH: no lymphadenopathy   PSYCH:  mentation appears normal and affect normal/bright    MSK:  Minimal limping  No ecchymosis, warmth or erythema, left knee  Range of motion is full and symmetrical  Mild patellofemoral crepitus, left knee  Pain with " patellofemoral compression  Well defined medial joint line pain  Elly's is negative  Ligaments are stable throughout the knee    IMAGING INTERPRETATION:  Unremarkable other than mild patellofemoral degenerative changes     ASSESSMENT:  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  In addition to the patellofemoral degenerative changes, there is some concern for medial meniscus tear pathology.  We recommended further evaluation with MRI scan this point.  We will talk about possible knee arthroscopy versus cortisone injection to address er symptoms.  Follow-up after MRI scan.           Domenic Agustin MD  Dept. Orthopedic Surgery  [facility]       Disclaimer: This note consists of symbols derived from keyboarding, dictation and/or voice recognition software. As a result, there may be errors in the script that have gone undetected. Please consider this when interpreting information found in this chart.

## 2018-02-19 NOTE — PATIENT INSTRUCTIONS
Please call [facility] [phone] ([address], [address], [place], [place]) to schedule your appointment if you have not heard from them in two business days    Please follow up in clinic 2-3 business days following the MRI. Please call [phone] to schedule your follow up appointment.

## 2018-02-19 NOTE — MR AVS SNAPSHOT
After Visit Summary   2/19/2018    Whitney Aceves    MRN: [id number]           Patient Information     Date Of Birth          1960        Visit Information        Provider Department      2/19/2018 10:50 AM Domenic Agustin MD [facility]        Today's Diagnoses     Chronic pain of left knee    -  1      Care Instructions    Please call [facility] [phone] ([address], [address], [place], [place]) to schedule your appointment if you have not heard from them in two business days    Please follow up in clinic 2-3 business days following the MRI. Please call [phone] to schedule your follow up appointment.                Follow-ups after your visit        Who to contact     If you have questions or need follow up information about today's clinic visit or your schedule please contact [facility] directly at [phone].  Normal or non-critical lab and imaging results will be communicated to you by MyChart, letter or phone within 4 business days after the clinic has received the results. If you do not hear from us within 7 days, please contact the clinic through [employer] or phone. If you have a critical or abnormal lab result, we will notify you by phone as soon as possible.  Submit refill requests through [employer] or call your pharmacy and they will forward the refill request to us. Please allow 3 business days for your refill to be completed.          Additional Information About Your Visit        MyChart Information     [employer] gives you secure access to your electronic health record. If you see a primary care provider, you can also send messages to your care team and make appointments. If you have questions, please call your primary care clinic.  If you do not have a primary care provider, please call [phone] and they will assist you.        Care EveryWhere ID     This is your Care EveryWhere ID. This could be  "used by other organizations to access your [place] medical records  [id number]        Your Vitals Were     Height BMI (Body Mass Index)                5' 4.1\" (1.628 m) 27.89 kg/m2           Blood Pressure from Last 3 Encounters:   02/19/18 104/72   09/29/17 112/76   08/28/17 118/70    Weight from Last 3 Encounters:   02/19/18 163 lb (73.9 kg)   09/29/17 164 lb (74.4 kg)   08/28/17 163 lb (73.9 kg)               Primary Care Provider Office Phone # Fax #    Lisbeth Gonzalez [phone]       [address] [place] [address]        Equal Access to Services     MILTON GLASS : Alexa ribeiroo Sorenea, waaxda luqadaha, qaybta kaalmada adeegyada, kurtis bolaños. So [facility] [phone].    ATENCIÓN: Si habla español, tiene a romero disposición servicios gratuitos de asistencia lingüística. [facility] [phone].    We comply with applicable federal civil rights laws and [place] laws. We do not discriminate on the basis of race, color, national origin, age, disability, sex, sexual orientation, or gender identity.            Thank you!     Thank you for choosing [facility]  for your care. Our goal is always to provide you with excellent care. Hearing back from our patients is one way we can continue to improve our services. Please take a few minutes to complete the written survey that you may receive in the mail after your visit with us. Thank you!             Your Updated Medication List - Protect others around you: Learn how to safely use, store and throw away your medicines at www.disposemymeds.org.          This list is accurate as of 2/19/18 10:59 AM.  Always use your most recent med list.                   Brand Name Dispense Instructions for use Diagnosis    atorvastatin 10 MG tablet    LIPITOR    90 tablet    Take 1 tablet (10 mg) by mouth daily    Hyperlipidemia LDL goal <100, Family history of early CAD       azithromycin 250 " MG tablet    ZITHROMAX    6 tablet    2 tabs first day, then 1 tab by mouth daily for 4 additional days    Acute recurrent frontal sinusitis       cetirizine 10 MG tablet    zyrTEC    30 tablet    Take 1 tablet (10 mg) by mouth every evening    Otalgia, unspecified       * ADVIL PO      Take 400 mg by mouth as needed for moderate pain        * ibuprofen 800 MG tablet    ADVIL/MOTRIN    90 tablet    TAKE 1 TABLET EVERY 8 HOURSAS NEEDED FOR MODERATE     PAIN (FOR BURSITIS)    Generalized pain       MULTIVITAMIN ADULT PO      Take by mouth daily        OMEGA 3 PO           VIVELLE-DOT 0.1 MG/24HR BIW patch   Generic drug:  estradiol     24 patch    PLACE 1 PATCH ONTO THE SKIN TWICE A WEEK    Hot flashes, Symptomatic menopausal or female climacteric states       zolpidem 10 MG tablet    AMBIEN    15 tablet    TAKE ONE-HALF TABLET BY MOUTH EVERY EVENING AS NEEDED FOR SLEEP    Insomnia, unspecified type       * Notice:  This list has 2 medication(s) that are the same as other medications prescribed for you. Read the directions carefully, and ask your doctor or other care provider to review them with you.

## 2018-02-21 ENCOUNTER — TELEPHONE (OUTPATIENT)
Dept: ORTHOPEDICS | Facility: CLINIC | Age: 58
End: 2018-02-21

## 2018-02-21 DIAGNOSIS — F41.1 GENERALIZED ANXIETY DISORDER: Primary | ICD-10-CM

## 2018-02-21 NOTE — TELEPHONE ENCOUNTER
Zeina called and left a message indicating she has her MRI scheduled for March 1.  She would like a sedative to    Cj Thakur ATC

## 2018-02-22 NOTE — TELEPHONE ENCOUNTER
Left message for patient to return call.   Need to know what pharmacy she would like valium sent and then can request from Dr. Agustin.     KISHORE Gary RN

## 2018-02-22 NOTE — TELEPHONE ENCOUNTER
Patient left voicemail returning call. She can be reached at [phone].     Left message for patient to return call. Asked that she give us permission to leave more detailed information in voicemail if we are unable to reach her. (no consent to communicate on file. )    KISHORE Gary RN

## 2018-02-22 NOTE — TELEPHONE ENCOUNTER
Patient returned call and LVM regarding sedative medication for upcoming MRI. [facility] in [place] has been selected as directed by the patient.     Preferred call back: [phone]    No information left of VM indicating okay to leave detailed message.     Caroline Rojo M.Ed., ATR, ATC

## 2018-02-23 RX ORDER — DIAZEPAM 10 MG
10 TABLET ORAL PRN
Qty: 1 TABLET | Refills: 0 | Status: SHIPPED | OUTPATIENT
Start: 2018-02-23 | End: 2018-03-08

## 2018-02-28 ENCOUNTER — TELEPHONE (OUTPATIENT)
Dept: FAMILY MEDICINE | Facility: CLINIC | Age: 58
End: 2018-02-28

## 2018-02-28 NOTE — TELEPHONE ENCOUNTER
Patient scheduled lab appt 3/2/18 per Dr. Gonzalez and needs a follow up OV f/u labs. Please call to schedule.  [phone] (home)   Thank you  Nya Zepeda

## 2018-03-01 ENCOUNTER — HOSPITAL ENCOUNTER (OUTPATIENT)
Dept: MRI IMAGING | Facility: CLINIC | Age: 58
Discharge: HOME | End: 2018-03-01
Attending: ORTHOPAEDIC SURGERY | Admitting: ORTHOPAEDIC SURGERY
Payer: COMMERCIAL

## 2018-03-01 DIAGNOSIS — G89.29 CHRONIC PAIN OF LEFT KNEE: ICD-10-CM

## 2018-03-01 DIAGNOSIS — M25.562 CHRONIC PAIN OF LEFT KNEE: ICD-10-CM

## 2018-03-01 PROCEDURE — 73721 MRI JNT OF LWR EXTRE W/O DYE: CPT | Mod: LT

## 2018-03-02 DIAGNOSIS — Z82.49 FAMILY HISTORY OF EARLY CAD: ICD-10-CM

## 2018-03-02 DIAGNOSIS — E78.5 HYPERLIPIDEMIA LDL GOAL <100: ICD-10-CM

## 2018-03-02 LAB
ALT SERPL W P-5'-P-CCNC: 29 U/L (ref 0–50)
AST SERPL W P-5'-P-CCNC: 21 U/L (ref 0–45)
CHOLEST SERPL-MCNC: 200 MG/DL
CK SERPL-CCNC: 66 U/L (ref 30–225)
HDLC SERPL-MCNC: 60 MG/DL
LDLC SERPL CALC-MCNC: 117 MG/DL
NONHDLC SERPL-MCNC: 140 MG/DL
TRIGL SERPL-MCNC: 116 MG/DL

## 2018-03-02 PROCEDURE — 80061 LIPID PANEL: CPT | Performed by: FAMILY MEDICINE

## 2018-03-02 PROCEDURE — 82550 ASSAY OF CK (CPK): CPT | Performed by: FAMILY MEDICINE

## 2018-03-02 PROCEDURE — 84460 ALANINE AMINO (ALT) (SGPT): CPT | Performed by: FAMILY MEDICINE

## 2018-03-02 PROCEDURE — 36415 COLL VENOUS BLD VENIPUNCTURE: CPT | Performed by: FAMILY MEDICINE

## 2018-03-02 PROCEDURE — 84450 TRANSFERASE (AST) (SGOT): CPT | Performed by: FAMILY MEDICINE

## 2018-03-05 ENCOUNTER — OFFICE VISIT (OUTPATIENT)
Dept: ORTHOPEDICS | Facility: CLINIC | Age: 58
End: 2018-03-05
Payer: COMMERCIAL

## 2018-03-05 VITALS
WEIGHT: 163 LBS | DIASTOLIC BLOOD PRESSURE: 74 MMHG | BODY MASS INDEX: 27.83 KG/M2 | SYSTOLIC BLOOD PRESSURE: 108 MMHG | HEIGHT: 64 IN

## 2018-03-05 DIAGNOSIS — M17.12 PRIMARY OSTEOARTHRITIS OF LEFT KNEE: Primary | ICD-10-CM

## 2018-03-05 PROCEDURE — 99213 OFFICE O/P EST LOW 20 MIN: CPT | Performed by: ORTHOPAEDIC SURGERY

## 2018-03-05 NOTE — PROGRESS NOTES
"HISTORY OF PRESENT ILLNESS:    Whitney Aceves is a 57 year old female who is seen in follow up for Left knee, MRI results.  Present symptoms: Pt states knee Has improved significantly since her last office visit. She attributes that to not doing the lunges  The main concern that we had was possible medial meniscus tear because of medial joint line pain.  Treatments tried to this point: MRI, ibuprofen, knee brace  Past Medical History: Unchanged from the visit of 3/1/2017. Please refer to that note.    REVIEW OF SYSTEMS:  CONSTITUTIONAL:  NEGATIVE for fever, chills, change in weight  INTEGUMENTARY/SKIN:  NEGATIVE for worrisome rashes, moles or lesions  EYES:  NEGATIVE for vision changes or irritation  ENT/MOUTH:  NEGATIVE for ear, mouth and throat problems  RESP:  NEGATIVE for significant cough or SOB  BREAST:  NEGATIVE for masses, tenderness or discharge  CV:  NEGATIVE for chest pain, palpitations or peripheral edema  GI:  NEGATIVE for nausea, abdominal pain, heartburn, or change in bowel habits  :  Negative   MUSCULOSKELETAL:  See HPI above  NEURO:  Paresthesias - knee  ENDOCRINE:  NEGATIVE for temperature intolerance, skin/hair changes  HEME/ALLERGY/IMMUNE:  NEGATIVE for bleeding problems  PSYCHIATRIC:  NEGATIVE for changes in mood or affect    PHYSICAL EXAM:  /74 (BP Location: Right arm, Patient Position: Sitting, Cuff Size: Adult Regular)  Ht 5' 4.1\" (1.628 m)  Wt 163 lb (73.9 kg)  BMI 27.89 kg/m2  Body mass index is 27.89 kg/(m^2).   GENERAL APPEARANCE: healthy, alert and no distress   SKIN: no suspicious lesions or rashes  NEURO: Normal strength and tone, mentation intact and speech normal  VASCULAR:  good pulses, and cappillary refill   LYMPH: no lymphadenopathy   PSYCH:  mentation appears normal and affect normal/bright    MSK:  Much improved the medial joint line pain, left knee  Gait is back to normal basically  Otherwise unchanged from her visit of February 19, 2018  Intact neurovascular " status    IMAGING INTERPRETATION:    MR Knee Left w/o Contrast    Narrative    MR KNEE LEFT WITHOUT CONTRAST   3/1/2018 10:52 AM    HISTORY:  Evaluate for medial meniscus tear. Chronic pain of left  knee.     TECHNIQUE:  Sagittal proton density and T2, coronal T1, and coronal  and transverse fat suppressed T2 weighted images.    COMPARISON: X-ray from 2/19/2018.    FINDINGS:   Medial Meniscus: Intrameniscal degeneration posterior horn medial  meniscus. No definite meniscus tear.       Lateral Meniscus: No tear, displaced fragment, or extrusion.       Anterior Cruciate Ligament: Intact.     Posterior Cruciate Ligament: Intact.     Medial Collateral Ligament: Intact.    Lateral Collateral Ligament Complex, Popliteus Tendon: The fibular  collateral ligament, biceps femoris tendon, popliteal tendon, and  iliotibial band are intact. There is a small amount of edema deep to  the iliotibial band which could be related to iliotibial band  syndrome.    Osseous Structures and Cartilaginous Surfaces:  Mild grade 2 central  chondromalacia patella. Mild grade 2 and grade 3 chondromalacia  lateral femoral condyle near the notch, best seen on coronal series 5  image 20. Bone marrow signal is unremarkable.    Extensor Mechanism: The quadriceps and infrapatellar tendons are  intact. The medial and lateral patellar retinacula appear  unremarkable.    Joint Space: There is a physiological amount of fluid in the joint  space.  No definite articular bodies are demonstrated.    Additional Findings:  No semimembranosus-tibial collateral ligament or  pes anserine bursitis. No Baker's cyst.      Impression    IMPRESSION:    1. Intrameniscal degeneration posterior horn medial meniscus. No  evidence of meniscus tear.  2. Small amount of edema deep to the iliotibial band which could be  related to iliotibial band syndrome.  3. Mild grade 2 chondromalacia patella centrally. Mild grade 2 to  perhaps some minimal grade 3 chondromalacia lateral  femoral condyle  near the notch.    CECE MURO MD        ASSESSMENT:  Degenerative medial meniscus but no tear, left knee  Degenerative changes of the anterior and medial compartments, improving with resting    PLAN:  We visualized the images of MRI scan from March 1, 2018 for findings of thoroughly explained.  As long as she is doing well  With significant improvement over the last two weeks,further observation was felt to be most reasonable.  Future possibility of cortisone injection versus surgical intervention was informed.  For the time being, continue to avoid certain activities such as deep bending, squatting, kneeling and jumping and apply the usual treatments of over-the-counter medication, icing and bracing.  Follow-up as needed.           Domenic Agustin MD  Dept. Orthopedic Surgery  [facility]       Disclaimer: This note consists of symbols derived from keyboarding, dictation and/or voice recognition software. As a result, there may be errors in the script that have gone undetected. Please consider this when interpreting information found in this chart.

## 2018-03-05 NOTE — LETTER
"    3/5/2018         RE: Whitney Aceves  [address] [facility] [address]        Dear Colleague,    Thank you for referring your patient, Whitney cAeves, to the [facility] ORTHOPEDIC SURGERY. Please see a copy of my visit note below.    HISTORY OF PRESENT ILLNESS:    Whitney Aceves is a 57 year old female who is seen in follow up for Left knee, MRI results.  Present symptoms: Pt states knee Has improved significantly since her last office visit. She attributes that to not doing the lunges  The main concern that we had was possible medial meniscus tear because of medial joint line pain.  Treatments tried to this point: MRI, ibuprofen, knee brace  Past Medical History: Unchanged from the visit of 3/1/2017. Please refer to that note.    REVIEW OF SYSTEMS:  CONSTITUTIONAL:  NEGATIVE for fever, chills, change in weight  INTEGUMENTARY/SKIN:  NEGATIVE for worrisome rashes, moles or lesions  EYES:  NEGATIVE for vision changes or irritation  ENT/MOUTH:  NEGATIVE for ear, mouth and throat problems  RESP:  NEGATIVE for significant cough or SOB  BREAST:  NEGATIVE for masses, tenderness or discharge  CV:  NEGATIVE for chest pain, palpitations or peripheral edema  GI:  NEGATIVE for nausea, abdominal pain, heartburn, or change in bowel habits  :  Negative   MUSCULOSKELETAL:  See HPI above  NEURO:  Paresthesias - knee  ENDOCRINE:  NEGATIVE for temperature intolerance, skin/hair changes  HEME/ALLERGY/IMMUNE:  NEGATIVE for bleeding problems  PSYCHIATRIC:  NEGATIVE for changes in mood or affect    PHYSICAL EXAM:  /74 (BP Location: Right arm, Patient Position: Sitting, Cuff Size: Adult Regular)  Ht 5' 4.1\" (1.628 m)  Wt 163 lb (73.9 kg)  BMI 27.89 kg/m2  Body mass index is 27.89 kg/(m^2).   GENERAL APPEARANCE: healthy, alert and no distress   SKIN: no suspicious lesions or rashes  NEURO: Normal strength and tone, mentation intact and speech normal  VASCULAR:  good pulses, and cappillary refill   LYMPH: no " lymphadenopathy   PSYCH:  mentation appears normal and affect normal/bright    MSK:  Much improved the medial joint line pain, left knee  Gait is back to normal basically  Otherwise unchanged from her visit of February 19, 2018  Intact neurovascular status    IMAGING INTERPRETATION:    MR Knee Left w/o Contrast    Narrative    MR KNEE LEFT WITHOUT CONTRAST   3/1/2018 10:52 AM    HISTORY:  Evaluate for medial meniscus tear. Chronic pain of left  knee.     TECHNIQUE:  Sagittal proton density and T2, coronal T1, and coronal  and transverse fat suppressed T2 weighted images.    COMPARISON: X-ray from 2/19/2018.    FINDINGS:   Medial Meniscus: Intrameniscal degeneration posterior horn medial  meniscus. No definite meniscus tear.       Lateral Meniscus: No tear, displaced fragment, or extrusion.       Anterior Cruciate Ligament: Intact.     Posterior Cruciate Ligament: Intact.     Medial Collateral Ligament: Intact.    Lateral Collateral Ligament Complex, Popliteus Tendon: The fibular  collateral ligament, biceps femoris tendon, popliteal tendon, and  iliotibial band are intact. There is a small amount of edema deep to  the iliotibial band which could be related to iliotibial band  syndrome.    Osseous Structures and Cartilaginous Surfaces:  Mild grade 2 central  chondromalacia patella. Mild grade 2 and grade 3 chondromalacia  lateral femoral condyle near the notch, best seen on coronal series 5  image 20. Bone marrow signal is unremarkable.    Extensor Mechanism: The quadriceps and infrapatellar tendons are  intact. The medial and lateral patellar retinacula appear  unremarkable.    Joint Space: There is a physiological amount of fluid in the joint  space.  No definite articular bodies are demonstrated.    Additional Findings:  No semimembranosus-tibial collateral ligament or  pes anserine bursitis. No Baker's cyst.      Impression    IMPRESSION:    1. Intrameniscal degeneration posterior horn medial meniscus.  No  evidence of meniscus tear.  2. Small amount of edema deep to the iliotibial band which could be  related to iliotibial band syndrome.  3. Mild grade 2 chondromalacia patella centrally. Mild grade 2 to  perhaps some minimal grade 3 chondromalacia lateral femoral condyle  near the notch.    CECE MURO MD        ASSESSMENT:  Degenerative medial meniscus but no tear, left knee  Degenerative changes of the anterior and medial compartments, improving with resting    PLAN:  We visualized the images of MRI scan from March 1, 2018 for findings of thoroughly explained.  As long as she is doing well  With significant improvement over the last two weeks,further observation was felt to be most reasonable.  Future possibility of cortisone injection versus surgical intervention was informed.  For the time being, continue to avoid certain activities such as deep bending, squatting, kneeling and jumping and apply the usual treatments of over-the-counter medication, icing and bracing.  Follow-up as needed.           Domenic Agustin MD  Dept. Orthopedic Surgery  [facility]       Disclaimer: This note consists of symbols derived from keyboarding, dictation and/or voice recognition software. As a result, there may be errors in the script that have gone undetected. Please consider this when interpreting information found in this chart.      Again, thank you for allowing me to participate in the care of your patient.        Sincerely,        Domenic Agustin MD

## 2018-03-05 NOTE — MR AVS SNAPSHOT
After Visit Summary   3/5/2018    Whitney Aceves    MRN: [id number]           Patient Information     Date Of Birth          1960        Visit Information        Provider Department      3/5/2018 2:00 PM Domenic Agustin MD [facility] ORTHOPEDIC SURGERY        Today's Diagnoses     Primary osteoarthritis of left knee    -  1       Follow-ups after your visit        Your next 10 appointments already scheduled     Mar 08, 2018 11:45 AM CST   Office Visit with Lisbeth Gonzalez MD   [facility]    [address] [place] [id number] [phone]           Bring a current list of meds and any records pertaining to this visit. For Physicals, please bring immunization records and any forms needing to be filled out. Please arrive 10 minutes early to complete paperwork.              Who to contact     If you have questions or need follow up information about today's clinic visit or your schedule please contact [facility] ORTHOPEDIC SURGERY directly at [phone].  Normal or non-critical lab and imaging results will be communicated to you by [employer], letter or phone within 4 business days after the clinic has received the results. If you do not hear from us within 7 days, please contact the clinic through [employer] or phone. If you have a critical or abnormal lab result, we will notify you by phone as soon as possible.  Submit refill requests through [employer] or call your pharmacy and they will forward the refill request to us. Please allow 3 business days for your refill to be completed.          Additional Information About Your Visit        [employer] Information     [employer] gives you secure access to your electronic health record. If you see a primary care provider, you can also send messages to your care team and make appointments. If you have questions, please call your primary care clinic.  If you do not have a primary care  "provider, please call [phone] and they will assist you.        Care EveryWhere ID     This is your Care EveryWhere ID. This could be used by other organizations to access your [place] medical records  [id number]        Your Vitals Were     Height BMI (Body Mass Index)                5' 4.1\" (1.628 m) 27.89 kg/m2           Blood Pressure from Last 3 Encounters:   03/05/18 108/74   02/19/18 104/72   09/29/17 112/76    Weight from Last 3 Encounters:   03/05/18 163 lb (73.9 kg)   02/19/18 163 lb (73.9 kg)   09/29/17 164 lb (74.4 kg)              Today, you had the following     No orders found for display       Primary Care Provider Office Phone # Fax #    Lisbeth Gonzalez [phone] [address] [facility] [address]        Equal Access to Services     MILTON GLASS AH: Hadii toney pantoja hadasho Soomaali, waaxda luqadaha, qaybta kaalmada adeegyada, kurtis subramanian . So [facility] [phone].    ATENCIÓN: Si habla español, tiene a romero disposición servicios gratuitos de asistencia lingüística. Llame al [phone].    We comply with applicable federal civil rights laws and [place] laws. We do not discriminate on the basis of race, color, national origin, age, disability, sex, sexual orientation, or gender identity.            Thank you!     Thank you for choosing [facility] ORTHOPEDIC SURGERY  for your care. Our goal is always to provide you with excellent care. Hearing back from our patients is one way we can continue to improve our services. Please take a few minutes to complete the written survey that you may receive in the mail after your visit with us. Thank you!             Your Updated Medication List - Protect others around you: Learn how to safely use, store and throw away your medicines at www.disposemymeds.org.          This list is accurate as of 3/5/18  2:42 PM.  Always use your most recent med list.                   Brand Name Dispense " Instructions for use Diagnosis    atorvastatin 10 MG tablet    LIPITOR    90 tablet    Take 1 tablet (10 mg) by mouth daily    Hyperlipidemia LDL goal <100, Family history of early CAD       azithromycin 250 MG tablet    ZITHROMAX    6 tablet    2 tabs first day, then 1 tab by mouth daily for 4 additional days    Acute recurrent frontal sinusitis       cetirizine 10 MG tablet    zyrTEC    30 tablet    Take 1 tablet (10 mg) by mouth every evening    Otalgia, unspecified       diazepam 10 MG tablet    VALIUM    1 tablet    Take 1 tablet (10 mg) by mouth as needed for other (claustrophobia, take prior to MRI) Take 30-60 minutes before procedure.  Do not operate a vehicle after taking this medication.    Generalized anxiety disorder       * ADVIL PO      Take 400 mg by mouth as needed for moderate pain        * ibuprofen 800 MG tablet    ADVIL/MOTRIN    90 tablet    TAKE 1 TABLET EVERY 8 HOURSAS NEEDED FOR MODERATE     PAIN (FOR BURSITIS)    Generalized pain       MULTIVITAMIN ADULT PO      Take by mouth daily        OMEGA 3 PO           VIVELLE-DOT 0.1 MG/24HR BIW patch   Generic drug:  estradiol     24 patch    PLACE 1 PATCH ONTO THE SKIN TWICE A WEEK    Hot flashes, Symptomatic menopausal or female climacteric states       zolpidem 10 MG tablet    AMBIEN    15 tablet    TAKE ONE-HALF TABLET BY MOUTH EVERY EVENING AS NEEDED FOR SLEEP    Insomnia, unspecified type       * Notice:  This list has 2 medication(s) that are the same as other medications prescribed for you. Read the directions carefully, and ask your doctor or other care provider to review them with you.

## 2018-03-05 NOTE — NURSING NOTE
"Chief Complaint   Patient presents with     Results     Left knee, MRI results       Initial /74 (BP Location: Right arm, Patient Position: Sitting, Cuff Size: Adult Regular)  Ht 5' 4.1\" (1.628 m)  Wt 163 lb (73.9 kg)  BMI 27.89 kg/m2 Estimated body mass index is 27.89 kg/(m^2) as calculated from the following:    Height as of this encounter: 5' 4.1\" (1.628 m).    Weight as of this encounter: 163 lb (73.9 kg).  Medication Reconciliation: complete    "

## 2018-03-08 ENCOUNTER — OFFICE VISIT (OUTPATIENT)
Dept: FAMILY MEDICINE | Facility: CLINIC | Age: 58
End: 2018-03-08
Payer: COMMERCIAL

## 2018-03-08 VITALS
HEIGHT: 64 IN | DIASTOLIC BLOOD PRESSURE: 72 MMHG | HEART RATE: 60 BPM | BODY MASS INDEX: 27.52 KG/M2 | TEMPERATURE: 97.6 F | OXYGEN SATURATION: 98 % | WEIGHT: 161.2 LBS | SYSTOLIC BLOOD PRESSURE: 110 MMHG

## 2018-03-08 DIAGNOSIS — G47.00 INSOMNIA, UNSPECIFIED TYPE: ICD-10-CM

## 2018-03-08 DIAGNOSIS — E78.5 HYPERLIPIDEMIA LDL GOAL <130: Primary | ICD-10-CM

## 2018-03-08 DIAGNOSIS — E78.5 HYPERLIPIDEMIA LDL GOAL <100: ICD-10-CM

## 2018-03-08 DIAGNOSIS — Z82.49 FAMILY HISTORY OF EARLY CAD: ICD-10-CM

## 2018-03-08 DIAGNOSIS — R47.01 EXPRESSIVE APHASIA: ICD-10-CM

## 2018-03-08 PROCEDURE — 99214 OFFICE O/P EST MOD 30 MIN: CPT | Performed by: FAMILY MEDICINE

## 2018-03-08 RX ORDER — ATORVASTATIN CALCIUM 10 MG/1
10 TABLET, FILM COATED ORAL DAILY
Qty: 90 TABLET | Refills: 1 | Status: SHIPPED | OUTPATIENT
Start: 2018-03-08 | End: 2018-03-08

## 2018-03-08 RX ORDER — ZOLPIDEM TARTRATE 10 MG/1
TABLET ORAL
Qty: 15 TABLET | Refills: 1 | Status: SHIPPED | OUTPATIENT
Start: 2018-03-08 | End: 2018-09-17

## 2018-03-08 RX ORDER — ATORVASTATIN CALCIUM 10 MG/1
10 TABLET, FILM COATED ORAL DAILY
Qty: 90 TABLET | Refills: 1 | Status: SHIPPED | OUTPATIENT
Start: 2018-03-08 | End: 2018-08-24

## 2018-03-08 ASSESSMENT — PATIENT HEALTH QUESTIONNAIRE - PHQ9: 5. POOR APPETITE OR OVEREATING: SEVERAL DAYS

## 2018-03-08 ASSESSMENT — ANXIETY QUESTIONNAIRES
1. FEELING NERVOUS, ANXIOUS, OR ON EDGE: NOT AT ALL
2. NOT BEING ABLE TO STOP OR CONTROL WORRYING: NOT AT ALL
5. BEING SO RESTLESS THAT IT IS HARD TO SIT STILL: NOT AT ALL
GAD7 TOTAL SCORE: 1
IF YOU CHECKED OFF ANY PROBLEMS ON THIS QUESTIONNAIRE, HOW DIFFICULT HAVE THESE PROBLEMS MADE IT FOR YOU TO DO YOUR WORK, TAKE CARE OF THINGS AT HOME, OR GET ALONG WITH OTHER PEOPLE: NOT DIFFICULT AT ALL
7. FEELING AFRAID AS IF SOMETHING AWFUL MIGHT HAPPEN: NOT AT ALL
6. BECOMING EASILY ANNOYED OR IRRITABLE: NOT AT ALL
3. WORRYING TOO MUCH ABOUT DIFFERENT THINGS: NOT AT ALL

## 2018-03-08 NOTE — NURSING NOTE
"Chief Complaint   Patient presents with     Recheck Medication       Initial /72 (BP Location: Right arm, Patient Position: Chair, Cuff Size: Adult Large)  Pulse 60  Temp 97.6  F (36.4  C) (Oral)  Ht 5' 4\" (1.626 m)  Wt 161 lb 3.2 oz (73.1 kg)  SpO2 98%  BMI 27.67 kg/m2 Estimated body mass index is 27.67 kg/(m^2) as calculated from the following:    Height as of this encounter: 5' 4\" (1.626 m).    Weight as of this encounter: 161 lb 3.2 oz (73.1 kg).  Medication Reconciliation: complete   Maribel Thorne CMA  "

## 2018-03-08 NOTE — PATIENT INSTRUCTIONS
Please, call or return to clinic or go to the ER immediately if signs or symptoms worsen or fail to improve as anticipated.     Keep going with your exercise - as directed by Dr. Agustin.     Keeping notes on your speech symptoms .     Recheck with me again in 6 months or sooner if needed.                Thank you for choosing [facility]  for your Health Care. It was a pleasure seeing you at your visit today. Please contact us with any questions or concerns you may have.                   Lisbeth Gonzalez MD                                  To reach your [facility] care team after hours call:   [phone]    Our clinic hours are:     Monday- 7:30 am - 7:00 pm                             Tuesday through Friday- 7:30 am - 5:00 pm                                        Saturday- 8:00 am - 12:00 pm                  Phone:  [phone]    Our pharmacy hours are:     Monday  8:00 am to 7:00 pm      Tuesday through Friday 8:00am to 6:00pm                        Saturday - 9:00 am to 1:00 pm      Sunday : Closed.              Phone:  [phone]      There is also information available at our web site:  [email or web address]    If your provider ordered any lab tests and you do not receive the results within 10 business days, please call the clinic.    If you need a medication refill please contact your pharmacy.  Please allow 2 business days for your refill to be completed.    Our clinic offers telephone visits and e visits.  Please ask one of your team members to explain more.      Use Fwd: Powerhart (secure email communication and access to your chart) to send your primary care provider a message or make an appointment. Ask someone on your Team how to sign up for [employer].

## 2018-03-08 NOTE — MR AVS SNAPSHOT
After Visit Summary   3/8/2018    Whitney Aceves    MRN: [id number]           Patient Information     Date Of Birth          1960        Visit Information        Provider Department      3/8/2018 11:45 AM Lisbeth Gonzalez MD [facility]        Today's Diagnoses     Hyperlipidemia LDL goal <130    -  1    Expressive aphasia        Insomnia, unspecified type        Hyperlipidemia LDL goal <100        Family history of early CAD          Care Instructions    Please, call or return to clinic or go to the ER immediately if signs or symptoms worsen or fail to improve as anticipated.     Keep going with your exercise - as directed by Dr. Agustin.     Keeping notes on your speech symptoms .     Recheck with me again in 6 months or sooner if needed.                Thank you for choosing [facility]  for your Health Care. It was a pleasure seeing you at your visit today. Please contact us with any questions or concerns you may have.                   Lisbeth Gonzalez MD                                  To reach your [facility] care team after hours call:   [phone]    Our clinic hours are:     Monday- 7:30 am - 7:00 pm                             Tuesday through Friday- 7:30 am - 5:00 pm                                        Saturday- 8:00 am - 12:00 pm                  Phone:  [phone]    Our pharmacy hours are:     Monday  8:00 am to 7:00 pm      Tuesday through Friday 8:00am to 6:00pm                        Saturday - 9:00 am to 1:00 pm      Sunday : Closed.              Phone:  [phone]      There is also information available at our web site:  [email or web address]    If your provider ordered any lab tests and you do not receive the results within 10 business days, please call the clinic.    If you need a medication refill please contact your pharmacy.  Please allow 2 business days for your refill to be completed.    Our clinic offers  telephone visits and e visits.  Please ask one of your team members to explain more.      Use [employer] (secure email communication and access to your chart) to send your primary care provider a message or make an appointment. Ask someone on your Team how to sign up for [employer].                       Follow-ups after your visit        Additional Services     NEUROLOGY ADULT REFERRAL       Your provider has referred you for the following:     [facility]: Neurology Clinic - [place] [phone]   [email or web address]    Or   [facility]: [facility] [phone]   [email or web address]  [place] [phone]   [email or web address]  [place] [phone]   [email or web address]    Please be aware that coverage of these services is subject to the terms and limitations of your health insurance plan.  Call member services at your health plan with any benefit or coverage questions.      Please bring the following with you to your appointment:    (1) Any X-Rays, CTs or MRIs which have been performed.  Contact the facility where they were done to arrange for  prior to your scheduled appointment.    (2) List of current medications  (3) This referral request   (4) Any documents/labs given to you for this referral            SPEECH THERAPY REFERRAL       *This therapy referral will be filtered to a centralized scheduling office at [facility] and the patient will receive a call to schedule an appointment at a [facility] location most convenient for them. *     [facility] provides Speech Therapy evaluation and treatment and many specialty services across the [place] system.  If requesting a specialty program, please choose from the list below.  If you have not heard from the scheduling office within 2 business days, please call [phone]  "for all locations, with the exception of [place], please call [phone] and [place], please call [phone]      Treatment: Evaluation & Treatment:   Speech Treatment Diagnosis: ? Expressive Aphasia      Special Instructions: eval and treat   Special Programs: eval and treat      Please be aware that coverage of these services is subject to the terms and limitations of your health insurance plan.  Call member services at your health plan with any benefit or coverage questions.      **Note to Provider:  If you are referring outside of [facility] for the therapy appointment, please list the name of the location in the \"special instructions\" above, print the referral and give to the patient to schedule the appointment.                  Follow-up notes from your care team     Return in about 6 months (around 9/8/2018) for Physical Exam, Routine Visit, cholesterol recheck.      Your next 10 appointments already scheduled     Sep 17, 2018  8:15 AM CDT   Office Visit with Lisbeth Gonzalez MD   [facility]    [address] [place] [id number] [phone]           Bring a current list of meds and any records pertaining to this visit. For Physicals, please bring immunization records and any forms needing to be filled out. Please arrive 10 minutes early to complete paperwork.              Who to contact     If you have questions or need follow up information about today's clinic visit or your schedule please contact [facility] directly at [phone].  Normal or non-critical lab and imaging results will be communicated to you by MyChart, letter or phone within 4 business days after the clinic has received the results. If you do not hear from us within 7 days, please contact the clinic through MyChart or phone. If you have a critical or abnormal lab result, we will notify you by phone as soon as possible.  Submit refill requests " "through [employer] or call your pharmacy and they will forward the refill request to us. Please allow 3 business days for your refill to be completed.          Additional Information About Your Visit        i[employer] Information     BagThat gives you secure access to your electronic health record. If you see a primary care provider, you can also send messages to your care team and make appointments. If you have questions, please call your primary care clinic.  If you do not have a primary care provider, please call [phone] and they will assist you.        Care EveryWhere ID     This is your Care EveryWhere ID. This could be used by other organizations to access your [place] medical records  [id number]        Your Vitals Were     Pulse Temperature Height Pulse Oximetry BMI (Body Mass Index)       60 97.6  F (36.4  C) (Oral) 5' 4\" (1.626 m) 98% 27.67 kg/m2        Blood Pressure from Last 3 Encounters:   03/08/18 110/72   03/05/18 108/74   02/19/18 104/72    Weight from Last 3 Encounters:   03/08/18 161 lb 3.2 oz (73.1 kg)   03/05/18 163 lb (73.9 kg)   02/19/18 163 lb (73.9 kg)              We Performed the Following     NEUROLOGY ADULT REFERRAL     SPEECH THERAPY REFERRAL          Today's Medication Changes          These changes are accurate as of 3/8/18 12:50 PM.  If you have any questions, ask your nurse or doctor.               Start taking these medicines.        Dose/Directions    atorvastatin 10 MG tablet   Commonly known as:  LIPITOR   Used for:  Hyperlipidemia LDL goal <100, Family history of early CAD   Started by:  Lisbeth Gonzalez MD        Dose:  10 mg   Take 1 tablet (10 mg) by mouth daily   Quantity:  90 tablet   Refills:  1         These medicines have changed or have updated prescriptions.        Dose/Directions    ibuprofen 800 MG tablet   Commonly known as:  ADVIL/MOTRIN   This may have changed:  Another medication with the same name was removed. Continue taking this medication, and follow " the directions you see here.   Used for:  Generalized pain   Changed by:  Lisbeth Gonzalez MD        TAKE 1 TABLET EVERY 8 HOURSAS NEEDED FOR MODERATE     PAIN (FOR BURSITIS)   Quantity:  90 tablet   Refills:  3       zolpidem 10 MG tablet   Commonly known as:  AMBIEN   This may have changed:  See the new instructions.   Used for:  Insomnia, unspecified type   Changed by:  Lisbeth Gonzalez MD        TAKE ONE-HALF TABLET BY MOUTH EVERY EVENING AS NEEDED FOR SLEEP   Quantity:  15 tablet   Refills:  1         Stop taking these medicines if you haven't already. Please contact your care team if you have questions.     diazepam 10 MG tablet   Commonly known as:  VALIUM   Stopped by:  Lisbeth Gonzalez MD                Where to get your medicines      These medications were sent to [facility] [address] AT [address] [place] [id number] [facility] [address]     Phone:  [phone]     atorvastatin 10 MG tablet         Some of these will need a paper prescription and others can be bought over the counter.  Ask your nurse if you have questions.     Bring a paper prescription for each of these medications     zolpidem 10 MG tablet                Primary Care Provider Office Phone # Fax #    Lisbeth Gonzalez [phone]       [address] [place] [address]        Equal Access to Services     [facility] Center[facility]ESSICA : Hadii toney ku hadasho Soomaali, waaxda luqadaha, qaybta kaalmada adeegyada, kurtis bolaños. So [facility] [phone].    ATENCIÓN: Si habla español, tiene a romero disposición servicios gratuitos de asistencia lingüística. Llbrayden al [phone].    We comply with applicable federal civil rights laws and [place] laws. We do not discriminate on the basis of race, color, national origin, age, disability, sex, sexual orientation, or gender identity.            Thank you!     Thank  you for choosing [facility]  for your care. Our goal is always to provide you with excellent care. Hearing back from our patients is one way we can continue to improve our services. Please take a few minutes to complete the written survey that you may receive in the mail after your visit with us. Thank you!             Your Updated Medication List - Protect others around you: Learn how to safely use, store and throw away your medicines at www.Aircuity[employer]ymeds.org.          This list is accurate as of 3/8/18 12:50 PM.  Always use your most recent med list.                   Brand Name Dispense Instructions for use Diagnosis    atorvastatin 10 MG tablet    LIPITOR    90 tablet    Take 1 tablet (10 mg) by mouth daily    Hyperlipidemia LDL goal <100, Family history of early CAD       azithromycin 250 MG tablet    ZITHROMAX    6 tablet    2 tabs first day, then 1 tab by mouth daily for 4 additional days    Acute recurrent frontal sinusitis       cetirizine 10 MG tablet    zyrTEC    30 tablet    Take 1 tablet (10 mg) by mouth every evening    Otalgia, unspecified       ibuprofen 800 MG tablet    ADVIL/MOTRIN    90 tablet    TAKE 1 TABLET EVERY 8 HOURSAS NEEDED FOR MODERATE     PAIN (FOR BURSITIS)    Generalized pain       MULTIVITAMIN ADULT PO      Take by mouth daily        OMEGA 3 PO           VIVELLE-DOT 0.1 MG/24HR BIW patch   Generic drug:  estradiol     24 patch    PLACE 1 PATCH ONTO THE SKIN TWICE A WEEK    Hot flashes, Symptomatic menopausal or female climacteric states       zolpidem 10 MG tablet    AMBIEN    15 tablet    TAKE ONE-HALF TABLET BY MOUTH EVERY EVENING AS NEEDED FOR SLEEP    Insomnia, unspecified type

## 2018-03-08 NOTE — PROGRESS NOTES
SUBJECTIVE:                                                    Whitney Aceves is a 57 year old female who presents to clinic today for the following health issues:    Hyperlipidemia Follow-Up  Doing circuit training with cardio and resistance training 3-4days/week.   Started having knee pain /problems - has bone on bone osteoarthritis - saw Dr. Agustin 1 year ago  - saw him again 1 week ago - MRI showed no tears this year, but no cartilage left.     Rate your low fat/cholesterol diet?: good    Taking statin?  No    Other lipid medications/supplements?:  Fish oil/Omega 3    Had been off her statin for 4 weeks when she got her labs done. Ran out.     Recent Labs   Lab Test  03/02/18   1030  08/28/17   0847   12/13/10   1026  06/21/10   1022   CHOL  200*  247*   < >  215*  238*   HDL  60  64   < >  61  63   LDL  117*  151*   < >  135*  143*   TRIG  116  162*   < >  96  164*   CHOLHDLRATIO   --    --    --   3.5  3.8    < > = values in this interval not displayed.          Lab Results   Component Value Date    CHOL 200 03/02/2018    CHOL 247 08/28/2017     Lab Results   Component Value Date    HDL 60 03/02/2018    HDL 64 08/28/2017     Lab Results   Component Value Date     03/02/2018     08/28/2017     Lab Results   Component Value Date    TRIG 116 03/02/2018    TRIG 162 08/28/2017     Lab Results   Component Value Date    CHOLHDLRATIO 3.5 12/13/2010    CHOLHDLRATIO 3.8 06/21/2010       Anxiety Follow-Up    Status since last visit: No change    Other associated symptoms:None    Complicating factors:   Significant life event: No   Current substance abuse: None  Depression symptoms: No  JOSE ANTONIO-7 SCORE 2/17/2017 8/28/2017 3/8/2018   Total Score - - -   Total Score 1 1 1   JOSE ANTONIO-7      Medication Followup of Insomnia    Taking Medication as prescribed: yes    Side Effects:  None    Medication Helping Symptoms:  yes        Amount of exercise or physical activity: 6-7 days/week for an average of 60 minutes    Problems  "taking medications regularly: No    Medication side effects: none    Diet: eating healthier    Wt Readings from Last 5 Encounters:   03/08/18 161 lb 3.2 oz (73.1 kg)   03/05/18 163 lb (73.9 kg)   02/19/18 163 lb (73.9 kg)   09/29/17 164 lb (74.4 kg)   08/28/17 163 lb (73.9 kg)         Problem list and histories reviewed & adjusted, as indicated.  Additional history: as documented    Reviewed and updated as needed this visit by clinical staff  Tobacco  Allergies  Meds  Med Hx  Surg Hx  Fam Hx  Soc Hx      Reviewed and updated as needed this visit by Provider       ROS: mixing up her words occasionally - for instance wanted to say astringent , but it came out nonsensically.c can say up to 3-4 words in a row, that are nonsensical.   Happens 1x-2x/week.  No numbness, tingling, or weakness in upper or lower extremities. No bowel or bladder control problems.  No other changes in speech, swallowing, hearing, coordination  or vision. No problems with word finding. No hx of seizures or head injury or MVA.   No related to taking ambien.   ROS: 12 point ROS neg other than the symptoms noted above.     OBJECTIVE:                                                    /72 (BP Location: Right arm, Patient Position: Chair, Cuff Size: Adult Large)  Pulse 60  Temp 97.6  F (36.4  C) (Oral)  Ht 5' 4\" (1.626 m)  Wt 161 lb 3.2 oz (73.1 kg)  SpO2 98%  BMI 27.67 kg/m2  Body mass index is 27.67 kg/(m^2).   GENERAL: healthy, alert, well nourished, well hydrated, no distress  HENT: ear canals- normal; TMs- normal; Nose- normal; Mouth- no ulcers, no lesions  NECK: no tenderness, no adenopathy, no asymmetry, no masses, no stiffness; thyroid- normal to palpation  RESP: lungs clear to auscultation - no rales, no rhonchi, no wheezes  CV: regular rates and rhythm, normal S1 S2, no S3 or S4 and no murmur, no click or rub -  ABDOMEN: soft, no tenderness, no  hepatosplenomegaly, no masses, normal bowel sounds  MS: extremities- no gross " deformities noted, no edema  PSYCH: Alert and oriented times 3; speech- coherent , normal rate and volume; able to articulate logical thoughts, able to abstract reason, no tangential thoughts, no hallucinations or delusions, affect- normal    Diagnostic test results:  none      ASSESSMENT/PLAN:                                                        ICD-10-CM    1. Hyperlipidemia LDL goal <130 E78.5    2. Expressive aphasia R47.01 SPEECH THERAPY REFERRAL     NEUROLOGY ADULT REFERRAL   3. Insomnia, unspecified type G47.00 zolpidem (AMBIEN) 10 MG tablet   4. Hyperlipidemia LDL goal <100 E78.5 atorvastatin (LIPITOR) 10 MG tablet     DISCONTINUED: atorvastatin (LIPITOR) 10 MG tablet   5. Family history of early CAD Z82.49 atorvastatin (LIPITOR) 10 MG tablet     DISCONTINUED: atorvastatin (LIPITOR) 10 MG tablet       See Patient Instructions. Please, call or return to clinic or go to the ER immediately if signs or symptoms worsen or fail to improve as anticipated.           Lisbeth Gonzalez MD  [facility]

## 2018-03-09 ASSESSMENT — ANXIETY QUESTIONNAIRES: GAD7 TOTAL SCORE: 1

## 2018-03-09 ASSESSMENT — PATIENT HEALTH QUESTIONNAIRE - PHQ9: SUM OF ALL RESPONSES TO PHQ QUESTIONS 1-9: 2

## 2018-05-18 DIAGNOSIS — J01.11 ACUTE RECURRENT FRONTAL SINUSITIS: ICD-10-CM

## 2018-05-18 NOTE — TELEPHONE ENCOUNTER
Requested Prescriptions   Pending Prescriptions Disp Refills     azithromycin (ZITHROMAX) 250 MG tablet [Pharmacy Med Name: AZITHROMYCIN 250MG TABLETS 6-RIKA] 6 tablet 0     Sig: TAKE 2 TABLETS BY MOUTH ON DAY 1, THEN TAKE 1 TABLET BY MOUTH DAILY FOR 4 DAYS    There is no refill protocol information for this order        Last Written Prescription Date:  02/17/2017  Last Fill Quantity: 6 tablet,  # refills: 2   Last office visit: 3/8/2018

## 2018-05-21 NOTE — TELEPHONE ENCOUNTER
Isabella Dean contacted Whitney on 05/21/18 and left a message. If patient calls back please contact RN team. Triage, why do they need this. Noted pt does get refills at time for recurrent maxillary sinusitis.  Sherry Dean, RN  [facility]

## 2018-05-25 RX ORDER — AZITHROMYCIN 250 MG/1
TABLET, FILM COATED ORAL
Qty: 6 TABLET | Refills: 0 | Status: SHIPPED | OUTPATIENT
Start: 2018-05-25 | End: 2018-05-29

## 2018-05-25 NOTE — TELEPHONE ENCOUNTER
Called [phone]    Patient states that she always has a pack on hand for sinus infections because she travels quite often.   DENIES: all symptoms currently. Patient just wants a pack on hand.       Routing to PCP for further review/recommendations/orders.      Ethel Winston RN  [facility]

## 2018-05-29 ENCOUNTER — OFFICE VISIT (OUTPATIENT)
Dept: FAMILY MEDICINE | Facility: CLINIC | Age: 58
End: 2018-05-29
Payer: COMMERCIAL

## 2018-05-29 VITALS
OXYGEN SATURATION: 98 % | HEIGHT: 64 IN | HEART RATE: 68 BPM | SYSTOLIC BLOOD PRESSURE: 118 MMHG | DIASTOLIC BLOOD PRESSURE: 82 MMHG | BODY MASS INDEX: 27.14 KG/M2 | TEMPERATURE: 98 F | WEIGHT: 159 LBS

## 2018-05-29 DIAGNOSIS — L23.2 ALLERGIC CONTACT DERMATITIS DUE TO COSMETICS: Primary | ICD-10-CM

## 2018-05-29 PROCEDURE — 99213 OFFICE O/P EST LOW 20 MIN: CPT | Performed by: PHYSICIAN ASSISTANT

## 2018-05-29 RX ORDER — LORATADINE 10 MG/1
10 TABLET ORAL DAILY
Qty: 30 TABLET | Refills: 1 | COMMUNITY
Start: 2018-05-29 | End: 2020-10-05

## 2018-05-29 RX ORDER — METHYLPREDNISOLONE 4 MG
TABLET, DOSE PACK ORAL
Qty: 21 TABLET | Refills: 0 | Status: SHIPPED | OUTPATIENT
Start: 2018-05-29 | End: 2018-09-17

## 2018-05-29 NOTE — PROGRESS NOTES
"  SUBJECTIVE:                                                    Whitney Aceves is a 57 year old female who presents to clinic today for the following health issues:      Rash  Onset: x4 days    Description:   Location: arms  Character: round, raised, burning, red  Itching (Pruritis): YES - worse at night    Progression of Symptoms:  Worsening - moving up arms    Accompanying Signs & Symptoms:  Fever: no   Body aches or joint pain: no   Sore throat symptoms: no   Recent cold symptoms: no     History:   Previous similar rash: YES- on legs -was from chemical in pool    Precipitating factors:   Exposure to similar rash: no   New exposures: soaps - body-used on arms only   Recent travel: no     Alleviating factors:  Benadryl oral and cream - still waking from itching    Therapies Tried and outcome: above    Patient reports that she used new hand soap up her arms and then broke out in this itchy rash.  She says this happened last Thursday evening.      She denies any fevers, chills or sweats.  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  She says that she has to give a presentation and she does not want her arms to have the rash when she presents at the meeting this week.    She takes a daily Claritin, and has taken benadryl at night.  She has also been using the benadryl cream \"liberally\" with little if any relief.      Problem list and histories reviewed & adjusted, as indicated.  Additional history: as documented      ROS:  Constitutional, HEENT, cardiovascular, pulmonary, GI, , musculoskeletal, neuro, skin, endocrine and psych systems are negative, except as otherwise noted.    OBJECTIVE:                                                    /82 (BP Location: Right arm, Patient Position: Chair, Cuff Size: Adult Regular)  Pulse 68  Temp 98  F (36.7  C) (Oral)  Ht 5' 4\" (1.626 m)  Wt 159 lb (72.1 kg)  SpO2 98%  Breastfeeding? No  BMI " 27.29 kg/m2  Body mass index is 27.29 kg/(m^2).  GENERAL: healthy, alert and no distress  EYES: Eyes grossly normal to inspection, PERRL and conjunctivae and sclerae normal  MS: no gross musculoskeletal defects noted, no edema  SKIN: Very faint, mild, papular rash on forearms bilaterally.    NEURO: Normal strength and tone, mentation intact and speech normal  PSYCH: mentation appears normal, affect normal/bright    Diagnostic Test Results:  none      ASSESSMENT/PLAN:                                                      Whitney was seen today for derm problem.    Diagnoses and all orders for this visit:    Allergic contact dermatitis due to cosmetics  -     methylPREDNISolone (MEDROL DOSEPAK) 4 MG tablet; Follow package instructions    - Patient has first been advised of home cares.  Rash is mild on arms bilaterally and should be self limiting.  She has been encouraged to try Allegra instead of Claritin incase that works better for her.  She states that she does not like the way Zyrtec makes her feel.  Patient has also been advised to try cool compresses, cooler showers and wear loose or non-occlusive clothing.    - Patient has been encouraged to avoid use of the benadryl cream and can use a small amount of hydrocortisone cream to the real itchy areas.    - Patient given medrol dose pack RX to have on hand, but was advised that this should only be used if needed.    - Patient advised to followup if not improving.  She should be seen sooner if symptoms change or worsen in any way.     -- I have discussed the patient's diagnosis, and my plan of treatment with the patient and/or family. Patient is aware to followup if symptoms do not improve.  Patient has been advised to be seen sooner or seek more immediate care if symptoms change or worsen.  Patient agrees with and understands the plan today.     See Patient Instructions        Lora Muhammad PA-C    [facility]

## 2018-05-29 NOTE — MR AVS SNAPSHOT
"              After Visit Summary   5/29/2018    Whitney Aceves    MRN: [id number]           Patient Information     Date Of Birth          1960        Visit Information        Provider Department      5/29/2018 1:40 PM Lora Muhammad PA-C [facility] [place]        Today's Diagnoses     Allergic contact dermatitis due to cosmetics    -  1      Care Instructions      Contact Dermatitis  Contact dermatitis is a skin rash caused by something that touches the skin and makes it irritated and inflamed. Your skin may be red, swollen, dry, and may be cracked. Blisters may form and ooze. The rash will itch.  Contact dermatitis can form on the face and neck, backs of hands, forearms, genitals, and lower legs.  People can get contact dermatitis from lots of sources. These include:    Plants such as poison ivy, oak, or sumac    Chemicals in hair dyes and rinses, soaps, solvents, waxes, fingernail polish, and deodorants     Jewelry or watchbands made of nickel  Contact dermatitis is not passed from person to person.  Talk with your healthcare provider about what may have caused the rash. A type of allergy testing called \"patch testing\" may be used to discover what you are allergic to. You will need to avoid the source of your rash in the future to prevent it from coming back.  Treatment is done to relieve itching and prevent the rash from coming back. The rash should go away in a few days to a few weeks.  Home care  Your healthcare provider may prescribe medicine to relieve swelling and itching. Follow all instructions when using these medicines.  General care:    Avoid anything that heats up your skin, such as hot showers or baths, or direct sunlight. This can make itching worse.    Apply cold compresses to soothe your sores to help relieve your symptoms. Do this for 30 minutes 3 to 4 times a day. You can make a cold compress by soaking a cloth in cold water. Squeeze out excess water. You can add colloidal " oatmeal to the water to help reduce itching. For severe itching in a small area, apply an ice pack wrapped in a thin towel. Do this for 20 minutes 3 to 4 times a day.    You can also try wet dressings. One way to do this is to wear a wet piece of clothing under a dry one. Wear a damp shirt under a dry shirt if your upper body is affected. This can relieve itching and prevent you from scratching the affected area.    You can also help relieve large areas of itching by taking a lukewarm bath with colloidal oatmeal added to the water.    Use hydrocortisone cream for redness and irritation, unless another medicine was prescribed. You can also use benzocaine anesthetic cream or spray. Calamine lotion can also relieve mild symptoms.    Use oral diphenhydramine to help reduce itching. You can buy this antihistamine at drug and grocery stores. It can make you sleepy, so use lower doses during the daytime. Or you can use loratadine. This is an antihistamine that will not make you sleepy. Do not use diphenhydramine if you have glaucoma or have trouble urinating due to an enlarged prostate.    If a plant causes your rash, make sure to wash your skin and the clothes you were wearing when you came into contact with the plant. This is to wash away the plant oils that gave you the rash and prevent more or worse symptoms.    Stay away from the substance or object that causes your symptoms. If you can t avoid it, wear gloves or some other type of protection.  Follow-up care  Follow up with your healthcare provider, or as advised.  When to seek medical advice  Call your healthcare provider right away if any of these occur:    Spreading of the rash to other parts of your body    Severe swelling of your face, eyelids, mouth, throat or tongue    Trouble urinating due to swelling in the genital area    Fever of 100.4 F (38 C) or higher    Redness or swelling that gets worse    Pain that gets worse    Foul-smelling fluid leaking from the  skin    Yellow-brown crusts on the open blisters  Date Last Reviewed: 9/1/2016 2000-2017 [employer]. [address] [place] [address], [place] [address]. All rights reserved. This information is not intended as a substitute for professional medical care. Always follow your healthcare professional's instructions.                Follow-ups after your visit        Your next 10 appointments already scheduled     Sep 17, 2018  8:15 AM CDT   [facility] Visit with Lisbeth Gonzalez MD   [facility]    [address] [place] [id number] [phone]           Bring a current list of meds and any records pertaining to this visit. For Physicals, please bring immunization records and any forms needing to be filled out. Please arrive 10 minutes early to complete paperwork.              Who to contact     If you have questions or need follow up information about today's clinic visit or your schedule please contact [facility] directly at [phone].  Normal or non-critical lab and imaging results will be communicated to you by [employer], letter or phone within 4 business days after the clinic has received the results. If you do not hear from us within 7 days, please contact the clinic through [employer] or phone. If you have a critical or abnormal lab result, we will notify you by phone as soon as possible.  Submit refill requests through [employer] or call your pharmacy and they will forward the refill request to us. Please allow 3 business days for your refill to be completed.          Additional Information About Your Visit        [employer] Information     [employer] gives you secure access to your electronic health record. If you see a primary care provider, you can also send messages to your care team and make appointments. If you have questions, please call your primary care clinic.  If you do not have a primary care provider, please call  "[phone] and they will assist you.        Care EveryWhere ID     This is your Care EveryWhere ID. This could be used by other organizations to access your [place] medical records  [id number]        Your Vitals Were     Pulse Temperature Height Pulse Oximetry Breastfeeding? BMI (Body Mass Index)    68 98  F (36.7  C) (Oral) 5' 4\" (1.626 m) 98% No 27.29 kg/m2       Blood Pressure from Last 3 Encounters:   05/29/18 118/82   03/08/18 110/72   03/05/18 108/74    Weight from Last 3 Encounters:   05/29/18 159 lb (72.1 kg)   03/08/18 161 lb 3.2 oz (73.1 kg)   03/05/18 163 lb (73.9 kg)              Today, you had the following     No orders found for display         Today's Medication Changes          These changes are accurate as of 5/29/18  2:22 PM.  If you have any questions, ask your nurse or doctor.               Start taking these medicines.        Dose/Directions    methylPREDNISolone 4 MG tablet   Commonly known as:  MEDROL DOSEPAK   Used for:  Allergic contact dermatitis due to cosmetics   Started by:  Lora Muhammad PA-C        Follow package instructions   Quantity:  21 tablet   Refills:  0         Stop taking these medicines if you haven't already. Please contact your care team if you have questions.     cetirizine 10 MG tablet   Commonly known as:  zyrTEC   Stopped by:  Lora Muhammad PA-C                Where to get your medicines      Some of these will need a paper prescription and others can be bought over the counter.  Ask your nurse if you have questions.     Bring a paper prescription for each of these medications     methylPREDNISolone 4 MG tablet                Primary Care Provider Office Phone # Fax #    Lisbeth Gonzalez [phone]       [address] [place] [id number]        Equal Access to Services     [employer] AH: Alexa ribeiroo Alejandro, waaxda luqadaha, qaybta kaalmada jd, kurtis bolaños. So waslade " [phone].    ATENCIÓN: Si herbert gabriel, tiene a romero disposición servicios gratuitos de asistencia lingüística. Lamonte borrero [phone].    We comply with applicable federal civil rights laws and [place] laws. We do not discriminate on the basis of race, color, national origin, age, disability, sex, sexual orientation, or gender identity.            Thank you!     Thank you for choosing [facility]  for your care. Our goal is always to provide you with excellent care. Hearing back from our patients is one way we can continue to improve our services. Please take a few minutes to complete the written survey that you may receive in the mail after your visit with us. Thank you!             Your Updated Medication List - Protect others around you: Learn how to safely use, store and throw away your medicines at www.disposemymeds.org.          This list is accurate as of 5/29/18  2:22 PM.  Always use your most recent med list.                   Brand Name Dispense Instructions for use Diagnosis    atorvastatin 10 MG tablet    LIPITOR    90 tablet    Take 1 tablet (10 mg) by mouth daily    Hyperlipidemia LDL goal <100, Family history of early CAD       CLARITIN 10 MG tablet   Generic drug:  loratadine     30 tablet    Take 1 tablet (10 mg) by mouth daily        ibuprofen 800 MG tablet    ADVIL/MOTRIN    90 tablet    TAKE 1 TABLET EVERY 8 HOURSAS NEEDED FOR MODERATE     PAIN (FOR BURSITIS)    Generalized pain       methylPREDNISolone 4 MG tablet    MEDROL DOSEPAK    21 tablet    Follow package instructions    Allergic contact dermatitis due to cosmetics       MULTIVITAMIN ADULT PO      Take by mouth daily        OMEGA 3 PO           VIVELLE-DOT 0.1 MG/24HR BIW patch   Generic drug:  estradiol     24 patch    PLACE 1 PATCH ONTO THE SKIN TWICE A WEEK    Hot flashes, Symptomatic menopausal or female climacteric states       zolpidem 10 MG tablet    AMBIEN    15 tablet    TAKE ONE-HALF TABLET BY MOUTH EVERY  EVENING AS NEEDED FOR SLEEP    Insomnia, unspecified type

## 2018-05-29 NOTE — PATIENT INSTRUCTIONS
"  Contact Dermatitis  Contact dermatitis is a skin rash caused by something that touches the skin and makes it irritated and inflamed. Your skin may be red, swollen, dry, and may be cracked. Blisters may form and ooze. The rash will itch.  Contact dermatitis can form on the face and neck, backs of hands, forearms, genitals, and lower legs.  People can get contact dermatitis from lots of sources. These include:    Plants such as poison ivy, oak, or sumac    Chemicals in hair dyes and rinses, soaps, solvents, waxes, fingernail polish, and deodorants     Jewelry or watchbands made of nickel  Contact dermatitis is not passed from person to person.  Talk with your healthcare provider about what may have caused the rash. A type of allergy testing called \"patch testing\" may be used to discover what you are allergic to. You will need to avoid the source of your rash in the future to prevent it from coming back.  Treatment is done to relieve itching and prevent the rash from coming back. The rash should go away in a few days to a few weeks.  Home care  Your healthcare provider may prescribe medicine to relieve swelling and itching. Follow all instructions when using these medicines.  General care:    Avoid anything that heats up your skin, such as hot showers or baths, or direct sunlight. This can make itching worse.    Apply cold compresses to soothe your sores to help relieve your symptoms. Do this for 30 minutes 3 to 4 times a day. You can make a cold compress by soaking a cloth in cold water. Squeeze out excess water. You can add colloidal oatmeal to the water to help reduce itching. For severe itching in a small area, apply an ice pack wrapped in a thin towel. Do this for 20 minutes 3 to 4 times a day.    You can also try wet dressings. One way to do this is to wear a wet piece of clothing under a dry one. Wear a damp shirt under a dry shirt if your upper body is affected. This can relieve itching and prevent you from " scratching the affected area.    You can also help relieve large areas of itching by taking a lukewarm bath with colloidal oatmeal added to the water.    Use hydrocortisone cream for redness and irritation, unless another medicine was prescribed. You can also use benzocaine anesthetic cream or spray. Calamine lotion can also relieve mild symptoms.    Use oral diphenhydramine to help reduce itching. You can buy this antihistamine at drug and grocery stores. It can make you sleepy, so use lower doses during the daytime. Or you can use loratadine. This is an antihistamine that will not make you sleepy. Do not use diphenhydramine if you have glaucoma or have trouble urinating due to an enlarged prostate.    If a plant causes your rash, make sure to wash your skin and the clothes you were wearing when you came into contact with the plant. This is to wash away the plant oils that gave you the rash and prevent more or worse symptoms.    Stay away from the substance or object that causes your symptoms. If you can t avoid it, wear gloves or some other type of protection.  Follow-up care  Follow up with your healthcare provider, or as advised.  When to seek medical advice  Call your healthcare provider right away if any of these occur:    Spreading of the rash to other parts of your body    Severe swelling of your face, eyelids, mouth, throat or tongue    Trouble urinating due to swelling in the genital area    Fever of 100.4 F (38 C) or higher    Redness or swelling that gets worse    Pain that gets worse    Foul-smelling fluid leaking from the skin    Yellow-brown crusts on the open blisters  Date Last Reviewed: 9/1/2016 2000-2017 [employer]. [address] [place] [id number]. All rights reserved. This information is not intended as a substitute for professional medical care. Always follow your healthcare professional's instructions.

## 2018-07-27 DIAGNOSIS — R52 GENERALIZED PAIN: ICD-10-CM

## 2018-07-30 RX ORDER — IBUPROFEN 800 MG/1
TABLET ORAL
Qty: 90 TABLET | Refills: 0 | Status: SHIPPED | OUTPATIENT
Start: 2018-07-30 | End: 2019-05-23

## 2018-07-30 NOTE — TELEPHONE ENCOUNTER
"Requested Prescriptions   Pending Prescriptions Disp Refills      MG tablet [Pharmacy Med Name: IBU TAB 800MG] 90 tablet 3     Sig: TAKE 1 TABLET EVERY 8 HOURSAS NEEDED FOR MODERATE PAIN(FOR BURSITIS)    NSAID Medications Failed    7/27/2018  9:08 PM       Failed - Normal CBC on file in past 12 months    Recent Labs   Lab Test  02/17/17   0943   WBC  5.3   RBC  4.71   HGB  14.1   HCT  42.0   PLT  224       For [facility] ONLY: [id number] = WBC, [id number] = RBC         Failed - Normal serum creatinine on file in past 12 months    Recent Labs   Lab Test  02/17/17   0943   CR  0.65            Passed - Blood pressure under 140/90 in past 12 months    BP Readings from Last 3 Encounters:   05/29/18 118/82   03/08/18 110/72   03/05/18 108/74                Passed - Normal ALT on file in past 12 months    Recent Labs   Lab Test  03/02/18   1030   ALT  29            Passed - Normal AST on file in past 12 months    Recent Labs   Lab Test  03/02/18   1030   AST  21            Passed - Recent (12 mo) or future (30 days) visit within the authorizing provider's specialty    Patient had office visit in the last 12 months or has a visit in the next 30 days with authorizing provider or within the authorizing provider's specialty.  See \"Patient Info\" tab in inbasket, or \"Choose Columns\" in Meds & Orders section of the refill encounter.           Passed - Patient is age 6-64 years       Passed - No active pregnancy on record       Passed - No positive pregnancy test in past 12 months        Last Written Prescription Date:  1/11/17  Last Fill Quantity: 90,  # refills: 3   Last office visit: 8/28/2017 with prescribing provider:     Future Office Visit:   Next 5 appointments (look out 90 days)     Sep 17, 2018  8:20 AM CDT   [facility]    [address] [place] [id number] [phone]                   "

## 2018-08-24 DIAGNOSIS — E78.5 HYPERLIPIDEMIA LDL GOAL <100: ICD-10-CM

## 2018-08-24 DIAGNOSIS — Z82.49 FAMILY HISTORY OF EARLY CAD: ICD-10-CM

## 2018-08-24 RX ORDER — ATORVASTATIN CALCIUM 10 MG/1
TABLET, FILM COATED ORAL
Qty: 90 TABLET | Refills: 1 | Status: SHIPPED | OUTPATIENT
Start: 2018-08-24 | End: 2019-05-23

## 2018-08-24 NOTE — TELEPHONE ENCOUNTER
"Requested Prescriptions   Pending Prescriptions Disp Refills     atorvastatin (LIPITOR) 10 MG tablet [Pharmacy Med Name: ATORVASTATIN TAB 10MG] 90 tablet 1     Sig: TAKE 1 TABLET DAILY    Statins Protocol Passed    8/24/2018  4:33 AM       Passed - LDL on file in past 12 months    Recent Labs   Lab Test  03/02/18   1030   LDL  117*            Passed - No abnormal creatine kinase in past 12 months    Recent Labs   Lab Test  03/02/18   1030   CKT  66               Passed - Recent (12 mo) or future (30 days) visit within the authorizing provider's specialty    Patient had office visit in the last 12 months or has a visit in the next 30 days with authorizing provider or within the authorizing provider's specialty.  See \"Patient Info\" tab in inbasket, or \"Choose Columns\" in Meds & Orders section of the refill encounter.           Passed - Patient is age 18 or older       Passed - No active pregnancy on record       Passed - No positive pregnancy test in past 12 months        LOV 5/29/2018    Prescription approved per [facility].  Sherry Dean RN  [place] [facility]    "

## 2018-09-13 ENCOUNTER — RADIANT APPOINTMENT (OUTPATIENT)
Dept: MAMMOGRAPHY | Facility: CLINIC | Age: 58
End: 2018-09-13
Payer: COMMERCIAL

## 2018-09-13 DIAGNOSIS — R23.2 HOT FLASHES: ICD-10-CM

## 2018-09-13 DIAGNOSIS — N95.1 SYMPTOMATIC MENOPAUSAL OR FEMALE CLIMACTERIC STATES: ICD-10-CM

## 2018-09-13 DIAGNOSIS — Z12.31 VISIT FOR SCREENING MAMMOGRAM: ICD-10-CM

## 2018-09-13 PROCEDURE — 77067 SCR MAMMO BI INCL CAD: CPT | Mod: TC

## 2018-09-13 PROCEDURE — 77063 BREAST TOMOSYNTHESIS BI: CPT | Mod: TC

## 2018-09-13 NOTE — TELEPHONE ENCOUNTER
"Requested Prescriptions   Pending Prescriptions Disp Refills     VIVELLE-DOT 0.1 MG/24HR BIW patch [Pharmacy Med Name: VIVELLE-DOT  DIS 0.1MG] 24 patch 3     Sig: APPLY 1 PATCH ONTO THE SKIN2 TIMES A WEEK    Hormone Replacement Therapy Passed    9/13/2018  4:12 AM       Passed - Blood pressure under 140/90 in past 12 months    BP Readings from Last 3 Encounters:   05/29/18 118/82   03/08/18 110/72   03/05/18 108/74                Passed - Recent (12 mo) or future (30 days) visit within the authorizing provider's specialty    Patient had office visit in the last 12 months or has a visit in the next 30 days with authorizing provider or within the authorizing provider's specialty.  See \"Patient Info\" tab in inbasket, or \"Choose Columns\" in Meds & Orders section of the refill encounter.           Passed - Patient has mammogram in past 2 years on file if age 50-75       Passed - Patient is 18 years of age or older       Passed - No active pregnancy on record       Passed - No positive pregnancy test on record in past 12 months        Last Written Prescription Date:  9/20/17  Last Fill Quantity: 24,  # refills: 3   Last office visit: 8/28/17 with prescribing provider:  Buddy   Future Office Visit:   Next 5 appointments (look out 90 days)     Sep 17, 2018  8:20 AM CDT   [facility]    [address] [place] [id number] [phone]                   "

## 2018-09-17 ENCOUNTER — OFFICE VISIT (OUTPATIENT)
Dept: FAMILY MEDICINE | Facility: CLINIC | Age: 58
End: 2018-09-17
Payer: COMMERCIAL

## 2018-09-17 VITALS
HEIGHT: 64 IN | WEIGHT: 162.6 LBS | HEART RATE: 68 BPM | BODY MASS INDEX: 27.76 KG/M2 | DIASTOLIC BLOOD PRESSURE: 64 MMHG | SYSTOLIC BLOOD PRESSURE: 112 MMHG | TEMPERATURE: 97.3 F | OXYGEN SATURATION: 98 %

## 2018-09-17 DIAGNOSIS — F41.9 ANXIETY: ICD-10-CM

## 2018-09-17 DIAGNOSIS — Z23 NEED FOR PROPHYLACTIC VACCINATION AND INOCULATION AGAINST INFLUENZA: ICD-10-CM

## 2018-09-17 DIAGNOSIS — E78.5 HYPERLIPIDEMIA LDL GOAL <130: ICD-10-CM

## 2018-09-17 DIAGNOSIS — R61 NIGHT SWEATS: ICD-10-CM

## 2018-09-17 DIAGNOSIS — R39.15 URINARY URGENCY: ICD-10-CM

## 2018-09-17 DIAGNOSIS — J01.01 ACUTE RECURRENT MAXILLARY SINUSITIS: ICD-10-CM

## 2018-09-17 DIAGNOSIS — Z79.899 CONTROLLED SUBSTANCE AGREEMENT SIGNED: ICD-10-CM

## 2018-09-17 DIAGNOSIS — N95.1 SYMPTOMATIC MENOPAUSAL OR FEMALE CLIMACTERIC STATES: ICD-10-CM

## 2018-09-17 DIAGNOSIS — R35.1 NOCTURIA: ICD-10-CM

## 2018-09-17 DIAGNOSIS — G47.00 INSOMNIA, UNSPECIFIED TYPE: ICD-10-CM

## 2018-09-17 DIAGNOSIS — R53.83 FATIGUE, UNSPECIFIED TYPE: ICD-10-CM

## 2018-09-17 DIAGNOSIS — N32.81 OVERACTIVE BLADDER: ICD-10-CM

## 2018-09-17 DIAGNOSIS — Z00.01 ENCOUNTER FOR ROUTINE ADULT HEALTH EXAMINATION WITH ABNORMAL FINDINGS: Primary | ICD-10-CM

## 2018-09-17 LAB
ALBUMIN SERPL-MCNC: 4.1 G/DL (ref 3.4–5)
ALBUMIN UR-MCNC: NEGATIVE MG/DL
ALP SERPL-CCNC: 97 U/L (ref 40–150)
ALT SERPL W P-5'-P-CCNC: 40 U/L (ref 0–50)
ANION GAP SERPL CALCULATED.3IONS-SCNC: 10 MMOL/L (ref 3–14)
APPEARANCE UR: CLEAR
AST SERPL W P-5'-P-CCNC: 19 U/L (ref 0–45)
BACTERIA #/AREA URNS HPF: ABNORMAL /HPF
BASOPHILS # BLD AUTO: 0.1 10E9/L (ref 0–0.2)
BASOPHILS NFR BLD AUTO: 1.1 %
BILIRUB SERPL-MCNC: 0.8 MG/DL (ref 0.2–1.3)
BILIRUB UR QL STRIP: NEGATIVE
BUN SERPL-MCNC: 14 MG/DL (ref 7–30)
CALCIUM SERPL-MCNC: 8.9 MG/DL (ref 8.5–10.1)
CHLORIDE SERPL-SCNC: 107 MMOL/L (ref 94–109)
CHOLEST SERPL-MCNC: 141 MG/DL
CO2 SERPL-SCNC: 25 MMOL/L (ref 20–32)
COLOR UR AUTO: YELLOW
CREAT SERPL-MCNC: 0.7 MG/DL (ref 0.52–1.04)
CREAT UR-MCNC: 91 MG/DL
DIFFERENTIAL METHOD BLD: NORMAL
EOSINOPHIL # BLD AUTO: 0.4 10E9/L (ref 0–0.7)
EOSINOPHIL NFR BLD AUTO: 7.8 %
ERYTHROCYTE [DISTWIDTH] IN BLOOD BY AUTOMATED COUNT: 13 % (ref 10–15)
ESTRADIOL SERPL-MCNC: 50 PG/ML
FSH SERPL-ACNC: 38.9 IU/L
GFR SERPL CREATININE-BSD FRML MDRD: 87 ML/MIN/1.7M2
GLUCOSE SERPL-MCNC: 85 MG/DL (ref 70–99)
GLUCOSE UR STRIP-MCNC: NEGATIVE MG/DL
HCT VFR BLD AUTO: 43.6 % (ref 35–47)
HDLC SERPL-MCNC: 63 MG/DL
HGB BLD-MCNC: 14.4 G/DL (ref 11.7–15.7)
HGB UR QL STRIP: ABNORMAL
KETONES UR STRIP-MCNC: NEGATIVE MG/DL
LDLC SERPL CALC-MCNC: 56 MG/DL
LEUKOCYTE ESTERASE UR QL STRIP: NEGATIVE
LH SERPL-ACNC: 21 IU/L
LYMPHOCYTES # BLD AUTO: 1.5 10E9/L (ref 0.8–5.3)
LYMPHOCYTES NFR BLD AUTO: 32.5 %
MCH RBC QN AUTO: 29.9 PG (ref 26.5–33)
MCHC RBC AUTO-ENTMCNC: 33 G/DL (ref 31.5–36.5)
MCV RBC AUTO: 91 FL (ref 78–100)
MICROALBUMIN UR-MCNC: 5 MG/L
MICROALBUMIN/CREAT UR: 6.05 MG/G CR (ref 0–25)
MONOCYTES # BLD AUTO: 0.3 10E9/L (ref 0–1.3)
MONOCYTES NFR BLD AUTO: 6.8 %
MUCOUS THREADS #/AREA URNS LPF: PRESENT /LPF
NEUTROPHILS # BLD AUTO: 2.4 10E9/L (ref 1.6–8.3)
NEUTROPHILS NFR BLD AUTO: 51.8 %
NITRATE UR QL: NEGATIVE
NON-SQ EPI CELLS #/AREA URNS LPF: ABNORMAL /LPF
NONHDLC SERPL-MCNC: 78 MG/DL
PH UR STRIP: 6.5 PH (ref 5–7)
PLATELET # BLD AUTO: 213 10E9/L (ref 150–450)
POTASSIUM SERPL-SCNC: 4 MMOL/L (ref 3.4–5.3)
PROGEST SERPL-MCNC: 0.2 NG/ML
PROT SERPL-MCNC: 7.9 G/DL (ref 6.8–8.8)
RBC # BLD AUTO: 4.81 10E12/L (ref 3.8–5.2)
RBC #/AREA URNS AUTO: ABNORMAL /HPF
SODIUM SERPL-SCNC: 142 MMOL/L (ref 133–144)
SOURCE: ABNORMAL
SP GR UR STRIP: 1.01 (ref 1–1.03)
TRIGL SERPL-MCNC: 112 MG/DL
TSH SERPL DL<=0.005 MIU/L-ACNC: 1.51 MU/L (ref 0.4–4)
UROBILINOGEN UR STRIP-ACNC: 0.2 EU/DL (ref 0.2–1)
WBC # BLD AUTO: 4.6 10E9/L (ref 4–11)
WBC #/AREA URNS AUTO: ABNORMAL /HPF

## 2018-09-17 PROCEDURE — 85025 COMPLETE CBC W/AUTO DIFF WBC: CPT | Performed by: FAMILY MEDICINE

## 2018-09-17 PROCEDURE — 82043 UR ALBUMIN QUANTITATIVE: CPT | Performed by: FAMILY MEDICINE

## 2018-09-17 PROCEDURE — 80061 LIPID PANEL: CPT | Performed by: FAMILY MEDICINE

## 2018-09-17 PROCEDURE — 80053 COMPREHEN METABOLIC PANEL: CPT | Performed by: FAMILY MEDICINE

## 2018-09-17 PROCEDURE — 90686 IIV4 VACC NO PRSV 0.5 ML IM: CPT | Performed by: FAMILY MEDICINE

## 2018-09-17 PROCEDURE — 99396 PREV VISIT EST AGE 40-64: CPT | Mod: 25 | Performed by: FAMILY MEDICINE

## 2018-09-17 PROCEDURE — 84403 ASSAY OF TOTAL TESTOSTERONE: CPT | Performed by: FAMILY MEDICINE

## 2018-09-17 PROCEDURE — 36415 COLL VENOUS BLD VENIPUNCTURE: CPT | Performed by: FAMILY MEDICINE

## 2018-09-17 PROCEDURE — 83001 ASSAY OF GONADOTROPIN (FSH): CPT | Performed by: FAMILY MEDICINE

## 2018-09-17 PROCEDURE — 84144 ASSAY OF PROGESTERONE: CPT | Performed by: FAMILY MEDICINE

## 2018-09-17 PROCEDURE — 83002 ASSAY OF GONADOTROPIN (LH): CPT | Performed by: FAMILY MEDICINE

## 2018-09-17 PROCEDURE — 81001 URINALYSIS AUTO W/SCOPE: CPT | Performed by: FAMILY MEDICINE

## 2018-09-17 PROCEDURE — 99214 OFFICE O/P EST MOD 30 MIN: CPT | Mod: 25 | Performed by: FAMILY MEDICINE

## 2018-09-17 PROCEDURE — 90471 IMMUNIZATION ADMIN: CPT | Performed by: FAMILY MEDICINE

## 2018-09-17 PROCEDURE — 84443 ASSAY THYROID STIM HORMONE: CPT | Performed by: FAMILY MEDICINE

## 2018-09-17 PROCEDURE — 82670 ASSAY OF TOTAL ESTRADIOL: CPT | Performed by: FAMILY MEDICINE

## 2018-09-17 RX ORDER — ESTRADIOL 0.5 MG/1
0.5 TABLET ORAL DAILY
Qty: 90 TABLET | Refills: 0 | Status: SHIPPED | OUTPATIENT
Start: 2018-09-17 | End: 2019-05-23 | Stop reason: DRUGHIGH

## 2018-09-17 RX ORDER — AZITHROMYCIN 250 MG/1
TABLET, FILM COATED ORAL
Qty: 6 TABLET | Refills: 1 | Status: SHIPPED | OUTPATIENT
Start: 2018-09-17 | End: 2018-11-27

## 2018-09-17 RX ORDER — ZOLPIDEM TARTRATE 10 MG/1
TABLET ORAL
Qty: 30 TABLET | Refills: 1 | Status: SHIPPED | OUTPATIENT
Start: 2018-09-17 | End: 2019-05-23

## 2018-09-17 RX ORDER — ESTRADIOL 0.1 MG/D
PATCH, EXTENDED RELEASE TRANSDERMAL
Qty: 24 PATCH | Refills: 3 | OUTPATIENT
Start: 2018-09-17

## 2018-09-17 ASSESSMENT — PATIENT HEALTH QUESTIONNAIRE - PHQ9: 5. POOR APPETITE OR OVEREATING: SEVERAL DAYS

## 2018-09-17 ASSESSMENT — ANXIETY QUESTIONNAIRES
7. FEELING AFRAID AS IF SOMETHING AWFUL MIGHT HAPPEN: NOT AT ALL
2. NOT BEING ABLE TO STOP OR CONTROL WORRYING: SEVERAL DAYS
IF YOU CHECKED OFF ANY PROBLEMS ON THIS QUESTIONNAIRE, HOW DIFFICULT HAVE THESE PROBLEMS MADE IT FOR YOU TO DO YOUR WORK, TAKE CARE OF THINGS AT HOME, OR GET ALONG WITH OTHER PEOPLE: SOMEWHAT DIFFICULT
6. BECOMING EASILY ANNOYED OR IRRITABLE: NOT AT ALL
GAD7 TOTAL SCORE: 3
3. WORRYING TOO MUCH ABOUT DIFFERENT THINGS: SEVERAL DAYS
5. BEING SO RESTLESS THAT IT IS HARD TO SIT STILL: NOT AT ALL
1. FEELING NERVOUS, ANXIOUS, OR ON EDGE: NOT AT ALL

## 2018-09-17 NOTE — MR AVS SNAPSHOT
"              After Visit Summary   9/17/2018    Whitney Aceves    MRN: [id number]           Patient Information     Date Of Birth          1960        Visit Information        Provider Department      9/17/2018 8:20 AM Lisbeth Gonzalez MD [facility] [place]        Today's Diagnoses     Encounter for routine adult health examination with abnormal findings    -  1    Night sweats        Nocturia        Urinary urgency        Fatigue, unspecified type        Insomnia, unspecified type        Symptomatic menopausal or female climacteric states        Hyperlipidemia LDL goal <130        Anxiety        Controlled substance agreement signed - 9/17/2018 ok for #30 -10mg ambien (takes 1/2 tab) with 1 refill every 6 months or so           Care Instructions    For bladder urgency - Avoid /eliminate citrus juices ( and citric acid), all caffeine, even decaf coffee, and teas; alcohol, and artificial sweeteners, chocolate, tomato products and carbonated beverages ( even sparkling blunt).   1 serving of any of the above can irritate bladder for 3 days.     If you eliminate all the above and are still having problems, please contact our office.       Establish an exercise regimen. Activity goal: 45 minutes 5 days a week. New exercise routine: cardiovascular workout on exercise equipment, walking and weightlifting. Diet regimen was discussed and plan is self-directed dieting: reduce calories, reduce portions, reduce processed  carbs, increase fruits/vegetables and avoid sweets and supervised diet program. Avoid artificial sweeteners and \"diet\" drinks and sodas.       Dr. Gonzalez's recommended diet to rev-up metabolism for weight loss:   eat every 2-3 hours.   Lean protein = 2 egg whites, or turkey, chicken, fish, low fat, plain greek yogurt (try Fage 0% brand).. Low-glycemic fruits = strawberries, blueberries, raspberries, blackberries, nectarines, grapefruit, oranges,  apples.     2 oz. Lean protein + 1/2 " cup  low-glycemic fruit --- breakfast and midmorning snack     2 oz. Lean protein + 1/4 cup whole grain rice or potato + 1 cup green veggie - lunch, dinner , and afternoon snack.     Evening snack : 1/2 cup of low glycemic fruit or an apple.      If you can't kill it and grill it, or pick it off a plant and eat it - DON'T EAT IT!     For occasional pasta - try Barilla Plus - has protein in it. - keep to 1/2 cup instead of your 1/4 cup of rice or potato.     If 5-6 small meals/day - too labor intensive, then keep to 3 meals plus 1 snack with 3 oz lean protein per meal with 2- 3 oz protein in your snack.                   To prevent future wax impactions, gave rule of three's :   3 drops of mineral oil in each ear  3 times a day, for  3 days every  3 weeks, then flush with dilute Rose dish soap and lukewarm water with a bulb syringe ( like you would use to suck out an infant's nostrils).           Preventive Health Recommendations  Female Ages 50 - 64    Yearly exam: See your health care provider every year in order to  o Review health changes.   o Discuss preventive care.    o Review your medicines if your doctor has prescribed any.      Get a Pap test every three years (unless you have an abnormal result and your provider advises testing more often).    If you get Pap tests with HPV test, you only need to test every 5 years, unless you have an abnormal result.     You do not need a Pap test if your uterus was removed (hysterectomy) and you have not had cancer.    You should be tested each year for STDs (sexually transmitted diseases) if you're at risk.     Have a mammogram every 1 to 2 years.    Have a colonoscopy at age 50, or have a yearly FIT test (stool test). These exams screen for colon cancer.      Have a cholesterol test every 5 years, or more often if advised.    Have a diabetes test (fasting glucose) every three years. If you are at risk for diabetes, you should have this test more often.     If you are at  risk for osteoporosis (brittle bone disease), think about having a bone density scan (DEXA).    Shots: Get a flu shot each year. Get a tetanus shot every 10 years.    Nutrition:     Eat at least 5 servings of fruits and vegetables each day.    Eat whole-grain bread, whole-wheat pasta and brown rice instead of white grains and rice.    Get adequate Calcium and Vitamin D.     Lifestyle    Exercise at least 150 minutes a week (30 minutes a day, 5 days a week). This will help you control your weight and prevent disease.    Limit alcohol to one drink per day.    No smoking.     Wear sunscreen to prevent skin cancer.     See your dentist every six months for an exam and cleaning.    See your eye doctor every 1 to 2 years.               Thank you for choosing [facility]  for your Health Care. It was a pleasure seeing you at your visit today. Please contact us with any questions or concerns you may have.                   Lisbeth Gonzalez MD                                  To reach your [facility] care team after hours call:   [phone]    Our clinic hours are:     Monday- 7:30 am - 7:00 pm                             Tuesday through Friday- 7:30 am - 5:00 pm                                        Saturday- 8:00 am - 12:00 pm                  Phone:  [phone]    Our pharmacy hours are:     Monday  8:00 am to 7:00 pm      Tuesday through Friday 8:00am to 6:00pm                        Saturday - 9:00 am to 1:00 pm      Sunday : Closed.              Phone:  [phone]      There is also information available at our web site:  [email or web address]    If your provider ordered any lab tests and you do not receive the results within 10 business days, please call the clinic.    If you need a medication refill please contact your pharmacy.  Please allow 2 business days for your refill to be completed.    Our clinic offers telephone visits and e visits.  Please ask one of your team  "members to explain more.      Use [employer] (secure email communication and access to your chart) to send your primary care provider a message or make an appointment. Ask someone on your Team how to sign up for [employer].                         Follow-ups after your visit        Follow-up notes from your care team     Return in about 6 months (around 3/17/2019) for cholesterol recheck, controlled medication follow up.      Who to contact     If you have questions or need follow up information about today's clinic visit or your schedule please contact [facility] directly at [phone].  Normal or non-critical lab and imaging results will be communicated to you by MyChart, letter or phone within 4 business days after the clinic has received the results. If you do not hear from us within 7 days, please contact the clinic through [employer] or phone. If you have a critical or abnormal lab result, we will notify you by phone as soon as possible.  Submit refill requests through [employer] or call your pharmacy and they will forward the refill request to us. Please allow 3 business days for your refill to be completed.          Additional Information About Your Visit        MyChart Information     [employer] gives you secure access to your electronic health record. If you see a primary care provider, you can also send messages to your care team and make appointments. If you have questions, please call your primary care clinic.  If you do not have a primary care provider, please call [phone] and they will assist you.        Care EveryWhere ID     This is your Care EveryWhere ID. This could be used by other organizations to access your [place] medical records  [id number]        Your Vitals Were     Pulse Temperature Height Pulse Oximetry Breastfeeding? BMI (Body Mass Index)    68 97.3  F (36.3  C) (Tympanic) 5' 4\" (1.626 m) 98% No 27.91 kg/m2       Blood Pressure from Last 3 Encounters:   09/17/18 112/64 "   05/29/18 118/82   03/08/18 110/72    Weight from Last 3 Encounters:   09/17/18 162 lb 9.6 oz (73.8 kg)   05/29/18 159 lb (72.1 kg)   03/08/18 161 lb 3.2 oz (73.1 kg)              We Performed the Following     Albumin Random Urine Quantitative with Creat Ratio     CBC with platelets differential     Comprehensive metabolic panel     Estradiol     Follicle stimulating hormone     Lipid panel reflex to direct LDL Fasting     Lutropin     OFFICE/OUTPT VISIT,EST,LEVL IV     Progesterone     Testosterone total     TSH with free T4 reflex     UA reflex to Microscopic and Culture          Today's Medication Changes          These changes are accurate as of 9/17/18  9:17 AM.  If you have any questions, ask your nurse or doctor.               Start taking these medicines.        Dose/Directions    estradiol 0.5 MG tablet   Commonly known as:  ESTRACE   Used for:  Night sweats, Symptomatic menopausal or female climacteric states   Replaces:  VIVELLE-DOT 0.1 MG/24HR BIW patch   Started by:  Lisbeth Gonzalez MD        Dose:  0.5 mg   Take 1 tablet (0.5 mg) by mouth daily   Quantity:  90 tablet   Refills:  0         Stop taking these medicines if you haven't already. Please contact your care team if you have questions.     VIVELLE-DOT 0.1 MG/24HR BIW patch   Generic drug:  estradiol   Replaced by:  estradiol 0.5 MG tablet   Stopped by:  Lisbeth Gonzalez MD                Where to get your medicines      These medications were sent to [facility] - [place], [place] - [address] AT [address] [place] [address], [facility] [address]     Phone:  [phone]     estradiol 0.5 MG tablet         Some of these will need a paper prescription and others can be bought over the counter.  Ask your nurse if you have questions.     Bring a paper prescription for each of these medications     zolpidem 10 MG tablet                Primary Care Provider Office Phone # Fax #     Lisbeth Gonzalez [phone]       [address] [facility] [address]        Equal Access to Services     MILTON GLASS : Alexa Allen, wanikki villatoro, karenta kaottonielda benjaminleeroy, kurtis shravanin hayaaluis miguel alexandertyler reynoso marilynn bolaños. So [facility] [phone].    ATENCIÓN: Si habla español, tiene a romero disposición servicios gratuitos de asistencia lingüística. Llame al [phone].    We comply with applicable federal civil rights laws and [place] laws. We do not discriminate on the basis of race, color, national origin, age, disability, sex, sexual orientation, or gender identity.            Thank you!     Thank you for choosing [facility]  for your care. Our goal is always to provide you with excellent care. Hearing back from our patients is one way we can continue to improve our services. Please take a few minutes to complete the written survey that you may receive in the mail after your visit with us. Thank you!             Your Updated Medication List - Protect others around you: Learn how to safely use, store and throw away your medicines at www.disposemymeds.org.          This list is accurate as of 9/17/18  9:17 AM.  Always use your most recent med list.                   Brand Name Dispense Instructions for use Diagnosis    atorvastatin 10 MG tablet    LIPITOR    90 tablet    TAKE 1 TABLET DAILY    Hyperlipidemia LDL goal <100, Family history of early CAD       CLARITIN 10 MG tablet   Generic drug:  loratadine     30 tablet    Take 1 tablet (10 mg) by mouth daily        estradiol 0.5 MG tablet    ESTRACE    90 tablet    Take 1 tablet (0.5 mg) by mouth daily    Night sweats, Symptomatic menopausal or female climacteric states        MG tablet   Generic drug:  ibuprofen     90 tablet    TAKE 1 TABLET EVERY 8 HOURSAS NEEDED FOR MODERATE PAIN(FOR BURSITIS)    Generalized pain       MULTIVITAMIN ADULT PO      Take by mouth daily        OMEGA 3 PO            zolpidem 10 MG tablet    AMBIEN    30 tablet    TAKE ONE-HALF TABLET BY MOUTH EVERY EVENING AS NEEDED FOR SLEEP    Insomnia, unspecified type

## 2018-09-17 NOTE — TELEPHONE ENCOUNTER
Patient saw Dr Joseph today and this was discontinued during OV  Refused with note sent to pharmacy    Lala Acosta RN

## 2018-09-17 NOTE — PATIENT INSTRUCTIONS
"For bladder urgency - Avoid /eliminate citrus juices ( and citric acid), all caffeine, even decaf coffee, and teas; alcohol, and artificial sweeteners, chocolate, tomato products and carbonated beverages ( even sparkling blunt).   1 serving of any of the above can irritate bladder for 3 days.     If you eliminate all the above and are still having problems, please contact our office.       Establish an exercise regimen. Activity goal: 45 minutes 5 days a week. New exercise routine: cardiovascular workout on exercise equipment, walking and weightlifting. Diet regimen was discussed and plan is self-directed dieting: reduce calories, reduce portions, reduce processed  carbs, increase fruits/vegetables and avoid sweets and supervised diet program. Avoid artificial sweeteners and \"diet\" drinks and sodas.       Dr. Gonzalez's recommended diet to rev-up metabolism for weight loss:   eat every 2-3 hours.   Lean protein = 2 egg whites, or turkey, chicken, fish, low fat, plain greek yogurt (try Fage 0% brand).. Low-glycemic fruits = strawberries, blueberries, raspberries, blackberries, nectarines, grapefruit, oranges,  apples.     2 oz. Lean protein + 1/2 cup  low-glycemic fruit --- breakfast and midmorning snack     2 oz. Lean protein + 1/4 cup whole grain rice or potato + 1 cup green veggie - lunch, dinner , and afternoon snack.     Evening snack : 1/2 cup of low glycemic fruit or an apple.      If you can't kill it and grill it, or pick it off a plant and eat it - DON'T EAT IT!     For occasional pasta - try Barilla Plus - has protein in it. - keep to 1/2 cup instead of your 1/4 cup of rice or potato.     If 5-6 small meals/day - too labor intensive, then keep to 3 meals plus 1 snack with 3 oz lean protein per meal with 2- 3 oz protein in your snack.                   To prevent future wax impactions, gave rule of three's :   3 drops of mineral oil in each ear  3 times a day, for  3 days every  3 weeks, then flush with " dilute Rose dish soap and lukewarm water with a bulb syringe ( like you would use to suck out an infant's nostrils).           Preventive Health Recommendations  Female Ages 50 - 64    Yearly exam: See your health care provider every year in order to  o Review health changes.   o Discuss preventive care.    o Review your medicines if your doctor has prescribed any.      Get a Pap test every three years (unless you have an abnormal result and your provider advises testing more often).    If you get Pap tests with HPV test, you only need to test every 5 years, unless you have an abnormal result.     You do not need a Pap test if your uterus was removed (hysterectomy) and you have not had cancer.    You should be tested each year for STDs (sexually transmitted diseases) if you're at risk.     Have a mammogram every 1 to 2 years.    Have a colonoscopy at age 50, or have a yearly FIT test (stool test). These exams screen for colon cancer.      Have a cholesterol test every 5 years, or more often if advised.    Have a diabetes test (fasting glucose) every three years. If you are at risk for diabetes, you should have this test more often.     If you are at risk for osteoporosis (brittle bone disease), think about having a bone density scan (DEXA).    Shots: Get a flu shot each year. Get a tetanus shot every 10 years.    Nutrition:     Eat at least 5 servings of fruits and vegetables each day.    Eat whole-grain bread, whole-wheat pasta and brown rice instead of white grains and rice.    Get adequate Calcium and Vitamin D.     Lifestyle    Exercise at least 150 minutes a week (30 minutes a day, 5 days a week). This will help you control your weight and prevent disease.    Limit alcohol to one drink per day.    No smoking.     Wear sunscreen to prevent skin cancer.     See your dentist every six months for an exam and cleaning.    See your eye doctor every 1 to 2 years.               Thank you for choosing [facility]  for your Health Care. It was a pleasure seeing you at your visit today. Please contact us with any questions or concerns you may have.                   Lisbeth Gonzalez MD                                  To reach your [facility] care team after hours call:   [phone]    Our clinic hours are:     Monday- 7:30 am - 7:00 pm                             Tuesday through Friday- 7:30 am - 5:00 pm                                        Saturday- 8:00 am - 12:00 pm                  Phone:  [phone]    Our pharmacy hours are:     Monday  8:00 am to 7:00 pm      Tuesday through Friday 8:00am to 6:00pm                        Saturday - 9:00 am to 1:00 pm      Sunday : Closed.              Phone:  [phone]      There is also information available at our web site:  [email or web address]    If your provider ordered any lab tests and you do not receive the results within 10 business days, please call the clinic.    If you need a medication refill please contact your pharmacy.  Please allow 2 business days for your refill to be completed.    Our clinic offers telephone visits and e visits.  Please ask one of your team members to explain more.      Use [email or web address] (secure email communication and access to your chart) to send your primary care provider a message or make an appointment. Ask someone on your Team how to sign up for [employer].                      Thank you for choosing [facility]  for your Health Care. It was a pleasure seeing you at your visit today. Please contact us with any questions or concerns you may have.                   Lisbeth Gonzalez MD                                  To reach your [facility] care team after hours call:   [phone]    Our clinic hours are:     Monday- 7:30 am - 7:00 pm                             Tuesday through Friday- 7:30 am - 5:00 pm                                        Saturday- 8:00 am - 12:00 pm                   Phone:  [phone]    Our pharmacy hours are:     Monday  8:00 am to 7:00 pm      Tuesday through Friday 8:00am to 6:00pm                        Saturday - 9:00 am to 1:00 pm      Sunday : Closed.              Phone:  [phone]      There is also information available at our web site:  [email or web address]    If your provider ordered any lab tests and you do not receive the results within 10 business days, please call the clinic.    If you need a medication refill please contact your pharmacy.  Please allow 2 business days for your refill to be completed.    Our clinic offers telephone visits and e visits.  Please ask one of your team members to explain more.      Use MyChart (secure email communication and access to your chart) to send your primary care provider a message or make an appointment. Ask someone on your Team how to sign up for [employer].

## 2018-09-17 NOTE — LETTER
[facility] [place]    09/17/18    Patient: Whitney Aceves  YOB: 1960  Medical Record Number: [id number]                                                                  Controlled Substance Agreement  I understand that my care provider has prescribed controlled substances (narcotics, tranquilizers, and/or stimulants) to help manage my condition(s).  I am taking this medicine to help me function or work.  I know that this is strong medicine.  It could have serious side effects and even cause a dependency on the drug.  If I stop these medicines suddenly, I could have severe withdrawal symptoms.    The risks, benefits, and side effects of these medication(s) were explained to me.  I agree that:  1. I will take part in other treatments as advised by my provider.  This may be psychiatry or counseling, physical therapy, behavioral therapy, group treatment, or a referral to a pain clinic.  I will reduce or stop my medicine when my provider tells me to do so.   2. I will take my medicines as prescribed.  I will not change the dose or schedule unless my provider tells me to.  There will be no refills if I  run out early.   I may be contacted at any time without warning and asked to complete a drug test or pill count.   3. I will keep all my appointments at the clinic.  If I miss appointments or fail to follow instructions, my provider may stop my medicine.  4. I will not ask other providers to prescribe controlled substances. And I will not accept controlled substances from other people. If I need another prescribed controlled substance for a new reason, I will notify my provider within one business day.  5. If I enroll in the [place] Medical Marijuana program, I will tell my provider.  I will also sign an agreement to share my medical records with my provider.  6. I will use one pharmacy to fill all of my controlled substance prescriptions.  If my prescription is mailed to my pharmacy, it may take 5  to 7 days for my medicine to be ready.  7. I understand that my provider, clinic care team, and pharmacy can track controlled substance prescriptions from other providers through a central database (prescription monitoring program).  8. I will bring in my list of medications (or my medicine bottles) each time I come to the clinic.  [id number] REV-  07/2018                                                                                                                                   Page 1 of 2      [facility] [place]    09/17/18    Patient: Whitney Aceves  YOB: 1960  Medical Record Number: [id number]    9. Refills of controlled substances will be made only during office hours.  It is up to me to make sure that I do not run out of my medicines on weekends or holidays.    10. I am responsible for my prescriptions.  If the medicine/prescription is lost or stolen, it will not be replaced.   I also agree not to share these medicines with anyone.  11. I agree to not use ANY illegal or recreational drugs.  This includes marijuana, cocaine, bath salts or other drugs.  I agree not to use alcohol unless my provider says I may.  I agree to give urine samples whenever asked.  If I fail to give a urine sample, the provider may stop my medicine.     12. I will tell my nurse or provider right away if I become pregnant or have a new medical problem treated outside of [facility].  13. I understand that this medicine can affect my thinking and judgment.  It may be unsafe for me to drive, use machinery and do dangerous tasks.  I will not do any of these things until I know how the medicine affects me.  If my dose changes, I will wait to see how it affects me.  I will contact my provider if I have concerns about medicine side effects.  I understand that if I do not follow any of the conditions above, my prescriptions or treatment may be stopped.    I agree that my provider, clinic care team, and pharmacy may work  with any city, state or federal law enforcement agency that investigates the misuse, sale, or other diversion of my controlled medicine. I will allow my provider to discuss my care with or share a copy of this agreement with any other treating provider, pharmacy or emergency room where I receive care.  I agree to give up (waive) any right of privacy or confidentiality with respect to these authorizations.   I have read this agreement and have asked questions about anything I did not understand.   ___________________________________    ___________________________  Patient Signature                                                           Date and Time  ___________________________________     ____________________________  Witness                                                                            Date and Time  ___________________________________  Lisbeth Gonzalez MD  [id number] REV-  07/2018                                                                                                                                                   Page 2 of 2

## 2018-09-17 NOTE — PROGRESS NOTES
"  SUBJECTIVE:                                                    Whitney Aceves is a 57 year old female who presents to clinic today for the following health issues:    Hyperlipidemia Follow-Up      Rate your low fat/cholesterol diet?: { :327067::\"good\"}    Taking statin?  { :174525::\"No\"}    Other lipid medications/supplements?:  { :939434::\"none\"}    Anxiety Follow-Up    Status since last visit: { :950860::\"No change\"}    Other associated symptoms:{ :471054::\"None\"}    Complicating factors:   Significant life event: { :368600::\"No\"}   Current substance abuse: { :839576::\"None\"}  Depression symptoms: { :218656::\"No\"}  JOSE ANTONIO-7 SCORE 2/17/2017 8/28/2017 3/8/2018   Total Score - - -   Total Score 1 1 1       JOSE ANTONIO-7      Medication Followup of Ambien - Insomnia     Taking Medication as prescribed: {.:212948::\"yes\"}    Side Effects:  {NONEORCHOOSE:347654::\"None\"}    Medication Helping Symptoms:  {.:653469::\"yes\"}       Problem list and histories reviewed & adjusted, as indicated.  Additional history: as documented    Reviewed and updated as needed this visit by clinical staff       Reviewed and updated as needed this visit by Provider         ROS:   ROS: 12 point ROS neg other than the symptoms noted above    OBJECTIVE:                                                    There were no vitals taken for this visit.  There is no height or weight on file to calculate BMI.   {EXAM - NORMAL- condensed - partially selected:387569::\"GENERAL: healthy, alert, well nourished, well hydrated, no distress\",\"HENT: ear canals- normal; TMs- normal; Nose- normal; Mouth- no ulcers, no lesions\",\"NECK: no tenderness, no adenopathy, no asymmetry, no masses, no stiffness; thyroid- normal to palpation\",\"RESP: lungs clear to auscultation - no rales, no rhonchi, no wheezes\",\"CV: regular rates and rhythm, normal S1 S2, no S3 or S4 and no murmur, no click or rub -\",\"ABDOMEN: soft, no tenderness, no  hepatosplenomegaly, no masses, normal bowel " "sounds\"}    {Diagnostic Test Results:046879}     ASSESSMENT/PLAN:                                                    No diagnosis found.    {FOLLOW UP CLINIC OPTIONS:766265}         Lisbeth Gonzalez MD    [facility]      "

## 2018-09-17 NOTE — PROGRESS NOTES

## 2018-09-17 NOTE — PROGRESS NOTES
SUBJECTIVE:   CC: Whitney Aceves is an 57 year old woman who presents for preventive health visit.     Healthy Habits:    Do you get at least three servings of calcium containing foods daily (dairy, green leafy vegetables, etc.)? yes    Amount of exercise or daily activities, outside of work: a few days per week    Problems taking medications regularly No    Medication side effects: No    Have you had an eye exam in the past two years? yes    Do you see a dentist twice per year? yes    Do you have sleep apnea, excessive snoring or daytime drowsiness? Yes- has insomnia takes Ambien     Hyperlipidemia Follow-Up      Rate your low fat/cholesterol diet?: not monitoring fat    Taking statin?  Yes, no muscle aches from statin    Other lipid medications/supplements?:  Fish oil/Omega 3, dose unknown without side effects    Recent Labs   Lab Test  03/02/18   1030  08/28/17   0847   12/13/10   1026   CHOL  200*  247*   < >  215*   HDL  60  64   < >  61   LDL  117*  151*   < >  135*   TRIG  116  162*   < >  96   CHOLHDLRATIO   --    --    --   3.5    < > = values in this interval not displayed.          Anxiety Follow-Up    Status since last visit: No change    Other associated symptoms:None    Complicating factors:   Significant life event: No   Current substance abuse: None  Depression symptoms: No    JOSE ANTONIO-7 SCORE 8/28/2017 3/8/2018 9/17/2018   Total Score - - -   Total Score 1 1 3       JOSE ANTONIO-7    Today's PHQ-2 Score:   PHQ-2 ( 1999 Pf[employer]) 9/17/2018 3/8/2018   Q1: Little interest or pleasure in doing things 0 0   Q2: Feeling down, depressed or hopeless 0 0   PHQ-2 Score 0 0   Q1: Little interest or pleasure in doing things - -   Q2: Feeling down, depressed or hopeless - -   PHQ-2 Score - -       Abuse: Current or Past(Physical, Sexual or Emotional)- No  Do you feel safe in your environment - Yes    Social History   Substance Use Topics     Smoking status: Never Smoker     Smokeless tobacco: Never Used     Alcohol use No      If you drink alcohol do you typically have >3 drinks per day or >7 drinks per week? No                     Reviewed orders with patient.  Reviewed health maintenance and updated orders accordingly - Yes  Labs reviewed in EPIC  BP Readings from Last 3 Encounters:   18 112/64   18 118/82   18 110/72    Wt Readings from Last 3 Encounters:   18 162 lb 9.6 oz (73.8 kg)   18 159 lb (72.1 kg)   18 161 lb 3.2 oz (73.1 kg)                  Patient Active Problem List   Diagnosis     Panic disorder without agoraphobia     Anxiety     Overactive bladder     Hyperlipidemia LDL goal <130     Advanced directives, counseling/discussion     Gastroesophageal reflux disease without esophagitis     Symptomatic menopausal or female climacteric states     Fibrocystic breast changes of both breasts     Primary osteoarthritis of both hands     Family history of early CAD     Insomnia, unspecified type-Controlled substance agreement signed - 2018 ok for #30 -10mg ambien (takes 1/2 tab) with 1 refill every 6 months or so      Expressive aphasia     Urinary urgency     Fatigue, unspecified type     Night sweats     Controlled substance agreement signed - 2018 ok for #30 -10mg ambien (takes 1/2 tab) with 1 refill every 6 months or so      Past Surgical History:   Procedure Laterality Date      SECTION       COLONOSCOPY  2012    Procedure:COLONOSCOPY; Colonoscopy ; Surgeon:SILVERIO ARRIETA; Location: GI     COLONOSCOPY N/A 2017    Procedure: COLONOSCOPY;  Surgeon: Silverio Arrieta MD;  Location:  GI     COLPOSCOPY, BIOPSY, COMBINED  1991    Mild squamous epithelial dysplasia (SABIHA 1)     FISSURECTOMY RECTUM  01     HYSTERECTOMY TOTAL ABDOMINAL  3/4/1998    Myomatous uterus; pelvic pain. Started HRT right afterward with vasomotor sx.     HYSTERECTOMY, PAP NO LONGER INDICATED N/A     had for large fibroid     LIPOSUCTION (LOCATION)         Social  History   Substance Use Topics     Smoking status: Never Smoker     Smokeless tobacco: Never Used     Alcohol use No     Family History   Problem Relation Age of Onset     Hypertension Mother      Hyperlipidemia Mother      Diabetes Mother      C.A.D. Father 45       age 45 massive MI      Heart Failure Father      Hyperlipidemia Father      C.A.D. Maternal Grandmother      CATERINAAEVANGELIST. Maternal Grandfather      C.A.D. Paternal Grandmother      PADILLA.A.D. Paternal Grandfather      Cancer Other      9 members on maternal side. Bone CA, Breast CA (m. aunt),       Prostate Cancer Brother      age 37     C.A.D. Sister 57     MI age 50- CABG - 2 vessels - 3 mi's since age 55 - 3 stents placed 2017      Diabetes Sister      Breast Cancer Maternal Aunt      50s-Mom had 9 sisters. MAunt->50's     Hypertension Sister      Hyperlipidemia Sister      Prostate Cancer Brother          Current Outpatient Prescriptions   Medication Sig Dispense Refill     atorvastatin (LIPITOR) 10 MG tablet TAKE 1 TABLET DAILY 90 tablet 1     estradiol (ESTRACE) 0.5 MG tablet Take 1 tablet (0.5 mg) by mouth daily 90 tablet 0      MG tablet TAKE 1 TABLET EVERY 8 HOURSAS NEEDED FOR MODERATE PAIN(FOR BURSITIS) 90 tablet 0     loratadine (CLARITIN) 10 MG tablet Take 1 tablet (10 mg) by mouth daily 30 tablet 1     Multiple Vitamins-Minerals (MULTIVITAMIN ADULT PO) Take by mouth daily       Omega-3 Fatty Acids (OMEGA 3 PO)        zolpidem (AMBIEN) 10 MG tablet TAKE ONE-HALF TABLET BY MOUTH EVERY EVENING AS NEEDED FOR SLEEP 30 tablet 1     [DISCONTINUED] VIVELLE-DOT 0.1 MG/24HR BIW patch PLACE 1 PATCH ONTO THE SKIN TWICE A WEEK 24 patch 3     Allergies   Allergen Reactions     Septra [Sulfamethoxazole W/Trimethoprim]      Sulfa Drugs Hives     Murine Ear [Carbamide Peroxide] Rash     Recent Labs   Lab Test  18   1030  17   0847  17   0943  16   0901   10/25/10   1010   A1C   --    --    --    --    --   5.8   LDL  117*  151*   136*  131*   < >   --    HDL  60  64  57  67   < >   --    TRIG  116  162*  210*  93   < >   --    ALT  29   --   33  29   < >  25   CR   --    --   0.65  0.73   < >  0.64   GFRESTIMATED   --    --   >90  Non  GFR Calc    83   < >  >90   GFRESTBLACK   --    --   >90   GFR Calc    >90   GFR Calc     < >  >90   POTASSIUM   --    --   4.6  4.3   < >  4.2   TSH   --    --   1.36  1.71   --   1.39    < > = values in this interval not displayed.        Patient over age 50, mutual decision to screen reflected in health maintenance.    Pertinent mammograms are reviewed under the imaging tab.  History of abnormal Pap smear: Status post benign hysterectomy. Health Maintenance and Surgical History updated.  PAP / HPV 6/20/2015   PAP Normal     Reviewed and updated as needed this visit by clinical staff  Tobacco  Allergies  Meds  Problems  Med Hx  Surg Hx  Fam Hx  Soc Hx          Reviewed and updated as needed this visit by Provider  Allergies  Problems  Surg Hx            ROS:getting some signif. Drenching hot flashes despite changing vivelle-dot patch every 72-96 hours.   Feels like she has gained weight as well this past summer.  She was down to 156 lbs this summer. Feels like her clothes are fitting more tightly. Hasn't been able to exercise as much. Feeling much more fatigued lately - the last 6 weeks with the overheating feeling.  Not awakening in the morning feeling rested - awakening 4x/night  -gets up to urinate 3x, with night sweats, has been drinking more water.  Drinks crystal light almost daily , iced tea 1-2 bottles/cups/day.  Drinks those liquids mostly at night.  Discussed bladder irritants - For bladder urgency - Avoid /eliminate citrus juices, all caffeine, even decaf coffee, and teas; alcohol, and artificial sweeteners, chocolate, tomato products and carbonated beverages ( even sparkling blunt).   1 serving of any of the above can irritate bladder  "for 3 days.      If you eliminate all the above and are still having problems, please contact our office.       vivelle-dot = not covered by insurance.  Would like to try oral.      Wt Readings from Last 5 Encounters:   09/17/18 162 lb 9.6 oz (73.8 kg)   05/29/18 159 lb (72.1 kg)   03/08/18 161 lb 3.2 oz (73.1 kg)   03/05/18 163 lb (73.9 kg)   02/19/18 163 lb (73.9 kg)       CONSTITUTIONAL: NEGATIVE for fever, chills, change in weight.   INTEGUMENTARY/SKIN: NEGATIVE for worrisome rashes, moles or lesions  EYES: NEGATIVE for vision changes or irritation  ENT: NEGATIVE for ear, mouth and throat problems  RESP: NEGATIVE for significant cough or SOB  BREAST: NEGATIVE for masses, tenderness or discharge  CV: NEGATIVE for chest pain, palpitations or peripheral edema  GI: NEGATIVE for nausea, abdominal pain, heartburn, or change in bowel habits  : NEGATIVE for unusual urinary or vaginal symptoms. No vaginal bleeding.  MUSCULOSKELETAL: NEGATIVE for significant arthralgias or myalgia  NEURO: NEGATIVE for weakness, dizziness or paresthesias.   ENDOCRINE: positive for temperature intolerance, skin/hair changes.   HEME/ALLERGY/IMMUNE: NEGATIVE for bleeding problems.   PSYCHIATRIC: NEGATIVE for changes in mood or affect.     OBJECTIVE:   /64  Pulse 68  Temp 97.3  F (36.3  C) (Tympanic)  Ht 5' 4\" (1.626 m)  Wt 162 lb 9.6 oz (73.8 kg)  SpO2 98%  Breastfeeding? No  BMI 27.91 kg/m2  EXAM:  GENERAL APPEARANCE: healthy, alert and no distress  EYES: Eyes grossly normal to inspection, PERRL and conjunctivae and sclerae normal  HENT: ear canals and TM's normal, nose and mouth without ulcers or lesions, oropharynx clear and oral mucous membranes moist  NECK: no adenopathy, no asymmetry, masses, or scars and thyroid normal to palpation  RESP: lungs clear to auscultation - no rales, rhonchi or wheezes  BREAST: normal without masses, tenderness or nipple discharge and no palpable axillary masses or adenopathy  CV: regular rate " and rhythm, normal S1 S2, no S3 or S4, no murmur, click or rub, no peripheral edema and peripheral pulses strong  ABDOMEN: soft, nontender, no hepatosplenomegaly, no masses and bowel sounds normal   (female): normal female external genitalia, normal urethral meatus, vaginal mucosal atrophy noted, surgically absent  Cervix and uterus without masses or abnormal discharge  MS: no musculoskeletal defects are noted and gait is age appropriate without ataxia  SKIN: no suspicious lesions or rashes  NEURO: Normal strength and tone, sensory exam grossly normal, mentation intact and speech normal  PSYCH: mentation appears normal and affect normal/bright.     See [facility] orders.     ASSESSMENT/PLAN:       ICD-10-CM    1. Encounter for routine adult health examination with abnormal findings Z00.01 Urine Microscopic   2. Night sweats R61 Estradiol     Lutropin     Follicle stimulating hormone     Progesterone     Testosterone total     TSH with free T4 reflex     estradiol (ESTRACE) 0.5 MG tablet     OFFICE/OUTPT VISIT,EST,LEVL IV   3. Nocturia R35.1 UA reflex to Microscopic and Culture     OFFICE/OUTPT VISIT,EST,LEVL IV   4. Urinary urgency R39.15 OFFICE/OUTPT VISIT,EST,LEVL IV   5. Fatigue, unspecified type R53.83 CBC with platelets differential     Comprehensive metabolic panel     TSH with free T4 reflex     OFFICE/OUTPT VISIT,EST,LEVL IV   6. Insomnia, unspecified type G47.00 zolpidem (AMBIEN) 10 MG tablet     OFFICE/OUTPT VISIT,EST,LEVL IV   7. Symptomatic menopausal or female climacteric states N95.1 Estradiol     Lutropin     Follicle stimulating hormone     Progesterone     Testosterone total     estradiol (ESTRACE) 0.5 MG tablet     OFFICE/OUTPT VISIT,EST,LEVL IV   8. Hyperlipidemia LDL goal <130 E78.5 Lipid panel reflex to direct LDL Fasting     Comprehensive metabolic panel     Albumin Random Urine Quantitative with Creat Ratio     OFFICE/OUTPT VISIT,EST,LEVL IV   9. Anxiety F41.9 CBC with platelets differential  "    OFFICE/OUTPT VISIT,EST,LEVL IV   10. Controlled substance agreement signed - 9/17/2018 ok for #30 -10mg ambien (takes 1/2 tab) with 1 refill every 6 months or so  Z79.899 OFFICE/OUTPT VISIT,EST,LEVL IV   11. Need for prophylactic vaccination and inoculation against influenza Z23 FLU VACCINE, SPLIT VIRUS, IM (QUADRIVALENT) [96900]- >3 YRS     Vaccine Administration, Initial [32630]   12. Overactive bladder N32.81    13. Acute recurrent maxillary sinusitis J01.01 azithromycin (ZITHROMAX) 250 MG tablet       For bladder urgency - Avoid /eliminate citrus juices, all caffeine, even decaf coffee, and teas; alcohol, and artificial sweeteners, chocolate, tomato products and carbonated beverages ( even sparkling blunt).   1 serving of any of the above can irritate bladder for 3 days.     If you eliminate all the above and are still having problems, please contact our office.      Pt taking 1/2 of a 10mg tab of ambien very occasionally for sleep. controlled substance agreement  Done today in detail and discussed with pt risk of fatal arrhythymia at the 10mg dose.     Consider starting anxiety /SSRI medication if symptoms don't improve with exercise and whole foods diet.     COUNSELING:   Reviewed preventive health counseling, as reflected in patient instructions    BP Readings from Last 1 Encounters:   09/17/18 112/64     Estimated body mass index is 27.91 kg/(m^2) as calculated from the following:    Height as of this encounter: 5' 4\" (1.626 m).    Weight as of this encounter: 162 lb 9.6 oz (73.8 kg).    controlled substance agreement discussed in detail today with patient - line item by line item initialed by patient-  and signed today.     Weight management plan: see next   Establish an exercise regimen. Activity goal: 45 minutes 5 days a week. New exercise routine: cardiovascular workout on exercise equipment, walking and weightlifting. Diet regimen was discussed and plan is self-directed dieting: reduce calories, " "reduce portions, reduce processed  carbs, increase fruits/vegetables and avoid sweets and supervised diet program. Avoid artificial sweeteners and \"diet\" drinks and sodas.       Dr. Gonzalez's recommended diet to rev-up metabolism for weight loss:   eat every 2-3 hours.   Lean protein = 2 egg whites, or turkey, chicken, fish, low fat, plain greek yogurt (try Fage 0% brand).. Low-glycemic fruits = strawberries, blueberries, raspberries, blackberries, nectarines, grapefruit, oranges,  apples.     2 oz. Lean protein + 1/2 cup  low-glycemic fruit --- breakfast and midmorning snack     2 oz. Lean protein + 1/4 cup whole grain rice or potato + 1 cup green veggie - lunch, dinner , and afternoon snack.     Evening snack : 1/2 cup of low glycemic fruit or an apple.      If you can't kill it and grill it, or pick it off a plant and eat it - DON'T EAT IT!     For occasional pasta - try Barilla Plus - has protein in it. - keep to 1/2 cup instead of your 1/4 cup of rice or potato.     If 5-6 small meals/day - too labor intensive, then keep to 3 meals plus 1 snack with 3 oz lean protein per meal with 2- 3 oz protein in your snack.      reports that she has never smoked. She has never used smokeless tobacco.     Pt refuses HIV testing today = pt is very low risk.     Counseling Resources:  ATP IV Guidelines  Pooled Cohorts Equation Calculator  Breast Cancer Risk Calculator  FRAX Risk Assessment  ICSI Preventive Guidelines  Dietary Guidelines for Americans, 2010  USDA's MyPlate  ASA Prophylaxis  Lung CA Screening    Lisbeth Gonzalez MD  [facility]  "

## 2018-09-18 ASSESSMENT — ANXIETY QUESTIONNAIRES: GAD7 TOTAL SCORE: 3

## 2018-09-18 ASSESSMENT — PATIENT HEALTH QUESTIONNAIRE - PHQ9: SUM OF ALL RESPONSES TO PHQ QUESTIONS 1-9: 8

## 2018-09-19 LAB — TESTOST SERPL-MCNC: 8 NG/DL (ref 8–60)

## 2018-11-27 ENCOUNTER — MYC REFILL (OUTPATIENT)
Dept: FAMILY MEDICINE | Facility: CLINIC | Age: 58
End: 2018-11-27

## 2018-11-27 DIAGNOSIS — J01.01 ACUTE RECURRENT MAXILLARY SINUSITIS: ICD-10-CM

## 2018-11-28 RX ORDER — AZITHROMYCIN 250 MG/1
TABLET, FILM COATED ORAL
Qty: 6 TABLET | Refills: 1 | Status: SHIPPED | OUTPATIENT
Start: 2018-11-28 | End: 2019-04-02

## 2018-11-28 NOTE — TELEPHONE ENCOUNTER
Requested Prescriptions   Pending Prescriptions Disp Refills     azithromycin (ZITHROMAX) 250 MG tablet 6 tablet 1     Si tabs first day, then 1 tab by mouth daily for 4 additional days    There is no refill protocol information for this order        Last Written Prescription Date:  18  Last Fill Quantity: 6,  # refills: 1   Last office visit: 2018 with prescribing provider:  Dr. Gonzalez   Future Office Visit:    Routing refill request to provider for review/approval because:  Drug not on the [facility] refill protocol   Pao Gama RN

## 2018-11-28 NOTE — TELEPHONE ENCOUNTER
Message from tolingohart:  Original authorizing provider: MD Whitney Andrade would like a refill of the following medications:  azithromycin (ZITHROMAX) 250 MG tablet [Lisbeth Gonzalez MD]    Preferred pharmacy: [facility] [address] [place] [address] [address] [place] [address] & [facility]    Comment:

## 2019-02-20 DIAGNOSIS — Z82.49 FAMILY HISTORY OF EARLY CAD: ICD-10-CM

## 2019-02-20 DIAGNOSIS — E78.5 HYPERLIPIDEMIA LDL GOAL <100: ICD-10-CM

## 2019-02-20 RX ORDER — ATORVASTATIN CALCIUM 10 MG/1
TABLET, FILM COATED ORAL
Qty: 90 TABLET | Refills: 1 | Status: SHIPPED | OUTPATIENT
Start: 2019-02-20 | End: 2019-07-19

## 2019-02-20 NOTE — TELEPHONE ENCOUNTER
"Requested Prescriptions   Pending Prescriptions Disp Refills     atorvastatin (LIPITOR) 10 MG tablet [Pharmacy Med Name: ATORVASTATIN TAB 10MG] 90 tablet 1     Sig: TAKE 1 TABLET DAILY    Statins Protocol Passed - 2/20/2019 12:39 AM       Passed - LDL on file in past 12 months    Recent Labs   Lab Test 09/17/18  0929   LDL 56            Passed - No abnormal creatine kinase in past 12 months    Recent Labs   Lab Test 03/02/18  1030   CKT 66               Passed - Recent (12 mo) or future (30 days) visit within the authorizing provider's specialty    Patient had office visit in the last 12 months or has a visit in the next 30 days with authorizing provider or within the authorizing provider's specialty.  See \"Patient Info\" tab in inbasket, or \"Choose Columns\" in Meds & Orders section of the refill encounter.             Passed - Medication is active on med list       Passed - Patient is age 18 or older       Passed - No active pregnancy on record       Passed - No positive pregnancy test in past 12 months        atorvastatin (LIPITOR) 10 MG tablet 90 tablet 1 8/24/2018       Last Written Prescription Date:  08/24/2018  Last Fill Quantity: 90,  # refills: 1   Last office visit: 9/17/2018 with prescribing provider:  Dr. Gonzalez   Future Office Visit:  Unknown     "

## 2019-03-29 ENCOUNTER — TELEPHONE (OUTPATIENT)
Dept: FAMILY MEDICINE | Facility: CLINIC | Age: 59
End: 2019-03-29

## 2019-03-29 NOTE — TELEPHONE ENCOUNTER
Per huddle with JV-    Prefer esterified medication- more bio-identical to our bodies. This is common for estradiol.    Pt will let us know if/when we need to order anything. The patient indicates understanding of these issues and agrees with the plan.    Sherry Dean RN  [place] [facility]

## 2019-03-29 NOTE — TELEPHONE ENCOUNTER
Reason for Call:  Other prescription    Detailed comments: Patient calling regarding her estrogen medication. States she is picking between two different estrogen that her insurance approves and is unsure which one it is: conjugated estrogen or esterified.    Phone Number Patient can be reached at: Cell number on file:    Telephone Information:   Mobile [phone]       Best Time: anytime    Can we leave a detailed message on this number? NO    Call taken on 3/29/2019 at 4:13 PM by Myra SMALL

## 2019-04-02 ENCOUNTER — OFFICE VISIT (OUTPATIENT)
Dept: FAMILY MEDICINE | Facility: CLINIC | Age: 59
End: 2019-04-02
Payer: COMMERCIAL

## 2019-04-02 VITALS
RESPIRATION RATE: 16 BRPM | HEIGHT: 64 IN | DIASTOLIC BLOOD PRESSURE: 80 MMHG | OXYGEN SATURATION: 96 % | TEMPERATURE: 98.7 F | SYSTOLIC BLOOD PRESSURE: 120 MMHG | HEART RATE: 86 BPM | BODY MASS INDEX: 27.66 KG/M2 | WEIGHT: 162 LBS

## 2019-04-02 DIAGNOSIS — J01.90 ACUTE SINUSITIS WITH SYMPTOMS > 10 DAYS: Primary | ICD-10-CM

## 2019-04-02 PROCEDURE — 99213 OFFICE O/P EST LOW 20 MIN: CPT | Performed by: PHYSICIAN ASSISTANT

## 2019-04-02 RX ORDER — CODEINE PHOSPHATE AND GUAIFENESIN 10; 100 MG/5ML; MG/5ML
1-2 SOLUTION ORAL EVERY 4 HOURS PRN
Qty: 60 ML | Refills: 0 | Status: SHIPPED | OUTPATIENT
Start: 2019-04-02 | End: 2019-05-23

## 2019-04-02 RX ORDER — DOXYCYCLINE HYCLATE 100 MG
100 TABLET ORAL 2 TIMES DAILY
Qty: 14 TABLET | Refills: 0 | Status: SHIPPED | OUTPATIENT
Start: 2019-04-02 | End: 2019-05-23

## 2019-04-02 ASSESSMENT — ENCOUNTER SYMPTOMS
MYALGIAS: 0
FEVER: 0
CHILLS: 0
ABDOMINAL PAIN: 0
DIARRHEA: 0
SHORTNESS OF BREATH: 0
HEADACHES: 1
VOMITING: 0
COUGH: 1
SINUS PAIN: 1
FOCAL WEAKNESS: 0
NAUSEA: 0
SORE THROAT: 1

## 2019-04-02 ASSESSMENT — MIFFLIN-ST. JEOR: SCORE: 1299.83

## 2019-04-02 NOTE — PROGRESS NOTES
"  SUBJECTIVE:   Whitney Aceves is a 58 year old female who presents to clinic today for the following health issues:      Acute Illness   Acute illness concerns: Sore Throat/ Ear pain  Onset: 1 week    Fever: no    Chills/Sweats: no    Headache (location?): YES    Sinus Pressure:YES    Conjunctivitis:  no    Ear Pain: YES-     Rhinorrhea: YES    Congestion: YES    Sore Throat: YES- a lot of phlegm pt can not get out     Cough: YES-thick-     Wheeze: YES    Decreased Appetite: soft food    Nausea: no    Vomiting: no    Diarrhea:  no    Dysuria/Freq.: no    Fatigue/Achiness: YES    Sick/Strep Exposure: YES     Therapies Tried and outcome: pt had abx last week    Delsym, Mucinex         {additional problems for provider to add:847101}    Problem list and histories reviewed & adjusted, as indicated.  Additional history: {NONE - AS DOCUMENTED:131921::\"as documented\"}    {HIST REVIEW/ LINKS 2:914909}    Reviewed and updated as needed this visit by clinical staff       Reviewed and updated as needed this visit by Provider         {PROVIDER CHARTING PREFERENCE:500358}  "

## 2019-04-02 NOTE — PROGRESS NOTES
HPI  SUBJECTIVE:   Whitney Aceves is a 58 year old female who presents to clinic today for the following health issues:    Acute Illness   Acute illness concerns: Sore Throat/ Ear pain  Onset: 1.5 weeks    Fever: no    Chills/Sweats: no    Headache (location?): YES    Sinus Pressure:YES    Conjunctivitis:  no    Ear Pain: YES-     Rhinorrhea: YES    Congestion: YES    Sore Throat: YES- a lot of phlegm pt can not get out     Cough: YES-thick mucus    Wheeze: no    Decreased Appetite: no    Nausea: no    Vomiting: no    Diarrhea:  no    Dysuria/Freq.: no    Fatigue/Achiness: YES- fatigue    Sick/Strep Exposure: YES     Therapies Tried and outcome: pt had abx last week; also Flonase, Delsym & Mucinex without improvement    Pt has azithromycin on hand from her PCP due to frequent sinusitis & traveling a lot for work. Took this last week & didn't feel any better.      Chart Review:  PHQ-9 SCORE 8/28/2017 3/8/2018 9/17/2018   PHQ-9 Total Score - - -   PHQ-9 Total Score 2 2 8     JOSE ANTONIO-7 SCORE 8/28/2017 3/8/2018 9/17/2018   Total Score - - -   Total Score 1 1 3       Patient Active Problem List   Diagnosis     Panic disorder without agoraphobia     Anxiety     Overactive bladder     Hyperlipidemia LDL goal <130     Advanced directives, counseling/discussion     Gastroesophageal reflux disease without esophagitis     Symptomatic menopausal or female climacteric states     Fibrocystic breast changes of both breasts     Primary osteoarthritis of both hands     Family history of early CAD     Insomnia, unspecified type-Controlled substance agreement signed - 9/17/2018 ok for #30 -10mg ambien (takes 1/2 tab) with 1 refill every 6 months or so      Expressive aphasia     Urinary urgency     Fatigue, unspecified type     Night sweats     Controlled substance agreement signed - 9/17/2018 ok for #30 -10mg ambien (takes 1/2 tab) with 1 refill every 6 months or so      Acute recurrent maxillary sinusitis     Past Surgical History:    Procedure Laterality Date      SECTION       COLONOSCOPY  2012    Procedure:COLONOSCOPY; Colonoscopy ; Surgeon:SILVERIO ARRIETA; Location: GI     COLONOSCOPY N/A 2017    Procedure: COLONOSCOPY;  Surgeon: Silverio Arrieta MD;  Location:  GI     COLPOSCOPY, BIOPSY, COMBINED  1991    Mild squamous epithelial dysplasia (SABIHA 1)     FISSURECTOMY RECTUM  01     HYSTERECTOMY TOTAL ABDOMINAL  3/4/1998    Myomatous uterus; pelvic pain. Started HRT right afterward with vasomotor sx.     HYSTERECTOMY, PAP NO LONGER INDICATED N/A     had for large fibroid     LIPOSUCTION (LOCATION)       Family History   Problem Relation Age of Onset     Hypertension Mother      Hyperlipidemia Mother      Diabetes Mother      C.A.D. Father 45          age 45 massive MI      Heart Failure Father      Hyperlipidemia Father      C.A.D. Maternal Grandmother      C.A.D. Maternal Grandfather      C.A.D. Paternal Grandmother      C.A.D. Paternal Grandfather      Cancer Other         9 members on maternal side. Bone CA, Breast CA (m. aunt),       Prostate Cancer Brother         age 37     C.A.D. Sister 57        MI age 50- CABG - 2 vessels - 3 mi's since age 55 - 3 stents placed 2017      Diabetes Sister      Breast Cancer Maternal Aunt         50s-Mom had 9 sisters. MAunt->50's     Hypertension Sister      Hyperlipidemia Sister      Prostate Cancer Brother       Social History     Tobacco Use     Smoking status: Never Smoker     Smokeless tobacco: Never Used   Substance Use Topics     Alcohol use: No     Alcohol/week: 0.0 oz        Problem list, Medication list, Allergies, Medical/Social/Surg hx reviewed in [facility], updated as appropriate.      Review of Systems   Constitutional: Positive for malaise/fatigue. Negative for chills and fever.   HENT: Positive for congestion, sinus pain and sore throat.    Respiratory: Positive for cough. Negative for shortness of breath.    Cardiovascular: Negative for  "chest pain.   Gastrointestinal: Negative for abdominal pain, diarrhea, nausea and vomiting.   Musculoskeletal: Negative for myalgias.   Skin: Negative for rash.   Neurological: Positive for headaches. Negative for focal weakness.   All other systems reviewed and are negative.        Physical Exam   Constitutional: She is oriented to person, place, and time.   HENT:   Head: Normocephalic and atraumatic.   Right Ear: Tympanic membrane and external ear normal.   Left Ear: Tympanic membrane and external ear normal.   Nose: Mucosal edema present. Right sinus exhibits maxillary sinus tenderness and frontal sinus tenderness. Left sinus exhibits maxillary sinus tenderness and frontal sinus tenderness.   Mouth/Throat: Uvula is midline, oropharynx is clear and moist and mucous membranes are normal.   Postnasal drainage   Cardiovascular: Normal rate, regular rhythm and normal heart sounds.   Pulmonary/Chest: Effort normal and breath sounds normal.   Musculoskeletal: Normal range of motion.   Neurological: She is alert and oriented to person, place, and time.   Skin: Skin is warm and dry.   Nursing note and vitals reviewed.    Vital Signs  /80   Pulse 86   Temp 98.7  F (37.1  C) (Oral)   Resp 16   Ht 1.626 m (5' 4\")   Wt 73.5 kg (162 lb)   SpO2 96%   BMI 27.81 kg/m     Body mass index is 27.81 kg/m .    Diagnostic Test Results:  none     ASSESSMENT/PLAN:                                                        ICD-10-CM    1. Acute sinusitis with symptoms > 10 days J01.90 guaiFENesin-codeine (ROBITUSSIN AC) 100-10 MG/5ML solution     doxycycline hyclate (VIBRA-TABS) 100 MG tablet   Lungs CTAB, afebrile. C/w sinusitis- suspect resistant to azithromycin. Rx doxycycline, Robitussin AC for cough at night, continue Mucinex & Flonase as well as increased water intake.    I have discussed any lab or imaging results, the patient's diagnosis, and my plan of treatment with the patient and/or family. Patient is aware to come " back in if with worsening symptoms or if no relief despite treatment plan.  Patient voiced understanding and had no further questions.       Follow Up: Return in about 1 week (around 4/9/2019) for Follow up w/ PCP if not better.    MARY Lpoez, PA-C  [facility] [place]

## 2019-04-11 DIAGNOSIS — J32.9 SINUSITIS, CHRONIC: Primary | ICD-10-CM

## 2019-04-11 DIAGNOSIS — R09.81 NASAL CONGESTION: ICD-10-CM

## 2019-04-11 DIAGNOSIS — J01.90 ACUTE SINUSITIS WITH SYMPTOMS > 10 DAYS: ICD-10-CM

## 2019-04-11 DIAGNOSIS — J34.89 RHINORRHEA: ICD-10-CM

## 2019-04-11 RX ORDER — AZITHROMYCIN 250 MG/1
TABLET, FILM COATED ORAL
Qty: 6 TABLET | Refills: 2 | Status: SHIPPED | OUTPATIENT
Start: 2019-04-11 | End: 2019-09-18

## 2019-04-11 RX ORDER — DOXYCYCLINE HYCLATE 100 MG
100 TABLET ORAL 2 TIMES DAILY
Qty: 14 TABLET | Refills: 0 | Status: CANCELLED | OUTPATIENT
Start: 2019-04-11

## 2019-04-11 NOTE — TELEPHONE ENCOUNTER
Acute Illness   Acute illness concerns: continued sinus sx- leaves on Monday for flight  Onset: over 2 weeks    Fever: no    Chills/Sweats: no    Headache (location?): YES    Sinus Pressure:YES- tender, facial pain and teeth pain    Conjunctivitis:  no    Ear Pain: no    Rhinorrhea: YES and PND    Congestion: YES in head    Sore Throat: no    Drainage and mucus down throat, especially at night.       Cough: no this improved    Wheeze: no    Decreased Appetite: no    Nausea: no    Vomiting: no    Diarrhea:  no    Dysuria/Freq.: no    Fatigue/Achiness: YES- achy    Sick/Strep Exposure: no     Therapies Tried and outcome: doxycycline helped somewhat but this returned, mucinex daily- helps loosen, but the pt is having continual loose mucus     [phone] ok to .    Pt uses [facility] in [place] [address].    Per huddle with SAEID ok to give zpak with 2 refills. Pt to continue mucinex, fluids, rest, call with any further symptoms or concerns.    Sherry Dean RN  [place] [facility]

## 2019-04-11 NOTE — TELEPHONE ENCOUNTER
Requested Prescriptions   Pending Prescriptions Disp Refills     doxycycline hyclate (VIBRA-TABS) 100 MG tablet    Last Written Prescription Date:  4.2.19  Last Fill Quantity: 14 tablet,  # refills: 0   Last office visit: 4/2/2019 with prescribing provider: Lisbeth Gonzalez MD       Future Office Visit:         14 tablet 0     Sig: Take 1 tablet (100 mg) by mouth 2 times daily       There is no refill protocol information for this order

## 2019-04-11 NOTE — TELEPHONE ENCOUNTER
This was prescribed for sinusitis on 4/2/19 - is patient still having symptoms?  UsingMilest Message sent    Ethel Winston RN  [place] [facility]

## 2019-05-14 ENCOUNTER — TELEPHONE (OUTPATIENT)
Dept: FAMILY MEDICINE | Facility: CLINIC | Age: 59
End: 2019-05-14

## 2019-05-14 NOTE — TELEPHONE ENCOUNTER
Reason for Call:  Same Day Appointment, Requested Provider:  Lisbeth Gonzalez MD    PCP: Lisbeth Gonzalez    Reason for visit: The patient wants to see Dr. Gonzalez to discuss estrogen and also she thinks one of her toenails is dying.     Can we leave a detailed message on this number? YES    Phone number patient can be reached at: Cell number on file:    Telephone Information:   Mobile [phone]     Best Time: Anytime    Call taken on 5/14/2019 at 1:35 PM by Sofi Templeton

## 2019-05-15 NOTE — TELEPHONE ENCOUNTER
Huddled with Dr. Gonzalez regarding appointment request, she stated okay for an acute spot next week.  Called patient and scheduled her for Thursday, May 23rd at 3:20 PM with Dr. Gonzalez.    Maribel Thorne CMA

## 2019-05-23 ENCOUNTER — OFFICE VISIT (OUTPATIENT)
Dept: FAMILY MEDICINE | Facility: CLINIC | Age: 59
End: 2019-05-23
Payer: COMMERCIAL

## 2019-05-23 VITALS
SYSTOLIC BLOOD PRESSURE: 124 MMHG | HEIGHT: 64 IN | HEART RATE: 72 BPM | BODY MASS INDEX: 27.31 KG/M2 | WEIGHT: 160 LBS | TEMPERATURE: 98.6 F | DIASTOLIC BLOOD PRESSURE: 66 MMHG

## 2019-05-23 DIAGNOSIS — B35.1 ONYCHOMYCOSIS: ICD-10-CM

## 2019-05-23 DIAGNOSIS — R61 NIGHT SWEATS: ICD-10-CM

## 2019-05-23 DIAGNOSIS — Z79.899 CONTROLLED SUBSTANCE AGREEMENT SIGNED: Primary | ICD-10-CM

## 2019-05-23 DIAGNOSIS — N95.1 SYMPTOMATIC MENOPAUSAL OR FEMALE CLIMACTERIC STATES: ICD-10-CM

## 2019-05-23 DIAGNOSIS — R52 GENERALIZED PAIN: ICD-10-CM

## 2019-05-23 DIAGNOSIS — M75.102 ROTATOR CUFF SYNDROME, LEFT: ICD-10-CM

## 2019-05-23 DIAGNOSIS — G47.00 INSOMNIA, UNSPECIFIED TYPE: ICD-10-CM

## 2019-05-23 PROCEDURE — 99214 OFFICE O/P EST MOD 30 MIN: CPT | Performed by: FAMILY MEDICINE

## 2019-05-23 RX ORDER — ZOLPIDEM TARTRATE 10 MG/1
TABLET ORAL
Qty: 30 TABLET | Refills: 1 | Status: SHIPPED | OUTPATIENT
Start: 2019-05-23 | End: 2019-09-18

## 2019-05-23 RX ORDER — ESTRADIOL 1 MG/1
1 TABLET ORAL DAILY
Qty: 90 TABLET | Refills: 1 | Status: SHIPPED | OUTPATIENT
Start: 2019-05-23 | End: 2019-09-18

## 2019-05-23 RX ORDER — IBUPROFEN 800 MG/1
TABLET, FILM COATED ORAL
Qty: 90 TABLET | Refills: 1 | Status: SHIPPED | OUTPATIENT
Start: 2019-05-23 | End: 2019-07-15

## 2019-05-23 ASSESSMENT — MIFFLIN-ST. JEOR: SCORE: 1290.76

## 2019-05-23 NOTE — PROGRESS NOTES
"  SUBJECTIVE:                                                    Whitney Aceves is a 58 year old female who presents to clinic today for the following health issues:    Concern - ***  Onset: ***    Description:   ***    Intensity: {.:117163}    Progression of Symptoms:  {.:292644}    Accompanying Signs & Symptoms:  ***    Previous history of similar problem:   ***    Precipitating factors:   Worsened by: ***    Alleviating factors:  Improved by: ***    Therapies Tried and outcome: ***    Problem list and histories reviewed & adjusted, as indicated.  Additional history: as documented    Reviewed and updated as needed this visit by clinical staff       Reviewed and updated as needed this visit by Provider         ROS:   ROS: 12 point ROS neg other than the symptoms noted above    OBJECTIVE:                                                    There were no vitals taken for this visit.  There is no height or weight on file to calculate BMI.   {EXAM - NORMAL- condensed - partially selected:807350::\"GENERAL: healthy, alert, well nourished, well hydrated, no distress\",\"HENT: ear canals- normal; TMs- normal; Nose- normal; Mouth- no ulcers, no lesions\",\"NECK: no tenderness, no adenopathy, no asymmetry, no masses, no stiffness; thyroid- normal to palpation\",\"RESP: lungs clear to auscultation - no rales, no rhonchi, no wheezes\",\"CV: regular rates and rhythm, normal S1 S2, no S3 or S4 and no murmur, no click or rub -\",\"ABDOMEN: soft, no tenderness, no  hepatosplenomegaly, no masses, normal bowel sounds\"}    {Diagnostic Test Results:504091}     ASSESSMENT/PLAN:                                                    No diagnosis found.    {FOLLOW UP CLINIC OPTIONS:015414}         Lisbeth Gonzalez MD    [facility]    "

## 2019-05-23 NOTE — PATIENT INSTRUCTIONS
After bathing/showering every am , use a q-tip with very small amount of 1:5  bleach /water solution , then apply tea tree oil - 100% essential oil  To all nails And let dry - apply that in the am and then apply Kerasal anti-fungal nail treatment to affected nails only at night.         FUNGAL INFECTION of NAILS  Fungal infection of the nails causes changes in the appearance of fingernails and toenails. They may thicken, discolor, change shape or split. This condition is difficult to treat because nails grow slowly and have limited blood supply. It is common to have the infection come back after treatment.  There are two types of medicines used.    Topical anti-fungal medicines are applied to the surface of the skin and nail area. These medicines are not very effective because they cannot get deep into the nail.    Topical medicines are most useful in combination with oral medicines . Oral antifungal medicines are more effective because they penetrate the nail from the inside out.  If medicines fail, the nail can be removed surgically or chemically. This improves the effectiveness of medical treatment because the fungus is physically removed from the body.  HOME CARE:  1) Use medicines exactly as directed for as long as directed. Treating a fungal infection can take longer than other kinds of infections.  2) Smoking is a risk factor for fungal infection. This is one more reason to quit.  3) Wear absorbent socks and shoes that have ventilation. Sweaty feet increases risk of fungal infection and make an existing infection harder to treat.  4) Use footwear when in damp public places like swimming pools, gyms and shower rooms. This helps avoid exposure to the fungus that grows there.  FOLLOW UP with your doctor as directed by our staff.  GET PROMPT MEDICAL ATTENTION if any of the following occur:   -- Skin alongside the nail becomes reddened, swollen, painful or drains pus  -- Side effects from oral anti-fungal  medicines    9970-8041 Ariela Haynes, [address] [place] [address]. All rights reserved. This information is not intended as a substitute for professional medical care. Always follow your healthcare professional's instructions.      Patient Education     Exercises for Shoulder Flexibility: External Rotation    This stretch can help restore shoulder flexibility and relieve pain over time. When stretching, be sure to breathe deeply. Follow any special instructions from your healthcare provider or physical therapist:  1.  a doorway. Grasp the doorjamb with the hand on the frozen side. Your arm should be bent.  2. With the other hand, hold the elbow on the side with the involved frozen (stiff) shoulder firmly against your body.  3. Standing in the same spot, rotate your body away from the doorjamb. Stop when you feel the stretch in the shoulder. At first, try to hold the stretch for 5 seconds.  4. Work up to doing 3 sets of this stretch, 3 times a day. Work up to holding the stretch for 30 to 60 seconds.  Note: Keep your arms as still as you can. Over time, rotate your body a little more to enhance the stretch. But be careful not to twist your back.  Frozen shoulder is another name for adhesive capsulitis, which causes restricted movement in the shoulder. If you have frozen shoulder, this stretch may cause discomfort, especially when you first get started. A few months may pass before you achieve the results you want. But once your shoulder heals, it rarely becomes frozen again. So stick to your stretching program. If you have any questions, be sure to ask your healthcare provider.  Date Last Reviewed: 3/1/2018    0322-3368 [facility]. [address] [place] [address]. All rights reserved. This information is not intended as a substitute for professional medical care. Always follow your healthcare professional's instructions.           Patient Education     Exercises for  Shoulder Flexibility: Internal Rotation    This stretch can help restore shoulder flexibility and relieve pain over time. When stretching, be sure to breathe deeply. Follow any special instructions from your healthcare provider or physical therapist.  5. While seated, move the arm on the side you want to stretch toward the middle of your back. The palm of your hand should face out.  6. Cup your other hand under the hand that s behind your back. Gently push your cupped hand upward until you feel the stretch in the shoulder. Try to hold the stretch for 5 seconds.  7. Work up to doing 3 sets of this stretch, 3 times a day. Work up to holding the stretch for 30 to 60 seconds.  Note: Keep your back straight. It s OK if your hand can t reach the middle of your back. Instead, start the stretch with your hand as close as you can get it to the middle of your back.  Frozen shoulder  Frozen shoulder is another name for adhesive capsulitis. This causes restricted movement in the shoulder. If you have frozen shoulder, this stretch may cause discomfort, especially when you first get started. A few months may pass before you achieve the results you want. But once your shoulder heals, it rarely becomes frozen again. So stick to your stretching program. If you have any questions, be sure to ask your healthcare provider.   Date Last Reviewed: 12/1/2017 2000-2018 [employer]. [address] [place] [address], [place] [address]. All rights reserved. This information is not intended as a substitute for professional medical care. Always follow your healthcare professional's instructions.           Patient Education     Exercises for Shoulder Flexibility: Adduction (Reaching Across)    This stretch can help restore shoulder flexibility and relieve pain over time. When stretching, be sure to breathe deeply. And follow any special instructions from your doctor or physical therapist:  8. Put the hand from the side you want to stretch  on your opposite shoulder. Your elbow should point away from your body. Try to raise your elbow as close to shoulder height as you can.  9. With your other hand, push the raised elbow toward the opposite shoulder. Avoid turning your head. Stop when you feel the stretch. Try to hold the stretch for 5 seconds.  10. Work up to doing 3 sets of this stretch, 3 times a day. Work up to holding the stretch for 30 to 60 seconds.  Note: Be sure to push your elbow across your chest, not up toward your chin. Over time, try to push your elbow farther across your chest to enhance the stretch.  Frozen shoulder is another name for adhesive capsulitis, which causes restricted movement in the shoulder. If you have frozen shoulder, this stretch may cause discomfort, especially when you first get started. A few months may pass before you achieve the results you want. Once your shoulder heals, it rarely becomes frozen again. So stick to your stretching program. If you have any questions, be sure to ask your doctor.  Date Last Reviewed: 3/1/2018    6243-0209 [facility]. [address] [place] [address]. All rights reserved. This information is not intended as a substitute for professional medical care. Always follow your healthcare professional's instructions.           Patient Education     Exercises for Shoulder Flexibility: Back Scratch    Improving your flexibility can reduce pain. Stretching exercises also can help increase your range of pain-free motion. Breathe normally when you exercise. Try to use smooth, fluid movements. Never force a stretch.  Note: Follow any special instructions you are given. If you feel pain, stop the exercise. If the pain continues after stopping, call your healthcare provider.    Stand straight, placing the back of your hand on the side you want to stretch flat against your lower back.    Throw one end of a towel over your shoulder. Grab it behind your back with your other  hand.    Pull down gently on the towel with your front arm. Let your back arm slide up as high as is comfortable. You ll feel a stretch in your shoulder. Hold the stretch for a few seconds.    Repeat 3 to 5 times. Build up to holding each stretch for 30 to 60 seconds.  For your safety, check with your healthcare provider before starting an exercise program.  Date Last Reviewed: 3/1/2018    9871-3174 [employer]. [address] [place] [address]. All rights reserved. This information is not intended as a substitute for professional medical care. Always follow your healthcare professional's instructions.           Patient Education     Exercises for Shoulder Flexibility: Wall Walk    Improving your flexibility can reduce pain. Stretching exercises also can help increase your range of pain-free motion. Breathe normally when you exercise. Use smooth, fluid movements.  Note: Follow any special instructions you are given. If you feel pain, stop the exercise. If the pain continues after stopping, call your healthcare provider:    Stand with your shoulder about 2 feet from the wall.    Raise your arm to shoulder level and gently  walk  your fingers up the wall as high as you can.    Hold for a few seconds. Then walk your fingers back down.    Repeat 3 times. Move closer to the wall as you repeat.    Build up to holding each stretch for 30 seconds.  Caution: Do this stretch only if your healthcare provider recommends it. Don t do it when you are first injured.  Date Last Reviewed: 3/1/2018    4059-8111 [employer]. [address] [place] [address]. All rights reserved. This information is not intended as a substitute for professional medical care. Always follow your healthcare professional's instructions.           Patient Education     Shoulder External Rotation (Flexibility)    11. Lie on your back on a bed or the floor, with your arms at your side. Bend your arms at a  90-degree angle. Hold a stick or cane in front of you with both hands, palms down. Keep your upper arms close to your body.  12. Slowly push the stick or cane to the right with your left hand. Keep holding onto the stick or cane with both hands. Keep your elbows close to your body. Feel your right shoulder stretch. Stop at the point of discomfort. Hold for 5 seconds.  13. Slowly move your arms back. Relax.  14. Repeat 5 times, or as instructed.  Date Last Reviewed: 3/10/2016    6456-6630 [employer]. [address] [place] [address]. All rights reserved. This information is not intended as a substitute for professional medical care. Always follow your healthcare professional's instructions.

## 2019-05-23 NOTE — PROGRESS NOTES
"  SUBJECTIVE:                                                    Whitney Aceves is a 58 year old female who presents to clinic today for the following health issues:    Medication Followup of Estradiol    Taking Medication as prescribed: yes    Side Effects:  None /    Medication Helping Symptoms:  NO- Hot Flashes /not sleeping   SUBJECTIVE:    Right Great toenail discolored x 5 weeks    OBJECTIVE:  ***    ASSESSMENT/PLAN:  Per encounter diagnoses and orders.      Problem list and histories reviewed & adjusted, as indicated.  Additional history: as documented    Reviewed and updated as needed this visit by clinical staff  Allergies  Meds       Reviewed and updated as needed this visit by Provider         ROS:   ROS: 12 point ROS neg other than the symptoms noted above    OBJECTIVE:                                                    There were no vitals taken for this visit.  There is no height or weight on file to calculate BMI.   {EXAM - NORMAL- condensed - partially selected:888848::\"GENERAL: healthy, alert, well nourished, well hydrated, no distress\",\"HENT: ear canals- normal; TMs- normal; Nose- normal; Mouth- no ulcers, no lesions\",\"NECK: no tenderness, no adenopathy, no asymmetry, no masses, no stiffness; thyroid- normal to palpation\",\"RESP: lungs clear to auscultation - no rales, no rhonchi, no wheezes\",\"CV: regular rates and rhythm, normal S1 S2, no S3 or S4 and no murmur, no click or rub -\",\"ABDOMEN: soft, no tenderness, no  hepatosplenomegaly, no masses, normal bowel sounds\"}    {Diagnostic Test Results:490587}     ASSESSMENT/PLAN:                                                    No diagnosis found.    {FOLLOW UP CLINIC OPTIONS:480051}         Lisbeth Gonzalez MD    [facility]    "

## 2019-05-23 NOTE — PROGRESS NOTES
SUBJECTIVE:                                                    Whitney Aceves is a 58 year old female who presents to clinic today for the following health issues:    Medication Followup of Estradiol     Taking Medication as prescribed: yes    Side Effects:  Not working    Medication Helping Symptoms:  NO /Continues to have hot flashes at night    Was on patch, now doing oral secondary to insurance coverage - would be $190 every 3 months.     Has major hot flashes throughout the day , but that was controlled     Does public speaking and presentations a lot and can't have large  Hot flashes throughout the day.      Concern - Right Great toenail discolored  Onset: x 5 weeks    Description:   While yellow discolored - on the medial 1/4 distally.     Intensity: mild    Progression of Symptoms:  same    Accompanying Signs & Symptoms:  Wears  toenail polish    Previous history of similar problem:   no    Precipitating factors:   Worsened by: ?    Alleviating factors:  Improved by: none    Therapies Tried and outcome: none    Problem list and histories reviewed & adjusted, as indicated.  Additional history: as documented    Reviewed and updated as needed this visit by clinical staff  Allergies  Meds       Reviewed and updated as needed this visit by Provider     needs refill on Ibuprofen 800mg for her left rotator cuff issues that act up from time to time. Takes 4-5 days for it to calm down.   Creatinine   Date Value Ref Range Status   09/17/2018 0.70 0.52 - 1.04 mg/dL Final     GFR Estimate   Date Value Ref Range Status   09/17/2018 87 >60 mL/min/1.7m2 Final     Comment:     Non  GFR Calc   02/17/2017 >90  Non  GFR Calc   >60 mL/min/1.7m2 Final   06/13/2016 83 >60 mL/min/1.7m2 Final     Comment:     Non  GFR Calc           ROS:   ROS: 12 point ROS neg other than the symptoms noted above    OBJECTIVE:                                                    /66   Pulse 72   " Temp 98.6  F (37  C) (Oral)   Ht 1.626 m (5' 4\")   Wt 72.6 kg (160 lb)   BMI 27.46 kg/m    Body mass index is 27.46 kg/m .   GENERAL: healthy, alert, well nourished, well hydrated, no distress  HENT: ear canals- normal; TMs- normal; Nose- normal; Mouth- no ulcers, no lesions  NECK: no tenderness, no adenopathy, no asymmetry, no masses, no stiffness; thyroid- normal to palpation  RESP: lungs clear to auscultation - no rales, no rhonchi, no wheezes  CV: regular rates and rhythm, normal S1 S2, no S3 or S4 and no murmur, no click or rub -  ABDOMEN: soft, no tenderness, no  hepatosplenomegaly, no masses, normal bowel sounds  MS: extremities- no gross deformities noted, no edema, While yellow discolored - on the medial 1/4 distally of the right great toenail - no other toenails affected.     Diagnostic test results:  See [facility] orders.      ASSESSMENT/PLAN:                                                        ICD-10-CM    1. Controlled substance agreement signed - 9/17/2018 ok for #30 -10mg ambien (takes 1/2 tab) with 1 refill every 6 months or so  Z79.899    2. Insomnia, unspecified type G47.00 zolpidem (AMBIEN) 10 MG tablet   3. Onychomycosis B35.1    4. Night sweats R61 estradiol (ESTRACE) 1 MG tablet   5. Symptomatic menopausal or female climacteric states N95.1 estradiol (ESTRACE) 1 MG tablet   6. Generalized pain R52 ibuprofen (IBU) 800 MG tablet   7. Rotator cuff syndrome, left M75.102 ibuprofen (IBU) 800 MG tablet      See pt instruction.   Increase estradiol oral tablet from 0.5mg daily to 1mg daily.   Please, call or return to clinic or go to the ER immediately if signs or symptoms worsen or fail to improve as anticipated. Can increase up to 2mg daily , if needed.     Go without toenail polish for the rest of the summer.     Last mammo utd - 9/13/2018.     Ok for ambien refill -controlled substance agreement  Up to date.     See Patient Instructions.          Lisbeth Gonzalez MD    [facility] [place]

## 2019-07-19 DIAGNOSIS — E78.5 HYPERLIPIDEMIA LDL GOAL <100: ICD-10-CM

## 2019-07-19 DIAGNOSIS — Z82.49 FAMILY HISTORY OF EARLY CAD: ICD-10-CM

## 2019-07-22 RX ORDER — ATORVASTATIN CALCIUM 10 MG/1
10 TABLET, FILM COATED ORAL DAILY
Qty: 90 TABLET | Refills: 0 | Status: SHIPPED | OUTPATIENT
Start: 2019-07-22 | End: 2019-09-08

## 2019-07-22 NOTE — TELEPHONE ENCOUNTER
Refill approved through [facility] protocol.  Zeina Ruff RN  [facility]  [phone]

## 2019-09-08 DIAGNOSIS — E78.5 HYPERLIPIDEMIA LDL GOAL <100: ICD-10-CM

## 2019-09-08 DIAGNOSIS — Z82.49 FAMILY HISTORY OF EARLY CAD: ICD-10-CM

## 2019-09-09 NOTE — TELEPHONE ENCOUNTER
"Requested Prescriptions   Pending Prescriptions Disp Refills     atorvastatin (LIPITOR) 10 MG tablet [Pharmacy Med Name: ATORVASTATIN 10MG TABLETS] 90 tablet 0     Sig: TAKE 1 TABLET(10 MG) BY MOUTH DAILY     Last Written Prescription Date:  7/22/2019  Last Fill Quantity: 90,  # refills: 0   Last office visit: 5/23/2019 with prescribing provider:     Future Office Visit:   Next 5 appointments (look out 90 days)    Sep 18, 2019  8:40 AM CDT  [facility]YS[facility] Lisbeth Gonzalez MD  [facility] [address] [place] [id number] [phone]               [facility] Protocol Passed - 9/8/2019  6:52 PM        Passed - LDL on file in past 12 months     Recent Labs   Lab Test 09/17/18  0929   LDL 56             Passed - No abnormal creatine kinase in past 12 months     Recent Labs   Lab Test 03/02/18  1030   CKT 66                Passed - Recent (12 mo) or future (30 days) visit within the authorizing provider's specialty     Patient had office visit in the last 12 months or has a visit in the next 30 days with authorizing provider or within the authorizing provider's specialty.  See \"Patient Info\" tab in inbasket, or \"Choose Columns\" in Meds & Orders section of the refill encounter.              Passed - Medication is active on med list        Passed - Patient is age 18 or older        Passed - No active pregnancy on record        Passed - No positive pregnancy test in past 12 months        "

## 2019-09-10 RX ORDER — ATORVASTATIN CALCIUM 10 MG/1
TABLET, FILM COATED ORAL
Qty: 30 TABLET | Refills: 0 | Status: SHIPPED | OUTPATIENT
Start: 2019-09-10 | End: 2019-09-18

## 2019-09-10 NOTE — TELEPHONE ENCOUNTER
Due for an Office visit for further refills, only fill for 30 days     Marisabel Farmer RN, BSN  [facility]

## 2019-09-13 ENCOUNTER — TELEPHONE (OUTPATIENT)
Dept: FAMILY MEDICINE | Facility: CLINIC | Age: 59
End: 2019-09-13

## 2019-09-13 DIAGNOSIS — Z12.39 SCREENING FOR BREAST CANCER: ICD-10-CM

## 2019-09-13 DIAGNOSIS — Z00.00 ENCOUNTER FOR ROUTINE ADULT HEALTH EXAMINATION WITHOUT ABNORMAL FINDINGS: Primary | ICD-10-CM

## 2019-09-13 NOTE — TELEPHONE ENCOUNTER
Reason for call:  Order   Order or referral being requested: mammogram  Reason for request: routine  Date needed: before my next appointment  Has the patient been seen by the PCP for this problem? Not Applicable    Additional comments: Patient is scheduled for a mammo on 09/26.    Phone number to reach patient:  Cell number on file:    Telephone Information:   Mobile [phone]       Best Time:  NA, no need to contact patient    Can we leave a detailed message on this number?  Not Applicable     Odilai CERVANTES  Central Scheduler

## 2019-09-18 ENCOUNTER — OFFICE VISIT (OUTPATIENT)
Dept: FAMILY MEDICINE | Facility: CLINIC | Age: 59
End: 2019-09-18
Payer: COMMERCIAL

## 2019-09-18 ENCOUNTER — ANCILLARY PROCEDURE (OUTPATIENT)
Dept: GENERAL RADIOLOGY | Facility: CLINIC | Age: 59
End: 2019-09-18
Attending: FAMILY MEDICINE
Payer: COMMERCIAL

## 2019-09-18 VITALS
HEIGHT: 64 IN | WEIGHT: 162 LBS | BODY MASS INDEX: 27.66 KG/M2 | OXYGEN SATURATION: 99 % | DIASTOLIC BLOOD PRESSURE: 71 MMHG | HEART RATE: 66 BPM | SYSTOLIC BLOOD PRESSURE: 122 MMHG | TEMPERATURE: 97.5 F

## 2019-09-18 DIAGNOSIS — N95.1 SYMPTOMATIC MENOPAUSAL OR FEMALE CLIMACTERIC STATES: ICD-10-CM

## 2019-09-18 DIAGNOSIS — R52 GENERALIZED PAIN: ICD-10-CM

## 2019-09-18 DIAGNOSIS — D48.5 NEOPLASM OF UNCERTAIN BEHAVIOR OF SKIN: ICD-10-CM

## 2019-09-18 DIAGNOSIS — Z00.00 ROUTINE GENERAL MEDICAL EXAMINATION AT A HEALTH CARE FACILITY: Primary | ICD-10-CM

## 2019-09-18 DIAGNOSIS — K21.9 GASTROESOPHAGEAL REFLUX DISEASE WITHOUT ESOPHAGITIS: ICD-10-CM

## 2019-09-18 DIAGNOSIS — Z23 NEED FOR SHINGLES VACCINE: ICD-10-CM

## 2019-09-18 DIAGNOSIS — Z23 NEEDS FLU SHOT: ICD-10-CM

## 2019-09-18 DIAGNOSIS — M75.102 ROTATOR CUFF SYNDROME, LEFT: ICD-10-CM

## 2019-09-18 DIAGNOSIS — R61 NIGHT SWEATS: ICD-10-CM

## 2019-09-18 DIAGNOSIS — S63.259A CLOSED DISLOCATION OF FINGER OF LEFT HAND, INITIAL ENCOUNTER: ICD-10-CM

## 2019-09-18 DIAGNOSIS — Z13.21 ENCOUNTER FOR VITAMIN DEFICIENCY SCREENING: ICD-10-CM

## 2019-09-18 DIAGNOSIS — E78.5 HYPERLIPIDEMIA LDL GOAL <100: ICD-10-CM

## 2019-09-18 DIAGNOSIS — Z82.49 FAMILY HISTORY OF ASCVD (ARTERIOSCLEROTIC CARDIOVASCULAR DISEASE): ICD-10-CM

## 2019-09-18 DIAGNOSIS — G47.00 INSOMNIA, UNSPECIFIED TYPE: ICD-10-CM

## 2019-09-18 DIAGNOSIS — Z82.49 FAMILY HISTORY OF EARLY CAD: ICD-10-CM

## 2019-09-18 LAB
BASOPHILS # BLD AUTO: 0 10E9/L (ref 0–0.2)
BASOPHILS NFR BLD AUTO: 0.8 %
DIFFERENTIAL METHOD BLD: NORMAL
EOSINOPHIL # BLD AUTO: 0.3 10E9/L (ref 0–0.7)
EOSINOPHIL NFR BLD AUTO: 6.5 %
ERYTHROCYTE [DISTWIDTH] IN BLOOD BY AUTOMATED COUNT: 12.8 % (ref 10–15)
HCT VFR BLD AUTO: 39.2 % (ref 35–47)
HGB BLD-MCNC: 13.3 G/DL (ref 11.7–15.7)
LYMPHOCYTES # BLD AUTO: 1.7 10E9/L (ref 0.8–5.3)
LYMPHOCYTES NFR BLD AUTO: 36.7 %
MCH RBC QN AUTO: 30 PG (ref 26.5–33)
MCHC RBC AUTO-ENTMCNC: 33.9 G/DL (ref 31.5–36.5)
MCV RBC AUTO: 88 FL (ref 78–100)
MONOCYTES # BLD AUTO: 0.3 10E9/L (ref 0–1.3)
MONOCYTES NFR BLD AUTO: 5.9 %
NEUTROPHILS # BLD AUTO: 2.4 10E9/L (ref 1.6–8.3)
NEUTROPHILS NFR BLD AUTO: 50.1 %
PLATELET # BLD AUTO: 189 10E9/L (ref 150–450)
RBC # BLD AUTO: 4.44 10E12/L (ref 3.8–5.2)
WBC # BLD AUTO: 4.7 10E9/L (ref 4–11)

## 2019-09-18 PROCEDURE — 82306 VITAMIN D 25 HYDROXY: CPT | Performed by: FAMILY MEDICINE

## 2019-09-18 PROCEDURE — 99213 OFFICE O/P EST LOW 20 MIN: CPT | Mod: 25 | Performed by: FAMILY MEDICINE

## 2019-09-18 PROCEDURE — 90682 RIV4 VACC RECOMBINANT DNA IM: CPT | Performed by: FAMILY MEDICINE

## 2019-09-18 PROCEDURE — 80061 LIPID PANEL: CPT | Performed by: FAMILY MEDICINE

## 2019-09-18 PROCEDURE — 90471 IMMUNIZATION ADMIN: CPT | Performed by: FAMILY MEDICINE

## 2019-09-18 PROCEDURE — 73140 X-RAY EXAM OF FINGER(S): CPT | Mod: LT

## 2019-09-18 PROCEDURE — 36415 COLL VENOUS BLD VENIPUNCTURE: CPT | Performed by: FAMILY MEDICINE

## 2019-09-18 PROCEDURE — 82043 UR ALBUMIN QUANTITATIVE: CPT | Performed by: FAMILY MEDICINE

## 2019-09-18 PROCEDURE — 99396 PREV VISIT EST AGE 40-64: CPT | Mod: 25 | Performed by: FAMILY MEDICINE

## 2019-09-18 PROCEDURE — 80053 COMPREHEN METABOLIC PANEL: CPT | Performed by: FAMILY MEDICINE

## 2019-09-18 PROCEDURE — 85025 COMPLETE CBC W/AUTO DIFF WBC: CPT | Performed by: FAMILY MEDICINE

## 2019-09-18 PROCEDURE — 84443 ASSAY THYROID STIM HORMONE: CPT | Performed by: FAMILY MEDICINE

## 2019-09-18 RX ORDER — ATORVASTATIN CALCIUM 10 MG/1
10 TABLET, FILM COATED ORAL DAILY
Qty: 90 TABLET | Refills: 3 | Status: SHIPPED | OUTPATIENT
Start: 2019-09-18 | End: 2020-09-01

## 2019-09-18 RX ORDER — IBUPROFEN 800 MG/1
TABLET, FILM COATED ORAL
Qty: 90 TABLET | Refills: 0 | Status: SHIPPED | OUTPATIENT
Start: 2019-09-18 | End: 2019-10-18

## 2019-09-18 RX ORDER — ZOLPIDEM TARTRATE 10 MG/1
TABLET ORAL
Qty: 30 TABLET | Refills: 1 | Status: SHIPPED | OUTPATIENT
Start: 2019-09-18 | End: 2020-03-21

## 2019-09-18 RX ORDER — AZITHROMYCIN 250 MG/1
TABLET, FILM COATED ORAL
Refills: 2 | COMMUNITY
Start: 2019-04-11 | End: 2020-10-22

## 2019-09-18 RX ORDER — ESTRADIOL 1 MG/1
1 TABLET ORAL DAILY
Qty: 90 TABLET | Refills: 3 | Status: SHIPPED | OUTPATIENT
Start: 2019-09-18 | End: 2020-10-01

## 2019-09-18 ASSESSMENT — MIFFLIN-ST. JEOR: SCORE: 1299.83

## 2019-09-18 NOTE — LETTER
[facility]  [address] [place] [address], [place] [address]                  [phone]   October 2, 2019    Whitney Aceves  [address] [facility] [address]      Dear Whitney,    Here is a summary of your recent test results:      -Normal red blood cell (hgb) levels, normal white blood cell count and normal platelet levels.   -Cholesterol levels (LDL,HDL, Triglycerides) are normal.  ADVISE: rechecking in 1 year.   -Liver and gallbladder tests are normal (ALT,AST, Alk phos, bilirubin), kidney function is normal (Cr, GFR), sodium is normal, potassium is normal, calcium is normal, glucose is normal.   -TSH (thyroid stimulating hormone) level is normal which indicates normal thyroid function.   -Vitamin D level is low and oral supplementation should be started.  ADVISE: starting over the counter Vitamin D3  2000 IU - 3 tabs (6000 IU) daily for 6 weeks and then 2000 IU daily to maintain levels.  Then in 2 months, please schedule a lab only appointment to recheck your Vitamin D levels.   -Microalbumin (urine protein) test is normal.  ADVISE: rechecking this annually.     Please  enter future orders for this  - 25 hydroxyvitamin D2 and D3 - and pt  can do this as a lab only appointment.   Please inform patient. They can have this done at any [facility] without appt during regular outpt lab hours or by lab only appt at any [facility] clinic.  Dx: vitamin D Deficiency     Your test results are enclosed.      Please contact me if you have any questions.    In addition, here is a list of due or overdue Health Maintenance reminders.    There are no preventive care reminders to display for this patient.    Please call us at [phone] (or use [employer]) to address the above recommendations.            Thank you very much for trusting [facility]..     Healthy regards,       Lisbeth Gonzalez M.D.          Results for orders placed or performed in visit on  09/18/19   Lipid panel reflex to direct LDL Fasting   Result Value Ref Range    Cholesterol 175 <200 mg/dL    Triglycerides 135 <150 mg/dL    HDL Cholesterol 72 >49 mg/dL    LDL Cholesterol Calculated 76 <100 mg/dL    Non HDL Cholesterol 103 <130 mg/dL   Albumin Random Urine Quantitative with Creat Ratio   Result Value Ref Range    Creatinine Urine 48 mg/dL    Albumin Urine mg/L <5 mg/L    Albumin Urine mg/g Cr Unable to calculate due to low value 0 - 25 mg/g Cr   CBC with platelets differential   Result Value Ref Range    WBC 4.7 4.0 - 11.0 10e9/L    RBC Count 4.44 3.8 - 5.2 10e12/L    Hemoglobin 13.3 11.7 - 15.7 g/dL    Hematocrit 39.2 35.0 - 47.0 %    MCV 88 78 - 100 fl    MCH 30.0 26.5 - 33.0 pg    MCHC 33.9 31.5 - 36.5 g/dL    RDW 12.8 10.0 - 15.0 %    Platelet Count 189 150 - 450 10e9/L    % Neutrophils 50.1 %    % Lymphocytes 36.7 %    % Monocytes 5.9 %    % Eosinophils 6.5 %    % Basophils 0.8 %    Absolute Neutrophil 2.4 1.6 - 8.3 10e9/L    Absolute Lymphocytes 1.7 0.8 - 5.3 10e9/L    Absolute Monocytes 0.3 0.0 - 1.3 10e9/L    Absolute Eosinophils 0.3 0.0 - 0.7 10e9/L    Absolute Basophils 0.0 0.0 - 0.2 10e9/L    Diff Method Automated Method    Comprehensive metabolic panel   Result Value Ref Range    Sodium 139 133 - 144 mmol/L    Potassium 3.4 3.4 - 5.3 mmol/L    Chloride 107 94 - 109 mmol/L    Carbon Dioxide 27 20 - 32 mmol/L    Anion Gap 5 3 - 14 mmol/L    Glucose 81 70 - 99 mg/dL    Urea Nitrogen 9 7 - 30 mg/dL    Creatinine 0.66 0.52 - 1.04 mg/dL    GFR Estimate >90 >60 mL/min/[1.73_m2]    GFR Estimate If Black >90 >60 mL/min/[1.73_m2]    Calcium 8.6 8.5 - 10.1 mg/dL    Bilirubin Total 0.8 0.2 - 1.3 mg/dL    Albumin 3.8 3.4 - 5.0 g/dL    Protein Total 7.3 6.8 - 8.8 g/dL    Alkaline Phosphatase 89 40 - 150 U/L    ALT 25 0 - 50 U/L    AST 13 0 - 45 U/L   TSH with free T4 reflex   Result Value Ref Range    TSH 1.44 0.40 - 4.00 mU/L   Vitamin D Deficiency   Result Value Ref Range    Vitamin D Deficiency  screening 24 20 - 75 ug/L

## 2019-09-18 NOTE — PATIENT INSTRUCTIONS
Preventive Health Recommendations  Female Ages 50 - 64    Yearly exam: See your health care provider every year in order to  o Review health changes.   o Discuss preventive care.    o Review your medicines if your doctor has prescribed any.      Get a Pap test every three years (unless you have an abnormal result and your provider advises testing more often).    If you get Pap tests with HPV test, you only need to test every 5 years, unless you have an abnormal result.     You do not need a Pap test if your uterus was removed (hysterectomy) and you have not had cancer.    You should be tested each year for STDs (sexually transmitted diseases) if you're at risk.     Have a mammogram every 1 to 2 years.    Have a colonoscopy at age 50, or have a yearly FIT test (stool test). These exams screen for colon cancer.      Have a cholesterol test every 5 years, or more often if advised.    Have a diabetes test (fasting glucose) every three years. If you are at risk for diabetes, you should have this test more often.     If you are at risk for osteoporosis (brittle bone disease), think about having a bone density scan (DEXA).    Shots: Get a flu shot each year. Get a tetanus shot every 10 years.    Nutrition:     Eat at least 5 servings of fruits and vegetables each day.    Eat whole-grain bread, whole-wheat pasta and brown rice instead of white grains and rice.    Get adequate Calcium and Vitamin D.     Lifestyle    Exercise at least 150 minutes a week (30 minutes a day, 5 days a week). This will help you control your weight and prevent disease.    Limit alcohol to one drink per day.    No smoking.     Wear sunscreen to prevent skin cancer.     See your dentist every six months for an exam and cleaning.    See your eye doctor every 1 to 2 years.               Thank you for choosing [facility]  for your Health Care. It was a pleasure seeing you at your visit today. Please contact us with any questions or  concerns you may have.                   Lisbeth Gonzalez MD                                  To reach your [facility] system team after hours call:   [phone]    Our clinic hours are:     Monday- 7:30 am - 7:00 pm                             Tuesday through Friday- 7:30 am - 5:00 pm                                        Saturday- 8:00 am - 12:00 pm                  Phone:  [phone]    Our pharmacy hours are:     Monday  8:00 am to 7:00 pm      Tuesday through Friday 8:00am to 6:00pm                        Saturday - 9:00 am to 1:00 pm      Sunday : Closed.              Phone:  [phone]      There is also information available at our web site:  [email or web address]    If your provider ordered any lab tests and you do not receive the results within 10 business days, please call the clinic.    If you need a medication refill please contact your pharmacy.  Please allow 2 business days for your refill to be completed.    Our clinic offers telephone visits and e visits.  Please ask one of your team members to explain more.      Use [facility] (secure email communication and access to your chart) to send your primary care provider a message or make an appointment. Ask someone on your Team how to sign up for [facility].

## 2019-09-19 ENCOUNTER — APPOINTMENT (OUTPATIENT)
Dept: NURSING | Facility: CLINIC | Age: 59
End: 2019-09-19
Payer: COMMERCIAL

## 2019-09-19 LAB
ALBUMIN SERPL-MCNC: 3.8 G/DL (ref 3.4–5)
ALP SERPL-CCNC: 89 U/L (ref 40–150)
ALT SERPL W P-5'-P-CCNC: 25 U/L (ref 0–50)
ANION GAP SERPL CALCULATED.3IONS-SCNC: 5 MMOL/L (ref 3–14)
AST SERPL W P-5'-P-CCNC: 13 U/L (ref 0–45)
BILIRUB SERPL-MCNC: 0.8 MG/DL (ref 0.2–1.3)
BUN SERPL-MCNC: 9 MG/DL (ref 7–30)
CALCIUM SERPL-MCNC: 8.6 MG/DL (ref 8.5–10.1)
CHLORIDE SERPL-SCNC: 107 MMOL/L (ref 94–109)
CHOLEST SERPL-MCNC: 175 MG/DL
CO2 SERPL-SCNC: 27 MMOL/L (ref 20–32)
CREAT SERPL-MCNC: 0.66 MG/DL (ref 0.52–1.04)
CREAT UR-MCNC: 48 MG/DL
DEPRECATED CALCIDIOL+CALCIFEROL SERPL-MC: 24 UG/L (ref 20–75)
GFR SERPL CREATININE-BSD FRML MDRD: >90 ML/MIN/{1.73_M2}
GLUCOSE SERPL-MCNC: 81 MG/DL (ref 70–99)
HDLC SERPL-MCNC: 72 MG/DL
LDLC SERPL CALC-MCNC: 76 MG/DL
MICROALBUMIN UR-MCNC: <5 MG/L
MICROALBUMIN/CREAT UR: NORMAL MG/G CR (ref 0–25)
NONHDLC SERPL-MCNC: 103 MG/DL
POTASSIUM SERPL-SCNC: 3.4 MMOL/L (ref 3.4–5.3)
PROT SERPL-MCNC: 7.3 G/DL (ref 6.8–8.8)
SODIUM SERPL-SCNC: 139 MMOL/L (ref 133–144)
TRIGL SERPL-MCNC: 135 MG/DL
TSH SERPL DL<=0.005 MIU/L-ACNC: 1.44 MU/L (ref 0.4–4)

## 2019-09-19 NOTE — RESULT ENCOUNTER NOTE
"Please call pt with results below:     Radiology read xray from yesterday as \" There may be a small thin fracture fragment at the dorsal  aspect of the third middle phalanx distally.\"     Recommend small distal baseball type splint to allow for distal phalanx protection.  Pt can do as a nurse only visit and please come get me for this and I'll help pick out the splint.     Recommend calcium 1200mg daily and vitamin D 2000iu daily to help with bone healing as well.      For additional lab test information, labtestsonline.org is an excellent reference.    "

## 2019-09-24 ENCOUNTER — ALLIED HEALTH/NURSE VISIT (OUTPATIENT)
Dept: NURSING | Facility: CLINIC | Age: 59
End: 2019-09-24
Payer: COMMERCIAL

## 2019-09-24 DIAGNOSIS — M79.645 PAIN OF FINGER OF LEFT HAND: Primary | ICD-10-CM

## 2019-09-24 NOTE — PROGRESS NOTES
Pt has been wearing the splint for her left middle finger and by the end of the day it becomes very swollen due to her fingers swell all the time by the end of the day,     RN helped pt readjust splint and gave her some paper tape to help with it when at home     Patient stated an understanding and agreed with plan.    Marisabel Farmer RN, BSN  [facility]

## 2019-09-26 ENCOUNTER — ANCILLARY PROCEDURE (OUTPATIENT)
Dept: MAMMOGRAPHY | Facility: CLINIC | Age: 59
End: 2019-09-26
Payer: COMMERCIAL

## 2019-09-26 ENCOUNTER — TELEPHONE (OUTPATIENT)
Dept: FAMILY MEDICINE | Facility: CLINIC | Age: 59
End: 2019-09-26

## 2019-09-26 DIAGNOSIS — Z12.39 SCREENING FOR BREAST CANCER: ICD-10-CM

## 2019-09-26 PROCEDURE — 77063 BREAST TOMOSYNTHESIS BI: CPT | Mod: TC

## 2019-09-26 PROCEDURE — 77067 SCR MAMMO BI INCL CAD: CPT | Mod: TC

## 2019-09-26 NOTE — TELEPHONE ENCOUNTER
Referral faxed by [facility] at [place]. Patient notified that referral was being faxed.    Amy Aldrich  Referral Coordinator

## 2019-09-26 NOTE — TELEPHONE ENCOUNTER
Reason for Call:  Other referral    Detailed comments: Patient walked in, states that [facility] did not receive any referral information and will not be able to schedule her until they get it.     Phone Number Patient can be reached at: Cell number on file:    Telephone Information:   Mobile [phone]       Best Time: anytime    Can we leave a detailed message on this number? NO    Call taken on 9/26/2019 at 9:49 AM by Myra SMALL

## 2019-10-17 ENCOUNTER — OFFICE VISIT (OUTPATIENT)
Dept: CARDIOLOGY | Facility: CLINIC | Age: 59
End: 2019-10-17
Attending: FAMILY MEDICINE
Payer: COMMERCIAL

## 2019-10-17 VITALS
WEIGHT: 162.2 LBS | DIASTOLIC BLOOD PRESSURE: 84 MMHG | SYSTOLIC BLOOD PRESSURE: 132 MMHG | BODY MASS INDEX: 27.69 KG/M2 | HEIGHT: 64 IN | HEART RATE: 71 BPM

## 2019-10-17 DIAGNOSIS — E78.5 HYPERLIPIDEMIA LDL GOAL <130: Primary | ICD-10-CM

## 2019-10-17 PROCEDURE — 93005 ELECTROCARDIOGRAM TRACING: CPT | Performed by: INTERNAL MEDICINE

## 2019-10-17 PROCEDURE — 99203 OFFICE O/P NEW LOW 30 MIN: CPT | Performed by: INTERNAL MEDICINE

## 2019-10-17 ASSESSMENT — MIFFLIN-ST. JEOR: SCORE: 1300.73

## 2019-10-17 NOTE — LETTER
10/17/2019    Lisbeth Gonzalez MD  [address] [facility] [address] [address]    RE: Whitney CARRANZA Ketan       Dear Colleague,    I had the pleasure of seeing Whitney Aceves in the [facility].    HPI and Plan:   The pleasure for me to see this very pleasant 58-year-old lady for cardiac assessment.  She has a strong family history of premature atherosclerotic disease.  Her father  at the age of 45 from heart disease.  Her brother had a cardiac arrest from which he was resuscitated.  He has received coronary multiple stents.  He is a patient of my colleague Dr. Amos.  The sister has also had multiple coronary revascularizations as well in her 50s.    Patient herself has no cardiac symptoms.  She exercises on a regular basis.  Her initial blood pressure was around 160/90 which was definite surprised to her.  She does tell me that that she felt quite anxious after she was told she should see as for cardiac evaluation.  She was normotensive when I rechecked her blood pressure.  She has hyperlipidemia.  His total cholesterol IC is 247 with LDL of 151 HDL 64 triglycerides 162.  She does not smoke drink or abuse drugs.  Cardiac examination is unremarkable.    I computed her 10-year risk using the year  ACC risk calculator.  Using her pre-Lipitor cholesterol numbers with normal blood pressure her risk is no more than 3%.  As such she may not need aspirin or statin.  However the ACC risk calculator does not take into account her family history which atrophy was quite important.  She is an ideal candidate for calcium CT score which I have requested.  If she has coronary calcium then she should continue with Lipitor and perhaps start an aspirin.  If she does not we can consider withdrawing Lipitor though her numbers look absolutely great on this medication with an HDL of 72 and LDL of 76.    Even though she was normotensive when I rechecked her blood pressure I think blood pressure 160  systolic is a little too high to be simply accounted for by whitecoat hypertension though it is theoretically possible.  I advised home blood pressure monitoring.  Further follow-up will be arranged as necessary once I see the results of her calcium score.    Orders Placed This Encounter   Procedures     CT Coronary Calcium Scan     EKG 12-lead complete w/read - Clinics (performed today)       Orders Placed This Encounter   Medications     calcium-vitamin D (OSCAL) 250-125 MG-UNIT TABS per tablet     Sig: Take 1 tablet by mouth daily       Encounter Diagnosis   Name Primary?     Hyperlipidemia LDL goal <130 Yes       CURRENT MEDICATIONS:  Current Outpatient Medications   Medication Sig Dispense Refill     atorvastatin (LIPITOR) 10 MG tablet Take 1 tablet (10 mg) by mouth daily 90 tablet 3     azithromycin (ZITHROMAX) 250 MG tablet Leave on - pt travels  2     calcium-vitamin D (OSCAL) 250-125 MG-UNIT TABS per tablet Take 1 tablet by mouth daily       estradiol (ESTRACE) 1 MG tablet Take 1 tablet (1 mg) by mouth daily 90 tablet 3     ibuprofen (ADVIL/MOTRIN) 800 MG tablet TAKE 1 TABLET BY MOUTH EVERY 8 HOURS AS NEEDED FOR MODERATE PAIN FOR BURSITIS 90 tablet 0     loratadine (CLARITIN) 10 MG tablet Take 1 tablet (10 mg) by mouth daily 30 tablet 1     Multiple Vitamins-Minerals (MULTIVITAMIN ADULT PO) Take by mouth daily       Omega-3 Fatty Acids (OMEGA 3 PO)        zolpidem (AMBIEN) 10 MG tablet TAKE ONE-HALF TABLET BY MOUTH EVERY EVENING AS NEEDED FOR SLEEP 30 tablet 1       ALLERGIES     Allergies   Allergen Reactions     Septra [Sulfamethoxazole W/Trimethoprim]      Sulfa Drugs Hives     Murine Ear [Carbamide Peroxide] Swelling and Rash     Severe pain , swelling after debrox type ear drops - had to go to the clinic to have ears flushed out emergently.        PAST MEDICAL HISTORY:  Past Medical History:   Diagnosis Date     Anxiety     on effexor in past. Off since 2010     [facility] DJD(carpometacarpal degenerative  joint disease), localized primary      Edema      Fatigue, unspecified type 2018     Hormone replacement therapy      IBS (irritable bowel syndrome)     Diarrhea predominant     Insomnia, unspecified     On Ambien     Lichen sclerosus      Pap smear of cervix not needed      Urge incontinence        PAST SURGICAL HISTORY:  Past Surgical History:   Procedure Laterality Date      SECTION       COLONOSCOPY  2012    Procedure:COLONOSCOPY; Colonoscopy ; Surgeon:SILVERIO ARRIETA; Location: GI     COLONOSCOPY N/A 2017    Procedure: COLONOSCOPY;  Surgeon: Silverio Arrieta MD;  Location:  GI     COLPOSCOPY, BIOPSY, COMBINED  1991    Mild squamous epithelial dysplasia (SABIHA 1)     FISSURECTOMY RECTUM  01     HYSTERECTOMY TOTAL ABDOMINAL  3/4/1998    Myomatous uterus; pelvic pain. Started HRT right afterward with vasomotor sx.     HYSTERECTOMY, PAP NO LONGER INDICATED N/A     had for large fibroid     LIPOSUCTION (LOCATION)         FAMILY HISTORY:  Family History   Problem Relation Age of Onset     Hypertension Mother      Hyperlipidemia Mother      Diabetes Mother      C.A.D. Father 45          age 45 massive MI      Heart Failure Father      Hyperlipidemia Father      C.A.D. Maternal Grandmother      C.A.D. Maternal Grandfather      C.A.D. Paternal Grandmother      C.A.D. Paternal Grandfather      Cancer Other         9 members on maternal side. Bone CA, Breast CA (m. aunt),       Coronary Artery Disease Brother 55        Heart attack- s/p sudden cardiac arrest survived 2019      C.A.D. Sister 57        MI age 50- CABG - 2 vessels - 4 MI's since age 55 - 3 stents placed 2017      Diabetes Sister      Breast Cancer Maternal Aunt         50s-Mom had 9 sisters. 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   Social Needs     Financial resource strain: None     Food insecurity:     Worry: None     Inability: None     Transportation needs:     Medical: None     Non-medical: None   Tobacco Use     Smoking status: Never Smoker     Smokeless tobacco: Never Used   Substance and Sexual Activity     Alcohol use: No     Alcohol/week: 0.0 standard drinks     Drug use: No     Sexual activity: Yes     Partners: Male     Birth control/protection: Other     Comment: partial hysterectomy   Lifestyle     Physical activity:     Days per week: None     Minutes per session: None     Stress: None   Relationships     Social connections:     Talks on phone: None     Gets together: None     Attends Christian service: None     Active member of club or organization: None     Attends meetings of clubs or organizations: None     Relationship status: None     Intimate partner violence:     Fear of current or ex partner: None     Emotionally abused: None     Physically abused: None     Forced sexual activity: None   Other Topics Concern     Parent/sibling w/ CABG, MI or angioplasty before 65F 55M? No   Social History Narrative     None       Review of Systems:  Skin:  Positive for pigmentation   Eyes:  Negative    ENT:  Negative    Respiratory:  Negative    Cardiovascular:    Positive for;chest pain;edema  Gastroenterology: Positive for heartburn  Genitourinary:  Negative    Musculoskeletal:  Positive for arthritis;joint pain  Neurologic:  Positive for migraine headaches  Psychiatric:  Negative    Heme/Lymph/Imm:  Positive for allergies  Endocrine:  Negative      Physical Exam:  Vitals: BP (!) 162/94   Pulse 71   Ht 1.626 m (5' 4\")   Wt 73.6 kg (162 lb 3.2 oz)   BMI 27.84 kg/m   "     Constitutional:  cooperative, alert and oriented, well developed, well nourished, in no acute distress        Skin:  warm and dry to the touch, no apparent skin lesions or masses noted          Head:  normocephalic, no masses or lesions        Eyes:  pupils equal and round, conjunctivae and lids unremarkable, sclera white, no xanthalasma, EOMS intact, no nystagmus        Lymph:No Cervical lymphadenopathy present     ENT:  no pallor or cyanosis, dentition good        Neck:  carotid pulses are full and equal bilaterally, JVP normal, no carotid bruit        Respiratory:  normal breath sounds, clear to auscultation, normal A-P diameter, normal symmetry, normal respiratory excursion, no use of accessory muscles         Cardiac: regular rhythm, normal S1/S2, no S3 or S4, apical impulse not displaced, no murmurs, gallops or rubs                pulses full and equal, no bruits auscultated                                        GI:  abdomen soft, non-tender, BS normoactive, no mass, no HSM, no bruits        Extremities and Muscular Skeletal:  no deformities, clubbing, cyanosis, erythema observed              Neurological:           Psych:           Recent Lab Results:  LIPID RESULTS:  Lab Results   Component Value Date    CHOL 175 09/18/2019    HDL 72 09/18/2019    LDL 76 09/18/2019    TRIG 135 09/18/2019    CHOLHDLRATIO 3.5 12/13/2010       LIVER ENZYME RESULTS:  Lab Results   Component Value Date    AST 13 09/18/2019    ALT 25 09/18/2019       CBC RESULTS:  Lab Results   Component Value Date    WBC 4.7 09/18/2019    RBC 4.44 09/18/2019    HGB 13.3 09/18/2019    HCT 39.2 09/18/2019    MCV 88 09/18/2019    MCH 30.0 09/18/2019    MCHC 33.9 09/18/2019    RDW 12.8 09/18/2019     09/18/2019       BMP RESULTS:  Lab Results   Component Value Date     09/18/2019    POTASSIUM 3.4 09/18/2019    CHLORIDE 107 09/18/2019    CO2 27 09/18/2019    ANIONGAP 5 09/18/2019    GLC 81 09/18/2019    BUN 9 09/18/2019    CR 0.66  09/18/2019    GFRESTIMATED >90 09/18/2019    GFRESTBLACK >90 09/18/2019    MADHURI 8.6 09/18/2019        A1C RESULTS:  Lab Results   Component Value Date    A1C 5.8 10/25/2010       INR RESULTS:  No results found for: INR        CC  Lisbeth Gonzalez MD  [address] [place] [address]              HPI and Plan:   See dictation    Orders Placed This Encounter   Procedures     CT Coronary Calcium Scan     EKG 12-lead complete w/read - Clinics (performed today)       Orders Placed This Encounter   Medications     calcium-vitamin D (OSCAL) 250-125 MG-UNIT TABS per tablet     Sig: Take 1 tablet by mouth daily       Encounter Diagnosis   Name Primary?     Hyperlipidemia LDL goal <130 Yes       CURRENT MEDICATIONS:  Current Outpatient Medications   Medication Sig Dispense Refill     atorvastatin (LIPITOR) 10 MG tablet Take 1 tablet (10 mg) by mouth daily 90 tablet 3     azithromycin (ZITHROMAX) 250 MG tablet Leave on - pt travels  2     calcium-vitamin D (OSCAL) 250-125 MG-UNIT TABS per tablet Take 1 tablet by mouth daily       estradiol (ESTRACE) 1 MG tablet Take 1 tablet (1 mg) by mouth daily 90 tablet 3     ibuprofen (ADVIL/MOTRIN) 800 MG tablet TAKE 1 TABLET BY MOUTH EVERY 8 HOURS AS NEEDED FOR MODERATE PAIN FOR BURSITIS 90 tablet 0     loratadine (CLARITIN) 10 MG tablet Take 1 tablet (10 mg) by mouth daily 30 tablet 1     Multiple Vitamins-Minerals (MULTIVITAMIN ADULT PO) Take by mouth daily       Omega-3 Fatty Acids (OMEGA 3 PO)        zolpidem (AMBIEN) 10 MG tablet TAKE ONE-HALF TABLET BY MOUTH EVERY EVENING AS NEEDED FOR SLEEP 30 tablet 1       ALLERGIES     Allergies   Allergen Reactions     Septra [Sulfamethoxazole W/Trimethoprim]      Sulfa Drugs Hives     Murine Ear [Carbamide Peroxide] Swelling and Rash     Severe pain , swelling after debrox type ear drops - had to go to the clinic to have ears flushed out emergently.        PAST MEDICAL HISTORY:  Past Medical History:   Diagnosis Date      Anxiety     on effexor in past. Off since      [facility] DJD(carpometacarpal degenerative joint disease), localized primary      Edema      Fatigue, unspecified type 2018     Hormone replacement therapy      IBS (irritable bowel syndrome)     Diarrhea predominant     Insomnia, unspecified     On Ambien     Lichen sclerosus      Pap smear of cervix not needed      Urge incontinence        PAST SURGICAL HISTORY:  Past Surgical History:   Procedure Laterality Date      SECTION       COLONOSCOPY  2012    Procedure:COLONOSCOPY; Colonoscopy ; Surgeon:SILVERIO ARRIETA; Location: GI     COLONOSCOPY N/A 2017    Procedure: COLONOSCOPY;  Surgeon: Silverio Arrieta MD;  Location:  GI     COLPOSCOPY, BIOPSY, COMBINED  1991    Mild squamous epithelial dysplasia (SABIHA 1)     FISSURECTOMY RECTUM  01     HYSTERECTOMY TOTAL ABDOMINAL  3/4/1998    Myomatous uterus; pelvic pain. Started HRT right afterward with vasomotor sx.     HYSTERECTOMY, PAP NO LONGER INDICATED N/A     had for large fibroid     LIPOSUCTION (LOCATION)         FAMILY HISTORY:  Family History   Problem Relation Age of Onset     Hypertension Mother      Hyperlipidemia Mother      Diabetes Mother      C.A.D. Father 45          age 45 massive MI      Heart Failure Father      Hyperlipidemia Father      C.A.D. Maternal Grandmother      C.A.D. Maternal Grandfather      C.A.D. Paternal Grandmother      C.A.D. Paternal Grandfather      Cancer Other         9 members on maternal side. Bone CA, Breast CA (m. aunt),       Coronary Artery Disease Brother 55        Heart attack- s/p sudden cardiac arrest survived 2019      C.A.D. Sister 57        MI age 50- CABG - 2 vessels - 4 MI's since age 55 - 3 stents placed 2017      Diabetes Sister      Breast Cancer Maternal Aunt         50s-Mom had 9 sisters. 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   Social Needs     Financial resource strain: None     Food insecurity:     Worry: None     Inability: None     Transportation needs:     Medical: None     Non-medical: None   Tobacco Use     Smoking status: Never Smoker     Smokeless tobacco: Never Used   Substance and Sexual Activity     Alcohol use: No     Alcohol/week: 0.0 standard drinks     Drug use: No     Sexual activity: Yes     Partners: Male     Birth control/protection: Other     Comment: partial hysterectomy   Lifestyle     Physical activity:     Days per week: None     Minutes per session: None     Stress: None   Relationships     Social connections:     Talks on phone: None     Gets together: None     Attends Alevism service: None     Active member of club or organization: None     Attends meetings of clubs or organizations: None     Relationship status: None     Intimate partner violence:     Fear of current or ex partner: None     Emotionally abused: None     Physically abused: None     Forced sexual activity: None   Other Topics Concern     Parent/sibling w/ CABG, MI or angioplasty before 65F 55M? No   Social History Narrative     None       Review of Systems:  Skin:  Positive for pigmentation   Eyes:  Negative    ENT:  Negative    Respiratory:  Negative    Cardiovascular:    Positive for;chest pain;edema  Gastroenterology: Positive for heartburn  Genitourinary:  Negative    Musculoskeletal:  Positive for arthritis;joint pain  Neurologic:  Positive for migraine headaches  Psychiatric:  Negative    Heme/Lymph/Imm:  Positive for allergies  Endocrine:  Negative      Physical  "Exam:  Vitals: BP (!) 162/94   Pulse 71   Ht 1.626 m (5' 4\")   Wt 73.6 kg (162 lb 3.2 oz)   BMI 27.84 kg/m       Constitutional:  cooperative, alert and oriented, well developed, well nourished, in no acute distress        Skin:  warm and dry to the touch, no apparent skin lesions or masses noted          Head:  normocephalic, no masses or lesions        Eyes:  pupils equal and round, conjunctivae and lids unremarkable, sclera white, no xanthalasma, EOMS intact, no nystagmus        Lymph:No Cervical lymphadenopathy present     ENT:  no pallor or cyanosis, dentition good        Neck:  carotid pulses are full and equal bilaterally, JVP normal, no carotid bruit        Respiratory:  normal breath sounds, clear to auscultation, normal A-P diameter, normal symmetry, normal respiratory excursion, no use of accessory muscles         Cardiac: regular rhythm, normal S1/S2, no S3 or S4, apical impulse not displaced, no murmurs, gallops or rubs                pulses full and equal, no bruits auscultated                                        GI:  abdomen soft, non-tender, BS normoactive, no mass, no HSM, no bruits        Extremities and Muscular Skeletal:  no deformities, clubbing, cyanosis, erythema observed              Neurological:           Psych:           Recent Lab Results:  LIPID RESULTS:  Lab Results   Component Value Date    CHOL 175 09/18/2019    HDL 72 09/18/2019    LDL 76 09/18/2019    TRIG 135 09/18/2019    CHOLHDLRATIO 3.5 12/13/2010       LIVER ENZYME RESULTS:  Lab Results   Component Value Date    AST 13 09/18/2019    ALT 25 09/18/2019       CBC RESULTS:  Lab Results   Component Value Date    WBC 4.7 09/18/2019    RBC 4.44 09/18/2019    HGB 13.3 09/18/2019    HCT 39.2 09/18/2019    MCV 88 09/18/2019    MCH 30.0 09/18/2019    MCHC 33.9 09/18/2019    RDW 12.8 09/18/2019     09/18/2019       BMP RESULTS:  Lab Results   Component Value Date     09/18/2019    POTASSIUM 3.4 09/18/2019    CHLORIDE " 107 09/18/2019    CO2 27 09/18/2019    ANIONGAP 5 09/18/2019    GLC 81 09/18/2019    BUN 9 09/18/2019    CR 0.66 09/18/2019    GFRESTIMATED >90 09/18/2019    GFRESTBLACK >90 09/18/2019    MADHURI 8.6 09/18/2019        A1C RESULTS:  Lab Results   Component Value Date    A1C 5.8 10/25/2010       INR RESULTS:  No results found for: INR        Thank you for allowing me to participate in the care of your patient.    Sincerely,     DR CIPRIANO SPARROW MD     [facility]

## 2019-10-17 NOTE — PROGRESS NOTES
HPI and Plan:   The pleasure for me to see this very pleasant 58-year-old lady for cardiac assessment.  She has a strong family history of premature atherosclerotic disease.  Her father  at the age of 45 from heart disease.  Her brother had a cardiac arrest from which he was resuscitated.  He has received coronary multiple stents.  He is a patient of my colleague Dr. Amos.  The sister has also had multiple coronary revascularizations as well in her 50s.    Patient herself has no cardiac symptoms.  She exercises on a regular basis.  Her initial blood pressure was around 160/90 which was definite surprised to her.  She does tell me that that she felt quite anxious after she was told she should see as for cardiac evaluation.  She was normotensive when I rechecked her blood pressure.  She has hyperlipidemia.  His total cholesterol IC is 247 with LDL of 151 HDL 64 triglycerides 162.  She does not smoke drink or abuse drugs.  Cardiac examination is unremarkable.    I computed her 10-year risk using the 2013 ACC risk calculator.  Using her pre-Lipitor cholesterol numbers with normal blood pressure her risk is no more than 3%.  As such she may not need aspirin or statin.  However the ACC risk calculator does not take into account her family history which atrophy was quite important.  She is an ideal candidate for calcium CT score which I have requested.  If she has coronary calcium then she should continue with Lipitor and perhaps start an aspirin.  If she does not we can consider withdrawing Lipitor though her numbers look absolutely great on this medication with an HDL of 72 and LDL of 76.    Even though she was normotensive when I rechecked her blood pressure I think blood pressure 160 systolic is a little too high to be simply accounted for by whitecoat hypertension though it is theoretically possible.  I advised home blood pressure monitoring.  Further follow-up will be arranged as necessary once I see the  results of her calcium score.    Orders Placed This Encounter   Procedures     CT Coronary Calcium Scan     EKG 12-lead complete w/read - Clinics (performed today)       Orders Placed This Encounter   Medications     calcium-vitamin D (OSCAL) 250-125 MG-UNIT TABS per tablet     Sig: Take 1 tablet by mouth daily       Encounter Diagnosis   Name Primary?     Hyperlipidemia LDL goal <130 Yes       CURRENT MEDICATIONS:  Current Outpatient Medications   Medication Sig Dispense Refill     atorvastatin (LIPITOR) 10 MG tablet Take 1 tablet (10 mg) by mouth daily 90 tablet 3     azithromycin (ZITHROMAX) 250 MG tablet Leave on - pt travels  2     calcium-vitamin D (OSCAL) 250-125 MG-UNIT TABS per tablet Take 1 tablet by mouth daily       estradiol (ESTRACE) 1 MG tablet Take 1 tablet (1 mg) by mouth daily 90 tablet 3     ibuprofen (ADVIL/MOTRIN) 800 MG tablet TAKE 1 TABLET BY MOUTH EVERY 8 HOURS AS NEEDED FOR MODERATE PAIN FOR BURSITIS 90 tablet 0     loratadine (CLARITIN) 10 MG tablet Take 1 tablet (10 mg) by mouth daily 30 tablet 1     Multiple Vitamins-Minerals (MULTIVITAMIN ADULT PO) Take by mouth daily       Omega-3 Fatty Acids (OMEGA 3 PO)        zolpidem (AMBIEN) 10 MG tablet TAKE ONE-HALF TABLET BY MOUTH EVERY EVENING AS NEEDED FOR SLEEP 30 tablet 1       ALLERGIES     Allergies   Allergen Reactions     Septra [Sulfamethoxazole W/Trimethoprim]      Sulfa Drugs Hives     Murine Ear [Carbamide Peroxide] Swelling and Rash     Severe pain , swelling after debrox type ear drops - had to go to the clinic to have ears flushed out emergently.        PAST MEDICAL HISTORY:  Past Medical History:   Diagnosis Date     Anxiety     on effexor in past. Off since 2010     [facility] [place]carpometacarpal degenerative joint disease), localized primary      Edema      Fatigue, unspecified type 9/17/2018     Hormone replacement therapy      IBS (irritable bowel syndrome)     Diarrhea predominant     Insomnia, unspecified     On Ambien     Lichen  sclerosus      Pap smear of cervix not needed      Urge incontinence        PAST SURGICAL HISTORY:  Past Surgical History:   Procedure Laterality Date      SECTION       COLONOSCOPY  2012    Procedure:COLONOSCOPY; Colonoscopy ; Surgeon:SILVERIO ARRIETA; Location: GI     COLONOSCOPY N/A 2017    Procedure: COLONOSCOPY;  Surgeon: Silverio Arrieta MD;  Location:  GI     COLPOSCOPY, BIOPSY, COMBINED  1991    Mild squamous epithelial dysplasia (SABIHA 1)     FISSURECTOMY RECTUM  01     HYSTERECTOMY TOTAL ABDOMINAL  3/4/1998    Myomatous uterus; pelvic pain. Started HRT right afterward with vasomotor sx.     HYSTERECTOMY, PAP NO LONGER INDICATED N/A     had for large fibroid     LIPOSUCTION (LOCATION)         FAMILY HISTORY:  Family History   Problem Relation Age of Onset     Hypertension Mother      Hyperlipidemia Mother      Diabetes Mother      C.A.D. Father 45          age 45 massive MI      Heart Failure Father      Hyperlipidemia Father      C.A.D. Maternal Grandmother      C.A.D. Maternal Grandfather      C.A.D. Paternal Grandmother      C.A.D. Paternal Grandfather      Cancer Other         9 members on maternal side. Bone CA, Breast CA (m. aunt),       Coronary Artery Disease Brother 55        Heart attack- s/p sudden cardiac arrest survived 2019      C.A.D. Sister 57        MI age 50- CABG - 2 vessels - 4 MI's since age 55 - 3 stents placed 2017      Diabetes Sister      Breast Cancer Maternal Aunt         50s-Mom had 9 sisters. 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   Social Needs     Financial resource strain: None     Food insecurity:     Worry: None     Inability: None     Transportation needs:     Medical: None     Non-medical: None   Tobacco Use     Smoking status: Never Smoker     Smokeless tobacco: Never Used   Substance and Sexual Activity     Alcohol use: No     Alcohol/week: 0.0 standard drinks     Drug use: No     Sexual activity: Yes     Partners: Male     Birth control/protection: Other     Comment: partial hysterectomy   Lifestyle     Physical activity:     Days per week: None     Minutes per session: None     Stress: None   Relationships     Social connections:     Talks on phone: None     Gets together: None     Attends Mosque service: None     Active member of club or organization: None     Attends meetings of clubs or organizations: None     Relationship status: None     Intimate partner violence:     Fear of current or ex partner: None     Emotionally abused: None     Physically abused: None     Forced sexual activity: None   Other Topics Concern     Parent/sibling w/ CABG, MI or angioplasty before 65F 55M? No   Social History Narrative     None       Review of Systems:  Skin:  Positive for pigmentation   Eyes:  Negative    ENT:  Negative    Respiratory:  Negative    Cardiovascular:    Positive for;chest pain;edema  Gastroenterology: Positive for heartburn  Genitourinary:  Negative    Musculoskeletal:  Positive for arthritis;joint pain  Neurologic:  Positive for migraine headaches  Psychiatric:  Negative    Heme/Lymph/Imm:  Positive for allergies  Endocrine:  Negative      Physical Exam:  Vitals: BP (!) 162/94   Pulse 71   Ht 1.626 m (5' 4\")   Wt 73.6 kg (162 lb 3.2 oz)   BMI 27.84 kg/m      Constitutional:  cooperative, alert and oriented, well developed, well nourished, in no acute distress        Skin:  warm and dry to the touch, no apparent skin lesions or masses noted          Head:  normocephalic, no masses or " lesions        Eyes:  pupils equal and round, conjunctivae and lids unremarkable, sclera white, no xanthalasma, EOMS intact, no nystagmus        Lymph:No Cervical lymphadenopathy present     ENT:  no pallor or cyanosis, dentition good        Neck:  carotid pulses are full and equal bilaterally, JVP normal, no carotid bruit        Respiratory:  normal breath sounds, clear to auscultation, normal A-P diameter, normal symmetry, normal respiratory excursion, no use of accessory muscles         Cardiac: regular rhythm, normal S1/S2, no S3 or S4, apical impulse not displaced, no murmurs, gallops or rubs                pulses full and equal, no bruits auscultated                                        GI:  abdomen soft, non-tender, BS normoactive, no mass, no HSM, no bruits        Extremities and Muscular Skeletal:  no deformities, clubbing, cyanosis, erythema observed              Neurological:           Psych:           Recent Lab Results:  LIPID RESULTS:  Lab Results   Component Value Date    CHOL 175 09/18/2019    HDL 72 09/18/2019    LDL 76 09/18/2019    TRIG 135 09/18/2019    CHOLHDLRATIO 3.5 12/13/2010       LIVER ENZYME RESULTS:  Lab Results   Component Value Date    AST 13 09/18/2019    ALT 25 09/18/2019       CBC RESULTS:  Lab Results   Component Value Date    WBC 4.7 09/18/2019    RBC 4.44 09/18/2019    HGB 13.3 09/18/2019    HCT 39.2 09/18/2019    MCV 88 09/18/2019    MCH 30.0 09/18/2019    MCHC 33.9 09/18/2019    RDW 12.8 09/18/2019     09/18/2019       BMP RESULTS:  Lab Results   Component Value Date     09/18/2019    POTASSIUM 3.4 09/18/2019    CHLORIDE 107 09/18/2019    CO2 27 09/18/2019    ANIONGAP 5 09/18/2019    GLC 81 09/18/2019    BUN 9 09/18/2019    CR 0.66 09/18/2019    GFRESTIMATED >90 09/18/2019    GFRESTBLACK >90 09/18/2019    MADHURI 8.6 09/18/2019        A1C RESULTS:  Lab Results   Component Value Date    A1C 5.8 10/25/2010       INR RESULTS:  No results found for:  INR        CC  Lisbeth Gonzalez MD  [address] [place] [address]              HPI and Plan:   See dictation    Orders Placed This Encounter   Procedures     CT Coronary Calcium Scan     EKG 12-lead complete w/read - Clinics (performed today)       Orders Placed This Encounter   Medications     calcium-vitamin D (OSCAL) 250-125 MG-UNIT TABS per tablet     Sig: Take 1 tablet by mouth daily       Encounter Diagnosis   Name Primary?     Hyperlipidemia LDL goal <130 Yes       CURRENT MEDICATIONS:  Current Outpatient Medications   Medication Sig Dispense Refill     atorvastatin (LIPITOR) 10 MG tablet Take 1 tablet (10 mg) by mouth daily 90 tablet 3     azithromycin (ZITHROMAX) 250 MG tablet Leave on - pt travels  2     calcium-vitamin D (OSCAL) 250-125 MG-UNIT TABS per tablet Take 1 tablet by mouth daily       estradiol (ESTRACE) 1 MG tablet Take 1 tablet (1 mg) by mouth daily 90 tablet 3     ibuprofen (ADVIL/MOTRIN) 800 MG tablet TAKE 1 TABLET BY MOUTH EVERY 8 HOURS AS NEEDED FOR MODERATE PAIN FOR BURSITIS 90 tablet 0     loratadine (CLARITIN) 10 MG tablet Take 1 tablet (10 mg) by mouth daily 30 tablet 1     Multiple Vitamins-Minerals (MULTIVITAMIN ADULT PO) Take by mouth daily       Omega-3 Fatty Acids (OMEGA 3 PO)        zolpidem (AMBIEN) 10 MG tablet TAKE ONE-HALF TABLET BY MOUTH EVERY EVENING AS NEEDED FOR SLEEP 30 tablet 1       ALLERGIES     Allergies   Allergen Reactions     Septra [Sulfamethoxazole W/Trimethoprim]      Sulfa Drugs Hives     Murine Ear [Carbamide Peroxide] Swelling and Rash     Severe pain , swelling after debrox type ear drops - had to go to the clinic to have ears flushed out emergently.        PAST MEDICAL HISTORY:  Past Medical History:   Diagnosis Date     Anxiety     on effexor in past. Off since 2010     [facility] [place]carpometacarpal degenerative joint disease), localized primary      Edema      Fatigue, unspecified type 9/17/2018     Hormone replacement therapy      IBS  (irritable bowel syndrome)     Diarrhea predominant     Insomnia, unspecified     On Ambien     Lichen sclerosus      Pap smear of cervix not needed      Urge incontinence        PAST SURGICAL HISTORY:  Past Surgical History:   Procedure Laterality Date      SECTION       COLONOSCOPY  2012    Procedure:COLONOSCOPY; Colonoscopy ; Surgeon:SILVERIO ARRIETA; Location: GI     COLONOSCOPY N/A 2017    Procedure: COLONOSCOPY;  Surgeon: Silverio Arrieta MD;  Location:  GI     COLPOSCOPY, BIOPSY, COMBINED  1991    Mild squamous epithelial dysplasia (SABIHA 1)     FISSURECTOMY RECTUM  01     HYSTERECTOMY TOTAL ABDOMINAL  3/4/1998    Myomatous uterus; pelvic pain. Started HRT right afterward with vasomotor sx.     HYSTERECTOMY, PAP NO LONGER INDICATED N/A     had for large fibroid     LIPOSUCTION (LOCATION)         FAMILY HISTORY:  Family History   Problem Relation Age of Onset     Hypertension Mother      Hyperlipidemia Mother      Diabetes Mother      C.A.D. Father 45          age 45 massive MI      Heart Failure Father      Hyperlipidemia Father      C.A.D. Maternal Grandmother      C.A.D. Maternal Grandfather      C.A.D. Paternal Grandmother      C.A.D. Paternal Grandfather      Cancer Other         9 members on maternal side. Bone CA, Breast CA (m. aunt),       Coronary Artery Disease Brother 55        Heart attack- s/p sudden cardiac arrest survived 2019      C.A.D. Sister 57        MI age 50- CABG - 2 vessels - 4 MI's since age 55 - 3 stents placed 2017      Diabetes Sister      Breast Cancer Maternal Aunt         50s-Mom had 9 sisters. 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   Social Needs     Financial resource strain: None     Food insecurity:     Worry: None     Inability: None     Transportation needs:     Medical: None     Non-medical: None   Tobacco Use     Smoking status: Never Smoker     Smokeless tobacco: Never Used   Substance and Sexual Activity     Alcohol use: No     Alcohol/week: 0.0 standard drinks     Drug use: No     Sexual activity: Yes     Partners: Male     Birth control/protection: Other     Comment: partial hysterectomy   Lifestyle     Physical activity:     Days per week: None     Minutes per session: None     Stress: None   Relationships     Social connections:     Talks on phone: None     Gets together: None     Attends Restorationism service: None     Active member of club or organization: None     Attends meetings of clubs or organizations: None     Relationship status: None     Intimate partner violence:     Fear of current or ex partner: None     Emotionally abused: None     Physically abused: None     Forced sexual activity: None   Other Topics Concern     Parent/sibling w/ CABG, MI or angioplasty before 65F 55M? No   Social History Narrative     None       Review of Systems:  Skin:  Positive for pigmentation   Eyes:  Negative    ENT:  Negative    Respiratory:  Negative    Cardiovascular:    Positive for;chest pain;edema  Gastroenterology: Positive for heartburn  Genitourinary:  Negative    Musculoskeletal:  Positive for arthritis;joint pain  Neurologic:  Positive for migraine headaches  Psychiatric:  Negative    Heme/Lymph/Imm:  Positive for allergies  Endocrine:  Negative      Physical Exam:  Vitals: BP (!) 162/94   Pulse 71   Ht 1.626 m (5' 4\")   Wt 73.6 kg (162 lb 3.2 oz)   BMI 27.84 kg/m      Constitutional:  cooperative, alert and oriented, well developed, well nourished, in no acute distress        Skin:  warm and " dry to the touch, no apparent skin lesions or masses noted          Head:  normocephalic, no masses or lesions        Eyes:  pupils equal and round, conjunctivae and lids unremarkable, sclera white, no xanthalasma, EOMS intact, no nystagmus        Lymph:No Cervical lymphadenopathy present     ENT:  no pallor or cyanosis, dentition good        Neck:  carotid pulses are full and equal bilaterally, JVP normal, no carotid bruit        Respiratory:  normal breath sounds, clear to auscultation, normal A-P diameter, normal symmetry, normal respiratory excursion, no use of accessory muscles         Cardiac: regular rhythm, normal S1/S2, no S3 or S4, apical impulse not displaced, no murmurs, gallops or rubs                pulses full and equal, no bruits auscultated                                        GI:  abdomen soft, non-tender, BS normoactive, no mass, no HSM, no bruits        Extremities and Muscular Skeletal:  no deformities, clubbing, cyanosis, erythema observed              Neurological:           Psych:           Recent Lab Results:  LIPID RESULTS:  Lab Results   Component Value Date    CHOL 175 09/18/2019    HDL 72 09/18/2019    LDL 76 09/18/2019    TRIG 135 09/18/2019    CHOLHDLRATIO 3.5 12/13/2010       LIVER ENZYME RESULTS:  Lab Results   Component Value Date    AST 13 09/18/2019    ALT 25 09/18/2019       CBC RESULTS:  Lab Results   Component Value Date    WBC 4.7 09/18/2019    RBC 4.44 09/18/2019    HGB 13.3 09/18/2019    HCT 39.2 09/18/2019    MCV 88 09/18/2019    MCH 30.0 09/18/2019    MCHC 33.9 09/18/2019    RDW 12.8 09/18/2019     09/18/2019       BMP RESULTS:  Lab Results   Component Value Date     09/18/2019    POTASSIUM 3.4 09/18/2019    CHLORIDE 107 09/18/2019    CO2 27 09/18/2019    ANIONGAP 5 09/18/2019    GLC 81 09/18/2019    BUN 9 09/18/2019    CR 0.66 09/18/2019    GFRESTIMATED >90 09/18/2019    GFRESTBLACK >90 09/18/2019    MADHURI 8.6 09/18/2019        A1C RESULTS:  Lab Results    Component Value Date    A1C 5.8 10/25/2010       INR RESULTS:  No results found for: INR        CC  Lisbeth Gonzalez MD  [address] [place] [address]

## 2019-10-18 DIAGNOSIS — R52 GENERALIZED PAIN: ICD-10-CM

## 2019-10-18 DIAGNOSIS — M75.102 ROTATOR CUFF SYNDROME, LEFT: ICD-10-CM

## 2019-10-18 RX ORDER — IBUPROFEN 800 MG/1
TABLET, FILM COATED ORAL
Qty: 90 TABLET | Refills: 0 | Status: SHIPPED | OUTPATIENT
Start: 2019-10-18 | End: 2020-10-22

## 2019-10-18 NOTE — TELEPHONE ENCOUNTER
"Requested Prescriptions   Pending Prescriptions Disp Refills     ibuprofen (ADVIL/MOTRIN) 800 MG tablet [Pharmacy Med Name: IBUPROFEN 800MG TABLETS]            Last Written Prescription Date:  9.18.19  Last Fill Quantity: 90 tablet,  # refills: 0   Last office visit: 9/18/2019 with prescribing provider:  Lisbeth Gonzalez MD             Future Office Visit:       90 tablet 0     Sig: TAKE 1 TABLET BY MOUTH EVERY 8 HOURS AS NEEDED FOR MODERATE PAIN       NSAID Medications Passed - 10/18/2019  4:10 AM        Passed - Blood pressure under 140/90 in past 12 months     BP Readings from Last 3 Encounters:   10/17/19 132/84   09/18/19 122/71   05/23/19 124/66                 Passed - Normal ALT on file in past 12 months     Recent Labs   Lab Test 09/18/19  1005   ALT 25             Passed - Normal AST on file in past 12 months     Recent Labs   Lab Test 09/18/19  1005   AST 13             Passed - Recent (12 mo) or future (30 days) visit within the authorizing provider's specialty     Patient has had an office visit with the authorizing provider or a provider within the authorizing providers department within the previous 12 mos or has a future within next 30 days. See \"Patient Info\" tab in inbasket, or \"Choose Columns\" in Meds & Orders section of the refill encounter.              Passed - Patient is age 6-64 years        Passed - Normal CBC on file in past 12 months     Recent Labs   Lab Test 09/18/19  1005   WBC 4.7   RBC 4.44   HGB 13.3   HCT 39.2                    Passed - Medication is active on med list        Passed - No active pregnancy on record        Passed - Normal serum creatinine on file in past 12 months     Recent Labs   Lab Test 09/18/19  1005   CR 0.66             Passed - No positive pregnancy test in past 12 months        "

## 2019-10-18 NOTE — TELEPHONE ENCOUNTER
Refilled per RN Protocol.     Ethel Winston, RN  [facility]

## 2019-10-23 ENCOUNTER — TRANSFERRED RECORDS (OUTPATIENT)
Dept: HEALTH INFORMATION MANAGEMENT | Facility: CLINIC | Age: 59
End: 2019-10-23

## 2019-11-12 ENCOUNTER — HOSPITAL ENCOUNTER (OUTPATIENT)
Dept: CARDIOLOGY | Facility: CLINIC | Age: 59
Discharge: HOME | End: 2019-11-12
Attending: INTERNAL MEDICINE | Admitting: INTERNAL MEDICINE
Payer: COMMERCIAL

## 2019-11-12 DIAGNOSIS — E78.5 HYPERLIPIDEMIA LDL GOAL <130: ICD-10-CM

## 2019-11-12 PROCEDURE — 75571 CT HRT W/O DYE W/CA TEST: CPT | Mod: 26 | Performed by: INTERNAL MEDICINE

## 2019-11-12 PROCEDURE — 75571 CT HRT W/O DYE W/CA TEST: CPT

## 2019-11-13 ENCOUNTER — TELEPHONE (OUTPATIENT)
Dept: CARDIOLOGY | Facility: CLINIC | Age: 59
End: 2019-11-13

## 2019-11-13 NOTE — TELEPHONE ENCOUNTER
Called patient to discuss. Unable to reach. Left VM requesting callback.     Addendum: Patient returned call. Pleased with results. Will stop ASA. Will stay on Lipitor at least until she sees her PCP next. Will call with further questions or concerns.     BUDDY WoodardN, RN, PHN  Care Coordinator - Cardiology  [phone]

## 2019-11-13 NOTE — TELEPHONE ENCOUNTER
----- Message from Partha Roberts MD sent at 11/13/2019  9:49 AM CST -----  Pls let her know the good news.  Can stop asa, lipitor optional, my inclination would be to stop it as well.  Can see her in follow up to further discuss if she wishes.  Thanks.

## 2019-12-13 ENCOUNTER — TELEPHONE (OUTPATIENT)
Dept: NURSING | Facility: CLINIC | Age: 59
End: 2019-12-13

## 2019-12-13 ENCOUNTER — NURSE TRIAGE (OUTPATIENT)
Dept: NURSING | Facility: CLINIC | Age: 59
End: 2019-12-13

## 2019-12-13 NOTE — TELEPHONE ENCOUNTER
Call soon, well before 10 a.m. she leaves for the airport. [facility] in [place]. Please call patient back. She got pink eye from a tour of her  facilities. Exposed to pink eye, mattery eye, right eye today. Wants prescription called into [facility].  Epic encounter sent to the patient's clinic.    Chary Cantu RN-[facility]

## 2019-12-13 NOTE — TELEPHONE ENCOUNTER
Call soon, well before 10 a.m. she leaves for the airport. [facility] in [place]. Please call patient back. She got pink eye from a tour of her  facilities. Exposed to pink eye, mattery eye, right eye today. Wants prescription called into [facility].  Chary Cantu RN-[facility]

## 2019-12-13 NOTE — TELEPHONE ENCOUNTER
Pt calling back to check on prescription. Stated leaving for airport in 30 minutes and wants to swing by the pharmacy on their way. Please give Pt a call back asap. Thanks!

## 2020-01-27 ENCOUNTER — OFFICE VISIT (OUTPATIENT)
Dept: FAMILY MEDICINE | Facility: CLINIC | Age: 60
End: 2020-01-27
Payer: COMMERCIAL

## 2020-01-27 VITALS
SYSTOLIC BLOOD PRESSURE: 120 MMHG | WEIGHT: 161 LBS | BODY MASS INDEX: 27.49 KG/M2 | HEART RATE: 72 BPM | HEIGHT: 64 IN | TEMPERATURE: 97.7 F | OXYGEN SATURATION: 99 % | DIASTOLIC BLOOD PRESSURE: 88 MMHG

## 2020-01-27 DIAGNOSIS — Z82.49 FAMILY HISTORY OF ISCHEMIC HEART DISEASE: ICD-10-CM

## 2020-01-27 DIAGNOSIS — E78.5 HYPERLIPIDEMIA LDL GOAL <130: ICD-10-CM

## 2020-01-27 DIAGNOSIS — H93.8X1 EAR PRESSURE, RIGHT: Primary | ICD-10-CM

## 2020-01-27 PROCEDURE — 99214 OFFICE O/P EST MOD 30 MIN: CPT | Performed by: FAMILY MEDICINE

## 2020-01-27 ASSESSMENT — MIFFLIN-ST. JEOR: SCORE: 1290.29

## 2020-01-27 NOTE — PATIENT INSTRUCTIONS
Call if your ear sensations worsen.  Ok to do a nurse only visit to flush out ears,if needed.      Ok for mental health counseling referral - consider fluoxetine 10mg at night.      Please, call or return to clinic or go to the ER immediately if signs or symptoms worsen or fail to improve as anticipated.     Thank you so much or choosing [facility]  for your Health Care. It was a pleasure seeing you at your visit today! Please contact us with any questions or concerns you may have.                   Lisbeth Gonzalez MD                              To reach your [facility] care team after hours call:   [phone]    Our clinic hours are:     Monday- 7:30 am - 7:00 pm                             Tuesday through Friday- 7:30 am - 5:00 pm                                        Saturday- 8:00 am - 12:00 pm                  Phone:  [phone]    Our pharmacy hours are:     Monday  8:00 am to 7:00 pm      Tuesday through Friday 8:00am to 6:00pm                        Saturday - 9:00 am to 1:00 pm      Sunday : Closed.              Phone:  [phone]      There is also information available at our web site:  [email or web address]    If your provider ordered any lab tests and you do not receive the results within 10 business days, please call the clinic.    If you need a medication refill please contact your pharmacy.  Please allow 2 business days for your refill to be completed.    Our clinic offers telephone visits and e visits.  Please ask one of your team members to explain more.      Use [email or web address] (secure email communication and access to your chart) to send your primary care provider a message or make an appointment. Ask someone on your Team how to sign up for Font.

## 2020-01-27 NOTE — PROGRESS NOTES
"  SUBJECTIVE:                                                    Whitney Aceves is a 59 year old female who presents to clinic today for the following health issues:    Acute Illness   Acute illness concerns:   Onset: ***    Fever: { :587928::\"no\"}    Chills/Sweats: { :403535::\"no\"}    Headache (location?): { :789975::\"no\"}    Sinus Pressure:{.:912200::\"no\"}    Conjunctivitis:  {.:407361::\"no\"}    Ear Pain: {.:399458::\"no\"}    Rhinorrhea: { :434428::\"no\"}    Congestion: { :643630::\"no\"}    Sore Throat: { :259665::\"no\"}     Cough: {.:552901::\"no\"}    Wheeze: { :350764::\"no\"}    Decreased Appetite: { :502883::\"no\"}    Nausea: { :459242::\"no\"}    Vomiting: { :516097::\"no\"}    Diarrhea:  { :272454::\"no\"}    Dysuria/Freq.: { :036160::\"no\"}    Fatigue/Achiness: { :363481::\"no\"}    Sick/Strep Exposure: { :052226::\"no\"}     Therapies Tried and outcome: ***      Problem list and histories reviewed & adjusted, as indicated.  Additional history: as documented    Reviewed and updated as needed this visit by clinical staff       Reviewed and updated as needed this visit by Provider         ROS:   ROS: 12 point ROS neg other than the symptoms noted above    OBJECTIVE:                                                    There were no vitals taken for this visit.  There is no height or weight on file to calculate BMI.   {EXAM - NORMAL- condensed - partially selected:960087::\"GENERAL: healthy, alert, well nourished, well hydrated, no distress\",\"HENT: ear canals- normal; TMs- normal; Nose- normal; Mouth- no ulcers, no lesions\",\"NECK: no tenderness, no adenopathy, no asymmetry, no masses, no stiffness; thyroid- normal to palpation\",\"RESP: lungs clear to auscultation - no rales, no rhonchi, no wheezes\",\"CV: regular rates and rhythm, normal S1 S2, no S3 or S4 and no murmur, no click or rub -\",\"ABDOMEN: soft, no tenderness, no  hepatosplenomegaly, no masses, normal bowel sounds\"}    {Diagnostic Test Results:657144}     ASSESSMENT/PLAN:      "                                               No diagnosis found.    {FOLLOW UP CLINIC OPTIONS:346022}         Lisbeth Gonzalez MD    [facility]

## 2020-01-27 NOTE — PROGRESS NOTES
"  SUBJECTIVE:                                                    Whitney Aceves is a 59 year old female who presents to clinic today for the following health issues:    Concern - requesting ear wax removal   Onset: 3wks ago  Therapies Tried and outcome: none    Pt states it feels like she is \"underwater.\" Has not tried any treatment yet. In past she tried murine wax removing system and started having immense ear pain. She ended up having wax removed in clinic. Per pt she was told to not use anything in her ears after this. She also states it is difficult to flush ears herself. Denies pain.     Hyperlipidemia  Pt also wanted to discuss discontinuing atorvastatin today. Pt saw Dr. Roberts on 10/17/2019 and her CT calcium score showed no plaque in arteries. Dr. Roberts wanted to leave decision of stopping statin to pt's PCP. She continues to take her statin.    Recent Labs   Lab Test 09/18/19  1005 09/17/18  0929   CHOL 175 141   HDL 72 63   LDL 76 56   TRIG 135 112     Increased stress  Pt states she is \"falling apart\" mentally. Her mother recently passed away from stroke in 10/2019. She states it was \"hell\" for 11 days before she passed. She also relates her younger brother suffered a heart attack around the time her mother had a stroke. Her son, Tj, was diagnosed with psoriatic arthritis in spine 1 month ago after coming back from [facility]. She plans to fly out to see her son in 2 weeks.     PHQ-9 SCORE 8/28/2017 3/8/2018 9/17/2018   PHQ-9 Total Score - - -   PHQ-9 Total Score 2 2 8     JOSE ANTONIO-7 SCORE 8/28/2017 3/8/2018 9/17/2018   Total Score - - -   Total Score 1 1 3     Constipation  Notes her bowels have not been regular. Finds after she flies, her bowels start becoming irregular - has to take dulcolax to get into a pattern again. Believes this is stress related. Purchased fiber tablets 2 weeks ago and is taking this twice daily. Drinks lots of water - avoids pop and coffee. Her exercising habits has changed.  Denies " diet changes.     Problem list and histories reviewed & adjusted, as indicated.  Additional history: as documented    Patient Active Problem List   Diagnosis     Panic disorder without agoraphobia     Anxiety     Overactive bladder     Hyperlipidemia LDL goal <130     Advanced directives, counseling/discussion     Gastroesophageal reflux disease without esophagitis     Symptomatic menopausal or female climacteric states     Fibrocystic breast changes of both breasts     Primary osteoarthritis of both hands     Family history of early CAD     Insomnia, unspecified type-Controlled substance agreement signed - 9/17/2018 ok for #30 -10mg ambien (takes 1/2 tab) with 1 refill every 6 months or so      Expressive aphasia     Urinary urgency     Fatigue, unspecified type     Night sweats     Controlled substance agreement signed - 9/17/2018 ok for #30 -10mg ambien (takes 1/2 tab) with 1 refill every 6 months or so      Acute recurrent maxillary sinusitis       Current Outpatient Medications   Medication Sig Dispense Refill     atorvastatin (LIPITOR) 10 MG tablet Take 1 tablet (10 mg) by mouth daily 90 tablet 3     azithromycin (ZITHROMAX) 250 MG tablet Leave on - pt travels  2     calcium-vitamin D (OSCAL) 250-125 MG-UNIT TABS per tablet Take 1 tablet by mouth daily       estradiol (ESTRACE) 1 MG tablet Take 1 tablet (1 mg) by mouth daily 90 tablet 3     ibuprofen (ADVIL/MOTRIN) 800 MG tablet TAKE 1 TABLET BY MOUTH EVERY 8 HOURS AS NEEDED FOR MODERATE PAIN 90 tablet 0     loratadine (CLARITIN) 10 MG tablet Take 1 tablet (10 mg) by mouth daily 30 tablet 1     Multiple Vitamins-Minerals (MULTIVITAMIN ADULT PO) Take by mouth daily       Omega-3 Fatty Acids (OMEGA 3 PO)        zolpidem (AMBIEN) 10 MG tablet TAKE ONE-HALF TABLET BY MOUTH EVERY EVENING AS NEEDED FOR SLEEP 30 tablet 1     Allergies   Allergen Reactions     Septra [Sulfamethoxazole W/Trimethoprim]      Sulfa Drugs Hives     Murine Ear [Carbamide Peroxide] Swelling  "and Rash     Severe pain , swelling after debrox type ear drops - had to go to the clinic to have ears flushed out emergently.      Reviewed and updated as needed this visit by clinical staff       Reviewed and updated as needed this visit by Provider         ROS:   ROS: 12 point ROS neg other than the symptoms noted above    This document serves as a record of the services and decisions personally performed and made by Lisbeth Gonzalez MD. It was created on her behalf by Kasandra Qiu, a trained medical scribe. The creation of this document is based on the provider's statements to the medical scribe.  Kasandra Qiu 11:07 AM 2020    OBJECTIVE:                                                    /88   Pulse 72   Temp 97.7  F (36.5  C)   Ht 1.626 m (5' 4\")   Wt 73 kg (161 lb)   SpO2 99%   BMI 27.64 kg/m    Body mass index is 27.64 kg/m .   GENERAL: healthy, alert, well nourished, well hydrated, no distress  HENT: ear canals- normal; TMs- normal; Nose- normal; Mouth- no ulcers, no lesions  NECK: no tenderness, no adenopathy, no asymmetry, no masses, no stiffness; thyroid- normal to palpation  RESP: lungs clear to auscultation - no rales, no rhonchi, no wheezes  CV: regular rates and rhythm, normal S1 S2, no S3 or S4 and no murmur, no click or rub -    Diagnostic test results:  Diagnostic Test Results:  Labs reviewed in Epic  none      ASSESSMENT/PLAN:                                                        ICD-10-CM    1. Ear pressure, right H93.8X1    2. Hyperlipidemia LDL goal <130- on atorvastatin  E78.5    3. Family history of ischemic heart disease- dad  of MI at age 45; sister with 4 MI's, first age 50; brother with first MI at 55 - had prolonged cardiac arrest= survived  Z82.49      Continued atorvastatin given her family history of CVA and MI.     Discussed possibility of counseling and/or medication to deal with current stressors - she declined for both today. Pt will let us know if she changes " her mind. Consider fluoxetine 10mg at night.     On exam bilateral ears were normal, with only slight ring of wax in right ear. Try taking Claritin when flying. Pt will call if ear sensation worsens. Okay for nurse only visit to flush out ears if needed.     Continue with daily fiber therapy and stay well hydrated.     See patient instructions. Please, call or return to clinic or go to the ER immediately if signs or symptoms worsen or fail to improve as anticipated.     The information in this document, created by the medical scribe for me, accurately reflects the services I personally performed and the decisions made by me. I have reviewed and approved this document for accuracy prior to leaving the patient care area.  January 27, 2020 11:34 AM         Lisbeth Gonzalez MD    [facility]

## 2020-03-21 DIAGNOSIS — G47.00 INSOMNIA, UNSPECIFIED TYPE: ICD-10-CM

## 2020-03-21 RX ORDER — ZOLPIDEM TARTRATE 10 MG/1
TABLET ORAL
Qty: 30 TABLET | Refills: 1 | Status: SHIPPED | OUTPATIENT
Start: 2020-03-21 | End: 2020-10-22

## 2020-09-01 DIAGNOSIS — E78.5 HYPERLIPIDEMIA LDL GOAL <100: ICD-10-CM

## 2020-09-01 DIAGNOSIS — Z82.49 FAMILY HISTORY OF EARLY CAD: ICD-10-CM

## 2020-09-01 RX ORDER — ATORVASTATIN CALCIUM 10 MG/1
TABLET, FILM COATED ORAL
Qty: 90 TABLET | Refills: 0 | Status: SHIPPED | OUTPATIENT
Start: 2020-09-01 | End: 2020-10-22

## 2020-09-01 NOTE — TELEPHONE ENCOUNTER
Filled per FM protocol. Patient has appointment scheduled with provider on 10/22/2020.    BUDDY HoangN, RN  [employer] [facility]

## 2020-10-01 DIAGNOSIS — N95.1 SYMPTOMATIC MENOPAUSAL OR FEMALE CLIMACTERIC STATES: ICD-10-CM

## 2020-10-01 DIAGNOSIS — R61 NIGHT SWEATS: ICD-10-CM

## 2020-10-01 RX ORDER — ESTRADIOL 1 MG/1
TABLET ORAL
Qty: 90 TABLET | Refills: 0 | Status: SHIPPED | OUTPATIENT
Start: 2020-10-01 | End: 2020-10-22

## 2020-10-05 ENCOUNTER — HOSPITAL ENCOUNTER (OUTPATIENT)
Dept: CT IMAGING | Facility: CLINIC | Age: 60
End: 2020-10-05
Attending: PHYSICIAN ASSISTANT
Payer: COMMERCIAL

## 2020-10-05 ENCOUNTER — OFFICE VISIT (OUTPATIENT)
Dept: FAMILY MEDICINE | Facility: CLINIC | Age: 60
End: 2020-10-05
Payer: COMMERCIAL

## 2020-10-05 ENCOUNTER — OFFICE VISIT (OUTPATIENT)
Dept: PEDIATRICS | Facility: CLINIC | Age: 60
End: 2020-10-05
Attending: NURSE PRACTITIONER
Payer: COMMERCIAL

## 2020-10-05 ENCOUNTER — HOSPITAL ENCOUNTER (OUTPATIENT)
Dept: ULTRASOUND IMAGING | Facility: CLINIC | Age: 60
End: 2020-10-05
Attending: PHYSICIAN ASSISTANT
Payer: COMMERCIAL

## 2020-10-05 VITALS
DIASTOLIC BLOOD PRESSURE: 91 MMHG | TEMPERATURE: 97.3 F | HEIGHT: 64 IN | OXYGEN SATURATION: 98 % | BODY MASS INDEX: 28.17 KG/M2 | SYSTOLIC BLOOD PRESSURE: 146 MMHG | HEART RATE: 66 BPM | WEIGHT: 165 LBS

## 2020-10-05 VITALS
TEMPERATURE: 97.7 F | SYSTOLIC BLOOD PRESSURE: 130 MMHG | HEIGHT: 64 IN | WEIGHT: 161 LBS | DIASTOLIC BLOOD PRESSURE: 86 MMHG | HEART RATE: 60 BPM | OXYGEN SATURATION: 100 % | BODY MASS INDEX: 27.49 KG/M2

## 2020-10-05 DIAGNOSIS — R10.11 RUQ ABDOMINAL PAIN: ICD-10-CM

## 2020-10-05 DIAGNOSIS — R10.11 RUQ ABDOMINAL PAIN: Primary | ICD-10-CM

## 2020-10-05 LAB
ALBUMIN SERPL-MCNC: 3.5 G/DL (ref 3.4–5)
ALBUMIN UR-MCNC: NEGATIVE MG/DL
ALP SERPL-CCNC: 101 U/L (ref 40–150)
ALT SERPL W P-5'-P-CCNC: 26 U/L (ref 0–50)
ANION GAP SERPL CALCULATED.3IONS-SCNC: 5 MMOL/L (ref 3–14)
APPEARANCE UR: CLEAR
AST SERPL W P-5'-P-CCNC: 16 U/L (ref 0–45)
BASOPHILS # BLD AUTO: 0 10E9/L (ref 0–0.2)
BASOPHILS NFR BLD AUTO: 0.7 %
BILIRUB SERPL-MCNC: 1 MG/DL (ref 0.2–1.3)
BILIRUB UR QL STRIP: NEGATIVE
BUN SERPL-MCNC: 10 MG/DL (ref 7–30)
CALCIUM SERPL-MCNC: 8.7 MG/DL (ref 8.5–10.1)
CHLORIDE SERPL-SCNC: 108 MMOL/L (ref 94–109)
CO2 SERPL-SCNC: 27 MMOL/L (ref 20–32)
COLOR UR AUTO: YELLOW
CREAT SERPL-MCNC: 0.64 MG/DL (ref 0.52–1.04)
DIFFERENTIAL METHOD BLD: NORMAL
EOSINOPHIL # BLD AUTO: 0.3 10E9/L (ref 0–0.7)
EOSINOPHIL NFR BLD AUTO: 7 %
ERYTHROCYTE [DISTWIDTH] IN BLOOD BY AUTOMATED COUNT: 13 % (ref 10–15)
ERYTHROCYTE [SEDIMENTATION RATE] IN BLOOD BY WESTERGREN METHOD: 9 MM/H (ref 0–30)
GFR SERPL CREATININE-BSD FRML MDRD: >90 ML/MIN/{1.73_M2}
GLUCOSE SERPL-MCNC: 83 MG/DL (ref 70–99)
GLUCOSE UR STRIP-MCNC: NEGATIVE MG/DL
HCT VFR BLD AUTO: 41.8 % (ref 35–47)
HGB BLD-MCNC: 13.3 G/DL (ref 11.7–15.7)
HGB UR QL STRIP: ABNORMAL
IMM GRANULOCYTES # BLD: 0 10E9/L (ref 0–0.4)
IMM GRANULOCYTES NFR BLD: 0.5 %
KETONES UR STRIP-MCNC: NEGATIVE MG/DL
LEUKOCYTE ESTERASE UR QL STRIP: NEGATIVE
LIPASE SERPL-CCNC: 106 U/L (ref 73–393)
LYMPHOCYTES # BLD AUTO: 1.6 10E9/L (ref 0.8–5.3)
LYMPHOCYTES NFR BLD AUTO: 38.3 %
MCH RBC QN AUTO: 29.6 PG (ref 26.5–33)
MCHC RBC AUTO-ENTMCNC: 31.8 G/DL (ref 31.5–36.5)
MCV RBC AUTO: 93 FL (ref 78–100)
MONOCYTES # BLD AUTO: 0.3 10E9/L (ref 0–1.3)
MONOCYTES NFR BLD AUTO: 5.8 %
NEUTROPHILS # BLD AUTO: 2 10E9/L (ref 1.6–8.3)
NEUTROPHILS NFR BLD AUTO: 47.7 %
NITRATE UR QL: NEGATIVE
NRBC # BLD AUTO: 0 10*3/UL
NRBC BLD AUTO-RTO: 0 /100
PH UR STRIP: 6.5 PH (ref 5–7)
PLATELET # BLD AUTO: 222 10E9/L (ref 150–450)
POTASSIUM SERPL-SCNC: 3.5 MMOL/L (ref 3.4–5.3)
PROT SERPL-MCNC: 7.3 G/DL (ref 6.8–8.8)
RBC # BLD AUTO: 4.49 10E12/L (ref 3.8–5.2)
RBC #/AREA URNS AUTO: NORMAL /HPF
SODIUM SERPL-SCNC: 140 MMOL/L (ref 133–144)
SOURCE: ABNORMAL
SP GR UR STRIP: 1.01 (ref 1–1.03)
UROBILINOGEN UR STRIP-ACNC: 0.2 EU/DL (ref 0.2–1)
WBC # BLD AUTO: 4.3 10E9/L (ref 4–11)
WBC #/AREA URNS AUTO: NORMAL /HPF

## 2020-10-05 PROCEDURE — 250N000009 HC RX 250: Performed by: PHYSICIAN ASSISTANT

## 2020-10-05 PROCEDURE — 99207 PR FIRST ORDER ACUTE REFERRAL: CPT | Performed by: NURSE PRACTITIONER

## 2020-10-05 PROCEDURE — 76700 US EXAM ABDOM COMPLETE: CPT

## 2020-10-05 PROCEDURE — 85652 RBC SED RATE AUTOMATED: CPT | Performed by: PHYSICIAN ASSISTANT

## 2020-10-05 PROCEDURE — 85025 COMPLETE CBC W/AUTO DIFF WBC: CPT | Performed by: PHYSICIAN ASSISTANT

## 2020-10-05 PROCEDURE — 99215 OFFICE O/P EST HI 40 MIN: CPT | Performed by: PHYSICIAN ASSISTANT

## 2020-10-05 PROCEDURE — 83690 ASSAY OF LIPASE: CPT | Performed by: PHYSICIAN ASSISTANT

## 2020-10-05 PROCEDURE — 74177 CT ABD & PELVIS W/CONTRAST: CPT

## 2020-10-05 PROCEDURE — 80053 COMPREHEN METABOLIC PANEL: CPT | Performed by: PHYSICIAN ASSISTANT

## 2020-10-05 PROCEDURE — 250N000011 HC RX IP 250 OP 636: Performed by: PHYSICIAN ASSISTANT

## 2020-10-05 PROCEDURE — 81001 URINALYSIS AUTO W/SCOPE: CPT | Performed by: NURSE PRACTITIONER

## 2020-10-05 PROCEDURE — 36415 COLL VENOUS BLD VENIPUNCTURE: CPT | Performed by: PHYSICIAN ASSISTANT

## 2020-10-05 RX ORDER — IOPAMIDOL 755 MG/ML
500 INJECTION, SOLUTION INTRAVASCULAR ONCE
Status: COMPLETED | OUTPATIENT
Start: 2020-10-05 | End: 2020-10-05

## 2020-10-05 RX ADMIN — SODIUM CHLORIDE 60 ML: 9 INJECTION, SOLUTION INTRAVENOUS at 13:51

## 2020-10-05 RX ADMIN — IOPAMIDOL 81 ML: 755 INJECTION, SOLUTION INTRAVENOUS at 13:51

## 2020-10-05 ASSESSMENT — MIFFLIN-ST. JEOR
SCORE: 1290.29
SCORE: 1308.44

## 2020-10-05 NOTE — PROGRESS NOTES
SUBJECTIVE:   Whitney Aceves is a 59 year old female who presents to clinic today for the following health issues:      Concern - RUQ pain  Onset: x2 weeks  Description: Right side pain -under rib cage  Intensity: 7/10- at its worst   Progression of Symptoms:  same and constant-dull pain  Accompanying Signs & Symptoms: sharp pains comes and goes, right thigh started to pulsating 1x -woke from sleep  Previous history of similar problem: nothing  Precipitating factors:        Worsened by: bending over and going up stairs  - sharp pains  Alleviating factors:        Improved by: none  Therapies tried and outcome:  none      Pain is worse with bending-denies nausea vomiting fever bowel or bladder changes.  Denies correlation with eating.  Dull then can change to sharp like a knife resolves on its own random intermittent worsening this weekend.Denies back or shoulder blade pain. Wants addressed with labs and imaging today if possible     Still has gallbladder.  Denies nausea or vomiting.  Does have a very mild history of GERD.  Only takes Tums or Pepcid as needed.  Has not needed this as of late.  Denies any rashes or skin lesions.  Denies any trauma or falls.        Problem list and histories reviewed & adjusted, as indicated.  Additional history: as documented    Patient Active Problem List   Diagnosis     Panic disorder without agoraphobia     Anxiety     Overactive bladder     Hyperlipidemia LDL goal <130- on atorvastatin      Advanced directives, counseling/discussion     Gastroesophageal reflux disease without esophagitis     Symptomatic menopausal or female climacteric states     Fibrocystic breast changes of both breasts     Primary osteoarthritis of both hands     Family history of early CAD     Insomnia, unspecified type-Controlled substance agreement signed - 9/17/2018 ok for #30 -10mg ambien (takes 1/2 tab) with 1 refill every 6 months or so      Expressive aphasia     Urinary urgency     Fatigue, unspecified  type     Night sweats     Controlled substance agreement signed - 2018 ok for #30 -10mg ambien (takes 1/2 tab) with 1 refill every 6 months or so      Acute recurrent maxillary sinusitis     Past Surgical History:   Procedure Laterality Date      SECTION       COLONOSCOPY  2012    Procedure:COLONOSCOPY; Colonoscopy ; Surgeon:SILVERIO ARRIETA; Location: GI     COLONOSCOPY N/A 2017    Procedure: COLONOSCOPY;  Surgeon: Silverio Arrieta MD;  Location:  GI     COLPOSCOPY, BIOPSY, COMBINED  1991    Mild squamous epithelial dysplasia (SABIHA 1)     FISSURECTOMY RECTUM  01     HYSTERECTOMY TOTAL ABDOMINAL  3/4/1998    Myomatous uterus; pelvic pain. Started HRT right afterward with vasomotor sx.     HYSTERECTOMY, PAP NO LONGER INDICATED N/A     had for large fibroid     LIPOSUCTION (LOCATION)         Social History     Tobacco Use     Smoking status: Never Smoker     Smokeless tobacco: Never Used   Substance Use Topics     Alcohol use: No     Alcohol/week: 0.0 standard drinks     Family History   Problem Relation Age of Onset     Hypertension Mother      Hyperlipidemia Mother      Diabetes Mother      Cerebrovascular Disease Mother 89        right hemispheric stroke -  11 days after stroke - 10/2019      C.A.D. Father 45          age 45 massive MI      Heart Failure Father      Hyperlipidemia Father      C.A.D. Maternal Grandmother      C.A.D. Maternal Grandfather      C.A.D. Paternal Grandmother      C.A.D. Paternal Grandfather      Cancer Other         9 members on maternal side. Bone CA, Breast CA (m. aunt),       Coronary Artery Disease Brother 55        Heart attack- s/p sudden cardiac arrest survived 2019      C.A.D. Sister 57        MI age 50- CABG - 2 vessels - 4 MI's since age 55 - 3 stents placed 2017      Diabetes Sister      Breast Cancer Maternal Aunt         50s-Mom had 9 sisters. MAunt->50's     Hypertension Sister      Hyperlipidemia Sister       "Prostate Cancer Brother 37     Psoriasis Son 29        after returning from the service - lives in [place]     Psoriatic Arthritis Son 29        on Enbrel          Current Outpatient Medications   Medication Sig Dispense Refill     omeprazole (PRILOSEC) 20 MG DR capsule Take 1 capsule (20 mg) by mouth 2 times daily for 14 days 28 capsule 0     atorvastatin (LIPITOR) 10 MG tablet TAKE 1 TABLET(10 MG) BY MOUTH DAILY 90 tablet 0     azithromycin (ZITHROMAX) 250 MG tablet Leave on - pt travels  2     calcium-vitamin D (OSCAL) 250-125 MG-UNIT TABS per tablet Take 1 tablet by mouth daily       estradiol (ESTRACE) 1 MG tablet TAKE 1 TABLET(1 MG) BY MOUTH DAILY 90 tablet 0     ibuprofen (ADVIL/MOTRIN) 800 MG tablet TAKE 1 TABLET BY MOUTH EVERY 8 HOURS AS NEEDED FOR MODERATE PAIN 90 tablet 0     Multiple Vitamins-Minerals (MULTIVITAMIN ADULT PO) Take by mouth daily       Omega-3 Fatty Acids (OMEGA 3 PO)        zolpidem (AMBIEN) 10 MG tablet TAKE 1/2 TABLET BY MOUTH EVERY NIGHT AT BEDTIME AS NEEDED FOR SLEEP 30 tablet 1     Allergies   Allergen Reactions     Septra [Sulfamethoxazole W/Trimethoprim]      Sulfa Drugs Hives     Murine Ear [Carbamide Peroxide] Swelling and Rash     Severe pain , swelling after debrox type ear drops - had to go to the clinic to have ears flushed out emergently.        Reviewed and updated as needed this visit by clinical staff  Tobacco  Allergies  Meds  Problems  Med Hx  Surg Hx  Fam Hx  Soc Hx        Reviewed and updated as needed this visit by Provider  Tobacco  Allergies  Meds  Problems  Med Hx  Surg Hx  Fam Hx          ROS:  Constitutional, HEENT, cardiovascular, pulmonary, GI, , musculoskeletal, neuro, skin, endocrine and psych systems are negative, except as otherwise noted.    OBJECTIVE:   /86 (BP Location: Left arm, Cuff Size: Adult Regular)   Pulse 60   Temp 97.7  F (36.5  C)   Ht 1.626 m (5' 4\")   Wt 73 kg (161 lb)   SpO2 100%   BMI 27.64 kg/m   Body mass index is " 27.64 kg/m .      GENERAL: healthy, alert and no distress  EYES: Eyes grossly normal to inspection  NECK: no adenopathy  RESP: lungs clear to auscultation - no rales, rhonchi or wheezes  CV: regular rate and rhythm, normal S1 S2, no S3 or S4, no murmur, click or rub, no peripheral edema and peripheral pulses strong  Abdomen: Tenderness to right upper quadrant and epigastrium.  Tenderness along right anterior lower rib.  Normal bowel sounds.  No hepatosplenomegaly.  MS: no gross musculoskeletal defects noted, no edema  SKIN: no suspicious lesions or rashes  NEURO: Normal strength and tone, mentation intact and speech normal  PSYCH: mentation appears normal, affect normal/bright    Diagnostic Test Results:  Results for orders placed or performed in visit on 10/05/20 (from the past 24 hour(s))   Comprehensive metabolic panel (BMP + Alb, Alk Phos, ALT, AST, Total. Bili, TP)   Result Value Ref Range    Sodium 140 133 - 144 mmol/L    Potassium 3.5 3.4 - 5.3 mmol/L    Chloride 108 94 - 109 mmol/L    Carbon Dioxide 27 20 - 32 mmol/L    Anion Gap 5 3 - 14 mmol/L    Glucose 83 70 - 99 mg/dL    Urea Nitrogen 10 7 - 30 mg/dL    Creatinine 0.64 0.52 - 1.04 mg/dL    GFR Estimate >90 >60 mL/min/[1.73_m2]    GFR Estimate If Black >90 >60 mL/min/[1.73_m2]    Calcium 8.7 8.5 - 10.1 mg/dL    Bilirubin Total 1.0 0.2 - 1.3 mg/dL    Albumin 3.5 3.4 - 5.0 g/dL    Protein Total 7.3 6.8 - 8.8 g/dL    Alkaline Phosphatase 101 40 - 150 U/L    ALT 26 0 - 50 U/L    AST 16 0 - 45 U/L   Lipase   Result Value Ref Range    Lipase 106 73 - 393 U/L   CBC with platelets and differential   Result Value Ref Range    WBC 4.3 4.0 - 11.0 10e9/L    RBC Count 4.49 3.8 - 5.2 10e12/L    Hemoglobin 13.3 11.7 - 15.7 g/dL    Hematocrit 41.8 35.0 - 47.0 %    MCV 93 78 - 100 fl    MCH 29.6 26.5 - 33.0 pg    MCHC 31.8 31.5 - 36.5 g/dL    RDW 13.0 10.0 - 15.0 %    Platelet Count 222 150 - 450 10e9/L    Diff Method Automated Method     % Neutrophils 47.7 %    %  Lymphocytes 38.3 %    % Monocytes 5.8 %    % Eosinophils 7.0 %    % Basophils 0.7 %    % Immature Granulocytes 0.5 %    Nucleated RBCs 0 0 /100    Absolute Neutrophil 2.0 1.6 - 8.3 10e9/L    Absolute Lymphocytes 1.6 0.8 - 5.3 10e9/L    Absolute Monocytes 0.3 0.0 - 1.3 10e9/L    Absolute Eosinophils 0.3 0.0 - 0.7 10e9/L    Absolute Basophils 0.0 0.0 - 0.2 10e9/L    Abs Immature Granulocytes 0.0 0 - 0.4 10e9/L    Absolute Nucleated RBC 0.0    ESR: Erythrocyte sedimentation rate   Result Value Ref Range    Sed Rate 9 0 - 30 mm/h     Recent Results (from the past 744 hour(s))   US Abdomen Complete    Narrative    ULTRASOUND ABDOMEN COMPLETE October 5, 2020 at 1214 hours    HISTORY: 59-year-old woman with right upper quadrant pain.    COMPARISON: None.    FINDINGS: The visualized pancreas is unremarkable. The proximal  abdominal aorta is 2.5 cm, mid abdominal aorta 2.6 cm, and distal  abdominal aorta 1.8 cm. The visible IVC is patent and unremarkable.  The liver appears normal measuring 13.2 cm. No focal liver lesion. The  right kidney is 11.8 x 4.8 cm without hydronephrosis. Common bile duct  is 4.7 mm. Gallbladder wall thickness is 2.5 mm. No cholelithiasis or  pericholecystic fluid. Negative sonographic Stacy's sign. The left  kidney is 10.5 x 5.4 cm with AP cortical thickness of 1.2 cm. The  spleen is 10.3 cm.      Impression    IMPRESSION: Normal exam. Source of pain not identified.    CIPRIANO SOL MD   CT Abdomen Pelvis w Contrast    Narrative    CT ABDOMEN/PELVIS WITH CONTRAST October 5, 2020 2:05 PM    HISTORY: Right upper quadrant pain. Negative ultrasound exam.    TECHNIQUE: Helical axial scans from dome of liver through pubic  symphysis with 81mL Isovue-370 IV contrast. Radiation dose for this  scan was reduced using automated exposure control, adjustment of the  mA and/or kV according to patient size, or iterative reconstruction  technique.    COMPARISON: None.    FINDINGS: There are a few small benign  cysts in the right lobe of the  liver. The liver is otherwise unremarkable. The spleen, pancreas,  bilateral adrenal glands and kidneys bilaterally are unremarkable.  Mild vascular calcifications are seen. The bowel and mesentery in the  upper abdomen appear normal.    Scans through the pelvis show no acute abnormality or free fluid.  There is a retrocecal appendix with no evidence for appendicitis.      Impression    IMPRESSION:  1. No acute abnormality in the abdomen or pelvis.  2. Mild vascular calcifications. Few small benign liver cysts.       ASSESSMENT/PLAN:   Whitney was seen today for abdominal pain.    Diagnoses and all orders for this visit:    RUQ abdominal pain  CT and laboratory studies unremarkable.  Ultrasound unremarkable.  Suspect gastritis.  Will start patient on omeprazole 20 mg twice daily x14 days and have her follow-up with her PCP on 10/22/2020 as already scheduled.  Patient voiced understanding and agreement.  If symptoms worsen or do not improve prior to 10/22/2020 she is to contact her PCPs office.  -     Referral to Acute and Diagnostic Services (Day of diagnostic / First order acute)  -     Comprehensive metabolic panel (BMP + Alb, Alk Phos, ALT, AST, Total. Bili, TP)  -     Lipase  -     CBC with platelets and differential  -     ESR: Erythrocyte sedimentation rate  -     US Abdomen Complete; Future  -     Cancel: CT Abdomen Pelvis w/o & w Contrast; Future  -     sodium chloride (PF) 0.9% PF flush 3 mL  -     omeprazole (PRILOSEC) 20 MG DR capsule; Take 1 capsule (20 mg) by mouth 2 times daily for 14 days  -     IV access        Patient Instructions     Patient Education     Abdominal Pain    Abdominal pain is pain in the stomach or belly area. Everyone has this pain from time to time. In many cases it goes away on its own. But abdominal pain can sometimes be due to a serious problem, such as appendicitis. So it s important to know when to get help.  Causes of abdominal pain  There are  many possible causes of abdominal pain. Common causes in adults include:    Constipation, diarrhea, or gas    Stomach acid flowing back up into the esophagus (acid reflux or heartburn)    Severe acid reflux, called GERD (gastroesophageal reflux disease)    A sore in the lining of the stomach or small intestine (peptic ulcer)    Inflammation of the gallbladder, liver, or pancreas    Gallstones or kidney stones    Appendicitis     Intestinal blockage     An internal organ pushing through a muscle or other tissue (hernia)    Urinary tract infections    In women, menstrual cramps, fibroids, ovarian cysts, pelvic inflammatory disease, or endometriosis    Inflammation or infection of the intestines, including Crohn's disease and ulcerative colitis    Irritable bowel syndrome  Diagnosing the cause of abdominal pain  Your healthcare provider will give you a physical exam help find the cause of your pain. If needed, you will have tests. Belly pain has many possible causes. So it can be hard to find the reason for your pain. Giving details about your pain can help. Tell your provider where and when you feel the pain, and what makes it better or worse. Also let your provider know if you have other symptoms such as:    Fever    Tiredness    Upset stomach (nausea)    Vomiting    Changes in bathroom habits    Blood in the stool or black, tarry stool    Weight loss that you can't explain (involuntary weight loss?)  Also report any family history of stomach or intestinal problems, or cancers. Tell your provider about all your alcohol use and drug use. Tell your provider about all medicines you use, including herbs, vitamins, and supplements.   Treating abdominal pain  Some causes of pain need emergency medical treatment right away. These include appendicitis or a bowel blockage. Other problems can be treated with rest, fluids, or medicines. Your healthcare provider can give you specific instructions for treatment or self-care based  on what is causing your pain.     If you have vomiting or diarrhea, sip water or other clear fluids. When you are ready to eat solid foods again, start with small amounts of easy-to-digest, low-fat foods. These include apple sauce, toast, or crackers.  When to get medical care  Call 911 or go to the hospital right away if you:    Can t pass stool and are vomiting    Are vomiting blood or have bloody diarrhea or black, tarry diarrhea    Have chest, neck, or shoulder pain    Feel like you might pass out    Have pain in your shoulder blades with nausea    Have sudden, severe belly pain    Have new, severe pain unlike any you have felt before    Have a belly that is rigid, hard, and hurts to touch  Call your healthcare provider if you have:    Pain for more than 5 days    Bloating for more than 2 days    Diarrhea for more than 5 days    A fever of 100.4 F (38 C) or higher, or as directed by your healthcare provider    Pain that gets worse    Weight loss for no reason    Continued lack of appetite    Blood in your stool  How to prevent abdominal pain  Here are some tips to help prevent abdominal pain:    Eat smaller amounts of food at each meal.    Don't eat greasy, fried, or other high-fat foods.    Don't eat foods that give you gas.    Exercise regularly.    Drink plenty of fluids.  To help prevent GERD symptoms:    Quit smoking.    Reduce alcohol and foods that increase stomach acid.    Don't use aspirin or over-the-counter pain and fever medicines, if possible. This includes nonsteroidal anti-inflammatory drugs (NSAIDs).    Lose excess weight.    Finish eating at least 2 hours before you go to bed or lie down.    Raise the head of your bed.  Date Last Reviewed: 7/1/2016 2000-2019 [employer]. [address] [place] [address], [place] [address]. All rights reserved. This information is not intended as a substitute for professional medical care. Always follow your healthcare professional's instructions.                Return in about 17 days (around 10/22/2020) for Physical Exam, mammogram.     [address] [address]  [email or web address]   Office: [phone]           Odilia Everett MS, PA-C

## 2020-10-05 NOTE — RESULT ENCOUNTER NOTE
Results discussed directly with patient while patient was present. Any further details documented in the note.   Odilia Everett PA-C

## 2020-10-05 NOTE — Clinical Note
Thank you for your kind referral to the [facility].  Patient's CT and ultrasound were unremarkable.  Laboratory studies unremarkable.  Suspect gastritis.  Treated with omeprazole 20 mg twice daily x14 days and follow-up with her PCP on 1022 as already scheduled.    Odilia Everett MBA, MS, PA-C  [facility]

## 2020-10-05 NOTE — RESULT ENCOUNTER NOTE
Results discussed directly with patient while patient was present. Any further details documented in the note.   MUKUND Ramsay CNP

## 2020-10-05 NOTE — PATIENT INSTRUCTIONS
Patient Education     Unknown Causes of Abdominal Pain (Female)    The exact cause of your belly (abdominal) pain is not clear. This does not mean that this is something to worry about. Everyone likes to know the exact cause of the problem. But sometimes with belly pain, there is no clear-cut cause, and this could be a good thing. The good news is that your symptoms can be treated, and you will feel better.   Your condition does not seem serious now. But sometimes the signs of a serious problem may take more time to appear. For this reason, it is important for you to watch for any new symptoms, problems, or worsening of your condition.  Over the next few days, the abdominal pain may come and go. Or it may be constant. Other common symptoms can include nausea and vomiting. Sometimes it can be difficult to tell if you feel nauseous. You may just feel bad and not connect that feeling to nausea. Constipation, diarrhea, and a fever may go along with the pain.  The pain may continue even if treated correctly over the following days. Depending on how things go, sometimes the cause can become clear and may need more or different treatment. Additional evaluations, medicines, or tests may also be needed.  Home care  Your healthcare provider may prescribe medicine for pain, symptoms, or an infection.  Follow the healthcare provider's instructions for taking these medicines.  General care    Rest as much as you can until your next exam. No strenuous activities.    Try to find positions that ease discomfort. A small pillow placed on the abdomen may help relieve pain.    Something warm on your abdomen (such as a heating pad) may help, but be careful not to burn yourself.  Diet    Don t force yourself to eat, especially if having cramps, vomiting, or diarrhea.    Water is important so you don't get dehydrated. Soup may also be good. Sports drinks may also help, especially if they are not too acidic. Don't drink sugary drinks as  this can make things worse. Take liquids in small amounts. Don t guzzle them.    Caffeine sometimes makes the pain and cramping worse.    Don t take dairy products if you have vomiting or diarrhea.    Don't eat large amounts at a time. Wait a few minutes between bites.    Eat a diet low in fiber (called a low-residue diet). Foods allowed include refined breads, white rice, fruit and vegetable juices without pulp, tender meats. These foods will pass more easily through the intestine.    Don t have whole-grain foods, whole fruits and vegetables, meats, seeds and nuts, fried or fatty foods, dairy, alcohol and spicy foods until your symptoms go away.  Follow-up care  Follow up with your healthcare provider, or as advised, if your pain does not begin to improve in the next 24 hours.  Call 911  Call 911 if any of these occur:    Trouble breathing    Confusion    Fainting or loss of consciousness    Rapid heart rate    Seizure  When to seek medical advice  Call your healthcare provider right away if any of these occur:    Pain gets worse or moves to the right lower abdomen    New or worsening vomiting or diarrhea    Swelling of the abdomen    Unable to pass stool for more than 3 days    Fever of 100.4 F (38 C) or higher, or as directed by your healthcare provider.    Blood in vomit or bowel movements (dark red or black color)    Yellow color of eyes and skin (jaundice)    Weakness, dizziness    Chest, arm, back, neck, or jaw pain    Unexpected vaginal bleeding or missed period    Can't keep down liquids or water and you are getting dehydrated  Date Last Reviewed: 6/1/2018 2000-2019 [employer]. [address] [place] [address], [place] [address]. All rights reserved. This information is not intended as a substitute for professional medical care. Always follow your healthcare professional's instructions.

## 2020-10-05 NOTE — PATIENT INSTRUCTIONS
Patient Education     Abdominal Pain    Abdominal pain is pain in the stomach or belly area. Everyone has this pain from time to time. In many cases it goes away on its own. But abdominal pain can sometimes be due to a serious problem, such as appendicitis. So it s important to know when to get help.  Causes of abdominal pain  There are many possible causes of abdominal pain. Common causes in adults include:    Constipation, diarrhea, or gas    Stomach acid flowing back up into the esophagus (acid reflux or heartburn)    Severe acid reflux, called GERD (gastroesophageal reflux disease)    A sore in the lining of the stomach or small intestine (peptic ulcer)    Inflammation of the gallbladder, liver, or pancreas    Gallstones or kidney stones    Appendicitis     Intestinal blockage     An internal organ pushing through a muscle or other tissue (hernia)    Urinary tract infections    In women, menstrual cramps, fibroids, ovarian cysts, pelvic inflammatory disease, or endometriosis    Inflammation or infection of the intestines, including Crohn's disease and ulcerative colitis    Irritable bowel syndrome  Diagnosing the cause of abdominal pain  Your healthcare provider will give you a physical exam help find the cause of your pain. If needed, you will have tests. Belly pain has many possible causes. So it can be hard to find the reason for your pain. Giving details about your pain can help. Tell your provider where and when you feel the pain, and what makes it better or worse. Also let your provider know if you have other symptoms such as:    Fever    Tiredness    Upset stomach (nausea)    Vomiting    Changes in bathroom habits    Blood in the stool or black, tarry stool    Weight loss that you can't explain (involuntary weight loss?)  Also report any family history of stomach or intestinal problems, or cancers. Tell your provider about all your alcohol use and drug use. Tell your provider about all medicines you  use, including herbs, vitamins, and supplements.   Treating abdominal pain  Some causes of pain need emergency medical treatment right away. These include appendicitis or a bowel blockage. Other problems can be treated with rest, fluids, or medicines. Your healthcare provider can give you specific instructions for treatment or self-care based on what is causing your pain.     If you have vomiting or diarrhea, sip water or other clear fluids. When you are ready to eat solid foods again, start with small amounts of easy-to-digest, low-fat foods. These include apple sauce, toast, or crackers.  When to get medical care  Call 911 or go to the hospital right away if you:    Can t pass stool and are vomiting    Are vomiting blood or have bloody diarrhea or black, tarry diarrhea    Have chest, neck, or shoulder pain    Feel like you might pass out    Have pain in your shoulder blades with nausea    Have sudden, severe belly pain    Have new, severe pain unlike any you have felt before    Have a belly that is rigid, hard, and hurts to touch  Call your healthcare provider if you have:    Pain for more than 5 days    Bloating for more than 2 days    Diarrhea for more than 5 days    A fever of 100.4 F (38 C) or higher, or as directed by your healthcare provider    Pain that gets worse    Weight loss for no reason    Continued lack of appetite    Blood in your stool  How to prevent abdominal pain  Here are some tips to help prevent abdominal pain:    Eat smaller amounts of food at each meal.    Don't eat greasy, fried, or other high-fat foods.    Don't eat foods that give you gas.    Exercise regularly.    Drink plenty of fluids.  To help prevent GERD symptoms:    Quit smoking.    Reduce alcohol and foods that increase stomach acid.    Don't use aspirin or over-the-counter pain and fever medicines, if possible. This includes nonsteroidal anti-inflammatory drugs (NSAIDs).    Lose excess weight.    Finish eating at least 2 hours  before you go to bed or lie down.    Raise the head of your bed.  Date Last Reviewed: 7/1/2016 2000-2019 [employer]. [address] [place] [address], [place] [address]. All rights reserved. This information is not intended as a substitute for professional medical care. Always follow your healthcare professional's instructions.

## 2020-10-05 NOTE — PROGRESS NOTES
"Subjective   Whitney Aceves is a 59 year old female who presents to clinic today for the following health issues:    HPI   Concern - RUQ pain  Onset: x2 weeks  Description: Right side pain -under rib cage  Intensity: 2-7/10  Progression of Symptoms:  same and constant-dull pain  Accompanying Signs & Symptoms: sharp pains comes and goes, right thigh started to pulsating 1x -woke from sleep  Previous history of similar problem: nothing  Precipitating factors:        Worsened by: bending over and going up stairs  - sharp pains  Alleviating factors:        Improved by: none  Therapies tried and outcome:  none     Pain is worse with bending-denies nausea vomiting fever bowel or bladder changes.  Denies correlation with eating.  Dull then can change to sharp like a knife resolves on its own random intermittent worsening this weekend.Denies back or shoulder blade pain. Wants addressed with labs and imaging today if possible     Wt Readings from Last 5 Encounters:   10/05/20 74.8 kg (165 lb)   01/27/20 73 kg (161 lb)   10/17/19 73.6 kg (162 lb 3.2 oz)   09/18/19 73.5 kg (162 lb)   05/23/19 72.6 kg (160 lb)       Reviewed and updated as needed this visit by provider:  Tobacco  Allergies  Meds  Problems  Med Hx  Surg Hx  Fam Hx          Review of Systems   Constitutional, HEENT, cardiovascular, pulmonary, GI, , musculoskeletal, neuro, skin, endocrine and psych systems are negative, except as otherwise noted in the HPI.          Objective   BP (!) 146/91 (BP Location: Right arm, Patient Position: Chair, Cuff Size: Adult Regular)   Pulse 66   Temp 97.3  F (36.3  C) (Tympanic)   Ht 1.626 m (5' 4\")   Wt 74.8 kg (165 lb)   SpO2 98%   Breastfeeding No   BMI 28.32 kg/m   Body mass index is 28.32 kg/m .  Physical Exam   GENERAL: healthy, alert, well nourished, well hydrated, no distress  NECK: no tenderness, no adenopathy, no asymmetry, no masses, no stiffness; thyroid- normal to palpation  RESP: lungs clear to " "auscultation - no rales, no rhonchi, no wheezes  CV: regular rates and rhythm, normal S1 S2, no S3 or S4 and no murmur, no click or rub -  ABDOMEN: soft, RUQ tenderness, ?  hepatosplenomegaly, no masses, slightly decreased bowel sounds  SKIN: no suspicious lesions, no rashes  LYMPHATICS: ant. cervical- normal, post. cervical- normal, axillary- normal, supraclavicular- normal, inguinal- normal    Diagnostic Test Results  Urinalysis - unremarkable      Assessment & Plan   Whitney was seen today for musculoskeletal problem.    Diagnoses and all orders for this visit:    RUQ abdominal pain  -     Cancel: US Abdomen Complete; Future  -     Cancel: Comprehensive metabolic panel (BMP + Alb, Alk Phos, ALT, AST, Total. Bili, TP)  -     Cancel: CBC with platelets  -     Cancel: Lipase  -     Cancel: Amylase  -     *UA reflex to Microscopic and Culture ([facility] (except [place] and [place])  -     Urine Microscopic      Transfer to Acute and Diagnostic Services  I have contacted the staff at the [facility] at [phone] to confirm patient acceptance.  Special Needs: None    Discussed transition to Acute & Diagnostic Services Clinic, and patient agrees with next level of care.  Patient transportation will be provided by the patient.         BMI:   Estimated body mass index is 28.32 kg/m  as calculated from the following:    Height as of this encounter: 1.626 m (5' 4\").    Weight as of this encounter: 74.8 kg (165 lb).       See Patient Instructions    Return in about 2 weeks (around 10/19/2020).     Kasandra Johnson, JOY-[address] [address]  [email or web address]   Office: [phone]      "

## 2020-10-05 NOTE — RESULT ENCOUNTER NOTE
Whitney  Here are your recent results.  They are normal.  If you have any questions please do not hesitate to contact our office via phone ([phone]) or MyChart.    Odilia Everett MS, PA-C  [facility]

## 2020-10-22 ENCOUNTER — OFFICE VISIT (OUTPATIENT)
Dept: FAMILY MEDICINE | Facility: CLINIC | Age: 60
End: 2020-10-22
Payer: COMMERCIAL

## 2020-10-22 ENCOUNTER — ANCILLARY PROCEDURE (OUTPATIENT)
Dept: MAMMOGRAPHY | Facility: CLINIC | Age: 60
End: 2020-10-22
Attending: FAMILY MEDICINE
Payer: COMMERCIAL

## 2020-10-22 VITALS
SYSTOLIC BLOOD PRESSURE: 130 MMHG | WEIGHT: 164 LBS | HEART RATE: 78 BPM | TEMPERATURE: 97.8 F | BODY MASS INDEX: 28 KG/M2 | HEIGHT: 64 IN | OXYGEN SATURATION: 99 % | DIASTOLIC BLOOD PRESSURE: 80 MMHG

## 2020-10-22 DIAGNOSIS — Z82.49 FAMILY HISTORY OF EARLY CAD: ICD-10-CM

## 2020-10-22 DIAGNOSIS — J01.01 ACUTE RECURRENT MAXILLARY SINUSITIS: ICD-10-CM

## 2020-10-22 DIAGNOSIS — R10.11 RUQ ABDOMINAL PAIN: ICD-10-CM

## 2020-10-22 DIAGNOSIS — Z12.31 SCREENING MAMMOGRAM, ENCOUNTER FOR: ICD-10-CM

## 2020-10-22 DIAGNOSIS — Z79.899 CONTROLLED SUBSTANCE AGREEMENT SIGNED: ICD-10-CM

## 2020-10-22 DIAGNOSIS — D23.9 MULTIPLE DYSPLASTIC NEVI: ICD-10-CM

## 2020-10-22 DIAGNOSIS — N95.1 SYMPTOMATIC MENOPAUSAL OR FEMALE CLIMACTERIC STATES: ICD-10-CM

## 2020-10-22 DIAGNOSIS — G47.00 INSOMNIA, UNSPECIFIED TYPE: ICD-10-CM

## 2020-10-22 DIAGNOSIS — E78.5 HYPERLIPIDEMIA LDL GOAL <130: ICD-10-CM

## 2020-10-22 DIAGNOSIS — M15.8 OTHER OSTEOARTHRITIS INVOLVING MULTIPLE JOINTS: ICD-10-CM

## 2020-10-22 DIAGNOSIS — R53.83 FATIGUE, UNSPECIFIED TYPE: ICD-10-CM

## 2020-10-22 DIAGNOSIS — N32.81 OVERACTIVE BLADDER: ICD-10-CM

## 2020-10-22 DIAGNOSIS — Z00.01 ENCOUNTER FOR ROUTINE ADULT MEDICAL EXAM WITH ABNORMAL FINDINGS: Primary | ICD-10-CM

## 2020-10-22 DIAGNOSIS — M75.102 ROTATOR CUFF SYNDROME, LEFT: ICD-10-CM

## 2020-10-22 DIAGNOSIS — R61 NIGHT SWEATS: ICD-10-CM

## 2020-10-22 LAB
AMPHETAMINES UR QL: NOT DETECTED NG/ML
BARBITURATES UR QL SCN: NOT DETECTED NG/ML
BENZODIAZ UR QL SCN: NOT DETECTED NG/ML
BUPRENORPHINE UR QL: NOT DETECTED NG/ML
CANNABINOIDS UR QL: NOT DETECTED NG/ML
COCAINE UR QL SCN: NOT DETECTED NG/ML
D-METHAMPHET UR QL: NOT DETECTED NG/ML
ERYTHROCYTE [DISTWIDTH] IN BLOOD BY AUTOMATED COUNT: 13 % (ref 10–15)
HCT VFR BLD AUTO: 38.4 % (ref 35–47)
HGB BLD-MCNC: 12.9 G/DL (ref 11.7–15.7)
MCH RBC QN AUTO: 29.5 PG (ref 26.5–33)
MCHC RBC AUTO-ENTMCNC: 33.6 G/DL (ref 31.5–36.5)
MCV RBC AUTO: 88 FL (ref 78–100)
METHADONE UR QL SCN: NOT DETECTED NG/ML
OPIATES UR QL SCN: NOT DETECTED NG/ML
OXYCODONE UR QL SCN: NOT DETECTED NG/ML
PCP UR QL SCN: NOT DETECTED NG/ML
PLATELET # BLD AUTO: 205 10E9/L (ref 150–450)
PROPOXYPH UR QL: NOT DETECTED NG/ML
RBC # BLD AUTO: 4.38 10E12/L (ref 3.8–5.2)
TRICYCLICS UR QL SCN: NOT DETECTED NG/ML
WBC # BLD AUTO: 5.5 10E9/L (ref 4–11)

## 2020-10-22 PROCEDURE — 99396 PREV VISIT EST AGE 40-64: CPT | Performed by: FAMILY MEDICINE

## 2020-10-22 PROCEDURE — 82306 VITAMIN D 25 HYDROXY: CPT | Performed by: FAMILY MEDICINE

## 2020-10-22 PROCEDURE — 80053 COMPREHEN METABOLIC PANEL: CPT | Performed by: FAMILY MEDICINE

## 2020-10-22 PROCEDURE — 80306 DRUG TEST PRSMV INSTRMNT: CPT | Performed by: FAMILY MEDICINE

## 2020-10-22 PROCEDURE — 85027 COMPLETE CBC AUTOMATED: CPT | Performed by: FAMILY MEDICINE

## 2020-10-22 PROCEDURE — 99213 OFFICE O/P EST LOW 20 MIN: CPT | Mod: 25 | Performed by: FAMILY MEDICINE

## 2020-10-22 PROCEDURE — 77063 BREAST TOMOSYNTHESIS BI: CPT | Performed by: RADIOLOGY

## 2020-10-22 PROCEDURE — 82043 UR ALBUMIN QUANTITATIVE: CPT | Performed by: FAMILY MEDICINE

## 2020-10-22 PROCEDURE — 80061 LIPID PANEL: CPT | Performed by: FAMILY MEDICINE

## 2020-10-22 PROCEDURE — 84443 ASSAY THYROID STIM HORMONE: CPT | Performed by: FAMILY MEDICINE

## 2020-10-22 PROCEDURE — 36415 COLL VENOUS BLD VENIPUNCTURE: CPT | Performed by: FAMILY MEDICINE

## 2020-10-22 PROCEDURE — 77067 SCR MAMMO BI INCL CAD: CPT | Performed by: RADIOLOGY

## 2020-10-22 RX ORDER — IBUPROFEN 800 MG/1
TABLET, FILM COATED ORAL
Qty: 90 TABLET | Refills: 1 | Status: SHIPPED | OUTPATIENT
Start: 2020-10-22 | End: 2020-12-18

## 2020-10-22 RX ORDER — AZITHROMYCIN 250 MG/1
TABLET, FILM COATED ORAL
Qty: 6 TABLET | Refills: 3 | Status: SHIPPED | OUTPATIENT
Start: 2020-10-22 | End: 2021-05-06

## 2020-10-22 RX ORDER — ZOLPIDEM TARTRATE 10 MG/1
TABLET ORAL
Qty: 30 TABLET | Refills: 1 | Status: SHIPPED | OUTPATIENT
Start: 2020-10-22 | End: 2021-11-29

## 2020-10-22 RX ORDER — ESTRADIOL 1 MG/1
1 TABLET ORAL DAILY
Qty: 90 TABLET | Refills: 3 | Status: SHIPPED | OUTPATIENT
Start: 2020-10-22 | End: 2021-11-09

## 2020-10-22 RX ORDER — ATORVASTATIN CALCIUM 10 MG/1
10 TABLET, FILM COATED ORAL DAILY
Qty: 90 TABLET | Refills: 3 | Status: SHIPPED | OUTPATIENT
Start: 2020-10-22 | End: 2021-11-09

## 2020-10-22 ASSESSMENT — MIFFLIN-ST. JEOR: SCORE: 1303.9

## 2020-10-22 NOTE — PATIENT INSTRUCTIONS
[address] [address]  Office: [phone]   Fax:    [phone]       CeraVe - creamy eczema oil  Or sweet almond oil at home for help with cracked hands.     controlled substance agreement discussed in detail and renewed with  pt in detail today line by line item and signed with pt. Pt voices understanding of responsibility of taking and storing a controlled substance as well.     From description of pt's RUQ abdominal pain - I suspect this is either a muscle spasm or hidden hernia. Try not to bend over fully.   Please, call or return to clinic or go to the ER immediately if signs or symptoms worsen or fail to improve as anticipated.       Preventive Health Recommendations  Female Ages 50 - 64    Yearly exam: See your health care provider every year in order to  o Review health changes.   o Discuss preventive care.    o Review your medicines if your doctor has prescribed any.      Get a Pap test every three years (unless you have an abnormal result and your provider advises testing more often).    If you get Pap tests with HPV test, you only need to test every 5 years, unless you have an abnormal result.     You do not need a Pap test if your uterus was removed (hysterectomy) and you have not had cancer.    You should be tested each year for STDs (sexually transmitted diseases) if you're at risk.     Have a mammogram every 1 to 2 years.    Have a colonoscopy at age 50, or have a yearly FIT test (stool test). These exams screen for colon cancer.      Have a cholesterol test every 5 years, or more often if advised.    Have a diabetes test (fasting glucose) every three years. If you are at risk for diabetes, you should have this test more often.     If you are at risk for osteoporosis (brittle bone disease), think about having a bone density scan (DEXA).    Shots: Get a flu shot each year. Get a tetanus shot every 10 years.    Nutrition:     Eat at least 5  servings of fruits and vegetables each day.    Eat whole-grain bread, whole-wheat pasta and brown rice instead of white grains and rice.    Get adequate Calcium and Vitamin D.     Lifestyle    Exercise at least 150 minutes a week (30 minutes a day, 5 days a week). This will help you control your weight and prevent disease.    Limit alcohol to one drink per day.    No smoking.     Wear sunscreen to prevent skin cancer.     See your dentist every six months for an exam and cleaning.    See your eye doctor every 1 to 2 years.    Thank you so much or choosing [facility]  for your Health Care. It was a pleasure seeing you at your visit today! Please contact us with any questions or concerns you may have.                   Lisbeth Gonzalez MD                              To reach your [facility] care team after hours call:   [phone]    PLEASE NOTE OUR HOURS HAVE CHANGED secondary to COVID-19 coronavirus pandemic, as we are trying to minimize patient exposure to the virus,  which is now widespread in the [facility].  These hours may change with very little notice.  We apologize for any inconvenience.       Our current clinic hours are:          Monday- Friday  7:00am - 6:00pm  in person.                       Saturday and Sunday : Closed to in person and virtual visits        We have telephone and virtual visit times available between    7:00am - 6pm on Monday-Friday as well.                                                Phone:  [phone]      Our pharmacy hours:   Monday  9:00 am to 6:00 pm      Tuesday through Friday 9:00am to 5:00pm                        Saturday - 9:00 am to 12 noon       Sunday : Closed.              Phone:  [phone]    ###  Please note: at this time we are not accepting any walk-in visits. ###      There is also information available at our web site:  [email or web address]    If your provider ordered any lab tests and you do not receive  the results within 10 business days, please call the clinic.    If you need a medication refill please contact your pharmacy.  Please allow 2 business days for your refill to be completed.    Our clinic offers telephone visits and e visits.  Please ask one of your team members to explain more.      Use [employer] (secure email communication and access to your chart) to send your primary care provider a message or make an appointment. Ask someone on your Team how to sign up for [email or web address].

## 2020-10-22 NOTE — LETTER
[facility] [place]  10/22/20    Patient: Whitney Aceves  YOB: 1960  Medical Record Number: [id number]  CSN: [id number]                                                                              Non-opioid Controlled Substance Agreement    I understand that my care provider has prescribed a controlled substance to help manage my condition(s). I am taking this medicine to help me function or work. I know this is strong medicine, and that it can cause serious side effects. Controlled substances can be sedating, addicting and may cause a dependency on the drug. They can affect my ability to drive or think, and cause depression. They need to be taken exactly as prescribed. Combining controlled substances with certain medicines or chemicals (such as cocaine, sedatives and tranquilizers, sleeping pills, meth) can be dangerous or even fatal. Also, if I stop controlled substances suddenly, I may have severe withdrawal symptoms.  If not helpful, I may be asked to stop them.    The risks, benefits, and side effects of these medicine(s) were explained to me. I agree that:    1. I will take part in other treatments as advised by my care team. This may be psychiatry or counseling, physical therapy, behavioral therapy, group treatment or a referral to a pain clinic. I will reduce or stop my medicine when my care team tells me to do so.  2. I will take my medicines as prescribed. I will not change the dose or schedule unless my care team tells me to. There will be no refills if I  run out early.   I may be contactedwithout warning and asked to complete a urine drug test or pill count at any time.   3. I will keep all my appointments, and understand this is part of the monitoring of controlled substances. My care team may require an office visit for EVERY controlled substance refill. If I miss appointments or don t follow instructions, my care team may stop my medicine.  4. I will not ask other  providers to prescribe controlled substances, and I will not accept controlled substances from other people. If I need another prescribed controlled substance for a new reason, I will tell my care team within 1 business day.  5. I will use one pharmacy to fill all of my controlled substance prescriptions, and it is up to me to make sure that I do not run out of my medicines on weekends or holidays. If my care team is willing to refill my controlled substance prescription without a visit, I must request refills only during office hours, refills may take up to 3 days to process, and it may take up to 5 to 7 days for my medicine to be mailed and ready at my pharmacy. Prescriptions will not be mailed anywhere except my pharmacy.    6. I am responsible for my prescriptions. If the medicine/prescription is lost or stolen, it will not be replaced. I also agree not to share controlled substance medicines with anyone.              [facility] [place]  10/22/20  Patient:  Whitney Aceves  YOB: 1960  Medical Record Number: [id number]  CSN: [id number]    7. I agree to not use ANY illegal or recreational drugs. This includes marijuana, cocaine, bath salts or other drugs. I agree not to use alcohol unless my care team says I may. I agree to give urine samples whenever asked. If I don t give a urine sample, the care team may stop my medicine.    8. If I enroll in the [place] Medical Marijuana program, I will tell my care team. I will also sign an agreement to share my medical records with my care team.    9. I will bring in my list of medicines (or my medicine bottles) each time I come to the clinic.   10. I will tell my care team right away if I become pregnant or have a new medical problem treated outside of my regular clinic.  11. I understand that this medicine can affect my thinking and judgment. It may be unsafe for me to drive, use machinery and do dangerous tasks. I will not do any of these things  until I know how the medicine affects me. If my dose changes, I will wait to see how it affects me. I will contact my care team if I have concerns about medicine side effects.    I understand that if I do not follow any of the conditions above, my prescriptions or treatment may be stopped.      I agree that my provider, clinic care team, and pharmacy may work with any city, state or federal law enforcement agency that investigates the misuse, sale, or other diversion of my controlled medicine. I will allow my provider to discuss my care with or share a copy of this agreement with any other treating provider, pharmacy or emergency room where I receive care. I agree to give up (waive) any right of privacy or confidentiality with respect to these consents.   I have read this agreement and have asked questions about anything I did not understand.    ____________________________________________________    ____________  ________  Patient signature                                                         Date      Time    ____________________________________________________     ____________  ________  Witness                                                          Date      Time    ____________________________________________________  Provider signature

## 2020-10-22 NOTE — PROGRESS NOTES
SUBJECTIVE:   CC: Whitney Aceves is an 59 year old woman who presents for preventive health visit and the following other medical problems:      1. Encounter for routine adult medical exam with abnormal findings    2. Hyperlipidemia LDL goal <130- on atorvastatin - uncertain control - needs lab follow up and refill on medicine     3. Fatigue, unspecified type- ? menopause related vs other - still getting night sweats - will rule out thyroid issue and anemia     4. Controlled substance agreement re-signed today -  10/22/2020 ok for #30 -10mg ambien (takes 1/2 tab) with 1 refill every 6 months or so     5. RUQ abdominal pain- comes and goes - not related to foods or fatty foods, worse bending over with or without lifting and getting out of her car- ? muscle spasm - CT and US negative 10/5/2020     6. Night sweats- on estrace 1mg already - discussed progesterone deficiency usually causing that - pt would like to try black cohosh first prior to micronized progesterone 2nd to breast ca risk     7. Overactive bladder- pt declines medication at this time     8. Insomnia, unspecified type- mostly while travelling -CSA re-signed 10/22/2020 -  ? related to menopause as well - Ambien 10mg - takes 1/2 tab at bedtime PRN    9. Symptomatic menopausal or female climacteric states    10. Family history of early CAD    11. Rotator cuff syndrome, left    12. Acute recurrent maxillary sinusitis    13. Multiple dysplastic nevi    14. Other osteoarthritis involving multiple joints- left middle finger s/p fracture /dislocation - causing some chronic pain - pt requests refill of ibuprofen 800mg tabs - takes with food          Patient has been advised of split billing requirements and indicates understanding: Yes  Healthy Habits:    Getting at least 3 servings of Calcium per day:  Yes    Bi-annual eye exam:  Yes    Dental care twice a year:  Yes    Sleep apnea or symptoms of sleep apnea:  None    Diet:  Regular (no restrictions)     Frequency of exercise:  2-3 days/week    Duration of exercise:  15-30 minutes    Taking medications regularly:  No    PHQ-2 Total Score:     RUQ abdominal pain:  Follow up from sharp, knife-like RUQ abdominal pain that has been coming and going since early 10/2020 - US and CT abd/pelvis = negative.  Has been a bit better with omeprazole 20mg po bid over the counter.  Discussed HIDA scan vs. EGD vs. GI referral vs. Muscle spasm and/or hernia. Still having pain - not food related at all.  Pain lasts several seconds.- sometimes daily.  Worse bending over      Hyperlipidemia Follow-Up:       Are you regularly taking any medication or supplement to lower your cholesterol?   Yes- ATORVASTATIN    Are you having muscle aches or other side effects that you think could be caused by your cholesterol lowering medication?  No     Recent Labs   Lab Test 09/18/19  1005 09/17/18  0929   CHOL 175 141   HDL 72 63   LDL 76 56   TRIG 135 112        Would like to go up on her estrace as her night sweats have been worse lately - wakes her up and keeps her up.      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 Will be travelling more upcoming. Needs refill and controlled substance agreement  Renewal today.     Using Ibuprofen 800 mg for left middle finger s/p fracture and dislocation  years ago - still gets some osteoarthritis pain from that.      Today's PHQ-2 Score:   PHQ-2 ( 1999 Pfizer) 10/22/2020   Q1: Little interest or pleasure in doing things 0   Q2: Feeling down, depressed or hopeless 0   PHQ-2 Score 0   Q1: Little interest or pleasure in doing things -   Q2: Feeling down, depressed or hopeless -   PHQ-2 Score -       Abuse: Current or Past (Physical, Sexual or Emotional) - No  Do you feel safe in your environment? Yes    Going to dermatology for her full body skin exam tomorrow - [facility] - Dr. Carmen Dowling - for her early dysplastic melanoma.      Social History     Tobacco Use     Smoking status: Never Smoker      Smokeless tobacco: Never Used   Substance Use Topics     Alcohol use: No     Alcohol/week: 0.0 standard drinks     If you drink alcohol do you typically have >3 drinks per day or >7 drinks per week? No    Alcohol Use 10/22/2020   Prescreen: >3 drinks/day or >7 drinks/week? No       Reviewed orders with patient.  Reviewed health maintenance and updated orders accordingly - Yes  Lab work is in process  Labs reviewed in EPIC  BP Readings from Last 3 Encounters:   10/22/20 130/80   10/05/20 130/86   10/05/20 (!) 146/91    Wt Readings from Last 3 Encounters:   10/22/20 74.4 kg (164 lb)   10/05/20 73 kg (161 lb)   10/05/20 74.8 kg (165 lb)                  Patient Active Problem List   Diagnosis     Panic disorder without agoraphobia     Anxiety     Overactive bladder     Hyperlipidemia LDL goal <130- on atorvastatin      Advanced directives, counseling/discussion     Gastroesophageal reflux disease without esophagitis     Symptomatic menopausal or female climacteric states     Fibrocystic breast changes of both breasts     Primary osteoarthritis of both hands     Family history of early CAD     Insomnia, unspecified type-Controlled substance agreement signed -10/22/2020 ok for #30 -10mg ambien (takes 1/2 tab) with 1 refill every 6 months or so      Expressive aphasia     Urinary urgency     Fatigue, unspecified type     Night sweats- on estrace 1mg already - discussed progesterone deficiency usually causing that - pt would like to try black cohosh first prior to micronized progesterone 2nd to breast ca risk      Controlled substance agreement signed - 9/17/2018 ok for #30 -10mg ambien (takes 1/2 tab) with 1 refill every 6 months or so      Acute recurrent maxillary sinusitis     Multiple dysplastic nevi-Going to dermatology for her full body skin exam annually - next 10/23/2020 - [facility] - Dr. Carmen Dowling - for her early dysplastic melanoma.       RUQ abdominal pain- comes and goes - not related to foods  or fatty foods, worse bending over with or without lifting and getting out of her car- ? muscle spasm - CT and US negative 10/5/2020      Rotator cuff syndrome, left     Other osteoarthritis involving multiple joints- left middle finger s/p fracture /dislocation - causing some chronic pain - pt requests refill of ibuprofen 800mg tabs - takes with food      Past Surgical History:   Procedure Laterality Date      SECTION       COLONOSCOPY  2012    Procedure:COLONOSCOPY; Colonoscopy ; Surgeon:SILVERIO ARRIETA; Location: GI     COLONOSCOPY N/A 2017    Procedure: COLONOSCOPY;  Surgeon: Silverio Arrieta MD;  Location:  GI     COLPOSCOPY, BIOPSY, COMBINED  1991    Mild squamous epithelial dysplasia (SABIHA 1)     FISSURECTOMY RECTUM  01     HYSTERECTOMY TOTAL ABDOMINAL  3/4/1998    Myomatous uterus; pelvic pain. Started HRT right afterward with vasomotor sx.     HYSTERECTOMY, PAP NO LONGER INDICATED N/A     had for large fibroid     LIPOSUCTION (LOCATION)         Social History     Tobacco Use     Smoking status: Never Smoker     Smokeless tobacco: Never Used   Substance Use Topics     Alcohol use: No     Alcohol/week: 0.0 standard drinks     Family History   Problem Relation Age of Onset     Hypertension Mother      Hyperlipidemia Mother      Diabetes Mother      Cerebrovascular Disease Mother 89        right hemispheric stroke -  11 days after stroke - 10/2019      C.A.D. Father 45          age 45 massive MI      Heart Failure Father      Hyperlipidemia Father      C.A.D. Maternal Grandmother      C.A.D. Maternal Grandfather      C.A.D. Paternal Grandmother      C.A.D. Paternal Grandfather      Cancer Other         9 members on maternal side. Bone CA, Breast CA (m. aunt),       Coronary Artery Disease Brother 55        Heart attack- s/p sudden cardiac arrest survived 2019      C.A.D. Sister 57        MI age 50- CABG - 2 vessels - 4 MI's since age 55 - 3 stents placed  9/28/2017      Diabetes Sister      Breast Cancer Maternal Aunt         50s-Mom had 9 sisters. MAunt->50's     Hypertension Sister      Hyperlipidemia Sister      Prostate Cancer Brother 37     Psoriasis Son 29        after returning from the service - lives in [place]     Psoriatic Arthritis Son 29        on Enbrel          Current Outpatient Medications   Medication Sig Dispense Refill     atorvastatin (LIPITOR) 10 MG tablet Take 1 tablet (10 mg) by mouth daily 90 tablet 3     azithromycin (ZITHROMAX) 250 MG tablet 2 tabs first day, then 1 tab by mouth daily for 4 additional days 6 tablet 3     calcium-vitamin D (OSCAL) 250-125 MG-UNIT TABS per tablet Take 1 tablet by mouth daily       estradiol (ESTRACE) 1 MG tablet Take 1 tablet (1 mg) by mouth daily 90 tablet 3     ibuprofen (ADVIL/MOTRIN) 800 MG tablet TAKE 1 TABLET BY MOUTH EVERY 8 HOURS AS NEEDED with food  FOR MODERATE PAIN 90 tablet 1     Multiple Vitamins-Minerals (MULTIVITAMIN ADULT PO) Take by mouth daily       Omega-3 Fatty Acids (OMEGA 3 PO)        zolpidem (AMBIEN) 10 MG tablet TAKE 1/2 TABLET BY MOUTH EVERY NIGHT AT BEDTIME AS NEEDED FOR SLEEP 30 tablet 1     omeprazole (PRILOSEC) 20 MG DR capsule Take 1 capsule (20 mg) by mouth 2 times daily 28 capsule 0     Allergies   Allergen Reactions     Septra [Sulfamethoxazole W/Trimethoprim]      Sulfa Drugs Hives     Murine Ear [Carbamide Peroxide] Swelling and Rash     Severe pain , swelling after debrox type ear drops - had to go to the clinic to have ears flushed out emergently.      Recent Labs   Lab Test 10/05/20  1139 09/18/19  1005 09/17/18  0929 03/02/18  1030   LDL  --  76 56 117*   HDL  --  72 63 60   TRIG  --  135 112 116   ALT 26 25 40 29   CR 0.64 0.66 0.70  --    GFRESTIMATED >90 >90 87  --    GFRESTBLACK >90 >90 >90  --    POTASSIUM 3.5 3.4 4.0  --    TSH  --  1.44 1.51  --         Mammogram Screening: Patient over age 50, mutual decision to screen reflected in health maintenance.    Pertinent  "mammograms are reviewed under the imaging tab.  History of abnormal Pap smear: Status post benign hysterectomy. Health Maintenance and Surgical History updated.  PAP / HPV 2015   PAP Normal     Reviewed and updated as needed this visit by clinical staff  Tobacco  Allergies  Meds  Problems  Med Hx  Surg Hx  Fam Hx          Reviewed and updated as needed this visit by Provider       Surg Hx          OB History    Para Term  AB Living   2 2 2 0 0 2   SAB TAB Ectopic Multiple Live Births   0 0 0 0 2      # Outcome Date GA Lbr Nick/2nd Weight Sex Delivery Anes PTL Lv   2 Term 91   2.594 kg (5 lb 11.5 oz) M -SEC   TERELL      Birth Comments: Breech      Name: Tj Velazquez Term 87   3.005 kg (6 lb 10 oz) F Vag-Vacuum   TERELL      Name: Sam       Review of Systems:   CONSTITUTIONAL: NEGATIVE for fever, chills, change in weight  INTEGUMENTARY/SKIN: NEGATIVE for worrisome rashes, moles or lesions  EYES: NEGATIVE for vision changes or irritation  ENT: NEGATIVE for ear, mouth and throat problems  RESP: NEGATIVE for significant cough or SOB  BREAST: NEGATIVE for masses, tenderness or discharge  CV: NEGATIVE for chest pain, palpitations or peripheral edema  GI: NEGATIVE for nausea, abdominal pain, heartburn, or change in bowel habits  : NEGATIVE for unusual urinary or vaginal symptoms. No vaginal bleeding.  MUSCULOSKELETAL: NEGATIVE for significant arthralgias or myalgia  NEURO: NEGATIVE for weakness, dizziness or paresthesias  ENDOCRINE: NEGATIVE for temperature intolerance, skin/hair changes  HEME/ALLERGY/IMMUNE: NEGATIVE for bleeding problems  PSYCHIATRIC: NEGATIVE for changes in mood or affect .      OBJECTIVE:   /80   Pulse 78   Temp 97.8  F (36.6  C)   Ht 1.626 m (5' 4\")   Wt 74.4 kg (164 lb)   SpO2 99%   BMI 28.15 kg/m    Physical Exam  GENERAL APPEARANCE: healthy, alert and no distress  EYES: Eyes grossly normal to inspection, PERRL and conjunctivae and sclerae " normal  HENT: ear canals and TM's normal, nose and mouth without ulcers or lesions, oropharynx clear and oral mucous membranes moist  NECK: no adenopathy, no asymmetry, masses, or scars and thyroid normal to palpation  RESP: lungs clear to auscultation - no rales, rhonchi or wheezes  BREAST: normal without masses, tenderness or nipple discharge and no palpable axillary masses or adenopathy  CV: regular rate and rhythm, normal S1 S2, no S3 or S4, no murmur, click or rub, no peripheral edema and peripheral pulses strong  ABDOMEN: soft, nontender, no hepatosplenomegaly, no masses and bowel sounds normal  MS: no musculoskeletal defects are noted and gait is age appropriate without ataxia  SKIN: no suspicious lesions or rashes  NEURO: Normal strength and tone, sensory exam grossly normal, mentation intact and speech normal  PSYCH: mentation appears normal and affect normal/bright    Diagnostic Test Results:  Labs reviewed in Epic    ASSESSMENT/PLAN:       ICD-10-CM    1. Encounter for routine adult medical exam with abnormal findings  Z00.01    2. Hyperlipidemia LDL goal <130- on atorvastatin - uncertain control - needs lab follow up and refill on medicine   E78.5 Albumin Random Urine Quantitative with Creat Ratio     **Comprehensive metabolic panel FUTURE anytime     Lipid panel reflex to direct LDL Fasting     atorvastatin (LIPITOR) 10 MG tablet     OFFICE/OUTPT VISIT,EST,LEVL IV   3. Fatigue, unspecified type- ? menopause related vs other - still getting night sweats - will rule out thyroid issue and anemia   R53.83 **CBC with platelets FUTURE anytime     **Comprehensive metabolic panel FUTURE anytime     **TSH with free T4 reflex FUTURE anytime     **Vitamin D Deficiency FUTURE anytime     OFFICE/OUTPT VISIT,EST,LEVL IV   4. Controlled substance agreement re-signed today -  10/22/2020 ok for #30 -10mg ambien (takes 1/2 tab) with 1 refill every 6 months or so   Z79.899 Drug Abuse Screen Panel 13, Urine (Pain Care  Package)     zolpidem (AMBIEN) 10 MG tablet     OFFICE/OUTPT VISIT,EST,LEVL IV   5. RUQ abdominal pain- comes and goes - not related to foods or fatty foods, worse bending over with or without lifting and getting out of her car- ? muscle spasm - CT and US negative 10/5/2020   R10.11 omeprazole (PRILOSEC) 20 MG DR capsule     OFFICE/OUTPT VISIT,EST,LEVL IV   6. Night sweats- on estrace 1mg already - discussed progesterone deficiency usually causing that - pt would like to try black cohosh first prior to micronized progesterone 2nd to breast ca risk   R61 OFFICE/OUTPT VISIT,EST,LEVL IV   7. Overactive bladder- pt declines medication at this time   N32.81 OFFICE/OUTPT VISIT,EST,LEVL IV   8. Insomnia, unspecified type- mostly while travelling -CSA re-signed 10/22/2020 -  ? related to menopause as well - Ambien 10mg - takes 1/2 tab at bedtime PRN  G47.00 Drug Abuse Screen Panel 13, Urine (Pain Care Package)     zolpidem (AMBIEN) 10 MG tablet     OFFICE/OUTPT VISIT,EST,LEVL IV   9. Symptomatic menopausal or female climacteric states  N95.1 estradiol (ESTRACE) 1 MG tablet     OFFICE/OUTPT VISIT,EST,LEVL IV   10. Family history of early CAD  Z82.49 atorvastatin (LIPITOR) 10 MG tablet   11. Rotator cuff syndrome, left  M75.102 ibuprofen (ADVIL/MOTRIN) 800 MG tablet   12. Acute recurrent maxillary sinusitis  J01.01 azithromycin (ZITHROMAX) 250 MG tablet     OFFICE/OUTPT VISIT,EST,LEVL IV   13. Multiple dysplastic nevi  D23.9    14. Other osteoarthritis involving multiple joints- left middle finger s/p fracture /dislocation - causing some chronic pain - pt requests refill of ibuprofen 800mg tabs - takes with food   M15.8 ibuprofen (ADVIL/MOTRIN) 800 MG tablet     OFFICE/OUTPT VISIT,EST,LEVL IV     See above associated diagnoses and plan labs and rx's.     Patient has been advised of split billing requirements and indicates understanding: Yes  COUNSELING:  Reviewed preventive health counseling, as reflected in patient  "instructions    Estimated body mass index is 28.15 kg/m  as calculated from the following:    Height as of this encounter: 1.626 m (5' 4\").    Weight as of this encounter: 74.4 kg (164 lb).    Weight management plan: Discussed healthy diet and exercise guidelines    She reports that she has never smoked. She has never used smokeless tobacco.     controlled substance agreement discussed in detail and renewed with  pt in detail today line by line item and signed with pt. Pt voices understanding of responsibility of taking and storing a controlled substance as well.     From description of pt's RUQ abdominal pain - I suspect this is either a muscle spasm or hidden hernia. Try not to bend  Please, call or return to clinic or go to the ER immediately if signs or symptoms worsen or fail to improve as anticipated.     Counseling Resources:  ATP IV Guidelines  Pooled Cohorts Equation Calculator  Breast Cancer Risk Calculator  BRCA-Related Cancer Risk Assessment: FHS-7 Tool  FRAX Risk Assessment  ICSI Preventive Guidelines  Dietary Guidelines for Americans, 2010  USDA's MyPlate  ASA Prophylaxis  Lung CA Screening    Lisbeth Gonzalez MD  [facility]"

## 2020-10-23 LAB
ALBUMIN SERPL-MCNC: 3.3 G/DL (ref 3.4–5)
ALP SERPL-CCNC: 102 U/L (ref 40–150)
ALT SERPL W P-5'-P-CCNC: 31 U/L (ref 0–50)
ANION GAP SERPL CALCULATED.3IONS-SCNC: 4 MMOL/L (ref 3–14)
AST SERPL W P-5'-P-CCNC: 15 U/L (ref 0–45)
BILIRUB SERPL-MCNC: 0.8 MG/DL (ref 0.2–1.3)
BUN SERPL-MCNC: 15 MG/DL (ref 7–30)
CALCIUM SERPL-MCNC: 8.9 MG/DL (ref 8.5–10.1)
CHLORIDE SERPL-SCNC: 107 MMOL/L (ref 94–109)
CHOLEST SERPL-MCNC: 206 MG/DL
CO2 SERPL-SCNC: 27 MMOL/L (ref 20–32)
CREAT SERPL-MCNC: 0.69 MG/DL (ref 0.52–1.04)
CREAT UR-MCNC: 90 MG/DL
DEPRECATED CALCIDIOL+CALCIFEROL SERPL-MC: 49 UG/L (ref 20–75)
GFR SERPL CREATININE-BSD FRML MDRD: >90 ML/MIN/{1.73_M2}
GLUCOSE SERPL-MCNC: 85 MG/DL (ref 70–99)
HDLC SERPL-MCNC: 70 MG/DL
LDLC SERPL CALC-MCNC: 109 MG/DL
MICROALBUMIN UR-MCNC: 5 MG/L
MICROALBUMIN/CREAT UR: 5.93 MG/G CR (ref 0–25)
NONHDLC SERPL-MCNC: 136 MG/DL
POTASSIUM SERPL-SCNC: 3.5 MMOL/L (ref 3.4–5.3)
PROT SERPL-MCNC: 7.1 G/DL (ref 6.8–8.8)
SODIUM SERPL-SCNC: 138 MMOL/L (ref 133–144)
TRIGL SERPL-MCNC: 137 MG/DL
TSH SERPL DL<=0.005 MIU/L-ACNC: 1.57 MU/L (ref 0.4–4)

## 2020-12-16 DIAGNOSIS — M75.102 ROTATOR CUFF SYNDROME, LEFT: ICD-10-CM

## 2020-12-16 DIAGNOSIS — M15.8 OTHER OSTEOARTHRITIS INVOLVING MULTIPLE JOINTS: ICD-10-CM

## 2020-12-18 RX ORDER — IBUPROFEN 800 MG/1
TABLET, FILM COATED ORAL
Qty: 90 TABLET | Refills: 1 | Status: SHIPPED | OUTPATIENT
Start: 2020-12-18 | End: 2021-11-29

## 2020-12-18 NOTE — TELEPHONE ENCOUNTER
Prescription approved per [facility] Refill Protocol.  Genevieve Peacock RN, BSN

## 2021-01-04 NOTE — PROGRESS NOTES
SUBJECTIVE:   CC: Whitney Aceves is an 58 year old woman who presents for preventive health visit.     Healthy Habits:    Do you get at least three servings of calcium containing foods daily (dairy, green leafy vegetables, etc.)? yes    Amount of exercise or daily activities, outside of work: 6 day(s) per week - mostly weights    Problems taking medications regularly No    Medication side effects: No    Have you had an eye exam in the past two years? yes    Do you see a dentist twice per year? yes    Do you have sleep apnea, excessive snoring or daytime drowsiness?no    Fell 1.5 weeks ago - was packing and tripped L middle finger knuckle-tip of fonger was dislocated and put back in place. Still having pain inside of finger. Swelling has decreased. - desires xray today.     Strong fam hx of ASCVD - sister with 4 MI's and brother with cardiac arrest and  MI  -  Both in their mid 50's, dad  age 45 from same.      Hyperlipidemia Follow-Up:       Are you having any of the following symptoms? (Select all that apply)  No complaints of shortness of breath, chest pain or pressure.  No increased sweating or nausea with activity.  No left-sided neck or arm pain.  No complaints of pain in calves when walking 1-2 blocks.    Are you regularly taking any medication or supplement to lower your cholesterol?   Yes- Rx    Are you having muscle aches or other side effects that you think could be caused by your cholesterol lowering medication?  No    Recent Labs   Lab Test 18  0929 18  1030   CHOL 141 200*   HDL 63 60   LDL 56 117*   TRIG 112 116       Medication Followup of Ambien    Taking Medication as prescribed: yes    Side Effects:  None    Medication Helping Symptoms:  yes        Today's PHQ-2 Score: 0-0  PHQ-2 (  [employer]) 2019   Q1: Little interest or pleasure in doing things 0 0   Q2: Feeling down, depressed or hopeless 0 0   PHQ-2 Score 0 0   Q1: Little interest or pleasure in doing things -  Health Maintenance Due   Topic Date Due   • DTaP/Tdap/Td Vaccine (1 - Tdap) 07/10/1962       Patient is due for topics as listed above but is not proceeding with Immunization(s) Dtap/Tdap/Td at this time. Education provided for Immunization(s) Dtap/Tdap/Td.      COVID Screen    Does Patient OR patient's household members have any of the following:    · Temperature: Fever greater than or equal to 100 F   No   · Respiratory symptoms: New or worsening cough, shortness of breath, sore throat, congestion or runny nose   No   · GI Symptoms: New onset of nausea, vomiting or diarrhea   No   · Miscellaneous: New onset of loss of taste or smell, chills, repeated shaking with chills, muscle pain or headache   No   · In the last 14 days,  have you or anyone in your household tested positive, suspected of having or have a test in process for covid?   No   · In the last 14 days, have you or anyone in your household had any contact with anyone who has tested positive, suspected of having, or have a test in process for covid?   No        -   Q2: Feeling down, depressed or hopeless - -   PHQ-2 Score - -       Abuse: Current or Past(Physical, Sexual or Emotional)- No  Do you feel safe in your environment? Yes    Social History     Tobacco Use     Smoking status: Never Smoker     Smokeless tobacco: Never Used   Substance Use Topics     Alcohol use: No     Alcohol/week: 0.0 oz     If you drink alcohol do you typically have >3 drinks per day or >7 drinks per week? Not Applicable                     Reviewed orders with patient.  Reviewed health maintenance and updated orders accordingly - Yes  Lab work is in process  Labs reviewed in EPIC  BP Readings from Last 3 Encounters:   19 122/71   19 124/66   19 120/80    Wt Readings from Last 3 Encounters:   19 73.5 kg (162 lb)   19 72.6 kg (160 lb)   19 73.5 kg (162 lb)                  Patient Active Problem List   Diagnosis     Panic disorder without agoraphobia     Anxiety     Overactive bladder     Hyperlipidemia LDL goal <130     Advanced directives, counseling/discussion     Gastroesophageal reflux disease without esophagitis     Symptomatic menopausal or female climacteric states     Fibrocystic breast changes of both breasts     Primary osteoarthritis of both hands     Family history of early CAD     Insomnia, unspecified type-Controlled substance agreement signed - 2018 ok for #30 -10mg ambien (takes 1/2 tab) with 1 refill every 6 months or so      Expressive aphasia     Urinary urgency     Fatigue, unspecified type     Night sweats     Controlled substance agreement signed - 2018 ok for #30 -10mg ambien (takes 1/2 tab) with 1 refill every 6 months or so      Acute recurrent maxillary sinusitis     Past Surgical History:   Procedure Laterality Date      SECTION       COLONOSCOPY  2012    Procedure:COLONOSCOPY; Colonoscopy ; Surgeon:DEANDRE SHORT; Location: GI     COLONOSCOPY N/A 2017    Procedure: COLONOSCOPY;  Surgeon: Berny  Silverio Chavez MD;  Location:  GI     COLPOSCOPY, BIOPSY, COMBINED  1991    Mild squamous epithelial dysplasia (SABIHA 1)     FISSURECTOMY RECTUM  01     HYSTERECTOMY TOTAL ABDOMINAL  3/4/1998    Myomatous uterus; pelvic pain. Started HRT right afterward with vasomotor sx.     HYSTERECTOMY, PAP NO LONGER INDICATED N/A     had for large fibroid     LIPOSUCTION (LOCATION)         Social History     Tobacco Use     Smoking status: Never Smoker     Smokeless tobacco: Never Used   Substance Use Topics     Alcohol use: No     Alcohol/week: 0.0 oz     Family History   Problem Relation Age of Onset     Hypertension Mother      Hyperlipidemia Mother      Diabetes Mother      C.A.D. Father 45          age 45 massive MI      Heart Failure Father      Hyperlipidemia Father      C.A.D. Maternal Grandmother      C.A.D. Maternal Grandfather      C.A.D. Paternal Grandmother      C.A.D. Paternal Grandfather      Cancer Other         9 members on maternal side. Bone CA, Breast CA (m. aunt),       Coronary Artery Disease Brother 55        Heart attack- s/p sudden cardiac arrest survived 2019      CATERINAAEVANGELIST. Sister 57        MI age 50- CABG - 2 vessels - 4 MI's since age 55 - 3 stents placed 2017      Diabetes Sister      Breast Cancer Maternal Aunt         50s-Mom had 9 sisters. MAunt->50's     Hypertension Sister      Hyperlipidemia Sister      Prostate Cancer Brother 37         Current Outpatient Medications   Medication Sig Dispense Refill     atorvastatin (LIPITOR) 10 MG tablet TAKE 1 TABLET(10 MG) BY MOUTH DAILY 30 tablet 0     azithromycin (ZITHROMAX) 250 MG tablet Leave on - pt travels  2     estradiol (ESTRACE) 1 MG tablet Take 1 tablet (1 mg) by mouth daily 90 tablet 1     ibuprofen (ADVIL/MOTRIN) 800 MG tablet TAKE 1 TABLET BY MOUTH EVERY 8 HOURS AS NEEDED FOR MODERATE PAIN FOR BURSITIS 90 tablet 0     loratadine (CLARITIN) 10 MG tablet Take 1 tablet (10 mg) by mouth daily 30 tablet 1     Multiple  Vitamins-Minerals (MULTIVITAMIN ADULT PO) Take by mouth daily       Omega-3 Fatty Acids (OMEGA 3 PO)        zolpidem (AMBIEN) 10 MG tablet TAKE ONE-HALF TABLET BY MOUTH EVERY EVENING AS NEEDED FOR SLEEP 30 tablet 1     Allergies   Allergen Reactions     Septra [Sulfamethoxazole W/Trimethoprim]      Sulfa Drugs Hives     Murine Ear [Carbamide Peroxide] Swelling and Rash     Severe pain , swelling after debrox type ear drops - had to go to the clinic to have ears flushed out emergently.      Recent Labs   Lab Test 18  0929 18  1030 17  0847 17  0943   LDL 56 117* 151* 136*   HDL 63 60 64 57   TRIG 112 116 162* 210*   ALT 40 29  --  33   CR 0.70  --   --  0.65   GFRESTIMATED 87  --   --  >90  Non  GFR Calc     GFRESTBLACK >90  --   --  >90  African American GFR Calc     POTASSIUM 4.0  --   --  4.6   TSH 1.51  --   --  1.36        Mammogram Screening: Patient over age 50, mutual decision to screen reflected in health maintenance.    Pertinent mammograms are reviewed under the imaging tab.  History of abnormal Pap smear: NO - s/p KRYSTYNA without BSO - benign in .   PAP / HPV 2015   PAP Normal     Reviewed and updated as needed this visit by clinical staff  Tobacco  Allergies  Meds  Med Hx  Surg Hx  Fam Hx  Soc Hx        Reviewed and updated as needed this visit by Provider.         OB History    Para Term  AB Living   2 2 2 0 0 2   SAB TAB Ectopic Multiple Live Births   0 0 0 0 2      # Outcome Date GA Lbr Nick/2nd Weight Sex Delivery Anes PTL Lv   2 Term 91   2.594 kg (5 lb 11.5 oz) M -SEC   TERELL      Birth Comments: Breech      Name: Tj Velazquez Term 87   3.005 kg (6 lb 10 oz) F Vag-Vacuum   TERELL      Name: Sam       ROS:  CONSTITUTIONAL: NEGATIVE for fever, chills, change in weight  INTEGUMENTARY/SKIN: NEGATIVE for worrisome rashes, moles or lesions  EYES: NEGATIVE for vision changes or irritation  ENT: NEGATIVE for ear, mouth and  "throat problems  RESP: NEGATIVE for significant cough or SOB  BREAST: NEGATIVE for masses, tenderness or discharge  CV: NEGATIVE for chest pain, palpitations or peripheral edema  GI: NEGATIVE for nausea, abdominal pain, heartburn, or change in bowel habits  : NEGATIVE for unusual urinary or vaginal symptoms. No vaginal bleeding.  MUSCULOSKELETAL: NEGATIVE for significant arthralgias or myalgia  NEURO: NEGATIVE for weakness, dizziness or paresthesias  ENDOCRINE: NEGATIVE for temperature intolerance, skin/hair changes  HEME/ALLERGY/IMMUNE: NEGATIVE for bleeding problems  PSYCHIATRIC: NEGATIVE for changes in mood or affect     OBJECTIVE:   /71 (BP Location: Right arm, Patient Position: Chair, Cuff Size: Adult Regular)   Pulse 66   Temp 97.5  F (36.4  C) (Oral)   Ht 1.626 m (5' 4\")   Wt 73.5 kg (162 lb)   SpO2 99%   Breastfeeding? No   BMI 27.81 kg/m    EXAM:  GENERAL APPEARANCE: healthy, alert and no distress  EYES: Eyes grossly normal to inspection, PERRL and conjunctivae and sclerae normal  HENT: ear canals and TM's normal, nose and mouth without ulcers or lesions, oropharynx clear and oral mucous membranes moist  NECK: no adenopathy, no asymmetry, masses, or scars and thyroid normal to palpation  RESP: lungs clear to auscultation - no rales, rhonchi or wheezes  BREAST: normal without masses, tenderness or nipple discharge and no palpable axillary masses or adenopathy  CV: regular rate and rhythm, normal S1 S2, no S3 or S4, no murmur, click or rub, no peripheral edema and peripheral pulses strong  ABDOMEN: soft, nontender, no hepatosplenomegaly, no masses and bowel sounds normal   (female): normal female external genitalia, normal urethral meatus, vaginal mucosal atrophy noted, surgically absent uterus and cervix, normal postmenopausal adnexae without masses or abnormal discharge  MS: no musculoskeletal defects are noted and gait is age appropriate without ataxia. Left middle finger = no swelling, " mildly tender ? Soft tissue vs. Bony at the DIP joint area.    SKIN: slightly  suspicious lesion ? Hyperkeratotic inferior to previous scar for severely dysplastic nevus left upper medial back  or rashes  NEURO: Normal strength and tone, sensory exam grossly normal, mentation intact and speech normal  PSYCH: mentation appears normal and affect normal/bright.     Diagnostic Test Results:  Labs reviewed in Epic  xrays of the left middle finger: ? Bony abnormality ? Chronic at the DIP joint space - ? Mild OA vs. Other. fwd to radiology for overread     ASSESSMENT/PLAN:       ICD-10-CM    1. Routine general medical examination at a health care facility Z00.00 CANCELED: 25 Hydroxyvitamin D2 and D3   2. Hyperlipidemia LDL goal <100 E78.5 Lipid panel reflex to direct LDL Fasting     atorvastatin (LIPITOR) 10 MG tablet     Albumin Random Urine Quantitative with Creat Ratio     CBC with platelets differential     Comprehensive metabolic panel     TSH with free T4 reflex     CARDIOLOGY EVAL ADULT REFERRAL     OFFICE/OUTPT VISIT,EST,LEVL IV   3. Family history of early CAD Z82.49 atorvastatin (LIPITOR) 10 MG tablet     OFFICE/OUTPT VISIT,EST,LEVL IV   4. Night sweats R61 estradiol (ESTRACE) 1 MG tablet     OFFICE/OUTPT VISIT,EST,LEVL IV   5. Symptomatic menopausal or female climacteric states N95.1 estradiol (ESTRACE) 1 MG tablet     OFFICE/OUTPT VISIT,EST,LEVL IV   6. Generalized pain R52 ibuprofen (ADVIL/MOTRIN) 800 MG tablet   7. Rotator cuff syndrome, left M75.102 ibuprofen (ADVIL/MOTRIN) 800 MG tablet   8. Insomnia, unspecified type G47.00 zolpidem (AMBIEN) 10 MG tablet     OFFICE/OUTPT VISIT,EST,LEVL IV   9. Closed dislocation of finger of left hand, initial encounter S63.259A XR Finger Left G/E 2 Views     OFFICE/OUTPT VISIT,EST,LEVL IV   10. Gastroesophageal reflux disease without esophagitis K21.9 OFFICE/OUTPT VISIT,EST,LEVL IV   11. Needs flu shot Z23 C RIV4 (FLUBLOK) VACCINE RECOMBINANT DNA PRSRV ANTIBIO FREE, IM  "[[id number]]     ADMIN 1st VACCINE   12. Encounter for vitamin deficiency screening Z13.21 Vitamin D Deficiency   13. Family history of ASCVD (arteriosclerotic cardiovascular disease)- sister and brother in their mid 50's, dad  age 45  Z82.49 CARDIOLOGY EVAL ADULT REFERRAL   14. Neoplasm of uncertain behavior of skin D48.5 DERMATOLOGY REFERRAL     OFFICE/OUTPT VISIT,EST,LEVL IV   15. Need for shingles vaccine Z23 zoster vaccine recombinant adjuvanted (SHINGRIX) injection     DISCONTINUED: zoster vaccine recombinant adjuvanted (SHINGRIX) injection     See above for orders for other medical problems and refills. Will await radiology overread on the finger.   COUNSELING:   Reviewed preventive health counseling, as reflected in patient instructions     Estimated body mass index is 27.81 kg/m  as calculated from the following:    Height as of this encounter: 1.626 m (5' 4\").    Weight as of this encounter: 73.5 kg (162 lb).    Weight management plan: Discussed healthy diet and exercise guidelines     reports that she has never smoked. She has never used smokeless tobacco.    Baseball splint applied in the meantime to injured finger. Please, call or return to clinic or go to the ER immediately if signs or symptoms worsen or fail to improve as anticipated. Rest the affected painful area as much as possible.  Apply ice for 15-20 minutes intermittently as needed and especially after any offending activity. Daily stretching.  As pain recedes, begin normal activities slowly as tolerated.  Consider Physical Therapy if symptoms not better with symptomatic care.     Counseling Resources:  ATP IV Guidelines  Pooled Cohorts Equation Calculator  Breast Cancer Risk Calculator  FRAX Risk Assessment  ICSI Preventive Guidelines  Dietary Guidelines for Americans, 2010  USDA's MyPlate  ASA Prophylaxis  Lung CA Screening    Lisbeth Gonzalez MD  [facility]  " 2019 07:00

## 2021-01-15 ENCOUNTER — MYC MEDICAL ADVICE (OUTPATIENT)
Dept: FAMILY MEDICINE | Facility: CLINIC | Age: 61
End: 2021-01-15

## 2021-01-21 ENCOUNTER — OFFICE VISIT (OUTPATIENT)
Dept: FAMILY MEDICINE | Facility: CLINIC | Age: 61
End: 2021-01-21
Payer: COMMERCIAL

## 2021-01-21 VITALS
OXYGEN SATURATION: 99 % | HEIGHT: 64 IN | DIASTOLIC BLOOD PRESSURE: 86 MMHG | BODY MASS INDEX: 28.17 KG/M2 | WEIGHT: 165 LBS | SYSTOLIC BLOOD PRESSURE: 130 MMHG | HEART RATE: 89 BPM | TEMPERATURE: 96.3 F

## 2021-01-21 DIAGNOSIS — C44.311 BASAL CELL CARCINOMA (BCC) OF SKIN OF NOSE: ICD-10-CM

## 2021-01-21 DIAGNOSIS — Z01.818 PREOP GENERAL PHYSICAL EXAM: Primary | ICD-10-CM

## 2021-01-21 PROCEDURE — 99214 OFFICE O/P EST MOD 30 MIN: CPT | Performed by: FAMILY MEDICINE

## 2021-01-21 ASSESSMENT — MIFFLIN-ST. JEOR: SCORE: 1303.44

## 2021-01-21 NOTE — PROGRESS NOTES
[facility]  [address] [place] [id number]  Office: [phone]   Fax:    [phone]         Primary Provider: Lisbeth Ayers  Pre-op Performing Provider: LISBETH AYERS    PREOPERATIVE EVALUATION:  Today's date: 1/21/2021    Whitney Aceves is a 60 year old female who presents for a preoperative evaluation for Mohs and recontructive surgery right perinasal area..     Surgical Information:  Surgery/Procedure: mohs and reconstructive surgery for basil cell carcinoma  Surgery Location: [facility]  Surgeon: Dr Street  Surgery Date: 2-3-2021  Time of Surgery: 1045am  Where patient plans to recover: At home with family  Fax number for surgical facility: [phone]    Type of Anesthesia Anticipated: General    Subjective     HPI related to upcoming procedure: pt has has small bumps on right side of nose for several years.  Didn't respond to liquid n2 treatment and site was biopsied by Dr. Carmen Dowling , [facility] = basal cell skin carcinoma.   Is scheduled for MOHS procedure on Tuesday 2/2/2021 and then probable reconstructive surgery on Wednesday 2/3/2021.      Preop Questions 1/16/2021   1. Have you ever had a heart attack or stroke? No   2. Have you ever had surgery on your heart or blood vessels, such as a stent placement, a coronary artery bypass, or surgery on an artery in your head, neck, heart, or legs? No   3. Do you have chest pain with activity? No   4. Do you have a history of  heart failure? No   5. Do you currently have a cold, bronchitis or symptoms of other infection? No   6. Do you have a cough, shortness of breath, or wheezing? No   7. Do you or anyone in your family have previous history of blood clots? No   8. Do you or does anyone in your family have a serious bleeding problem such as prolonged bleeding following surgeries or cuts? No   9. Have you ever had problems with anemia or been told to take iron pills? No    10. Have you had any abnormal blood loss such as black, tarry or bloody stools, or abnormal vaginal bleeding? No   11. Have you ever had a blood transfusion? No   12. Are you willing to have a blood transfusion if it is medically needed before, during, or after your surgery? Yes   13. Have you or any of your relatives ever had problems with anesthesia? No   14. Do you have sleep apnea, excessive snoring or daytime drowsiness? No   15. Do you have any artifical heart valves or other implanted medical devices like a pacemaker, defibrillator, or continuous glucose monitor? No   16. Do you have artificial joints? No   17. Are you allergic to latex? No   18. Is there any chance that you may be pregnant? No       Health Care Directive:  Patient does not have a Health Care Directive or Living Will: Discussed advance care planning with patient; however, patient declined at this time.    Preoperative Review of :   reviewed - controlled substances reflected in medication list.- Ambien only.       Status of Chronic Conditions:  See problem list for active medical problems.  Problems all longstanding and stable, except as noted/documented.  See ROS for pertinent symptoms related to these conditions.      Review of Systems  CONSTITUTIONAL: NEGATIVE for fever, chills, change in weight  INTEGUMENTARY/SKIN: NEGATIVE for worrisome rashes, moles or lesions  EYES: NEGATIVE for vision changes or irritation  ENT/MOUTH: NEGATIVE for ear, mouth and throat problems  RESP: NEGATIVE for significant cough or SOB  BREAST: NEGATIVE for masses, tenderness or discharge  CV: NEGATIVE for chest pain, palpitations or peripheral edema  GI: NEGATIVE for nausea, abdominal pain, heartburn, or change in bowel habits  : NEGATIVE for frequency, dysuria, or hematuria  MUSCULOSKELETAL: NEGATIVE for significant arthralgias or myalgia  NEURO: NEGATIVE for weakness, dizziness or paresthesias  ENDOCRINE: NEGATIVE for temperature intolerance, skin/hair  changes  HEME: NEGATIVE for bleeding problems  PSYCHIATRIC: NEGATIVE for changes in mood or affect    Patient Active Problem List    Diagnosis Date Noted     Controlled substance agreement signed - 9/17/2018 ok for #30 -10mg ambien (takes 1/2 tab) with 1 refill every 6 months or so  09/17/2018     Priority: High     Hyperlipidemia LDL goal <130- on atorvastatin  10/31/2010     Priority: High     RUQ abdominal pain- comes and goes - not related to foods or fatty foods, worse bending over with or without lifting and getting out of her car- ? muscle spasm - CT and US negative 10/5/2020  10/22/2020     Priority: Medium     Rotator cuff syndrome, left 10/22/2020     Priority: Medium     Other osteoarthritis involving multiple joints- left middle finger s/p fracture /dislocation - causing some chronic pain - pt requests refill of ibuprofen 800mg tabs - takes with food  10/22/2020     Priority: Medium     Multiple dysplastic nevi-Going to dermatology for her full body skin exam annually - next 10/23/2020 - [facility] - Dr. Carmen Dowling - for her early dysplastic melanoma.        Priority: Medium     Urinary urgency 09/17/2018     Priority: Medium     Fatigue, unspecified type 09/17/2018     Priority: Medium     Night sweats- on estrace 1mg already - discussed progesterone deficiency usually causing that - pt would like to try black cohosh first prior to micronized progesterone 2nd to breast ca risk  09/17/2018     Priority: Medium     Acute recurrent maxillary sinusitis 09/17/2018     Priority: Medium     Insomnia, unspecified type-Controlled substance agreement signed -10/22/2020 ok for #30 -10mg ambien (takes 1/2 tab) with 1 refill every 6 months or so  03/08/2018     Priority: Medium     Patient is followed by SEJAL AYERS for ongoing prescription of ambien  All refills should be approved by this provider, or covering partner.    Medication(s): ambien 10mg - takes 1/2 tab.   Maximum quantity per  month: #30, if that.   Clinic visit frequency required: Q 6  months     Controlled substance agreement on file: Yes       Date(s): 10/22/2020  Benzodiazepine use reviewed by psychiatry:  No    Last [facility] website verification:  done on 10/22/2020   [email or web address]  --Lisbeth Gonzalez MD        Expressive aphasia 03/08/2018     Priority: Medium     mixing up her words occasionally - for instance wanted to say astringent , but it came out nonsensically.c can say up to 3-4 words in a row, that are nonsensical.         Primary osteoarthritis of both hands 09/29/2017     Priority: Medium     Family history of early CAD 09/29/2017     Priority: Medium     Symptomatic menopausal or female climacteric states 08/09/2017     Priority: Medium     Fibrocystic breast changes of both breasts 08/09/2017     Priority: Medium     Gastroesophageal reflux disease without esophagitis 02/17/2017     Priority: Medium     Advanced directives, counseling/discussion 01/15/2016     Priority: Medium     Advance Care Planning 1/15/2016: ACP Review of Chart / Resources Provided:  Reviewed chart for advance care plan.  Whitney Aceves has no plan or code status on file. Discussed available resources and provided with information. Added by Jun De La Rosa             Overactive bladder 06/21/2010     Priority: Medium     Anxiety      Priority: Medium     Ed score is 0 on 6-29-11       Panic disorder without agoraphobia 08/05/2008     Priority: Medium     Per Kade Trujillo/ernesto          Past Medical History:   Diagnosis Date     Anxiety     on effexor in past. Off since 2010     Cancer (H) 01/2021    SKIN CANCER     CMC DJD(carpometacarpal degenerative joint disease), localized primary      Edema      Fatigue, unspecified type 09/17/2018     Hormone replacement therapy      IBS (irritable bowel syndrome)     Diarrhea predominant     Insomnia, unspecified     On Ambien     Lichen sclerosus      Multiple dysplastic nevi     Going to  dermatology for her full body skin exam tomorrow - [facility] - Dr. Carmen Dowling - for her early dysplastic melanoma.     Pap smear of cervix not needed      Urge incontinence      Past Surgical History:   Procedure Laterality Date      SECTION       COLONOSCOPY  2012    Procedure:COLONOSCOPY; Colonoscopy ; Surgeon:SILVERIO ARRIETA; Location: GI     COLONOSCOPY N/A 2017    Procedure: COLONOSCOPY;  Surgeon: Silverio Arrieta MD;  Location:  GI     COLPOSCOPY, BIOPSY, COMBINED  1991    Mild squamous epithelial dysplasia (SABIHA 1)     FISSURECTOMY RECTUM  01     HYSTERECTOMY TOTAL ABDOMINAL  3/4/1998    Myomatous uterus; pelvic pain. Started HRT right afterward with vasomotor sx.     HYSTERECTOMY, PAP NO LONGER INDICATED N/A     had for large fibroid     LIPOSUCTION (LOCATION)       Current Outpatient Medications   Medication Sig Dispense Refill     atorvastatin (LIPITOR) 10 MG tablet Take 1 tablet (10 mg) by mouth daily 90 tablet 3     azithromycin (ZITHROMAX) 250 MG tablet 2 tabs first day, then 1 tab by mouth daily for 4 additional days 6 tablet 3     calcium-vitamin D (OSCAL) 250-125 MG-UNIT TABS per tablet Take 1 tablet by mouth daily       estradiol (ESTRACE) 1 MG tablet Take 1 tablet (1 mg) by mouth daily 90 tablet 3     ibuprofen (ADVIL/MOTRIN) 800 MG tablet TAKE 1 TABLET BY MOUTH EVERY 8 HOURS WITH FOOD AS NEEDED FOR MODERATE PAIN 90 tablet 1     Multiple Vitamins-Minerals (MULTIVITAMIN ADULT PO) Take by mouth daily       Omega-3 Fatty Acids (OMEGA 3 PO)        omeprazole (PRILOSEC) 20 MG DR capsule Take 1 capsule (20 mg) by mouth 2 times daily 28 capsule 0     zolpidem (AMBIEN) 10 MG tablet TAKE 1/2 TABLET BY MOUTH EVERY NIGHT AT BEDTIME AS NEEDED FOR SLEEP 30 tablet 1       Allergies   Allergen Reactions     Septra [Sulfamethoxazole W/Trimethoprim]      Sulfa Drugs Hives     Murine Ear [Carbamide Peroxide] Swelling and Rash     Severe pain , swelling after  "debrox type ear drops - had to go to the clinic to have ears flushed out emergently.         Social History     Tobacco Use     Smoking status: Never Smoker     Smokeless tobacco: Never Used   Substance Use Topics     Alcohol use: No     Alcohol/week: 0.0 standard drinks     Family History   Problem Relation Age of Onset     Hypertension Mother      Hyperlipidemia Mother      Diabetes Mother      Cerebrovascular Disease Mother 89        right hemispheric stroke -  11 days after stroke - 10/2019      C.AEVANGELIST. Father 45          age 45 massive MI      Heart Failure Father      Hyperlipidemia Father      C.A.D. Maternal Grandmother      C.A.D. Maternal Grandfather      C.A.D. Paternal Grandmother      C.A.D. Paternal Grandfather      Cancer Other         9 members on maternal side. Bone CA, Breast CA (m. aunt),       Coronary Artery Disease Brother 55        Heart attack- s/p sudden cardiac arrest survived 2019      C.A.LAMBERT. Sister 57        MI age 50- CABG - 2 vessels - 4 MI's since age 55 - 3 stents placed 2017      Diabetes Sister      Breast Cancer Maternal Aunt         50s-Mom had 9 sisters. MAunt->50's     Hypertension Sister      Hyperlipidemia Sister      Prostate Cancer Brother 37     Psoriasis Son 29        after returning from the service - lives in [place]     Psoriatic Arthritis Son 29        on Enbrel      History   Drug Use No         Objective     /86   Pulse 89   Temp 96.3  F (35.7  C)   Ht 1.626 m (5' 4\")   Wt 74.8 kg (165 lb)   SpO2 99%   BMI 28.32 kg/m      Physical Exam    GENERAL APPEARANCE: healthy, alert and no distress     EYES: EOMI, PERRL     HENT: ear canals and TM's normal and nose and mouth without ulcers or lesions     NECK: no adenopathy, no asymmetry, masses, or scars and thyroid normal to palpation     RESP: lungs clear to auscultation - no rales, rhonchi or wheezes     CV: regular rates and rhythm, normal S1 S2, no S3 or S4 and no murmur, click or rub     ABDOMEN:  " soft, nontender, no HSM or masses and bowel sounds normal     MS: extremities normal- no gross deformities noted, no evidence of inflammation in joints, FROM in all extremities.     SKIN: no suspicious lesions or rashes     NEURO: Normal strength and tone, sensory exam grossly normal, mentation intact and speech normal     PSYCH: mentation appears normal. and affect normal/bright     LYMPHATICS: No cervical adenopathy    Recent Labs   Lab Test 10/22/20  1231 10/05/20  1139   HGB 12.9 13.3    222    140   POTASSIUM 3.5 3.5   CR 0.69 0.64      Lab Results   Component Value Date    AST 15 10/22/2020     Lab Results   Component Value Date    ALT 31 10/22/2020     Lab Results   Component Value Date    PROTTOTAL 7.1 10/22/2020      Lab Results   Component Value Date    ALKPHOS 102 10/22/2020       Diagnostics:  No labs were ordered during this visit.   No EKG required for low risk surgery (cataract, skin procedure, breast biopsy, etc).    Revised Cardiac Risk Index (RCRI):  The patient has the following serious cardiovascular risks for perioperative complications:   - No serious cardiac risks = 0 points     RCRI Interpretation: 0 points: Class I (very low risk - 0.4% complication rate)           Assessment & Plan :   The proposed surgical procedure is considered LOW risk.      ICD-10-CM    1. Preop general physical exam  Z01.818    2. Basal cell carcinoma (BCC) of skin of nose  C44.311        Medication Instructions:  Patient is on no chronic medications in the mornings.     She will stop all her supplements and vitamins 2 weeks prior to surgery.  Tylenol only for pain.     RECOMMENDATION:  APPROVAL GIVEN to proceed with proposed procedure, without further diagnostic evaluation.    Signed Electronically by:        Lisbeth Gonzalez MD    Copy of this evaluation report is provided to requesting physician.    [facility] Preop Guidelines    Revised Cardiac Risk Index

## 2021-01-21 NOTE — PATIENT INSTRUCTIONS
Preparing for Your Surgery  Getting started  A nurse will call you to review your health history and instructions. They will give you an arrival time based on your scheduled surgery time.  Please be ready to share the following:    Your doctor's clinic name and phone number    Your medical, surgical and anesthesia history    A list of allergies and sensitivities    A list of medicines, including herbal treatments and over-the-counter drugs    Whether the patient has a legal guardian (ask how to send us the papers in advance)  If you have a child who's having surgery, please ask for a copy of Preparing for Your Child's Surgery.    Preparing for surgery    Within 30 days of surgery: Have a pre-op exam (sometimes called an H&P, or History and Physical). This can be done at a clinic or pre-operative center.  ? If you're having a , you may not need this exam. Talk to your care team    At your pre-op exam, talk to your care team about all medicines you take. If you need to stop any medicines before surgery, ask when to start taking them again.  ? We do this for your safety. Many medicines can make you bleed too much during surgery. Some change how well surgery (anesthesia) drugs work.    Call your insurance company to let them know you're having surgery. (If you don't have insurance, call [phone].)    Call your clinic if there's any change in your health. This includes signs of a cold or flu (sore throat, runny nose, cough, rash, fever). It also includes a scrape or scratch near the surgery site.    If you have questions on the day of surgery, call your hospital or surgery center.  Eating and drinking guidelines  For your safety: Unless your surgeon tells you otherwise, follow the guidelines below.    Eat and drink as usual until 8 hours before surgery. After that, no food or milk.    Drink clear liquids until 2 hours before surgery. These are liquids you can see through, like water, Gatorade and Propel  Water. You may also have black coffee and tea (no cream or milk).    Nothing by mouth within 2 hours of surgery. This includes gum, candy and breath mints.    If you drink, stop drinking alcohol the night before surgery.    If your care team tells you to take medicine on the morning of surgery, it's okay to take it with a sip of water.  Preventing infection    Shower or bathe the night before and morning of your surgery. Follow the instructions your clinic gave you. (If no instructions, use regular soap.)    Don't shave or clip hair near your surgery site. We'll remove the hair if needed.    Don't smoke or vape the morning of surgery. You may chew nicotine gum up to 2 hours before surgery. A nicotine patch is okay.  ? Note: Some surgeries require you to completely quit smoking and nicotine. Check with your surgeon.    Your care team will make every effort to keep you safe from infection. We will:  ? Clean our hands often with soap and water (or an alcohol-based hand rub).  ? Clean the skin at your surgery site with a special soap that kills germs.  ? Give you a special gown to keep you warm. (Cold raises the risk of infection.)  ? Wear special hair covers, masks, gowns and gloves during surgery.  ? Give antibiotic medicine, if prescribed. Not all surgeries need antibiotics.  What to bring on the day of surgery    Photo ID and insurance card    Copy of your health care directive, if you have one    Glasses and hearing aides (bring cases)  ? You can't wear contacts during surgery    Inhaler and eye drops, if you use them (tell us about these when you arrive)    CPAP machine or breathing device, if you use them    A few personal items, if spending the night    If you have . . .  ? A pacemaker or ICD (cardiac defibrillator): Bring the ID card.  ? An implanted stimulator: Bring the remote control.  ? A legal guardian: Bring a copy of the certified (court-stamped) guardianship papers.  Please remove any jewelry, including  body piercings. Leave jewelry and other valuables at home.  If you're going home the day of surgery  Important: If you don't follow the rules below, we must cancel your surgery.     Arrange for someone to drive you home after surgery. You may not drive, take a taxi or take public transportation by yourself (unless you'll have local anesthesia only).    Arrange for a responsible adult to stay with you overnight. If you don't, we may keep you in the hospital overnight, and you may need to pay the costs yourself.  Questions?   If you have any questions for your care team, list them here: _________________________________________________________________________________________________________________________________________________________________________________________________________________________________________________________________________________________________________________________  For informational purposes only. Not to replace the advice of your health care provider. Copyright   2003, 2019 [employer]. All rights reserved. Clinically reviewed by Dee Nichols MD. [employer] [id number] - REV 4/20.    Preparing for Your Surgery  Getting started  A nurse will call you to review your health history and instructions. They will give you an arrival time based on your scheduled surgery time.  Please be ready to share the following:    Your doctor's clinic name and phone number    Your medical, surgical and anesthesia history    A list of allergies and sensitivities    A list of medicines, including herbal treatments and over-the-counter drugs    Whether the patient has a legal guardian (ask how to send us the papers in advance)  If you have a child who's having surgery, please ask for a copy of Preparing for Your Child's Surgery.    Preparing for surgery    Within 30 days of surgery: Have a pre-op exam (sometimes called an H&P, or History and Physical). This can be done at a clinic or pre-operative  center.  ? If you're having a , you may not need this exam. Talk to your care team    At your pre-op exam, talk to your care team about all medicines you take. If you need to stop any medicines before surgery, ask when to start taking them again.  ? We do this for your safety. Many medicines can make you bleed too much during surgery. Some change how well surgery (anesthesia) drugs work.    Call your insurance company to let them know you're having surgery. (If you don't have insurance, call [phone].)    Call your clinic if there's any change in your health. This includes signs of a cold or flu (sore throat, runny nose, cough, rash, fever). It also includes a scrape or scratch near the surgery site.    If you have questions on the day of surgery, call your hospital or surgery center.  Eating and drinking guidelines  For your safety: Unless your surgeon tells you otherwise, follow the guidelines below.    Eat and drink as usual until 8 hours before surgery. After that, no food or milk.    Drink clear liquids until 2 hours before surgery. These are liquids you can see through, like water, Gatorade and Propel Water. You may also have black coffee and tea (no cream or milk).    Nothing by mouth within 2 hours of surgery. This includes gum, candy and breath mints.    If you drink, stop drinking alcohol the night before surgery.    If your care team tells you to take medicine on the morning of surgery, it's okay to take it with a sip of water.  Preventing infection    Shower or bathe the night before and morning of your surgery. Follow the instructions your clinic gave you. (If no instructions, use regular soap.)    Don't shave or clip hair near your surgery site. We'll remove the hair if needed.    Don't smoke or vape the morning of surgery. You may chew nicotine gum up to 2 hours before surgery. A nicotine patch is okay.  ? Note: Some surgeries require you to completely quit smoking and nicotine. Check  with your surgeon.    Your care team will make every effort to keep you safe from infection. We will:  ? Clean our hands often with soap and water (or an alcohol-based hand rub).  ? Clean the skin at your surgery site with a special soap that kills germs.  ? Give you a special gown to keep you warm. (Cold raises the risk of infection.)  ? Wear special hair covers, masks, gowns and gloves during surgery.  ? Give antibiotic medicine, if prescribed. Not all surgeries need antibiotics.  What to bring on the day of surgery    Photo ID and insurance card    Copy of your health care directive, if you have one    Glasses and hearing aides (bring cases)  ? You can't wear contacts during surgery    Inhaler and eye drops, if you use them (tell us about these when you arrive)    CPAP machine or breathing device, if you use them    A few personal items, if spending the night    If you have . . .  ? A pacemaker or ICD (cardiac defibrillator): Bring the ID card.  ? An implanted stimulator: Bring the remote control.  ? A legal guardian: Bring a copy of the certified (court-stamped) guardianship papers.  Please remove any jewelry, including body piercings. Leave jewelry and other valuables at home.  If you're going home the day of surgery  Important: If you don't follow the rules below, we must cancel your surgery.     Arrange for someone to drive you home after surgery. You may not drive, take a taxi or take public transportation by yourself (unless you'll have local anesthesia only).    Arrange for a responsible adult to stay with you overnight. If you don't, we may keep you in the hospital overnight, and you may need to pay the costs yourself.  Questions?   If you have any questions for your care team, list them here:  "_________________________________________________________________________________________________________________________________________________________________________________________________________________________________________________________________________________________________________________________  For informational purposes only. Not to replace the advice of your health care provider. Copyright   2003, 2019 [facility]. All rights reserved. Clinically reviewed by Dee Nichols MD. [employer] [id number] - REV 4/20.    Before Your Procedure or Hospital Admission  Testing for COVID-19 (Coronavirus)  Thank you for choosing [facility] for your health care needs. This is a very challenging time for everyone. The World Health Organization and the State of [place] have declared the COVID-19 virus a pandemic.   Our goal is to keep you and our team here at [facility] safe and healthy. We've taken several steps to make this happen. For example:    We screen our staff, care teams and patients for COVID-19.    Everyone at [facility] must wear a mask and stay 6 feet apart.    We are limiting hospital and clinic visitors.  Before you come in  All patients must get tested for COVID-19. Your test needs to happen 2 to 4 days before you check in to the hospital or surgery site.   A clinic scheduler will call about a week in advance to set up a testing time at one of our labs where we'll take a swab of your nose or throat.  Note: If you go to a clinic or pharmacy like [facility] or [facility] for your test, make sure it's a \"RT-PCR\" test, not a \"rapid\" COVID-19 test. (See Questions and Answers below.)  After the test, please stay at home and stay out of contact with other people. It will be harder for you to recover if you get COVID-19 before your treatment.  Please follow all current safety guidelines, including:    Limit trips outside your home.    Limit the number of people you see.    Always " wear a mask outside your home.    Use social distancing. (Stay 6 feet away from others whenever you can.)    Wash your hands often.  If your test shows you have COVID-19  If your test is positive, we'll let you know. A positive test means that you have the virus.   We'll probably have to postpone your admission, surgery or procedure. Your doctor will discuss this with you. After that, we'll let you know what to do and when you can reschedule.   We may need to cancel your treatment on short notice for other reasons, too.  If your test shows you DON'T have COVID-19  Even if your test is negative, you may still get COVID-19. It's rare but, sometimes, the test result is wrong. You could also catch the virus after taking the test.   There's a very small chance that you could catch COVID-19 in the hospital or surgery center. [facility] has taken many steps to prevent this from happening.   Day of your surgery or procedure    Please come wearing a mask or something else that covers both your nose and mouth.    When you arrive, we'll ask you some questions to find out if you have any signs or symptoms of COVID-19.    Ask your care team if you can have visitors. All visitors must wear masks and will be screened for signs of COVID-19.  ? Even if no visitors are allowed, you can still have with you:    Your legal guardian or legal decision maker    A parent and one other visitor, if you are younger than 18 years old    A partner and a , if you are in labor  ? We might need to teach you about taking care of yourself after surgery. If so, a visitor can come into the hospital to learn about it, too.  ? The rules for visitors change often, depending on how much the virus is spreading. To learn more, see Visiting a Loved One in the Hospital during the COVID-19 Outbreak.  Please call your care team, hospital or surgery center if you have any questions. We thank you for your understanding and for choosing [facility]  "[place] for your care.   Questions and Answers  Does it matter where I get tested for COVID-19?  Yes. We urge you to get tested at one of our [facility] COVID-19 testing sites. We process these tests in our lab and can get the results quickly. Your [facility] care team needs to get your results before you check in.  What should I do if I can't get tested at [facility]?  You can get tested somewhere else, but you'll need to take these extra steps:  1. Contact your family doctor or clinic to arrange your test.  2. Take the test within 4 days of your surgery or procedure. We can't accept tests older than 4 days.  3. Make sure your doctor or clinic faxes your results to [facility] at [phone].  If we don't get your results in time, we may have to postpone or cancel your treatment.  Ask if you're getting a \"RT-PCR\" COVID-19 test. It should NOT be a \"rapid\" COVID-19 test. Many drug stores use \"rapid\" tests, but they may not be as accurate. We don't accept the results of \"rapid\" tests.  For informational purposes only. Not to replace the advice of your health care provider. Copyright   2020 [facility]. All rights reserved. Clinically reviewed by In[facility] and the [facility] COVID-19 Clinical Team. [employer] [id number] - REV 10/20.    How to Take Your Medication Before Surgery  - STOP taking all vitamins and herbal supplements 14 days before surgery.   Don't take any medicines or supplements either the morning of surgery.     Thank you so much or choosing [facility]  for your Health Care. It was a pleasure seeing you at your visit today! Please contact us with any questions or concerns you may have.                   Lisbeth Gonzalez MD                              To reach your [facility] care team after hours call:   [phone]    PLEASE NOTE OUR HOURS HAVE CHANGED secondary to COVID-19 coronavirus " pandemic, as we are trying to minimize patient exposure to the virus,  which is now widespread in the state.  These hours may change with very little notice.  We apologize for any inconvenience.       Our current clinic hours are:          Monday- Thursday   7:00am - 6:00pm  in person.      Friday  7:00am- 5:00pm                       Saturday and Sunday : Closed to in person and virtual visits        We have telephone and virtual visit times available between    7:00am - 6pm on Monday-Friday as well.                                                Phone:  [phone]      Our pharmacy hours:   Monday  9:00 am to 6:00 pm      Tuesday through Friday 9:00am to 5:00pm                        Saturday - 9:00 am to 12 noon       Sunday : Closed.              Phone:  [phone]    ###  Please note: at this time we are not accepting any walk-in visits. ###      There is also information available at our web site:  [email or web address]    If your provider ordered any lab tests and you do not receive the results within 10 business days, please call the clinic.    If you need a medication refill please contact your pharmacy.  Please allow 2 business days for your refill to be completed.    Our clinic offers telephone visits and e visits.  Please ask one of your team members to explain more.      Use [employer] (secure email communication and access to your chart) to send your primary care provider a message or make an appointment. Ask someone on your Team how to sign up for [employer].               Return in about 3 months (around 4/21/2021) for cholesterol recheck, as a lab only visit, please call [facility] [phone] to schedule.

## 2021-01-22 ENCOUNTER — MYC MEDICAL ADVICE (OUTPATIENT)
Dept: FAMILY MEDICINE | Facility: CLINIC | Age: 61
End: 2021-01-22

## 2021-01-24 ENCOUNTER — MYC MEDICAL ADVICE (OUTPATIENT)
Dept: FAMILY MEDICINE | Facility: CLINIC | Age: 61
End: 2021-01-24

## 2021-01-25 NOTE — TELEPHONE ENCOUNTER
Routing to [facility] to print and file in pts chart     Thank you     Marisabel Farmer RN, BSN  [facility]

## 2021-02-17 ENCOUNTER — DOCUMENTATION ONLY (OUTPATIENT)
Dept: OTHER | Facility: CLINIC | Age: 61
End: 2021-02-17

## 2021-04-08 ENCOUNTER — IMMUNIZATION (OUTPATIENT)
Dept: NURSING | Facility: CLINIC | Age: 61
End: 2021-04-08
Payer: COMMERCIAL

## 2021-04-08 PROCEDURE — 91300 PR COVID VAC PFIZER DIL RECON 30 MCG/0.3 ML IM: CPT

## 2021-04-08 PROCEDURE — 0001A PR COVID VAC PFIZER DIL RECON 30 MCG/0.3 ML IM: CPT

## 2021-04-29 ENCOUNTER — IMMUNIZATION (OUTPATIENT)
Dept: NURSING | Facility: CLINIC | Age: 61
End: 2021-04-29
Attending: INTERNAL MEDICINE
Payer: COMMERCIAL

## 2021-04-29 PROCEDURE — 0002A PR COVID VAC PFIZER DIL RECON 30 MCG/0.3 ML IM: CPT

## 2021-04-29 PROCEDURE — 91300 PR COVID VAC PFIZER DIL RECON 30 MCG/0.3 ML IM: CPT

## 2021-05-06 ENCOUNTER — OFFICE VISIT (OUTPATIENT)
Dept: FAMILY MEDICINE | Facility: CLINIC | Age: 61
End: 2021-05-06
Payer: COMMERCIAL

## 2021-05-06 VITALS
HEIGHT: 64 IN | OXYGEN SATURATION: 98 % | TEMPERATURE: 97.7 F | HEART RATE: 92 BPM | RESPIRATION RATE: 16 BRPM | WEIGHT: 165 LBS | DIASTOLIC BLOOD PRESSURE: 82 MMHG | SYSTOLIC BLOOD PRESSURE: 122 MMHG | BODY MASS INDEX: 28.17 KG/M2

## 2021-05-06 DIAGNOSIS — M53.3 SACRAL PAIN: ICD-10-CM

## 2021-05-06 DIAGNOSIS — R07.81 RIB PAIN ON RIGHT SIDE: ICD-10-CM

## 2021-05-06 DIAGNOSIS — R31.0 GROSS HEMATURIA: Primary | ICD-10-CM

## 2021-05-06 LAB
ALBUMIN UR-MCNC: NEGATIVE MG/DL
APPEARANCE UR: CLEAR
BASOPHILS # BLD AUTO: 0 10E9/L (ref 0–0.2)
BASOPHILS NFR BLD AUTO: 0.6 %
BILIRUB UR QL STRIP: NEGATIVE
COLOR UR AUTO: YELLOW
DIFFERENTIAL METHOD BLD: NORMAL
EOSINOPHIL # BLD AUTO: 0.2 10E9/L (ref 0–0.7)
EOSINOPHIL NFR BLD AUTO: 5 %
ERYTHROCYTE [DISTWIDTH] IN BLOOD BY AUTOMATED COUNT: 12.8 % (ref 10–15)
ERYTHROCYTE [SEDIMENTATION RATE] IN BLOOD BY WESTERGREN METHOD: 10 MM/H (ref 0–30)
GLUCOSE UR STRIP-MCNC: NEGATIVE MG/DL
HCT VFR BLD AUTO: 37.8 % (ref 35–47)
HGB BLD-MCNC: 12.7 G/DL (ref 11.7–15.7)
HGB UR QL STRIP: ABNORMAL
KETONES UR STRIP-MCNC: NEGATIVE MG/DL
LEUKOCYTE ESTERASE UR QL STRIP: NEGATIVE
LYMPHOCYTES # BLD AUTO: 1.8 10E9/L (ref 0.8–5.3)
LYMPHOCYTES NFR BLD AUTO: 36.3 %
MCH RBC QN AUTO: 29.6 PG (ref 26.5–33)
MCHC RBC AUTO-ENTMCNC: 33.6 G/DL (ref 31.5–36.5)
MCV RBC AUTO: 88 FL (ref 78–100)
MONOCYTES # BLD AUTO: 0.3 10E9/L (ref 0–1.3)
MONOCYTES NFR BLD AUTO: 6.8 %
NEUTROPHILS # BLD AUTO: 2.5 10E9/L (ref 1.6–8.3)
NEUTROPHILS NFR BLD AUTO: 51.3 %
NITRATE UR QL: NEGATIVE
NON-SQ EPI CELLS #/AREA URNS LPF: NORMAL /LPF
PH UR STRIP: 5.5 PH (ref 5–7)
PLATELET # BLD AUTO: 222 10E9/L (ref 150–450)
RBC # BLD AUTO: 4.29 10E12/L (ref 3.8–5.2)
RBC #/AREA URNS AUTO: NORMAL /HPF
SOURCE: ABNORMAL
SP GR UR STRIP: 1.01 (ref 1–1.03)
UROBILINOGEN UR STRIP-ACNC: 0.2 EU/DL (ref 0.2–1)
WBC # BLD AUTO: 4.8 10E9/L (ref 4–11)
WBC #/AREA URNS AUTO: NORMAL /HPF

## 2021-05-06 PROCEDURE — 81001 URINALYSIS AUTO W/SCOPE: CPT | Performed by: PHYSICIAN ASSISTANT

## 2021-05-06 PROCEDURE — 36415 COLL VENOUS BLD VENIPUNCTURE: CPT | Performed by: PHYSICIAN ASSISTANT

## 2021-05-06 PROCEDURE — 86140 C-REACTIVE PROTEIN: CPT | Performed by: PHYSICIAN ASSISTANT

## 2021-05-06 PROCEDURE — 87086 URINE CULTURE/COLONY COUNT: CPT | Performed by: PHYSICIAN ASSISTANT

## 2021-05-06 PROCEDURE — 85025 COMPLETE CBC W/AUTO DIFF WBC: CPT | Performed by: PHYSICIAN ASSISTANT

## 2021-05-06 PROCEDURE — 85652 RBC SED RATE AUTOMATED: CPT | Performed by: PHYSICIAN ASSISTANT

## 2021-05-06 PROCEDURE — 80053 COMPREHEN METABOLIC PANEL: CPT | Performed by: PHYSICIAN ASSISTANT

## 2021-05-06 PROCEDURE — 99214 OFFICE O/P EST MOD 30 MIN: CPT | Performed by: PHYSICIAN ASSISTANT

## 2021-05-06 PROCEDURE — 82150 ASSAY OF AMYLASE: CPT | Performed by: PHYSICIAN ASSISTANT

## 2021-05-06 PROCEDURE — 83690 ASSAY OF LIPASE: CPT | Performed by: PHYSICIAN ASSISTANT

## 2021-05-06 RX ORDER — PREDNISONE 20 MG/1
TABLET ORAL
Qty: 20 TABLET | Refills: 0 | Status: SHIPPED | OUTPATIENT
Start: 2021-05-06 | End: 2021-07-01

## 2021-05-06 RX ORDER — MELATON/B.COHOSH/VALERIAN/HOPS 3 MG-40 MG
CAPSULE ORAL
Refills: 0 | COMMUNITY
Start: 2021-05-06 | End: 2022-12-14 | Stop reason: ALTCHOICE

## 2021-05-06 ASSESSMENT — MIFFLIN-ST. JEOR: SCORE: 1303.44

## 2021-05-06 NOTE — PROGRESS NOTES
"    Assessment & Plan     Gross hematuria  Unclear etiology.  Labs for surveillance.  Recent CT and ultrasound unremarkable without any evidence of nephrolithiasis.  If labs unremarkable would refer to urology for further evaluation/treatment.  - *UA reflex to Microscopic and Culture ([place] and [facility] (except [place] and [place])  - Urine Microscopic  - Urine Culture Aerobic Bacterial  - Comprehensive metabolic panel (BMP + Alb, Alk Phos, ALT, AST, Total. Bili, TP)  - Lipase  - Amylase  - CBC with platelets and differential  - ESR: Erythrocyte sedimentation rate  - CRP, inflammation    Sacral pain  Unclear etiology -suspect bone bruise/fracture.  Recommend ice and avoiding recurrent trauma with very intentional movements/padding under her seat.  We will do a trial of a prednisone taper for both the right rib discomfort as well as the sacral pain to see if this improves her symptoms.  If no improvement would consider imaging.  - predniSONE (DELTASONE) 20 MG tablet  Dispense: 20 tablet; Refill: 0    Rib pain on right side  Suspect rib misalignment.  We will do a trial of a prednisone taper to see if this improves her symptoms.  If it does not consider gentle chiropractic care versus formal physical therapy.  Patient will keep me informed of her symptoms status with this treatment regimen.  - predniSONE (DELTASONE) 20 MG tablet  Dispense: 20 tablet; Refill: 0      BMI:   Estimated body mass index is 28.32 kg/m  as calculated from the following:    Height as of this encounter: 1.626 m (5' 4\").    Weight as of this encounter: 74.8 kg (165 lb).   Weight management plan: Patient was referred to their PCP to discuss a diet and exercise plan.        Return in about 1 week (around 5/13/2021) for Update via Tauntr.    Odilia Everett PA-C  [facility]    Florentino Olson is a 60 year old who presents for the following health issues     HPI     Genitourinary - " "Female  Onset/Duration: 2 DAYS  Description:   Painful urination (Dysuria): no           Frequency: no  Blood in urine (Hematuria): YES -urine appears somewhat rosalina in color/light pink.  Initially she thought that it was a water in the toilet but then realized when she was using different facilities that the color abnormality was present and different locations.  Delay in urine (Hesitency): no  Intensity: Denies pain  Progression of Symptoms:  same  Accompanying Signs & Symptoms:  Fever/chills: no  Flank pain: YES -persistent since October.  Had work-up in October that was unremarkable.  Nausea and vomiting: no  Vaginal symptoms: none  Abdominal/Pelvic Pain: no  History:   History of frequent UTI s: no  History of kidney stones: no  Sexually Active: YES  Possibility of pregnancy: No  Precipitating or alleviating factors: None  Therapies tried and outcome:  none     RUQ pain -  This is persisting.  Does not seem to be associated with any specific position, eating.  Had work-up with ultrasound and CT scan that were both normal/unremarkable on 10/5/2020.  Now that she has had time to think about any changes/aggravating factors she feels like this may have been aggravated by a deep tissue massage that she had.  She had no issues with her right upper quadrant/right side pain that feels like it follows along her rib line.  Denies any bowel abnormalities/changes    Of note, the patient states that for the last month she has had pain over her tailbone.  Denies any known trauma.  Is quite sensitive when she is sitting and finds herself needing to adjust her position frequently.      Review of Systems   Constitutional, HEENT, cardiovascular, pulmonary, GI, , musculoskeletal, neuro, skin, endocrine and psych systems are negative, except as otherwise noted.      Objective    /82   Pulse 92   Temp 97.7  F (36.5  C)   Resp 16   Ht 1.626 m (5' 4\")   Wt 74.8 kg (165 lb)   SpO2 98%   BMI 28.32 kg/m    Body mass index " is 28.32 kg/m .  Physical Exam   GENERAL: healthy, alert and no distress  EYES: Eyes grossly normal to inspection  RESP: lungs clear to auscultation - no rales, rhonchi or wheezes  CV: regular rate and rhythm, normal S1 S2, no S3 or S4, no murmur, click or rub, no peripheral edema and peripheral pulses strong  ABDOMEN: soft, tenderness along right lower rib line with guarding (anterior to posterior), no hepatosplenomegaly, no masses and bowel sounds normal  MS: Tenderness to deep palpation of the distal sacrum  SKIN: no suspicious lesions or rashes  NEURO: Normal strength and tone, mentation intact and speech normal  PSYCH: mentation appears normal, affect normal/bright    Results for orders placed or performed in visit on 05/06/21   *UA reflex to Microscopic and Culture ([place] and [facility] (except [place] and [place])     Status: Abnormal    Specimen: Midstream Urine   Result Value Ref Range    Color Urine Yellow     Appearance Urine Clear     Glucose Urine Negative NEG^Negative mg/dL    Bilirubin Urine Negative NEG^Negative    Ketones Urine Negative NEG^Negative mg/dL    Specific Gravity Urine 1.015 1.003 - 1.035    Blood Urine Trace (A) NEG^Negative    pH Urine 5.5 5.0 - 7.0 pH    Protein Albumin Urine Negative NEG^Negative mg/dL    Urobilinogen Urine 0.2 0.2 - 1.0 EU/dL    Nitrite Urine Negative NEG^Negative    Leukocyte Esterase Urine Negative NEG^Negative    Source Midstream Urine    Urine Microscopic     Status: None   Result Value Ref Range    WBC Urine 0 - 5 OTO5^0 - 5 /HPF    RBC Urine O - 2 OTO2^O - 2 /HPF    Squamous Epithelial /LPF Urine Few FEW^Few /LPF   Comprehensive metabolic panel (BMP + Alb, Alk Phos, ALT, AST, Total. Bili, TP)     Status: None   Result Value Ref Range    Sodium 139 133 - 144 mmol/L    Potassium 3.7 3.4 - 5.3 mmol/L    Chloride 106 94 - 109 mmol/L    Carbon Dioxide 29 20 - 32 mmol/L    Anion Gap 4 3 - 14 mmol/L    Glucose 73 70 - 99 mg/dL    Urea Nitrogen 18 7 - 30  mg/dL    Creatinine 0.71 0.52 - 1.04 mg/dL    GFR Estimate >90 >60 mL/min/[1.73_m2]    GFR Estimate If Black >90 >60 mL/min/[1.73_m2]    Calcium 9.0 8.5 - 10.1 mg/dL    Bilirubin Total 0.9 0.2 - 1.3 mg/dL    Albumin 3.5 3.4 - 5.0 g/dL    Protein Total 7.2 6.8 - 8.8 g/dL    Alkaline Phosphatase 79 40 - 150 U/L    ALT 25 0 - 50 U/L    AST 10 0 - 45 U/L   Lipase     Status: None   Result Value Ref Range    Lipase 154 73 - 393 U/L   Amylase     Status: None   Result Value Ref Range    Amylase 58 30 - 110 U/L   CBC with platelets and differential     Status: None   Result Value Ref Range    WBC 4.8 4.0 - 11.0 10e9/L    RBC Count 4.29 3.8 - 5.2 10e12/L    Hemoglobin 12.7 11.7 - 15.7 g/dL    Hematocrit 37.8 35.0 - 47.0 %    MCV 88 78 - 100 fl    MCH 29.6 26.5 - 33.0 pg    MCHC 33.6 31.5 - 36.5 g/dL    RDW 12.8 10.0 - 15.0 %    Platelet Count 222 150 - 450 10e9/L    % Neutrophils 51.3 %    % Lymphocytes 36.3 %    % Monocytes 6.8 %    % Eosinophils 5.0 %    % Basophils 0.6 %    Absolute Neutrophil 2.5 1.6 - 8.3 10e9/L    Absolute Lymphocytes 1.8 0.8 - 5.3 10e9/L    Absolute Monocytes 0.3 0.0 - 1.3 10e9/L    Absolute Eosinophils 0.2 0.0 - 0.7 10e9/L    Absolute Basophils 0.0 0.0 - 0.2 10e9/L    Diff Method Automated Method    ESR: Erythrocyte sedimentation rate     Status: None   Result Value Ref Range    Sed Rate 10 0 - 30 mm/h   CRP, inflammation     Status: None   Result Value Ref Range    CRP Inflammation <2.9 0.0 - 8.0 mg/L   Urine Culture Aerobic Bacterial     Status: None    Specimen: Midstream Urine   Result Value Ref Range    Specimen Description Midstream Urine     Special Requests Specimen received in preservative     Culture Micro No growth

## 2021-05-07 LAB
ALBUMIN SERPL-MCNC: 3.5 G/DL (ref 3.4–5)
ALP SERPL-CCNC: 79 U/L (ref 40–150)
ALT SERPL W P-5'-P-CCNC: 25 U/L (ref 0–50)
AMYLASE SERPL-CCNC: 58 U/L (ref 30–110)
ANION GAP SERPL CALCULATED.3IONS-SCNC: 4 MMOL/L (ref 3–14)
AST SERPL W P-5'-P-CCNC: 10 U/L (ref 0–45)
BILIRUB SERPL-MCNC: 0.9 MG/DL (ref 0.2–1.3)
BUN SERPL-MCNC: 18 MG/DL (ref 7–30)
CALCIUM SERPL-MCNC: 9 MG/DL (ref 8.5–10.1)
CHLORIDE SERPL-SCNC: 106 MMOL/L (ref 94–109)
CO2 SERPL-SCNC: 29 MMOL/L (ref 20–32)
CREAT SERPL-MCNC: 0.71 MG/DL (ref 0.52–1.04)
CRP SERPL-MCNC: <2.9 MG/L (ref 0–8)
GFR SERPL CREATININE-BSD FRML MDRD: >90 ML/MIN/{1.73_M2}
GLUCOSE SERPL-MCNC: 73 MG/DL (ref 70–99)
LIPASE SERPL-CCNC: 154 U/L (ref 73–393)
POTASSIUM SERPL-SCNC: 3.7 MMOL/L (ref 3.4–5.3)
PROT SERPL-MCNC: 7.2 G/DL (ref 6.8–8.8)
SODIUM SERPL-SCNC: 139 MMOL/L (ref 133–144)

## 2021-05-08 LAB
BACTERIA SPEC CULT: NO GROWTH
Lab: NORMAL
SPECIMEN SOURCE: NORMAL

## 2021-05-10 NOTE — RESULT ENCOUNTER NOTE
Trigae: please call pt to get update on symptoms and send to me (if she hasn't already responded via MetaFLO[employer])    Whitney  I have reviewed your recent labs. Here are the results:    Great news!  Your labs are all normal for kidney/liver/gallbladder/pancreatic function.  Additionally, your urine culture is normal.      Has your urine appearance normalized?  Is your side pain/sacral pain improved with the steroids?  Please send me a [employer] update with your current symptoms so we can determine the next course of action.     If you have any questions please do not hesitate to contact our office via phone ([phone]) or [employer].    Odilia Everett, MS, PA-C  [facility] [place]

## 2021-07-01 ENCOUNTER — VIRTUAL VISIT (OUTPATIENT)
Dept: FAMILY MEDICINE | Facility: CLINIC | Age: 61
End: 2021-07-01
Payer: COMMERCIAL

## 2021-07-01 DIAGNOSIS — R07.81 RIB PAIN ON RIGHT SIDE: ICD-10-CM

## 2021-07-01 DIAGNOSIS — M53.3 SACRAL PAIN: ICD-10-CM

## 2021-07-01 PROCEDURE — 99214 OFFICE O/P EST MOD 30 MIN: CPT | Mod: 95 | Performed by: PHYSICIAN ASSISTANT

## 2021-07-01 RX ORDER — PREDNISONE 20 MG/1
TABLET ORAL
Qty: 20 TABLET | Refills: 0 | Status: SHIPPED | OUTPATIENT
Start: 2021-07-01 | End: 2021-08-31

## 2021-07-01 RX ORDER — CYCLOBENZAPRINE HCL 10 MG
5-10 TABLET ORAL 3 TIMES DAILY PRN
Qty: 40 TABLET | Refills: 0 | Status: SHIPPED | OUTPATIENT
Start: 2021-07-01 | End: 2021-08-31

## 2021-07-01 NOTE — PROGRESS NOTES
Whitney is a 60 year old who is being evaluated via a billable video visit.      How would you like to obtain your AVS? MyChart  If the video visit is dropped, the invitation should be resent by: Text to cell phone: .[phone]  Will anyone else be joining your video visit? No      Video Start Time: 11:18 AM    Assessment & Plan     Sacral pain  Rib pain on right side  Short-term improvement.  Patient desires another prednisone taper and muscle relaxer so that she can take this during continued chiropractic treatment to hopefully achieve pain relief.  Her current pain relief is intermittent and a chiropractor feels that this would be beneficial to have more long-term benefits.  I have sent this over to her pharmacy and she will keep us apprised of her symptoms.  - predniSONE (DELTASONE) 20 MG tablet  Dispense: 20 tablet; Refill: 0  - cyclobenzaprine (FLEXERIL) 10 MG tablet  Dispense: 40 tablet; Refill: 0        Return in about 4 months (around 10/22/2021) for Physical Exam, Fasting labs, Medication recheck.    Odilia Everett PA-C  [facility]   Whitney is a 60 year old who presents for the following health issues     HPI     Sacral pain  Onset/Duration: 4 wks  Description  Location:tailbone  Joint Swelling: no  Redness: no  Pain: YES  Warmth: no  Intensity:  moderate  Progression of Symptoms:  Worsening.   Accompanying signs and symptoms:   Fevers: no  Numbness/tingling/weakness: no  History  Trauma to the area: no  Recent illness:  no  Previous similar problem: no  Previous evaluation:  YES  Precipitating or alleviating factors:  Aggravating factors include: sitting  Therapies tried and outcome:  Chiropractor.  Sarahy Brown - [place]    5/6/2021 -patient was placed on a long and strong prednisone taper that was very helpful.  She states that she was quite a bit better and was able to establish with a new chiropractor, Sarahy Dumont in [place].  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  It improves upon standing.  The chiropractor does feel that she has malalignment of her hips as the origin of her sacral pain.  Unfortunately, they have been unable to achieve long-term benefits from her adjustments.  Her chiropractor was hoping to have her follow up with me again and possibly have a muscle relaxer and/or another prednisone taper to hopefully achieve longer-term results.      Review of Systems   Constitutional, HEENT, cardiovascular, pulmonary, GI, , musculoskeletal, neuro, skin, endocrine and psych systems are negative, except as otherwise noted.      Objective           Vitals:  No vitals were obtained today due to virtual visit.    Physical Exam   GENERAL: Healthy, alert and no distress  EYES: Eyes grossly normal to inspection.  No discharge or erythema, or obvious scleral/conjunctival abnormalities.  HENT: Normal cephalic/atraumatic.  External ears, nose and mouth without ulcers or lesions.  No nasal drainage visible.  NECK: No asymmetry, visible masses or scars  RESP: No audible wheeze, cough, or visible cyanosis.  No visible retractions or increased work of breathing.    SKIN: Visible skin clear. No significant rash, abnormal pigmentation or lesions.  NEURO: Cranial nerves grossly intact.  Mentation and speech appropriate for age.  PSYCH: Mentation appears normal, affect normal/bright, judgement and insight intact, normal speech and appearance well-groomed.              Video-Visit Details    Type of service:  Video Visit    Video End Time:11:28 AM    Originating Location (pt. Location): Home    Distant Location (provider location):  [facility]     Platform used for Video Visit: Fuel[employer]

## 2021-07-01 NOTE — PROGRESS NOTES
"Whitney is a 60 year old who is being evaluated via a billable video visit.      How would you like to obtain your AVS? {AVS Preference:531411}  If the video visit is dropped, the invitation should be resent by: {video visit invitation:028334}  Will anyone else be joining your video visit? {:426583}  {If patient encounters technical issues they should call [phone] :530650}    Video Start Time: {video visit start/end time for provider to select:736836}    {PROVIDER CHARTING PREFERENCE:227351}    Subjective   Whitney is a 60 year old who presents for the following health issues {ACCOMPANIED BY STATEMENT (Optional):386753}    HPI     {SUPERLIST (Optional):955508}  {additonal problems for provider to add (Optional):547313}    Review of Systems   {ROS COMP (Optional):698048}      Objective           Vitals:  No vitals were obtained today due to virtual visit.    Physical Exam   {video visit exam brief selected:922538::\"GENERAL: Healthy, alert and no distress\",\"EYES: Eyes grossly normal to inspection.  No discharge or erythema, or obvious scleral/conjunctival abnormalities.\",\"RESP: No audible wheeze, cough, or visible cyanosis.  No visible retractions or increased work of breathing.  \",\"SKIN: Visible skin clear. No significant rash, abnormal pigmentation or lesions.\",\"NEURO: Cranial nerves grossly intact.  Mentation and speech appropriate for age.\",\"PSYCH: Mentation appears normal, affect normal/bright, judgement and insight intact, normal speech and appearance well-groomed.\"}    {Diagnostic Test Results (Optional):741528}    {AMBULATORY ATTESTATION (Optional):355595}        Video-Visit Details    Type of service:  Video Visit    Video End Time:{video visit start/end time for provider to select:093728}    Originating Location (pt. Location): {video visit patient location:989258::\"Home\"}    Distant Location (provider location):  [facility]     Platform used for Video Visit: {Virtual Visit " "Platforms:791466::\"Ahsan\"}  "

## 2021-07-26 ENCOUNTER — TRANSFERRED RECORDS (OUTPATIENT)
Dept: HEALTH INFORMATION MANAGEMENT | Facility: CLINIC | Age: 61
End: 2021-07-26

## 2021-08-16 ENCOUNTER — TELEPHONE (OUTPATIENT)
Dept: FAMILY MEDICINE | Facility: CLINIC | Age: 61
End: 2021-08-16

## 2021-08-16 DIAGNOSIS — M53.3 SACRAL PAIN: Primary | ICD-10-CM

## 2021-08-16 DIAGNOSIS — M54.50 LOW BACK PAIN, UNSPECIFIED BACK PAIN LATERALITY, UNSPECIFIED CHRONICITY, UNSPECIFIED WHETHER SCIATICA PRESENT: ICD-10-CM

## 2021-08-16 NOTE — TELEPHONE ENCOUNTER
Patient calls and the MRI being ordered by [facility] provider is being denied because it is not showing that patient did any follow up or had a referral to [facility] after 05/06/21 OV  07/01/21 VV.  Please enter referral to [facility].

## 2021-08-20 NOTE — TELEPHONE ENCOUNTER
Patient is inquiring about previous request for a new referral needing to be placed as her insurance denied coverage for an MRI. Please advise.     Luis Carcamo

## 2021-08-23 DIAGNOSIS — M53.3 SACRAL PAIN: ICD-10-CM

## 2021-08-23 DIAGNOSIS — R07.81 RIB PAIN ON RIGHT SIDE: ICD-10-CM

## 2021-08-23 NOTE — TELEPHONE ENCOUNTER
I placed referral for [facility].  Coverage for the MRI will have to be done by the ordering provider at [facility], however.        Odilia Everett MBA, MS, PA-C

## 2021-08-23 NOTE — TELEPHONE ENCOUNTER
Patient needs refill of the prednisone as is starting PT with [facility] and is having a difficult time breathing       Zeina Quevedo

## 2021-08-23 NOTE — TELEPHONE ENCOUNTER
Dao cedillo needs the office visit notes from primary from 5/6 and 7/1 and the referral to O     Patient will call back with the fax number for [employer] so I can fax  Them over required documentation to be able to get the MRI.      Notes and referral are at [facility] waiting for fax number     Zeina Quevedo

## 2021-08-24 NOTE — TELEPHONE ENCOUNTER
[facility] called per patient request. [facility] advised that it's too late at this point to get it approved and has been denied. Representative said she can fax over the denial letter to review. Gave our fax number.     Erica Govea

## 2021-08-27 ENCOUNTER — MYC MEDICAL ADVICE (OUTPATIENT)
Dept: FAMILY MEDICINE | Facility: CLINIC | Age: 61
End: 2021-08-27

## 2021-08-27 NOTE — TELEPHONE ENCOUNTER
Patient called back and stated that she is not having any problems breathing.  Patient stated that prednisone helps her tailbone pain.  Patient stated that she is not taking the muscle relaxant stating it makes her feel sick and it doesn't help the pain.  Patient has seen primary care provider in the past for this reason.  Patient frustrated that this is taking so long to figure out.  She would like a call back today.      Patient wanting to know if there could be a refill in addition to the request, if she needs it  to get her through the treatment.      Please call patient when completed.  Thanks

## 2021-08-27 NOTE — TELEPHONE ENCOUNTER
Note below reviewed. Will await PCP direction.  Tish DELONG RN, BSN  -[facility]

## 2021-08-27 NOTE — TELEPHONE ENCOUNTER
Attempt # 1    Called # [phone]     Left a non detailed VM to call back at [phone] and ask for any available Triage Nurse.    Tish DELONG, RN, BSN  -[facility]

## 2021-08-28 NOTE — TELEPHONE ENCOUNTER
High dose taper not usually refilled.     Will await provider return to authorize or change amount needed/provided.       Kasandra Johnson, FNP-BC

## 2021-08-30 RX ORDER — PREDNISONE 20 MG/1
TABLET ORAL
Qty: 20 TABLET | Refills: 0 | OUTPATIENT
Start: 2021-08-30

## 2021-08-31 ENCOUNTER — VIRTUAL VISIT (OUTPATIENT)
Dept: FAMILY MEDICINE | Facility: CLINIC | Age: 61
End: 2021-08-31
Payer: COMMERCIAL

## 2021-08-31 DIAGNOSIS — M99.03 LUMBOSACRAL DYSFUNCTION: Primary | ICD-10-CM

## 2021-08-31 DIAGNOSIS — M53.3 SACRAL PAIN: ICD-10-CM

## 2021-08-31 DIAGNOSIS — R07.81 RIB PAIN ON RIGHT SIDE: ICD-10-CM

## 2021-08-31 PROCEDURE — 99215 OFFICE O/P EST HI 40 MIN: CPT | Mod: 95 | Performed by: PHYSICIAN ASSISTANT

## 2021-08-31 RX ORDER — DIAZEPAM 10 MG
10 TABLET ORAL ONCE
Qty: 1 TABLET | Refills: 0 | Status: SHIPPED | OUTPATIENT
Start: 2021-08-31 | End: 2021-08-31

## 2021-08-31 RX ORDER — HYDROCODONE BITARTRATE AND ACETAMINOPHEN 5; 325 MG/1; MG/1
TABLET ORAL
COMMUNITY
Start: 2020-12-15 | End: 2021-08-31

## 2021-08-31 RX ORDER — PREDNISONE 20 MG/1
TABLET ORAL
Qty: 20 TABLET | Refills: 0 | Status: SHIPPED | OUTPATIENT
Start: 2021-08-31 | End: 2022-07-11

## 2021-08-31 RX ORDER — MELOXICAM 15 MG/1
15 TABLET ORAL DAILY PRN
Qty: 30 TABLET | Refills: 0 | Status: SHIPPED | OUTPATIENT
Start: 2021-08-31 | End: 2021-11-29

## 2021-08-31 NOTE — PROGRESS NOTES
Whitney is a 60 year old who is being evaluated via a billable video visit.      How would you like to obtain your AVS? MyChart  If the video visit is dropped, the invitation should be resent by: Text to cell phone: [phone]  Will anyone else be joining your video visit? No      Video Start Time: 9:16 AM    Assessment & Plan     Lumbosacral dysfunction  Sacral pain  Persistent pain.  I agree that an MRI is the next most appropriate step.  Continue therapy.  Will repeat one more prednisone taper and utilize meloxicam for pain relief if needed.  Will keep pt apprised of results when available  - predniSONE (DELTASONE) 20 MG tablet  Dispense: 20 tablet; Refill: 0  - meloxicam (MOBIC) 15 MG tablet  Dispense: 30 tablet; Refill: 0  - MR Lumbar Spine w/o Contrast  - diazepam (VALIUM) 10 MG tablet  Dispense: 1 tablet; Refill: 0    Rib pain on right side  Resolved       Review of prior external note(s) from - Outside records from [facility]  41 minutes spent on the date of the encounter doing chart review, history and exam, documentation and further activities per the note         Return in about 1 week (around 9/7/2021) for MRI.    Odilia Everett PA-C  [facility]   Whitney is a 60 year old who presents for the following health issues:    HPI     Chronic/Recurring Back Pain Follow Up  Pain when trying to  [facility].  Worsened by going up/down stairs, lifting,k movement, sitting, driving.  Not exacerbated by standing/walking.  No bowel/bladder symptoms.    Answers for HPI/ROS submitted by the patient on 8/31/2021  Where is your back pain located? : other  How would you describe your back pain? : sharp  Where does your back pain spread? : left buttocks  Since you noticed your back pain, how has it changed? : unchanged  Does your back pain interfere with your job?: Yes  If yes, which:: Chiropractor, Physical Therapy  Summary of care:  Pain began early April in her tailbone with no  inciting trauma/event.    5/6/2021 - Office visit [facility] - Pain described as worse with sitting.  Felt the need to adjust position frequently.  Tenderness to deep palpation of distal sacrum.  Trial of long/strong prednisone taper and PHYSICAL THERAPY vs chiropractic care.  7/1/2021 - Video follow up visit.  Reported near complete resolution of pain but unfortunately return shortly after completion of the taper.  Chiropractic care was helpful but only immediately after treatment - no long term resolution.  Was desiring a repeat prednisone taper to see if the combination of prednisone and chiropractic could produce long term benefit.    7/26/2021 - Evaluated by Dr. Melendez at [facility] due to lack of long term benefit with 5 weeks of chiropractic care.  Xrays were completed (4 view lumbar spine) - normal results.  MRI was ordered to rule out occult pathology.  Unfortunately this was denied by her insurance  8/27/2021 - PHYSICAL THERAPY visit/evaluation with new therapist at [facility].  Feels that he can improve her lumbosacral dysfunction.  She did note short term benefit after her treatment session.  Plan is weekly treatments moving forward.  Dr. Melendez's office is trying to get MRI covered.  8/31/2021 - Virtual visit today.    Whitney stopped muscle relaxants as she has not noticed significant benefit and feels fatigued with these.  Is taking ibuprofen but no signfiicant relief is noted.  Wondering if there is any option for another predinisone taper as this appears to be the one thing that has provided a longer term relief from her pain.  She is hoping that a repeat taper, in combination with her new therapy can make gains in her pain relief/resolution.      Review of Systems   Constitutional, HEENT, cardiovascular, pulmonary, GI, , musculoskeletal, neuro, skin, endocrine and psych systems are negative, except as otherwise noted.      Objective           Vitals:  No vitals were  obtained today due to virtual visit.    Physical Exam   GENERAL: Healthy, alert and no distress  EYES: Eyes grossly normal to inspection.  No discharge or erythema, or obvious scleral/conjunctival abnormalities.  HENT: Normal cephalic/atraumatic.  External ears, nose and mouth without ulcers or lesions.  No nasal drainage visible.  NECK: No asymmetry, visible masses or scars  RESP: No audible wheeze, cough, or visible cyanosis.  No visible retractions or increased work of breathing.    SKIN: Visible skin clear. No significant rash, abnormal pigmentation or lesions.  NEURO: Cranial nerves grossly intact.  Mentation and speech appropriate for age.  PSYCH: Mentation appears normal, affect normal/bright, judgement and insight intact, normal speech and appearance well-groomed.            Video-Visit Details    Type of service:  Video Visit    Video End Time:9:36 AM    Originating Location (pt. Location): Home    Distant Location (provider location):  [facility]     Platform used for Video Visit: Teleport

## 2021-09-17 ENCOUNTER — HOSPITAL ENCOUNTER (OUTPATIENT)
Dept: MRI IMAGING | Facility: CLINIC | Age: 61
Discharge: HOME | End: 2021-09-17
Attending: PHYSICIAN ASSISTANT | Admitting: PHYSICIAN ASSISTANT
Payer: COMMERCIAL

## 2021-09-17 DIAGNOSIS — M53.3 SACRAL PAIN: ICD-10-CM

## 2021-09-17 DIAGNOSIS — M99.03 LUMBOSACRAL DYSFUNCTION: ICD-10-CM

## 2021-09-17 PROCEDURE — 72148 MRI LUMBAR SPINE W/O DYE: CPT

## 2021-09-17 NOTE — LETTER
September 20, 2021      Whitney J Ketan  [address] [address] [address]        Dear Ms.Ketan,    We are writing to inform you of your test results.    Whitney   Here are your recent results.  Your MRI shows does show a disc bulge but there is no herniation/compression on any nerve roots.  I would recommend continuing PHYSICAL THERAPY and discussing next steps/recommendations with Dr. Melendez.  I will ask my staff to fax this to his office for his review as well.  I hope you are improving from our last discussion!     If you have any questions please do not hesitate to contact our office via phone ([phone]) or MyChart.     Odilia Everett, MS, PA-C   [facility] [place]       Resulted Orders   MR Lumbar Spine w/o Contrast    Narrative    MRI OF THE LUMBAR SPINE WITHOUT CONTRAST September 17, 2021 3:05 PM     HISTORY: Low back pain greater than six weeks. Recurrent sacral pain -  since March - normal XR at [facility] 7/26/2021.  Failed chiropractic  treatment, now PT, and two rounds of steroids provided temporary  relief. Lumbosacral dysfunction. Sacral pain.    TECHNIQUE: Multiplanar, multisequence MRI images of the lumbar spine  were acquired without IV contrast.    COMPARISON: None.    FINDINGS: There are five lumbar-type vertebrae for the purposes of  this dictation.     There is normal alignment of the lumbar vertebrae. Vertebral body  heights of the lumbar spine are normal. Marrow signal throughout the  lumbar vertebrae is within normal limits. There is no evidence for  fracture or pathologic bony lesion of the lumbar spine.     There is loss of disc height, disc desiccation and posterior disc  bulging at the L4-L5 and L5-S1 levels. The remaining lumbar  intervertebral discs are within normal limits in height, contour and  signal intensity.    The tip of the conus medullaris is at the L1-L2 level which is within  normal limits. There is no evidence for intrathecal abnormality.     Level by level:      T12-L1: Normal disc height and signal. No herniation. Normal facet  joints. No spinal canal stenosis. No foraminal stenosis on either  side.     L1-L2: Normal disc height and signal. No herniation. Normal facet  joints. No spinal canal stenosis. No foraminal stenosis on either  side.     L2-L3: Normal disc height and signal. No herniation. Normal facet  joints. No spinal canal stenosis. No foraminal stenosis on either  side.     L3-L4: Normal disc height and signal. No herniation. Normal facet  joints. No spinal canal stenosis. No foraminal stenosis on either  side.     L4-L5: Loss of disc height, disc desiccation and mild circumferential  disc bulging. No herniation. There is increased T2 signal in the  posterior aspect of the disc bulge consistent with an annular tear.  Minimal facet arthropathy bilaterally. Minimal spinal canal narrowing.  No foraminal stenosis on either side.    L5-S1: Loss of disc height, disc desiccation and mild circumferential  disc bulging. No herniation. Mild facet arthropathy bilaterally. No  spinal canal stenosis. No foraminal stenosis on either side.      Impression    IMPRESSION:  1. Degenerative change at the L4-L5 and L5-S1 levels as detailed  above.  2. No spinal canal or neural foraminal narrowing at any level of the  lumbar spine.    SUZANNE CAMACHO MD         SYSTEM ID:  [id number]       If you have any questions or concerns, please call the clinic at the number listed above.       Sincerely,      Odilia Everett PA-C

## 2021-09-17 NOTE — LETTER
September 20, 2021      Whitney J Ketan  [address] [address] [address]        Dear Ms.Ketan,    We are writing to inform you of your test results.    Whitney   Here are your recent results.  Your MRI shows does show a disc bulge but there is no herniation/compression on any nerve roots.  I would recommend continuing PHYSICAL THERAPY and discussing next steps/recommendations with Dr. Melendez.  I will ask my staff to fax this to his office for his review as well.  I hope you are improving from our last discussion!     If you have any questions please do not hesitate to contact our office via phone ([phone]) or MyChart.     Odilia Everett, MS, PA-C   [facility] [place]       Resulted Orders   MR Lumbar Spine w/o Contrast    Narrative    MRI OF THE LUMBAR SPINE WITHOUT CONTRAST September 17, 2021 3:05 PM     HISTORY: Low back pain greater than six weeks. Recurrent sacral pain -  since March - normal XR at [facility] 7/26/2021.  Failed chiropractic  treatment, now PT, and two rounds of steroids provided temporary  relief. Lumbosacral dysfunction. Sacral pain.    TECHNIQUE: Multiplanar, multisequence MRI images of the lumbar spine  were acquired without IV contrast.    COMPARISON: None.    FINDINGS: There are five lumbar-type vertebrae for the purposes of  this dictation.     There is normal alignment of the lumbar vertebrae. Vertebral body  heights of the lumbar spine are normal. Marrow signal throughout the  lumbar vertebrae is within normal limits. There is no evidence for  fracture or pathologic bony lesion of the lumbar spine.     There is loss of disc height, disc desiccation and posterior disc  bulging at the L4-L5 and L5-S1 levels. The remaining lumbar  intervertebral discs are within normal limits in height, contour and  signal intensity.    The tip of the conus medullaris is at the L1-L2 level which is within  normal limits. There is no evidence for intrathecal abnormality.     Level by level:      T12-L1: Normal disc height and signal. No herniation. Normal facet  joints. No spinal canal stenosis. No foraminal stenosis on either  side.     L1-L2: Normal disc height and signal. No herniation. Normal facet  joints. No spinal canal stenosis. No foraminal stenosis on either  side.     L2-L3: Normal disc height and signal. No herniation. Normal facet  joints. No spinal canal stenosis. No foraminal stenosis on either  side.     L3-L4: Normal disc height and signal. No herniation. Normal facet  joints. No spinal canal stenosis. No foraminal stenosis on either  side.     L4-L5: Loss of disc height, disc desiccation and mild circumferential  disc bulging. No herniation. There is increased T2 signal in the  posterior aspect of the disc bulge consistent with an annular tear.  Minimal facet arthropathy bilaterally. Minimal spinal canal narrowing.  No foraminal stenosis on either side.    L5-S1: Loss of disc height, disc desiccation and mild circumferential  disc bulging. No herniation. Mild facet arthropathy bilaterally. No  spinal canal stenosis. No foraminal stenosis on either side.      Impression    IMPRESSION:  1. Degenerative change at the L4-L5 and L5-S1 levels as detailed  above.  2. No spinal canal or neural foraminal narrowing at any level of the  lumbar spine.    SUZANNE CAMACHO MD         SYSTEM ID:  [id number]       If you have any questions or concerns, please call the clinic at the number listed above.       Sincerely,      Odilia Everett PA-C

## 2021-09-20 NOTE — RESULT ENCOUNTER NOTE
Team: please fax a copy of the MRI report to Dr. Melendez at [facility].    Whitney  Here are your recent results.  Your MRI shows does show a disc bulge but there is no herniation/compression on any nerve roots.  I would recommend continuing PHYSICAL THERAPY and discussing next steps/recommendations with Dr. Melendez.  I will ask my staff to fax this to his office for his review as well.  I hope you are improving from our last discussion!     If you have any questions please do not hesitate to contact our office via phone ([phone]) or Consumer Agent Portal (CAP)hart.    Odilia Everett, MS, PA-C  [facility]

## 2021-10-26 ENCOUNTER — ANCILLARY PROCEDURE (OUTPATIENT)
Dept: MAMMOGRAPHY | Facility: CLINIC | Age: 61
End: 2021-10-26
Attending: FAMILY MEDICINE
Payer: COMMERCIAL

## 2021-10-26 DIAGNOSIS — Z12.31 VISIT FOR SCREENING MAMMOGRAM: ICD-10-CM

## 2021-10-26 PROCEDURE — 77067 SCR MAMMO BI INCL CAD: CPT | Mod: TC | Performed by: RADIOLOGY

## 2021-10-26 PROCEDURE — 77063 BREAST TOMOSYNTHESIS BI: CPT | Mod: TC | Performed by: RADIOLOGY

## 2021-11-01 ENCOUNTER — IMMUNIZATION (OUTPATIENT)
Dept: NURSING | Facility: CLINIC | Age: 61
End: 2021-11-01
Payer: COMMERCIAL

## 2021-11-01 DIAGNOSIS — Z23 HIGH PRIORITY FOR 2019-NCOV VACCINE: Primary | ICD-10-CM

## 2021-11-01 PROCEDURE — 0004A COVID-19,PF,PFIZER (12+ YRS): CPT

## 2021-11-01 PROCEDURE — 91300 COVID-19,PF,PFIZER (12+ YRS): CPT

## 2021-11-01 PROCEDURE — 99207 PR NO CHARGE LOS: CPT

## 2021-11-03 NOTE — RESULT ENCOUNTER NOTE
Your mammogram was normal.     Thank you so much for choosing [facility].  Please contact us with any questions that you may have.   We appreciate the opportunity to serve you now and look forward to supporting your healthcare needs for a long time to come!    Most Sincerely,     Lisbeth Gonzalez MD

## 2021-11-07 DIAGNOSIS — Z82.49 FAMILY HISTORY OF EARLY CAD: ICD-10-CM

## 2021-11-07 DIAGNOSIS — E78.5 HYPERLIPIDEMIA LDL GOAL <130: ICD-10-CM

## 2021-11-07 DIAGNOSIS — N95.1 SYMPTOMATIC MENOPAUSAL OR FEMALE CLIMACTERIC STATES: ICD-10-CM

## 2021-11-09 RX ORDER — ATORVASTATIN CALCIUM 10 MG/1
TABLET, FILM COATED ORAL
Qty: 90 TABLET | Refills: 0 | Status: SHIPPED | OUTPATIENT
Start: 2021-11-09 | End: 2021-11-29

## 2021-11-09 RX ORDER — ESTRADIOL 1 MG/1
TABLET ORAL
Qty: 90 TABLET | Refills: 0 | Status: SHIPPED | OUTPATIENT
Start: 2021-11-09 | End: 2021-11-29

## 2021-11-09 NOTE — TELEPHONE ENCOUNTER
Patient due for fasting office visit- 90 days supply given.  Routing to team to schedule appointment     Zeina BATISTA RN  [facility]  [phone]

## 2021-11-17 ENCOUNTER — TELEPHONE (OUTPATIENT)
Dept: FAMILY MEDICINE | Facility: CLINIC | Age: 61
End: 2021-11-17
Payer: COMMERCIAL

## 2021-11-17 DIAGNOSIS — J01.01 ACUTE RECURRENT MAXILLARY SINUSITIS: Primary | ICD-10-CM

## 2021-11-17 RX ORDER — AZITHROMYCIN 250 MG/1
TABLET, FILM COATED ORAL
Qty: 6 TABLET | Refills: 0 | Status: SHIPPED | OUTPATIENT
Start: 2021-11-17 | End: 2021-11-22

## 2021-11-17 NOTE — TELEPHONE ENCOUNTER
Ok for Zithromax for sinus infection - getting on a plane later today.     TC will encourage pt to get covid tested at her destination and keep her mask on in the air port and during her flight.

## 2021-11-17 NOTE — MR AVS SNAPSHOT
After Visit Summary   2/18/2017    Whitney Aceves    MRN: [id number]           Patient Information     Date Of Birth          1960        Visit Information        Provider Department      2/18/2017 11:20 AM Weiler, Karen, MD [facility]        Today's Diagnoses     Ear pain, bilateral    -  1    Allergic reaction, initial encounter           Follow-ups after your visit        Your next 10 appointments already scheduled     Mar 02, 2017  8:30 AM MATEO   [facility]   [facility] [place]  [facility]    [address]  [address]  [place] [address]   [phone]              Who to contact     If you have questions or need follow up information about today's clinic visit or your schedule please contact [facility] directly at [phone].  Normal or non-critical lab and imaging results will be communicated to you by MyChart, letter or phone within 4 business days after the clinic has received the results. If you do not hear from us within 7 days, please contact the clinic through MyChart or phone. If you have a critical or abnormal lab result, we will notify you by phone as soon as possible.  Submit refill requests through [employer] or call your pharmacy and they will forward the refill request to us. Please allow 3 business days for your refill to be completed.          Additional Information About Your Visit        MyChart Information     [employer] gives you secure access to your electronic health record. If you see a primary care provider, you can also send messages to your care team and make appointments. If you have questions, please call your primary care clinic.  If you do not have a primary care provider, please call [phone] and they will assist you.        Care EveryWhere ID     This is your Care EveryWhere ID. This could be used by other organizations to access your [place] medical records  [id number]        Your Vitals Were  Pt advised "    Pulse Temperature Respirations Height Pulse Oximetry Breastfeeding?    88 97.6  F (36.4  C) (Tympanic) 14 5' 4\" (1.626 m) 98% No    BMI (Body Mass Index)                   28.15 kg/m2            Blood Pressure from Last 3 Encounters:   02/21/17 114/80   02/18/17 132/76   02/17/17 112/62    Weight from Last 3 Encounters:   02/18/17 164 lb (74.4 kg)   02/17/17 164 lb (74.4 kg)   09/10/16 156 lb 6.4 oz (70.9 kg)              We Performed the Following     INJECTION INTRAMUSCULAR OR SUB-Q     KETOROLAC TROMETHAMINE 15MG          Today's Medication Changes          These changes are accurate as of: 2/18/17 11:59 PM.  If you have any questions, ask your nurse or doctor.               Start taking these medicines.        Dose/Directions    HYDROcodone-acetaminophen 5-325 MG per tablet   Commonly known as:  NORCO   Used for:  Ear pain, bilateral   Started by:  Weiler, Karen, MD        Dose:  1-2 tablet   Take 1-2 tablets by mouth every 4 hours as needed for moderate to severe pain maximum 3 tablet(s) per day   Quantity:  20 tablet   Refills:  0       ketorolac 30 MG/ML injection   Commonly known as:  TORADOL   Used for:  Ear pain, bilateral   Started by:  Weiler, Karen, MD        Dose:  30 mg   Inject 1 mL (30 mg) into the muscle once for 1 dose   Quantity:  1 mL   Refills:  0       predniSONE 20 MG tablet   Commonly known as:  DELTASONE   Used for:  Ear pain, bilateral, Allergic reaction, initial encounter   Started by:  Weiler, Karen, MD        Dose:  20 mg   Take 1 tablet (20 mg) by mouth daily   Quantity:  5 tablet   Refills:  0            Where to get your medicines      These medications were sent to [facility] [place], [place] - [address]  [address] [place] [address]     Phone:  [phone]     predniSONE 20 MG tablet         Some of these will need a paper prescription and others can be bought over the counter.  Ask your nurse if you have questions.     Bring " a paper prescription for each of these medications     HYDROcodone-acetaminophen 5-325 MG per tablet       You don't need a prescription for these medications     ketorolac 30 MG/ML injection                Primary Care Provider Office Phone # Fax #    Lisbeth Gonzalez [phone]       [facility] [address] [address]        Thank you!     Thank you for choosing [facility]  for your care. Our goal is always to provide you with excellent care. Hearing back from our patients is one way we can continue to improve our services. Please take a few minutes to complete the written survey that you may receive in the mail after your visit with us. Thank you!             Your Updated Medication List - Protect others around you: Learn how to safely use, store and throw away your medicines at www.disposemymeds.org.          This list is accurate as of: 2/18/17 11:59 PM.  Always use your most recent med list.                   Brand Name Dispense Instructions for use    azithromycin 250 MG tablet    ZITHROMAX    6 tablet    2 tabs first day, then 1 tab by mouth daily for 4 additional days       carbamide peroxide 6.5 % otic solution    DEBROX    3 mL    Place 5-10 drops into both ears 2 times daily for 3 days       cetirizine 10 MG tablet    zyrTEC    30 tablet    Take 1 tablet (10 mg) by mouth every evening       clobetasol propionate 0.05 % Foam    OLUX    50 g    Apply to outer part of ear and scalp 1-2x/day for up to 2 weeks at a time       estradiol 0.1 MG/24HR BIW patch    VIVELLE-DOT    24 patch    Place 1 patch onto the skin twice a week       fluticasone 50 MCG/ACT spray    FLONASE    1 Bottle    Spray 1-2 sprays into both nostrils daily       HYDROcodone-acetaminophen 5-325 MG per tablet    NORCO    20 tablet    Take 1-2 tablets by mouth every 4 hours as needed for moderate to severe pain maximum 3 tablet(s) per day       ibuprofen 800 MG  tablet    ADVIL/MOTRIN    90 tablet    TAKE 1 TABLET EVERY 8 HOURSAS NEEDED FOR MODERATE     PAIN (FOR BURSITIS)       ketorolac 30 MG/ML injection    TORADOL    1 mL    Inject 1 mL (30 mg) into the muscle once for 1 dose       predniSONE 20 MG tablet    DELTASONE    5 tablet    Take 1 tablet (20 mg) by mouth daily       zolpidem 10 MG tablet    AMBIEN    30 tablet    Take 0.5 tablets (5 mg) by mouth nightly as needed for sleep

## 2021-11-29 ENCOUNTER — OFFICE VISIT (OUTPATIENT)
Dept: FAMILY MEDICINE | Facility: CLINIC | Age: 61
End: 2021-11-29
Payer: COMMERCIAL

## 2021-11-29 VITALS
SYSTOLIC BLOOD PRESSURE: 118 MMHG | WEIGHT: 153 LBS | OXYGEN SATURATION: 98 % | TEMPERATURE: 97.6 F | BODY MASS INDEX: 26.12 KG/M2 | HEART RATE: 78 BPM | HEIGHT: 64 IN | DIASTOLIC BLOOD PRESSURE: 68 MMHG

## 2021-11-29 DIAGNOSIS — Z79.899 CONTROLLED SUBSTANCE AGREEMENT SIGNED: ICD-10-CM

## 2021-11-29 DIAGNOSIS — Z12.11 SCREEN FOR COLON CANCER: ICD-10-CM

## 2021-11-29 DIAGNOSIS — J01.01 ACUTE RECURRENT MAXILLARY SINUSITIS: ICD-10-CM

## 2021-11-29 DIAGNOSIS — N95.1 SYMPTOMATIC MENOPAUSAL OR FEMALE CLIMACTERIC STATES: ICD-10-CM

## 2021-11-29 DIAGNOSIS — Z00.00 ROUTINE GENERAL MEDICAL EXAMINATION AT A HEALTH CARE FACILITY: Primary | ICD-10-CM

## 2021-11-29 DIAGNOSIS — E55.9 VITAMIN D DEFICIENCY: ICD-10-CM

## 2021-11-29 DIAGNOSIS — M75.102 ROTATOR CUFF SYNDROME, LEFT: ICD-10-CM

## 2021-11-29 DIAGNOSIS — M15.8 OTHER OSTEOARTHRITIS INVOLVING MULTIPLE JOINTS: ICD-10-CM

## 2021-11-29 DIAGNOSIS — Z82.49 FAMILY HISTORY OF EARLY CAD: ICD-10-CM

## 2021-11-29 DIAGNOSIS — C44.311 BASAL CELL CARCINOMA (BCC) OF SKIN OF NOSE: ICD-10-CM

## 2021-11-29 DIAGNOSIS — G47.00 INSOMNIA, UNSPECIFIED TYPE: ICD-10-CM

## 2021-11-29 DIAGNOSIS — M53.3 SACRAL PAIN: ICD-10-CM

## 2021-11-29 DIAGNOSIS — D23.9 MULTIPLE DYSPLASTIC NEVI: ICD-10-CM

## 2021-11-29 DIAGNOSIS — R61 NIGHT SWEATS: ICD-10-CM

## 2021-11-29 DIAGNOSIS — M99.03 LUMBOSACRAL DYSFUNCTION: ICD-10-CM

## 2021-11-29 DIAGNOSIS — E78.5 HYPERLIPIDEMIA LDL GOAL <130: ICD-10-CM

## 2021-11-29 DIAGNOSIS — Z83.719 FAMILY HISTORY OF COLONIC POLYPS: ICD-10-CM

## 2021-11-29 PROCEDURE — 99396 PREV VISIT EST AGE 40-64: CPT | Performed by: FAMILY MEDICINE

## 2021-11-29 PROCEDURE — 99213 OFFICE O/P EST LOW 20 MIN: CPT | Mod: 25 | Performed by: FAMILY MEDICINE

## 2021-11-29 RX ORDER — MELOXICAM 15 MG/1
15 TABLET ORAL DAILY PRN
Qty: 30 TABLET | Refills: 3 | Status: SHIPPED | OUTPATIENT
Start: 2021-11-29 | End: 2022-12-14 | Stop reason: ALTCHOICE

## 2021-11-29 RX ORDER — ZOLPIDEM TARTRATE 10 MG/1
TABLET ORAL
Qty: 30 TABLET | Refills: 3 | Status: SHIPPED | OUTPATIENT
Start: 2021-11-29 | End: 2022-12-14

## 2021-11-29 RX ORDER — IBUPROFEN 800 MG/1
TABLET, FILM COATED ORAL
Qty: 90 TABLET | Refills: 1 | Status: SHIPPED | OUTPATIENT
Start: 2021-11-29 | End: 2022-05-10

## 2021-11-29 RX ORDER — ESTRADIOL 1 MG/1
1 TABLET ORAL DAILY
Qty: 90 TABLET | Refills: 3 | Status: SHIPPED | OUTPATIENT
Start: 2021-11-29 | End: 2022-09-16 | Stop reason: ALTCHOICE

## 2021-11-29 RX ORDER — AZITHROMYCIN 250 MG/1
TABLET, FILM COATED ORAL
Qty: 6 TABLET | Refills: 3 | Status: SHIPPED | OUTPATIENT
Start: 2021-11-29 | End: 2021-12-04

## 2021-11-29 RX ORDER — ATORVASTATIN CALCIUM 10 MG/1
10 TABLET, FILM COATED ORAL DAILY
Qty: 90 TABLET | Refills: 3 | Status: SHIPPED | OUTPATIENT
Start: 2021-11-29 | End: 2022-12-14

## 2021-11-29 ASSESSMENT — MIFFLIN-ST. JEOR: SCORE: 1244

## 2021-11-29 NOTE — PROGRESS NOTES
SUBJECTIVE:   CC: Whitney Aceves is an 61 year old woman who presents for preventive health visit and the following other medical problems:      1. Routine general medical examination at a health care facility    2. Symptomatic menopausal or female climacteric states    3. Rotator cuff syndrome, left    4. Other osteoarthritis involving multiple joints- left middle finger s/p fracture /dislocation - causing some chronic pain - pt requests refill of ibuprofen 800mg tabs - takes with food     5. Lumbosacral dysfunction    6. Sacral pain    7. Insomnia, unspecified type- mostly while travelling -CSA re-signed 11/29/2021 -  ? related to menopause as well - Ambien 10mg - takes 1/2 tab at bedtime PRN    8. Controlled substance agreement re-signed today -  11/29/2021  ok for #30 -10mg ambien (takes 1/2 tab) with 1 refill every 6 months or so     9. Family history of early CAD    10. Hyperlipidemia LDL goal <130- on atorvastatin - uncertain control - needs lab follow up and refill on medicine     11. Acute recurrent maxillary sinusitis- gets when travelling - fills only very judiciously     12. Night sweats- on estrace 1mg already - discussed progesterone deficiency usually causing that - pt would like to try black cohosh first prior to micronized progesterone 2nd to breast ca risk     13. Multiple dysplastic nevi-Going to dermatology for her full body skin exam annually - next 12/8/2021 [facility] - Dr. Carmen Dowling - for her early dysplastic melanoma.      14. Vitamin D deficiency    15. Screen for colon cancer    16. Basal cell carcinoma (BCC) of skin of nose    17. Family history of colonic polyps- precancerous             Patient has been advised of split billing requirements and indicates understanding: Yes  Healthy Habits:    Getting at least 3 servings of Calcium per day:  Yes    Bi-annual eye exam:  Yes    Dental care twice a year:  Yes    Sleep apnea or symptoms of sleep apnea:  None    Diet:  Regular  (no restrictions)    Frequency of exercise:  6-7 days/week    Duration of exercise:: 30 min.    Taking medications regularly:  Yes    Barriers to taking medications:  None    Medication side effects:  None    PHQ-2 Total Score:    Additional concerns today:  No          Hyperlipidemia Follow-Up: Is fasting today - needs labs and refills       Are you regularly taking any medication or supplement to lower your cholesterol?   Yes- Atrovastatin    Are you having muscle aches or other side effects that you think could be caused by your cholesterol lowering medication?  No     Recent Labs   Lab Test 10/22/20  1231 09/18/19  1005   CHOL 206* 175   HDL 70 72   * 76   TRIG 137 135          Medication Followup of Ambien - needs CSA renewed today. Last pills that she got doesn't work as well - cuts 10mg tab in 1/2  - only lasts about   3 hours.       Taking Medication as prescribed: yes    Side Effects:  None    Medication Helping Symptoms:  yes      Today's PHQ-2 Score:   PHQ-2 ( 1999 Pfizer) 10/27/2021   Q1: Little interest or pleasure in doing things 0   Q2: Feeling down, depressed or hopeless 0   PHQ-2 Score 0   PHQ-2 Total Score (12-17 Years)- Positive if 3 or more points; Administer PHQ-A if positive 0   Q1: Little interest or pleasure in doing things Not at all   Q2: Feeling down, depressed or hopeless Not at all   PHQ-2 Score 0       Abuse: Current or Past (Physical, Sexual or Emotional) - No  Do you feel safe in your environment? Yes        Social History     Tobacco Use     Smoking status: Never Smoker     Smokeless tobacco: Never Used   Substance Use Topics     Alcohol use: No     Alcohol/week: 0.0 standard drinks     If you drink alcohol do you typically have >3 drinks per day or >7 drinks per week? Not applicable    Alcohol Use 11/29/2021   Prescreen: >3 drinks/day or >7 drinks/week? -   Prescreen: >3 drinks/day or >7 drinks/week? Not Applicable       Reviewed orders with patient.  Reviewed health  maintenance and updated orders accordingly - Yes  Lab work is in process  Labs reviewed in EPIC  BP Readings from Last 3 Encounters:   11/29/21 118/68   05/06/21 122/82   01/21/21 130/86    Wt Readings from Last 3 Encounters:   11/29/21 69.4 kg (153 lb)   05/06/21 74.8 kg (165 lb)   01/21/21 74.8 kg (165 lb)                  Patient Active Problem List   Diagnosis     Panic disorder without agoraphobia     Anxiety     Overactive bladder     Hyperlipidemia LDL goal <130- on atorvastatin      Gastroesophageal reflux disease without esophagitis     Symptomatic menopausal or female climacteric states     Fibrocystic breast changes of both breasts     Primary osteoarthritis of both hands     Family history of early CAD     Insomnia, unspecified type-Controlled substance agreement signed -11/29/2021 ok for #30 -10mg ambien (takes 1/2 tab) with 1 refill every 6 months or so      Expressive aphasia     Urinary urgency     Fatigue, unspecified type     Night sweats- on estrace 1mg already - discussed progesterone deficiency usually causing that - pt would like to try black cohosh first prior to micronized progesterone 2nd to breast ca risk      Controlled substance agreement signed -11/29/2021 ok for #30 -10mg ambien (takes 1/2 tab) with 1 refill every 6 months or so      Acute recurrent maxillary sinusitis     Multiple dysplastic nevi-Going to dermatology for her full body skin exam annually - next appt=12/8/2021 - [facility] - Dr. Carmen Dowling - for her early dysplastic melanoma.       RUQ abdominal pain- comes and goes - not related to foods or fatty foods, worse bending over with or without lifting and getting out of her car- ? muscle spasm - CT and US negative 10/5/2020      Rotator cuff syndrome, left     Other osteoarthritis involving multiple joints- left middle finger s/p fracture /dislocation - causing some chronic pain - pt requests refill of ibuprofen 800mg tabs - takes with food      Lumbosacral  dysfunction     Sacral pain     Family history of colonic polyps- precancerous      Basal cell carcinoma (BCC) of skin of nose     Vitamin D deficiency     Past Surgical History:   Procedure Laterality Date      SECTION       COLONOSCOPY  2012    Procedure:COLONOSCOPY; Colonoscopy ; Surgeon:SILVERIO ARRIETA; Location: [facility]     COLONOSCOPY N/A 2017    Procedure: COLONOSCOPY;  Surgeon: Silverio Arrieta MD;  Location:  GI     COLPOSCOPY, BIOPSY, COMBINED  1991    Mild squamous epithelial dysplasia (SABIHA 1)     FISSURECTOMY RECTUM  01     HYSTERECTOMY TOTAL ABDOMINAL  3/4/1998    Myomatous uterus; pelvic pain. Started HRT right afterward with vasomotor sx.     HYSTERECTOMY, PAP NO LONGER INDICATED N/A     had for large fibroid     LIPOSUCTION (LOCATION)       MOHS MICROGRAPHIC PROCEDURE         Social History     Tobacco Use     Smoking status: Never Smoker     Smokeless tobacco: Never Used   Substance Use Topics     Alcohol use: No     Alcohol/week: 0.0 standard drinks     Family History   Problem Relation Age of Onset     Hypertension Mother      Hyperlipidemia Mother      Diabetes Mother      Cerebrovascular Disease Mother 89        right hemispheric stroke -  11 days after stroke - 10/2019      C.A.D. Father 45          age 45 massive MI      Heart Failure Father      Hyperlipidemia Father      C.A.D. Maternal Grandmother      C.A.D. Maternal Grandfather      C.A.D. Paternal Grandmother      C.A.D. Paternal Grandfather      Cancer Other         9 members on maternal side. Bone CA, Breast CA (m. aunt),       Coronary Artery Disease Brother 55        Heart attack- s/p sudden cardiac arrest survived 2019      C.A.D. Sister 57        MI age 50- CABG - 2 vessels - 4 MI's since age 55 - 3 stents placed 2017      Diabetes Sister      Hypertension Sister      Breast Cancer Maternal Aunt         50s-Mom had 9 sisters. MAunt->50's     Hypertension Sister       Hyperlipidemia Sister      Prostate Cancer Brother 66     Testicular cancer Brother 37     Psoriasis Son 29        after returning from the service - lives in [place]     Psoriatic Arthritis Son 29        on Enbrel          Current Outpatient Medications   Medication Sig Dispense Refill     atorvastatin (LIPITOR) 10 MG tablet Take 1 tablet (10 mg) by mouth daily 90 tablet 3     azithromycin (ZITHROMAX) 250 MG tablet Take 2 tablets (500 mg) by mouth daily for 1 day, THEN 1 tablet (250 mg) daily for 4 days. 6 tablet 3     Black Cohosh-SoyIsoflav-Magnol (ESTROVEN MENOPAUSE RELIEF) CAPS   0     calcium-vitamin D (OSCAL) 250-125 MG-UNIT TABS per tablet Take 1 tablet by mouth daily       estradiol (ESTRACE) 1 MG tablet Take 1 tablet (1 mg) by mouth daily 90 tablet 3     ibuprofen (ADVIL/MOTRIN) 800 MG tablet TAKE 1 TABLET BY MOUTH EVERY 8 HOURS WITH FOOD AS NEEDED FOR MODERATE PAIN 90 tablet 1     meloxicam (MOBIC) 15 MG tablet Take 1 tablet (15 mg) by mouth daily as needed for pain 30 tablet 3     Multiple Vitamins-Minerals (MULTIVITAMIN ADULT PO) Take by mouth daily       Omega-3 Fatty Acids (OMEGA 3 PO)        predniSONE (DELTASONE) 20 MG tablet Take 60 mg daily for 3 days, then 40 mg daily for 3 days, then 20 mg daily for 3 days, then 10 mg for 4 days 20 tablet 0     zolpidem (AMBIEN) 10 MG tablet TAKE 1/2 TABLET BY MOUTH EVERY NIGHT AT BEDTIME AS NEEDED FOR SLEEP 30 tablet 3     Allergies   Allergen Reactions     Septra [Sulfamethoxazole W/Trimethoprim]      Sulfa Drugs Hives     Murine Ear [Carbamide Peroxide] Swelling and Rash     Severe pain , swelling after debrox type ear drops - had to go to the clinic to have ears flushed out emergently.      Recent Labs   Lab Test 05/06/21  1559 10/22/20  1231 10/05/20  1139 09/18/19  1005 09/17/18  0929   LDL  --  109*  --  76 56   HDL  --  70  --  72 63   TRIG  --  137  --  135 112   ALT 25 31 26 25 40   CR 0.71 0.69 0.64 0.66 0.70   GFRESTIMATED >90 >90 >90 >90 87   GFRESTBLACK  >90 >90 >90 >90 >90   POTASSIUM 3.7 3.5 3.5 3.4 4.0   TSH  --  1.57  --  1.44 1.51        Breast Cancer Screening:  Any new diagnosis of family breast, ovarian, or bowel cancer? No    FHS-7:   Breast CA Risk Assessment (FHS-7) 10/26/2021 10/27/2021   Did any of your first-degree relatives have breast or ovarian cancer? No No   Did any of your relatives have bilateral breast cancer? No Unknown   Did any man in your family have breast cancer? No No   Did any woman in your family have breast and ovarian cancer? No No   Did any woman in your family have breast cancer before age 50 y? No No   Do you have 2 or more relatives with breast and/or ovarian cancer? No Unknown   Do you have 2 or more relatives with breast and/or bowel cancer? No Unknown       Mammogram Screening: Recommended annual mammography  Pertinent mammograms are reviewed under the imaging tab.    History of abnormal Pap smear: Status post benign hysterectomy. Health Maintenance and Surgical History updated.  PAP / HPV 6/20/2015   PAP (Historical) Normal     Reviewed and updated as needed this visit by clinical staff  Tobacco  Allergies  Meds   Med Hx  Surg Hx  Fam Hx  Soc Hx       Reviewed and updated as needed this visit by Provider               Past Medical History:   Diagnosis Date     Anxiety     on effexor in past. Off since 2010     Cancer (H) 01/2021    SKIN CANCER     CMC DJD(carpometacarpal degenerative joint disease), localized primary      Edema      Fatigue, unspecified type 09/17/2018     Hormone replacement therapy      IBS (irritable bowel syndrome)     Diarrhea predominant     Insomnia, unspecified     On Ambien     Lichen sclerosus      Multiple dysplastic nevi     Going to dermatology for her full body skin exam tomorrow - [facility] - Dr. Carmen Dowling - for her early dysplastic melanoma.     Pap smear of cervix not needed      Skin cancer      Urge incontinence       Past Surgical History:   Procedure Laterality Date       SECTION       COLONOSCOPY  2012    Procedure:COLONOSCOPY; Colonoscopy ; Surgeon:DEANDRE ARRIETA; Location: GI     COLONOSCOPY N/A 2017    Procedure: COLONOSCOPY;  Surgeon: Deandre Arrieta MD;  Location:  GI     COLPOSCOPY, BIOPSY, COMBINED  1991    Mild squamous epithelial dysplasia (SABIHA 1)     FISSURECTOMY RECTUM  01     HYSTERECTOMY TOTAL ABDOMINAL  3/4/1998    Myomatous uterus; pelvic pain. Started HRT right afterward with vasomotor sx.     HYSTERECTOMY, PAP NO LONGER INDICATED N/A     had for large fibroid     LIPOSUCTION (LOCATION)       MOHS MICROGRAPHIC PROCEDURE       OB History    Para Term  AB Living   2 2 2 0 0 2   SAB IAB Ectopic Multiple Live Births   0 0 0 0 2      # Outcome Date GA Lbr Nick/2nd Weight Sex Delivery Anes PTL Lv   2 Term 91   2.594 kg (5 lb 11.5 oz) M -SEC   TERELL      Birth Comments: Breech      Name: Tj Velazquez Term 87   3.005 kg (6 lb 10 oz) F Vag-Vacuum   TERELL      Name: Sam       Review of Systems:   CONSTITUTIONAL: NEGATIVE for fever, chills, change in weight  INTEGUMENTARY/SKIN: NEGATIVE for worrisome rashes, moles or lesions  EYES: NEGATIVE for vision changes or irritation  ENT: NEGATIVE for ear, mouth and throat problems  RESP: NEGATIVE for significant cough or SOB  BREAST: NEGATIVE for masses, tenderness or discharge  CV: NEGATIVE for chest pain, palpitations or peripheral edema  GI: NEGATIVE for nausea, abdominal pain, heartburn, or change in bowel habits  : NEGATIVE for unusual urinary or vaginal symptoms. No vaginal bleeding.  MUSCULOSKELETAL: NEGATIVE for significant arthralgias or myalgia  NEURO: NEGATIVE for weakness, dizziness or paresthesias  ENDOCRINE: NEGATIVE for temperature intolerance, skin/hair changes  HEME/ALLERGY/IMMUNE: NEGATIVE for bleeding problems  PSYCHIATRIC: NEGATIVE for changes in mood or affect .      OBJECTIVE:   /68 (BP Location: Left arm, Patient Position:  "Chair, Cuff Size: Adult Regular)   Pulse 78   Temp 97.6  F (36.4  C) (Tympanic)   Ht 1.626 m (5' 4\")   Wt 69.4 kg (153 lb)   LMP  (LMP Unknown)   SpO2 98%   Breastfeeding No   BMI 26.26 kg/m    Physical Exam:   GENERAL APPEARANCE: healthy, alert and no distress  EYES: Eyes grossly normal to inspection, PERRL and conjunctivae and sclerae normal  HENT: ear canals and TM's normal, nose and mouth without ulcers or lesions, oropharynx clear and oral mucous membranes moist  NECK: no adenopathy, no asymmetry, masses, or scars and thyroid normal to palpation  RESP: lungs clear to auscultation - no rales, rhonchi or wheezes  BREAST: normal without masses, tenderness or nipple discharge and no palpable axillary masses or adenopathy  CV: regular rate and rhythm, normal S1 S2, no S3 or S4, no murmur, click or rub, no peripheral edema and peripheral pulses strong.   ABDOMEN: soft, nontender, no hepatosplenomegaly, no masses and bowel sounds normal  Vagina and vulva are normal;  no discharge is noted.  Cervix and Uterus surgically absent.   Adnexa normal in size without masses or tenderness. Pap Smear - not needed. Exam chaperoned by Nurse.     MS: no musculoskeletal defects are noted and gait is age appropriate without ataxia  SKIN: no suspicious lesions or rashes  NEURO: Normal strength and tone, sensory exam grossly normal, mentation intact and speech normal  PSYCH: mentation appears normal and affect normal/bright.     Diagnostic Test Results:  Labs reviewed in Epic    ASSESSMENT/PLAN:       ICD-10-CM    1. Routine general medical examination at a health care facility  Z00.00    2. Symptomatic menopausal or female climacteric states  N95.1 estradiol (ESTRACE) 1 MG tablet     CBC with platelets     TSH with free T4 reflex     OFFICE/OUTPT VISIT,EST,LEVL IV   3. Rotator cuff syndrome, left  M75.102 ibuprofen (ADVIL/MOTRIN) 800 MG tablet   4. Other osteoarthritis involving multiple joints- left middle finger s/p fracture " /dislocation - causing some chronic pain - pt requests refill of ibuprofen 800mg tabs - takes with food   M15.8 ibuprofen (ADVIL/MOTRIN) 800 MG tablet   5. Lumbosacral dysfunction  M99.03 meloxicam (MOBIC) 15 MG tablet     Drug Abuse Screen Panel 13, Urine (Pain Care Package) - lab collect     OFFICE/OUTPT VISIT,EST,LEVL IV   6. Sacral pain  M53.3 meloxicam (MOBIC) 15 MG tablet     Drug Abuse Screen Panel 13, Urine (Pain Care Package) - lab collect     OFFICE/OUTPT VISIT,EST,LEVL IV   7. Insomnia, unspecified type- mostly while travelling -CSA re-signed 11/29/2021 -  ? related to menopause as well - Ambien 10mg - takes 1/2 tab at bedtime PRN  G47.00 zolpidem (AMBIEN) 10 MG tablet     Drug Abuse Screen Panel 13, Urine (Pain Care Package) - lab collect     OFFICE/OUTPT VISIT,EST,LEVL IV   8. Controlled substance agreement re-signed today -  11/29/2021  ok for #30 -10mg ambien (takes 1/2 tab) with 1 refill every 6 months or so   Z79.899 zolpidem (AMBIEN) 10 MG tablet     Drug Abuse Screen Panel 13, Urine (Pain Care Package) - lab collect     OFFICE/OUTPT VISIT,EST,LEVL IV   9. Family history of early CAD  Z82.49 atorvastatin (LIPITOR) 10 MG tablet   10. Hyperlipidemia LDL goal <130- on atorvastatin - uncertain control - needs lab follow up and refill on medicine   E78.5 REVIEW OF HEALTH MAINTENANCE PROTOCOL ORDERS     Lipid panel reflex to direct LDL Fasting     atorvastatin (LIPITOR) 10 MG tablet     Albumin Random Urine Quantitative with Creat Ratio     Comprehensive metabolic panel     OFFICE/OUTPT VISIT,EST,LEVL IV   11. Acute recurrent maxillary sinusitis- gets when travelling - fills only very judiciously   J01.01 azithromycin (ZITHROMAX) 250 MG tablet     OFFICE/OUTPT VISIT,EST,LEVL IV   12. Night sweats- on estrace 1mg already - discussed progesterone deficiency usually causing that - pt would like to try black cohosh first prior to micronized progesterone 2nd to breast ca risk   R61    13. Multiple dysplastic  "nevi-Going to dermatology for her full body skin exam annually - next 12/8/2021 [facility] - Dr. Carmen Dowling - for her early dysplastic melanoma.    D23.9    14. Vitamin D deficiency  E55.9 25 Hydroxyvitamin D2 and D3     OFFICE/OUTPT VISIT,EST,LEVL IV   15. Screen for colon cancer  Z12.11    16. Basal cell carcinoma (BCC) of skin of nose  C44.311    17. Family history of colonic polyps- precancerous   Z83.71 Adult Gastro Ref - Procedure Only     Needs vitamin D level rechecked today as well.    controlled substance agreement discussed in detail and renewed with  pt in detail today line by line item and signed with pt. Pt voices understanding of responsibility of taking and storing a controlled substance as well.      Patient has been advised of split billing requirements and indicates understanding: Yes  COUNSELING:  Reviewed preventive health counseling, as reflected in patient instructions    Estimated body mass index is 26.26 kg/m  as calculated from the following:    Height as of this encounter: 1.626 m (5' 4\").    Weight as of this encounter: 69.4 kg (153 lb).    Weight management plan: Discussed healthy diet and exercise guidelines    She reports that she has never smoked. She has never used smokeless tobacco.      Counseling Resources:  ATP IV Guidelines  Pooled Cohorts Equation Calculator  Breast Cancer Risk Calculator  BRCA-Related Cancer Risk Assessment: FHS-7 Tool  FRAX Risk Assessment  ICSI Preventive Guidelines  Dietary Guidelines for Americans, 2010  USDA's MyPlate  ASA Prophylaxis  Lung CA Screening    Lisbeth Gonzalez MD  [facility]  "

## 2021-11-29 NOTE — PATIENT INSTRUCTIONS
Preventive Health Recommendations  Female Ages 50 - 64    Yearly exam: See your health care provider every year in order to  o Review health changes.   o Discuss preventive care.    o Review your medicines if your doctor has prescribed any.      Get a Pap test every three years (unless you have an abnormal result and your provider advises testing more often).    If you get Pap tests with HPV test, you only need to test every 5 years, unless you have an abnormal result.     You do not need a Pap test if your uterus was removed (hysterectomy) and you have not had cancer.    You should be tested each year for STDs (sexually transmitted diseases) if you're at risk.     Have a mammogram every 1 to 2 years.    Have a colonoscopy at age 50, or have a yearly FIT test (stool test). These exams screen for colon cancer.      Have a cholesterol test every 5 years, or more often if advised.    Have a diabetes test (fasting glucose) every three years. If you are at risk for diabetes, you should have this test more often.     If you are at risk for osteoporosis (brittle bone disease), think about having a bone density scan (DEXA).    Shots: Get a flu shot each year. Get a tetanus shot every 10 years.    Nutrition:     Eat at least 5 servings of fruits and vegetables each day.    Eat whole-grain bread, whole-wheat pasta and brown rice instead of white grains and rice.    Get adequate Calcium and Vitamin D.     Lifestyle    Exercise at least 150 minutes a week (30 minutes a day, 5 days a week). This will help you control your weight and prevent disease.    Limit alcohol to one drink per day.    No smoking.     Wear sunscreen to prevent skin cancer.     See your dentist every six months for an exam and cleaning.    See your eye doctor every 1 to 2 years.    Return in about 6 months (around 5/29/2022) for cholesterol/lipids, as a lab only visit, then recheck with Dr. DUPONT for physical in 1 year .     Thank you so much or choosing [facility]  for your Health Care. It was a pleasure seeing you at your visit today! Please contact us with any questions or concerns you may have.                   Lisbeth Gonzalez MD                              To reach your [facility] care team after hours call:   [phone]    PLEASE NOTE OUR HOURS HAVE CHANGED secondary to COVID-19 coronavirus pandemic, as we are trying to minimize patient exposure to the virus,  which is now widespread in the state.  These hours may change with very little notice.  We apologize for any inconvenience.       Our current clinic hours are:          Monday- Thursday   7:00am - 6:00pm  in person.      Friday  7:00am- 5:00pm                       Saturday and Sunday : Closed to in person and virtual visits        We have telephone and virtual visit times available between    7:00am - 6pm on Monday-Friday as well.                                                Phone:  [phone]      Our pharmacy hours: Monday through Friday 9:00am to 5:00pm                        Saturday - 9:00 am to 12 noon       Sunday : Closed.              Phone:  [phone]              ###  Please note: at this time we are not accepting any walk-in visits. ###      There is also information available at our web site:  [email or web address]    If your provider ordered any lab tests and you do not receive the results within 10 business days, please call the clinic.    If you need a medication refill please contact your pharmacy.  Please allow 2 business days for your refill to be completed.    Our clinic offers telephone visits and e visits.  Please ask one of your team members to explain more.      Use [employer] (secure email communication and access to your chart) to send your primary care provider a message or make an appointment. Ask someone on your Team how to sign up for [employer].

## 2021-11-29 NOTE — LETTER
[facility] [place]  11/29/21  Patient: Whitney Aceves  YOB: 1960  Medical Record Number: [id number]                                                                                  Non-Opioid Controlled Substance Agreement    This is an agreement between you and your provider regarding safe and appropriate use of controlled substances prescribed by your care team. Controlled substances are?medicines that can cause physical and mental dependence (abuse).     There are strict laws about having and using these medicines. We here at [facility] are  committed to working with you in your efforts to get better. To support you in this work, we'll help you schedule regular office appointments for medicine refills. If we must cancel or change your appointment for any reason, we'll make sure you have enough medicine to last until your next appointment.     As a Provider, I will:     Listen carefully to your concerns while treating you with respect.     Recommend a treatment plan that I believe is in your best interest and may involve therapies other than medicine.      Talk with you often about the possible benefits and the risk of harm of any medicine that we prescribe for you.    Assess the safety of this medicine and check how well it works.      Provide a plan on how to taper (discontinue or go off) using this medicine if the decision is made to stop its use.      ::  As a Patient, I understand controlled substances:       Are prescribed by my care provider to help me function or work and manage my condition(s).?    Are strong medicines and can cause serious side effects.       Need to be taken exactly as prescribed.?Combining controlled substances with certain medicines or chemicals (such as illegal drugs, alcohol, sedatives, sleeping pills, and benzodiazepines) can be dangerous or even fatal.? If I stop taking my medicines suddenly, I may have severe withdrawal symptoms.     The risks,  benefits, and side effects of these medicine(s) were explained to me. I agree that:    1. I will take part in other treatments as advised by my care team. This may be psychiatry or counseling, physical therapy, behavioral therapy, group treatment or a referral to specialist.    2. I will keep all my appointments and understand this is part of the monitoring of controlled substances.?My care team may require an office visit for EVERY controlled substance refill. If I miss appointments or don t follow instructions, my care team may stop my medicine    3. I will take my medicines as prescribed. I will not change the dose or schedule unless my care team tells me to. There will be no refills if I run out early.      4. I may be asked to come to the clinic and complete a urine drug test or complete a pill count. If I don t give a urine sample or participate in a pill count, the care team may stop my medicine.    5. I will only receive controlled substance prescriptions from this clinic. If I am treated by another provider, I will tell them that I am taking controlled substances and that I have a treatment agreement with this provider. I will inform my [facility] care team within one business day if I am given a prescription for any controlled substance by another healthcare provider. My [facility] care team can contact other providers and pharmacists about my use of any medicines.    6. It is up to me to make sure that I don't run out of my medicines on weekends or holidays.?If my care team is willing to refill my prescription without a visit, I must request refills only during office hours. Refills may take up to 3 business days to process. I will use one pharmacy to fill all my controlled substance prescriptions. I will notify the clinic about any changes to my insurance or medicine availability.    7. I am responsible for my prescriptions. If the medicine/prescription is lost, stolen or destroyed, it will  not be replaced.?I also agree not to share controlled substance medicines with anyone.     8. I am aware I should not use any illegal or recreational drugs. I agree not to drink alcohol unless my care team says I can.     9. If I enroll in the [place] Medical Cannabis program, I will tell my care team before my next refill.    10. I will tell my care team right away if I become pregnant, have a new medical problem treated outside of my regular clinic, or have a change in my medicines.     11. I understand that this medicine can affect my thinking, judgment and reaction time.? Alcohol and drugs affect the brain and body, which can affect the safety of my driving. Being under the influence of alcohol or drugs can affect my decision-making, behaviors, personal safety and the safety of others. Driving while impaired (DWI) can occur if a person is driving, operating or in physical control of a car, motorcycle, boat, snowmobile, ATV, motorbike, off-road vehicle or any other motor vehicle ([place] Statute 169A.20). I understand the risk if I choose to drive or operate any vehicle or machinery.    I understand that if I do not follow any of the conditions above, my prescriptions or treatment may be stopped or changed.   I agree that my provider, clinic care team and pharmacy may work with any city, state or federal law enforcement agency that investigates the misuse, sale or other diversion of my controlled medicine. I will allow my provider to discuss my care with, or share a copy of, this agreement with any other treating provider, pharmacy or emergency room where I receive care.     I have read this agreement and have asked questions about anything I did not understand.    ________________________________________________________  Patient Signature - Whitney Aceves     ___________________                   Date     ________________________________________________________  Provider Signature - Lisbeth Gonzalez MD        ___________________                   Date     ________________________________________________________  Witness Signature (required if provider not present while patient signing)          ___________________                   Date

## 2021-12-01 ENCOUNTER — MYC MEDICAL ADVICE (OUTPATIENT)
Dept: FAMILY MEDICINE | Facility: CLINIC | Age: 61
End: 2021-12-01
Payer: COMMERCIAL

## 2021-12-01 DIAGNOSIS — I83.812 VARICOSE VEINS OF LEG WITH PAIN, LEFT: Primary | ICD-10-CM

## 2022-01-12 ENCOUNTER — OFFICE VISIT (OUTPATIENT)
Dept: VASCULAR SURGERY | Facility: CLINIC | Age: 62
End: 2022-01-12
Attending: FAMILY MEDICINE
Payer: COMMERCIAL

## 2022-01-12 DIAGNOSIS — I83.812 VARICOSE VEINS OF LEG WITH PAIN, LEFT: ICD-10-CM

## 2022-01-12 PROCEDURE — 99203 OFFICE O/P NEW LOW 30 MIN: CPT | Performed by: SURGERY

## 2022-01-12 NOTE — PROGRESS NOTES
After Visit Follow Up  Per Dr. Schwab, patient was recommended to have 2 - 4 sessions at $400 of cosmetic sclerotherapy with the first session being under ultrasound guidance, 30 min session.    Reviewed with and gave to patient our vein clinic sclerotherapy packet of information which includes basic sclerotherapy information, pre-treatment instructions and post-treatment instructions.    Patient in agreement with plan and had no further questions.    Odilia England RN on 1/12/2022 at 11:25 AM

## 2022-01-12 NOTE — PROGRESS NOTES
"VEINSOLUTIONS CONSULTATION    HPI:    Whitney Aceves is a pleasant 61 year old female referred by Dr. Lisbeth Gonzalez for evaluation of left leg varicose veins and swelling.  The patient does not describe significant pain but has had the varicose veins \"for her whole life.\"  The swelling just started last year.  She wears compression when flying but not on a regular basis.  The swelling is noted in her left ankle in the evening.    She has no history of deep vein thrombosis, superficial thrombophlebitis or hemorrhage.    Her family history is significant for varicose veins in her mother who underwent vein stripping.    PAST MEDICAL HISTORY:   Past Medical History:   Diagnosis Date     Anxiety     on effexor in past. Off since      Cancer (H) 2021    SKIN CANCER     CMC DJD(carpometacarpal degenerative joint disease), localized primary      Edema      Fatigue, unspecified type 2018     Hormone replacement therapy      IBS (irritable bowel syndrome)     Diarrhea predominant     Insomnia, unspecified     On Ambien     Lichen sclerosus      Multiple dysplastic nevi     Going to dermatology for her full body skin exam tomorrow - [facility] - Dr. Carmen Dowling - for her early dysplastic melanoma.     Pap smear of cervix not needed      Skin cancer      Urge incontinence        PAST SURGICAL HISTORY:   Past Surgical History:   Procedure Laterality Date      SECTION       COLONOSCOPY  2012    Procedure:COLONOSCOPY; Colonoscopy ; Surgeon:DEANDRE ARRIETA; Location: GI     COLONOSCOPY N/A 2017    Procedure: COLONOSCOPY;  Surgeon: Deandre Arrieta MD;  Location:  GI     COLPOSCOPY, BIOPSY, COMBINED  1991    Mild squamous epithelial dysplasia (SABIHA 1)     FISSURECTOMY RECTUM  01     HYSTERECTOMY TOTAL ABDOMINAL  3/4/1998    Myomatous uterus; pelvic pain. Started HRT right afterward with vasomotor sx.     HYSTERECTOMY, PAP NO LONGER INDICATED N/A     had for " large fibroid     LIPOSUCTION (LOCATION)       MOHS MICROGRAPHIC PROCEDURE         FAMILY HISTORY:   Family History   Problem Relation Age of Onset     Hypertension Mother      Hyperlipidemia Mother      Diabetes Mother      Cerebrovascular Disease Mother 89        right hemispheric stroke -  11 days after stroke - 10/2019      C.A.D. Father 45          age 45 massive MI      Heart Failure Father      Hyperlipidemia Father      C.A.D. Maternal Grandmother      C.A.D. Maternal Grandfather      C.A.D. Paternal Grandmother      C.A.D. Paternal Grandfather      Cancer Other         9 members on maternal side. Bone CA, Breast CA (m. aunt),       Coronary Artery Disease Brother 55        Heart attack- s/p sudden cardiac arrest survived 2019      C.A.D. Sister 57        MI age 50- CABG - 2 vessels - 4 MI's since age 55 - 3 stents placed 2017      Diabetes Sister      Hypertension Sister      Breast Cancer Maternal Aunt         50s-Mom had 9 sisters. MAunt->50's     Hypertension Sister      Hyperlipidemia Sister      Prostate Cancer Brother 66     Testicular cancer Brother 37     Psoriasis Son 29        after returning from the service - lives in [place]     Psoriatic Arthritis Son 29        on Enbrel        SOCIAL HISTORY:   Social History     Tobacco Use     Smoking status: Never Smoker     Smokeless tobacco: Never Used   Substance Use Topics     Alcohol use: No     Alcohol/week: 0.0 standard drinks       REVIEW OF SYSTEMS: Review Of Systems  Skin: negative  Eyes: negative  Ears/Nose/Throat: negative  Respiratory: No shortness of breath, dyspnea on exertion, cough, or hemoptysis  Cardiovascular: negative  Gastrointestinal: negative  Genitourinary: negative  Musculoskeletal: Back pain, left ankle swelling  Neurologic: negative  Psychiatric: negative  Hematologic/Lymphatic/Immunologic: negative  Endocrine: negative      Vital signs:  LMP  (LMP Unknown)     Current Outpatient Medications   Medication Sig Dispense  Refill     atorvastatin (LIPITOR) 10 MG tablet Take 1 tablet (10 mg) by mouth daily 90 tablet 3     Black Cohosh-SoyIsoflav-Magnol (ESTROVEN MENOPAUSE RELIEF) CAPS   0     calcium-vitamin D (OSCAL) 250-125 MG-UNIT TABS per tablet Take 1 tablet by mouth daily       estradiol (ESTRACE) 1 MG tablet Take 1 tablet (1 mg) by mouth daily 90 tablet 3     ibuprofen (ADVIL/MOTRIN) 800 MG tablet TAKE 1 TABLET BY MOUTH EVERY 8 HOURS WITH FOOD AS NEEDED FOR MODERATE PAIN 90 tablet 1     meloxicam (MOBIC) 15 MG tablet Take 1 tablet (15 mg) by mouth daily as needed for pain 30 tablet 3     Multiple Vitamins-Minerals (MULTIVITAMIN ADULT PO) Take by mouth daily       Omega-3 Fatty Acids (OMEGA 3 PO)        predniSONE (DELTASONE) 20 MG tablet Take 60 mg daily for 3 days, then 40 mg daily for 3 days, then 20 mg daily for 3 days, then 10 mg for 4 days 20 tablet 0     zolpidem (AMBIEN) 10 MG tablet TAKE 1/2 TABLET BY MOUTH EVERY NIGHT AT BEDTIME AS NEEDED FOR SLEEP 30 tablet 3       PHYSICAL EXAM:  General: Pleasant, NAD.   HEENT: Normocephalic, atraumatic, external ears and nose normal.   Respiratory: Normal respiratory effort.   Cardiovascular: Pulse is regular.   Musculoskeletal: Gait and station normal.  The joints of her fingers and toes without deformity.  There is no cyanosis of her nailbeds.   EXTREMITIES: Right lower extremity: A few scattered telangiectasias but no significant varicose veins.  Scattered telangiectasias about the medial ankle.    Left lower extremity: 3 to 4 mm varicosity coursing from the left medial calf laterally and down the pretibial area to just above the ankle.  Trace edema of the left ankle.  Clusters of telangiectasias over the posterolateral left thigh.  Scattered telangiectasias about her medial ankle.  No stasis changes.    PULSES: R/L (3=normal pulse, 0=no palpable pulse) dorsalis pedis: 3/3; posterior tibial: 3/3.      Neurologic: Grossly normal  Psychiatric: Mood, affect, judgment and insight are  normal     ASSESSMENT:  Asymptomatic left leg varicose veins and bilateral extremity spider telangiectasias primarily of cosmetic concern.  We discussed the anatomy of the lower extremity veins and the pathophysiology of venous insufficiency.  The option of continued conservative measures with small risk of superficial thrombophlebitis, bleeding and progression of the disease process were discussed.    I do not feel that the swelling of the left lower extremity is necessarily related to the small varicose vein on her left lower extremity.  I discussed this frankly with her.    She wishes to consider treatment for cosmetic purposes.  I would treat the left leg varicose vein with ultrasound-guided sclerotherapy and could treat the spider telangiectasias at the same setting.  Details of cosmetic sclerotherapy including risks of allergic reaction, deep in the process, superficial thrombophlebitis, hyperpigmentation and ulceration were discussed.  She voiced understanding and her questions were answered.    PLAN:  Bilateral lower extremity cosmetic sclerotherapy, first session under ultrasound guidance.     Zi Schwab MD    Dictated using Dragon voice recognition software which may result in transcription errors        VEIN CLINIC LEG DRAWING:

## 2022-01-12 NOTE — Clinical Note
"    2022         RE: Whitney Aceves  [address] [facility] [id number]        Dear Colleague,    Thank you for referring your patient, Whitney Aceves, to the [facility] [place]. Please see a copy of my visit note below.    VEINSOLUTIONS CONSULTATION    HPI:    Whitney Aceves is a pleasant 61 year old female referred by Dr. Lisbeth Gonzalez for evaluation of left leg varicose veins and swelling.  The patient does not describe significant pain but has had the varicose veins \"for her whole life.\"  The swelling just started last year.  She wears compression when flying but not on a regular basis.  The swelling is noted in her left ankle in the evening.    She has no history of deep vein thrombosis, superficial thrombophlebitis or hemorrhage.    Her family history is significant for varicose veins in her mother who underwent vein stripping.    PAST MEDICAL HISTORY:   Past Medical History:   Diagnosis Date     Anxiety     on effexor in past. Off since      Cancer (H) 2021    SKIN CANCER     CMC DJD(carpometacarpal degenerative joint disease), localized primary      Edema      Fatigue, unspecified type 2018     Hormone replacement therapy      IBS (irritable bowel syndrome)     Diarrhea predominant     Insomnia, unspecified     On Ambien     Lichen sclerosus      Multiple dysplastic nevi     Going to dermatology for her full body skin exam tomorrow - [facility] - Dr. Carmen Dowling - for her early dysplastic melanoma.     Pap smear of cervix not needed      Skin cancer      Urge incontinence        PAST SURGICAL HISTORY:   Past Surgical History:   Procedure Laterality Date      SECTION       COLONOSCOPY  2012    Procedure:COLONOSCOPY; Colonoscopy ; Surgeon:DEANDRE ARRIETA; Location: GI     COLONOSCOPY N/A 2017    Procedure: COLONOSCOPY;  Surgeon: Deandre Arrieta MD;  Location:  GI     COLPOSCOPY, BIOPSY, COMBINED  1991    Mild " squamous epithelial dysplasia (SABIHA 1)     FISSURECTOMY RECTUM  01     HYSTERECTOMY TOTAL ABDOMINAL  3/4/1998    Myomatous uterus; pelvic pain. Started HRT right afterward with vasomotor sx.     HYSTERECTOMY, PAP NO LONGER INDICATED N/A     had for large fibroid     LIPOSUCTION (LOCATION)       MOHS MICROGRAPHIC PROCEDURE         FAMILY HISTORY:   Family History   Problem Relation Age of Onset     Hypertension Mother      Hyperlipidemia Mother      Diabetes Mother      Cerebrovascular Disease Mother 89        right hemispheric stroke -  11 days after stroke - 10/2019      C.A.D. Father 45          age 45 massive MI      Heart Failure Father      Hyperlipidemia Father      C.A.D. Maternal Grandmother      C.A.D. Maternal Grandfather      C.A.D. Paternal Grandmother      C.A.D. Paternal Grandfather      Cancer Other         9 members on maternal side. Bone CA, Breast CA (m. aunt),       Coronary Artery Disease Brother 55        Heart attack- s/p sudden cardiac arrest survived 2019      C.A.D. Sister 57        MI age 50- CABG - 2 vessels - 4 MI's since age 55 - 3 stents placed 2017      Diabetes Sister      Hypertension Sister      Breast Cancer Maternal Aunt         50s-Mom had 9 sisters. MAunt->50's     Hypertension Sister      Hyperlipidemia Sister      Prostate Cancer Brother 66     Testicular cancer Brother 37     Psoriasis Son 29        after returning from the service - lives in [place]     Psoriatic Arthritis Son 29        on Enbrel        SOCIAL HISTORY:   Social History     Tobacco Use     Smoking status: Never Smoker     Smokeless tobacco: Never Used   Substance Use Topics     Alcohol use: No     Alcohol/week: 0.0 standard drinks       REVIEW OF SYSTEMS: Review Of Systems  Skin: negative  Eyes: negative  Ears/Nose/Throat: negative  Respiratory: No shortness of breath, dyspnea on exertion, cough, or hemoptysis  Cardiovascular: negative  Gastrointestinal: negative  Genitourinary:  negative  Musculoskeletal: Back pain, left ankle swelling  Neurologic: negative  Psychiatric: negative  Hematologic/Lymphatic/Immunologic: negative  Endocrine: negative      Vital signs:  LMP  (LMP Unknown)     Current Outpatient Medications   Medication Sig Dispense Refill     atorvastatin (LIPITOR) 10 MG tablet Take 1 tablet (10 mg) by mouth daily 90 tablet 3     Black Cohosh-SoyIsoflav-Magnol (ESTROVEN MENOPAUSE RELIEF) CAPS   0     calcium-vitamin D (OSCAL) 250-125 MG-UNIT TABS per tablet Take 1 tablet by mouth daily       estradiol (ESTRACE) 1 MG tablet Take 1 tablet (1 mg) by mouth daily 90 tablet 3     ibuprofen (ADVIL/MOTRIN) 800 MG tablet TAKE 1 TABLET BY MOUTH EVERY 8 HOURS WITH FOOD AS NEEDED FOR MODERATE PAIN 90 tablet 1     meloxicam (MOBIC) 15 MG tablet Take 1 tablet (15 mg) by mouth daily as needed for pain 30 tablet 3     Multiple Vitamins-Minerals (MULTIVITAMIN ADULT PO) Take by mouth daily       Omega-3 Fatty Acids (OMEGA 3 PO)        predniSONE (DELTASONE) 20 MG tablet Take 60 mg daily for 3 days, then 40 mg daily for 3 days, then 20 mg daily for 3 days, then 10 mg for 4 days 20 tablet 0     zolpidem (AMBIEN) 10 MG tablet TAKE 1/2 TABLET BY MOUTH EVERY NIGHT AT BEDTIME AS NEEDED FOR SLEEP 30 tablet 3       PHYSICAL EXAM:  General: Pleasant, NAD.   HEENT: Normocephalic, atraumatic, external ears and nose normal.   Respiratory: Normal respiratory effort.   Cardiovascular: Pulse is regular.   Musculoskeletal: Gait and station normal.  The joints of her fingers and toes without deformity.  There is no cyanosis of her nailbeds.   EXTREMITIES: Right lower extremity: A few scattered telangiectasias but no significant varicose veins.  Scattered telangiectasias about the medial ankle.    Left lower extremity: 3 to 4 mm varicosity coursing from the left medial calf laterally and down the pretibial area to just above the ankle.  Trace edema of the left ankle.  Clusters of telangiectasias over the  posterolateral left thigh.  Scattered telangiectasias about her medial ankle.  No stasis changes.    PULSES: R/L (3=normal pulse, 0=no palpable pulse) dorsalis pedis: 3/3; posterior tibial: 3/3.      Neurologic: Grossly normal  Psychiatric: Mood, affect, judgment and insight are normal     Venous Duplex Ultrasound:   ***    ASSESSMENT:  ***    PLAN:  ***     Zi Schwab MD    Dictated using Dragon voice recognition software which may result in transcription errors        VEIN CLINIC LEG DRAWING:                After Visit Follow Up  Per Dr. cShwab, patient was recommended to have 2 - 4 sessions at $400 of cosmetic sclerotherapy with the first session being under ultrasound guidance, 30 min session.    Reviewed with and gave to patient our vein clinic sclerotherapy packet of information which includes basic sclerotherapy information, pre-treatment instructions and post-treatment instructions.    Patient in agreement with plan and had no further questions.    Odilia England, RN on 1/12/2022 at 11:25 AM      Again, thank you for allowing me to participate in the care of your patient.        Sincerely,        Zi Schwab MD

## 2022-01-12 NOTE — NURSING NOTE
Patient Reported symptoms:    Left Leg   Heaviness None of the time   Achiness None of the time   Swelling Some of the time   Throbbing None of the time   Itching None of the time   Appearance Slightly noticeable   Impact on work/activities Symptoms but full able to participate

## 2022-02-04 DIAGNOSIS — Z11.59 ENCOUNTER FOR SCREENING FOR OTHER VIRAL DISEASES: Primary | ICD-10-CM

## 2022-02-17 ENCOUNTER — LAB (OUTPATIENT)
Dept: LAB | Facility: CLINIC | Age: 62
End: 2022-02-17
Attending: INTERNAL MEDICINE
Payer: COMMERCIAL

## 2022-02-17 DIAGNOSIS — Z11.59 ENCOUNTER FOR SCREENING FOR OTHER VIRAL DISEASES: ICD-10-CM

## 2022-02-17 PROCEDURE — U0003 INFECTIOUS AGENT DETECTION BY NUCLEIC ACID (DNA OR RNA); SEVERE ACUTE RESPIRATORY SYNDROME CORONAVIRUS 2 (SARS-COV-2) (CORONAVIRUS DISEASE [COVID-19]), AMPLIFIED PROBE TECHNIQUE, MAKING USE OF HIGH THROUGHPUT TECHNOLOGIES AS DESCRIBED BY CMS-2020-01-R: HCPCS

## 2022-02-17 PROCEDURE — U0005 INFEC AGEN DETEC AMPLI PROBE: HCPCS

## 2022-02-18 LAB — SARS-COV-2 RNA RESP QL NAA+PROBE: NEGATIVE

## 2022-02-21 ENCOUNTER — HOSPITAL ENCOUNTER (OUTPATIENT)
Facility: CLINIC | Age: 62
Discharge: HOME | End: 2022-02-21
Attending: INTERNAL MEDICINE | Admitting: INTERNAL MEDICINE
Payer: COMMERCIAL

## 2022-02-21 VITALS
BODY MASS INDEX: 24.92 KG/M2 | RESPIRATION RATE: 16 BRPM | WEIGHT: 146 LBS | HEART RATE: 60 BPM | OXYGEN SATURATION: 99 % | SYSTOLIC BLOOD PRESSURE: 97 MMHG | HEIGHT: 64 IN | DIASTOLIC BLOOD PRESSURE: 71 MMHG

## 2022-02-21 LAB — COLONOSCOPY: NORMAL

## 2022-02-21 PROCEDURE — 99153 MOD SED SAME PHYS/QHP EA: CPT | Performed by: INTERNAL MEDICINE

## 2022-02-21 PROCEDURE — 45378 DIAGNOSTIC COLONOSCOPY: CPT | Performed by: INTERNAL MEDICINE

## 2022-02-21 PROCEDURE — 250N000011 HC RX IP 250 OP 636: Performed by: INTERNAL MEDICINE

## 2022-02-21 PROCEDURE — G0500 MOD SEDAT ENDO SERVICE >5YRS: HCPCS | Performed by: INTERNAL MEDICINE

## 2022-02-21 PROCEDURE — G0105 COLORECTAL SCRN; HI RISK IND: HCPCS | Performed by: INTERNAL MEDICINE

## 2022-02-21 RX ORDER — NALOXONE HYDROCHLORIDE 0.4 MG/ML
0.4 INJECTION, SOLUTION INTRAMUSCULAR; INTRAVENOUS; SUBCUTANEOUS
Status: DISCONTINUED | OUTPATIENT
Start: 2022-02-21 | End: 2022-02-21 | Stop reason: HOSPADM

## 2022-02-21 RX ORDER — ONDANSETRON 4 MG/1
4 TABLET, ORALLY DISINTEGRATING ORAL EVERY 6 HOURS PRN
Status: DISCONTINUED | OUTPATIENT
Start: 2022-02-21 | End: 2022-02-21 | Stop reason: HOSPADM

## 2022-02-21 RX ORDER — FLUMAZENIL 0.1 MG/ML
0.2 INJECTION, SOLUTION INTRAVENOUS
Status: DISCONTINUED | OUTPATIENT
Start: 2022-02-21 | End: 2022-02-21 | Stop reason: HOSPADM

## 2022-02-21 RX ORDER — NALOXONE HYDROCHLORIDE 0.4 MG/ML
0.2 INJECTION, SOLUTION INTRAMUSCULAR; INTRAVENOUS; SUBCUTANEOUS
Status: DISCONTINUED | OUTPATIENT
Start: 2022-02-21 | End: 2022-02-21 | Stop reason: HOSPADM

## 2022-02-21 RX ORDER — FENTANYL CITRATE 0.05 MG/ML
50-100 INJECTION, SOLUTION INTRAMUSCULAR; INTRAVENOUS
Status: COMPLETED | OUTPATIENT
Start: 2022-02-21 | End: 2022-02-21

## 2022-02-21 RX ORDER — LIDOCAINE 40 MG/G
CREAM TOPICAL
Status: DISCONTINUED | OUTPATIENT
Start: 2022-02-21 | End: 2022-02-21 | Stop reason: HOSPADM

## 2022-02-21 RX ORDER — PROCHLORPERAZINE MALEATE 10 MG
10 TABLET ORAL EVERY 6 HOURS PRN
Status: DISCONTINUED | OUTPATIENT
Start: 2022-02-21 | End: 2022-02-21 | Stop reason: HOSPADM

## 2022-02-21 RX ORDER — ONDANSETRON 2 MG/ML
4 INJECTION INTRAMUSCULAR; INTRAVENOUS EVERY 6 HOURS PRN
Status: DISCONTINUED | OUTPATIENT
Start: 2022-02-21 | End: 2022-02-21 | Stop reason: HOSPADM

## 2022-02-21 RX ORDER — ONDANSETRON 2 MG/ML
4 INJECTION INTRAMUSCULAR; INTRAVENOUS
Status: COMPLETED | OUTPATIENT
Start: 2022-02-21 | End: 2022-02-21

## 2022-02-21 RX ORDER — EPINEPHRINE 1 MG/ML
0.1 INJECTION, SOLUTION INTRAMUSCULAR; SUBCUTANEOUS
Status: DISCONTINUED | OUTPATIENT
Start: 2022-02-21 | End: 2022-02-21 | Stop reason: HOSPADM

## 2022-02-21 RX ORDER — SIMETHICONE 40MG/0.6ML
133 SUSPENSION, DROPS(FINAL DOSAGE FORM)(ML) ORAL
Status: DISCONTINUED | OUTPATIENT
Start: 2022-02-21 | End: 2022-02-21 | Stop reason: HOSPADM

## 2022-02-21 RX ORDER — ATROPINE SULFATE 0.4 MG/ML
0.4 AMPUL (ML) INJECTION
Status: DISCONTINUED | OUTPATIENT
Start: 2022-02-21 | End: 2022-02-21 | Stop reason: HOSPADM

## 2022-02-21 RX ORDER — FENTANYL CITRATE 0.05 MG/ML
25-50 INJECTION, SOLUTION INTRAMUSCULAR; INTRAVENOUS
Status: DISCONTINUED | OUTPATIENT
Start: 2022-02-21 | End: 2022-02-21 | Stop reason: HOSPADM

## 2022-02-21 RX ADMIN — ONDANSETRON 4 MG: 2 INJECTION INTRAMUSCULAR; INTRAVENOUS at 09:31

## 2022-02-21 RX ADMIN — MIDAZOLAM 2 MG: 1 INJECTION INTRAMUSCULAR; INTRAVENOUS at 09:26

## 2022-02-21 RX ADMIN — MIDAZOLAM 2 MG: 1 INJECTION INTRAMUSCULAR; INTRAVENOUS at 09:20

## 2022-02-21 RX ADMIN — FENTANYL CITRATE 100 MCG: 0.05 INJECTION, SOLUTION INTRAMUSCULAR; INTRAVENOUS at 09:20

## 2022-02-21 RX ADMIN — FENTANYL CITRATE 50 MCG: 50 INJECTION INTRAMUSCULAR; INTRAVENOUS at 09:30

## 2022-02-21 NOTE — TELEPHONE ENCOUNTER
Pending Prescriptions:                       Disp   Refills    VIVELLE-DOT 0.1 MG/24HR BIW patch [Pharmac*24 pat*3        Sig: PLACE 1 PATCH ONTO THE SKIN TWICE A WEEK        Last Written Prescription Date:  06/13/16  Last Fill Quantity: 24,   # refills: 3  Last Office Visit with [facility], [facility] or [facility] prescribing provider: 06/13/16  Future Office visit:   None       No

## 2022-02-21 NOTE — H&P
[facility] Anesthesia Pre-op History and Physical    Whitney Aceves MRN# [id number]   Age: 61 year old YOB: 1960      Date of Surgery: today [facility]      Date of Exam 2/21/2022 Facility (Same day)       Home clinic: unknown  Primary care provider: Lisbeth Gonzalez         Chief Complaint and/or Reason for Procedure:   No chief complaint on file.           Active problem list:     Patient Active Problem List    Diagnosis Date Noted     Lumbosacral dysfunction 11/29/2021     Priority: Medium     Sacral pain 11/29/2021     Priority: Medium     Family history of colonic polyps- precancerous  11/29/2021     Priority: Medium     Basal cell carcinoma (BCC) of skin of nose 11/29/2021     Priority: Medium     Vitamin D deficiency 11/29/2021     Priority: Medium     RUQ abdominal pain- comes and goes - not related to foods or fatty foods, worse bending over with or without lifting and getting out of her car- ? muscle spasm - CT and US negative 10/5/2020  10/22/2020     Priority: Medium     Rotator cuff syndrome, left 10/22/2020     Priority: Medium     Other osteoarthritis involving multiple joints- left middle finger s/p fracture /dislocation - causing some chronic pain - pt requests refill of ibuprofen 800mg tabs - takes with food  10/22/2020     Priority: Medium     Multiple dysplastic nevi-Going to dermatology for her full body skin exam annually - next appt=12/8/2021 [facility] - Dr. Carmen Dowling - for her early dysplastic melanoma.        Priority: Medium     Urinary urgency 09/17/2018     Priority: Medium     Fatigue, unspecified type 09/17/2018     Priority: Medium     Night sweats- on estrace 1mg already - discussed progesterone deficiency usually causing that - pt would like to try black cohosh first prior to micronized progesterone 2nd to breast ca risk  09/17/2018     Priority: Medium     Controlled substance agreement signed -11/29/2021 ok  for #30 -10mg ambien (takes 1/2 tab) with 1 refill every 6 months or so  09/17/2018     Priority: Medium     Acute recurrent maxillary sinusitis 09/17/2018     Priority: Medium     Insomnia, unspecified type-Controlled substance agreement signed -11/29/2021 ok for #30 -10mg ambien (takes 1/2 tab) with 1 refill every 6 months or so  03/08/2018     Priority: Medium     Patient is followed by SEJAL AYERS for ongoing prescription of ambien  All refills should be approved by this provider, or covering partner.    Medication(s): ambien 10mg - takes 1/2 tab.   Maximum quantity per month: #30, if that.   Clinic visit frequency required: Q 6-12   months     Controlled substance agreement on file: Yes       Date(s): 11/29/2021   Benzodiazepine use reviewed by psychiatry:  No    Last [facility] website verification:  done on 11/29/2021    [email or web address]  --Sejal Ayers MD        Expressive aphasia 03/08/2018     Priority: Medium     mixing up her words occasionally - for instance wanted to say astringent , but it came out nonsensically.c can say up to 3-4 words in a row, that are nonsensical.         Primary osteoarthritis of both hands 09/29/2017     Priority: Medium     Family history of early CAD 09/29/2017     Priority: Medium     Symptomatic menopausal or female climacteric states 08/09/2017     Priority: Medium     Fibrocystic breast changes of both breasts 08/09/2017     Priority: Medium     Gastroesophageal reflux disease without esophagitis 02/17/2017     Priority: Medium     Hyperlipidemia LDL goal <130- on atorvastatin  10/31/2010     Priority: Medium     Overactive bladder 06/21/2010     Priority: Medium     Anxiety      Priority: Medium     Ed score is 0 on 6-29-11       Panic disorder without agoraphobia 08/05/2008     Priority: Medium     Per Kade Trujillo/ernesto                Medications (include herbals and vitamins):   Any Plavix use in the last 7 days?  No     Current  "Facility-Administered Medications   Medication     0.9% sodium chloride BOLUS     atropine injection 0.4 mg     benzocaine 20% (HURRICAINE/TOPEX) 20 % spray 0.5-2 mL     EPINEPHrine (Anaphylaxis) (ADRENALIN) injection (vial) 0.1 mg     fentaNYL (PF) (SUBLIMAZE) injection  mcg    Followed by     fentaNYL (PF) (SUBLIMAZE) injection 25-50 mcg     flumazenil (ROMAZICON) injection 0.2 mg     glucagon injection 0.5 mg     lidocaine (LMX4) kit     lidocaine 1 % 0.1-1 mL     [COMPLETED] midazolam (VERSED) injection 1-2 mg    Followed by     midazolam (VERSED) injection 1 mg     ondansetron (ZOFRAN) injection 4 mg     simethicone (MYLICON) suspension 133 mg     sodium chloride (PF) 0.9% PF flush 3 mL     sodium chloride (PF) 0.9% PF flush 3 mL     sodium chloride (PF) 0.9% PF flush 3 mL     sodium chloride (PF) 0.9% PF flush 3 mL             Allergies:      Allergies   Allergen Reactions     Septra [Sulfamethoxazole W/Trimethoprim]      Sulfa Drugs Hives     Murine Ear [Carbamide Peroxide] Swelling and Rash     Severe pain , swelling after debrox type ear drops - had to go to the clinic to have ears flushed out emergently.      Allergy to Latex?  No  Allergy to tape?    No  Intolerances: bactrim, sulfa drug, murine ear            Physical Exam:   All vitals have been reviewed  Patient Vitals for the past 8 hrs:   BP Temp src Pulse Resp SpO2 Height Weight   02/21/22 0910 125/79 Tympanic 68 14 96 % -- --   02/21/22 0811 -- -- -- -- -- 1.626 m (5' 4\") 66.2 kg (146 lb)     No intake/output data recorded.  Airway assessment:   Patient is able to open mouth wide  Patient is able to stick out tongue}              Lab / Radiology Results:             Anesthetic risk and/or ASA classification:       Pool Cardenas MD     "

## 2022-02-21 NOTE — LETTER
February 1, 2022      Whitney Aceves  [address] [address] [address]        Dear Whitney,     Please be aware that coverage of these services is subject to the terms and limitations of your health insurance plan.  Call member services at your health plan with any benefit or coverage questions.    Thank you for choosing [facility]. You are scheduled for the following service(s):    Date:  2/21/2022             Procedure:  COLONOSCOPY  Doctor:        Dr. Cardenas   Arrival Time:  7:55a  *Enter and check in at the Main Hospital Entrance*  Procedure Time:  8:40a      Location:   [facility]        Endoscopy Department, First Floor         [address] [place], [place] [address] [phone] or [phone] ([facility]) to reschedule        MIRALAX -GATORADE  PREP  Colonoscopy is the most accurate test to detect colon polyps and colon cancer; and the only test where polyps can be removed. During this procedure, a doctor examines the lining of your large intestine and rectum through a flexible tube.   Transportation  You must arrange for a ride for the day of your procedure with a responsible adult. A taxi , Uber, etc, is not an option unless you are accompanied by a responsible adult. If you fail to arrange transportation with a responsible adult, your procedure will be cancelled and rescheduled.    Purchase the  following supplies at your local pharmacy:  - 2 (two) bisacodyl tablets: each tablet contains 5 mg.  (Dulcolax  laxative NOT Dulcolax  stool softener)   - 1 (one) 8.3 oz bottle of Polyethylene Glycol (PEG) 3350 Powder   (MiraLAX , Smooth LAX , ClearLAX  or equivalent)  - 64 oz Gatorade    Regular Gatorade, Gatorade G2 , Powerade , Powerade Zero  or Pedialyte  is acceptable. Red colored flavors are not allowed; all other colors (yellow, green, orange, purple and blue) are okay. It is also okay to buy two 2.12 oz packets of powdered Gatorade that  can be mixed with water to a total volume of 64 oz of liquid.  - 1 (one) 10 oz bottle of Magnesium Citrate (Red colored flavors are not allowed)  It is also okay for you to use a 0.5 oz package of powdered magnesium citrate (17 g) mixed with 10 oz of water.      PREPARATION FOR COLONOSCOPY    7 days before:    Discontinue fiber supplements and medications containing iron. This includes Metamucil  and Fibercon ; and multivitamins with iron.    3 days before:    Begin a low-fiber diet. A low-fiber diet helps making the cleanout more effective.     Examples of a low-fiber diet include (but are not limited to): white bread, white rice, pasta, crackers, fish, chicken, eggs, ground beef, creamy peanut butter, cooked/steamed/boiled vegetables, canned fruit, bananas, melons, milk, plain yogurt cheese, salad dressing and other condiments.     The following are not allowed on a low-fiber diet: seeds, nuts, popcorn, bran, whole wheat, corn, quinoa, raw fruits and vegetables, berries and dried fruit, beans and lentils.    For additional details on low-fiber diet, please refer to the table on the last page.    2 days before:    Continue the low-fiber diet.     Drink at least 8 glasses of water throughout the day.     Stop eating solid foods at 11:45 pm.    1 day before:    In the morning: begin a clear liquid diet (liquids you can see through).     Examples of a clear liquid diet include: water, clear broth or bouillon, Gatorade, Pedialyte or Powerade, carbonated and non-carbonated soft drinks (Sprite , 7-Up , ginger ale), strained fruit juices without pulp (apple, white grape, white cranberry), Jell-O  and popsicles.     The following are not allowed on a clear liquid diet: red liquids, alcoholic beverages, dairy products (milk, creamer, and yogurt), protein shakes, creamy broths, juice with pulp and chewing tobacco.    At noon: take 2 (two) bisacodyl tablets     At 4 (and no later than 6pm): start drinking the Miralax-Gatorade  preparation (8.3 oz of Miralax mixed with 64 oz of Gatorade in a large pitcher). Drink 1(one) 8 oz glass every 15 minutes thereafter, until the mixture is gone.  COLON CLEANSING TIPS: drink adequate amounts of fluids before and after your colon cleansing to prevent dehydration. Stay near a toilet because you will have diarrhea. Even if you are sitting on the toilet, continue to drink the cleansing solution every 15 minutes. If you feel nauseous or vomit, rinse your mouth with water, take a 15 to 30-minute-break and then continue drinking the solution. You will be uncomfortable until the stool has flushed from your colon (in about 2 to 4 hours). You may feel chilled.    Day of your procedure  You may take all of your morning medications including blood pressure medications, blood thinners (if you have not been instructed to stop these by our office), methadone, anti-seizure medications with sips of water 3 hours prior to your procedure or earlier. Do not take insulin or vitamins prior to your procedure. Continue the clear liquid diet.     4 hours prior: drink 10 oz of magnesium citrate. It may be easier to drink it with a straw.    STOP consuming all liquids after that.     Do not take anything by mouth during this time.     Allow extra time to travel to your procedure as you may need to stop and use a restroom along the way.    You are ready for the procedure, if you followed all instructions and your stool is no longer formed, but clear or yellow liquid. If you are unsure whether your colon is clean, please call our office at [phone] before you leave for your appointment.    Bring the following to your procedure:  - Insurance Card/Photo ID.   - List of current medications including over-the-counter medications and supplements.   - Your rescue inhaler if you currently use one to control asthma.    Canceling or rescheduling your appointment:   If you must cancel or reschedule your appointment, please call  [phone] as soon as possible.  COLONOSCOPY PRE-PROCEDURE CHECKLIST    If you have diabetes, ask your regular doctor for diet and medication restrictions.  If you take an anticoagulant or anti-platelet medication (such as Coumadin , Lovenox , Pradaxa , Xarelto , Eliquis , etc.), please call your primary doctor for advice on holding this medication.  If you take aspirin you may continue to do so.  If you are or may be pregnant, please discuss the risks and benefits of this procedure with your doctor.      What happens during a colonoscopy?    Plan to spend up to two hours, starting at registration time, at the endoscopy center the day of your procedure. The colonoscopy takes an average of 15 to 30 minutes. Recovery time is about 30 minutes.      Before the exam:    You will change into a gown.    Your medical history and medication list will be reviewed with you, unless that has been done over the phone prior to the procedure.     A nurse will insert an intravenous (IV) line into your hand or arm.    The doctor will meet with you and will give you a consent form to sign.  During the exam:     Medicine will be given through the IV line to help you relax.     Your heart rate and oxygen levels will be monitored. If your blood pressure is low, you may be given fluids through the IV line.     The doctor will insert a flexible hollow tube, called a colonoscope, into your rectum. The scope will be advanced slowly through the large intestine (colon).    You may have a feeling of fullness or pressure.     If an abnormal tissue or a polyp is found, the doctor may remove it through the endoscope for closer examination, or biopsy. Tissue removal is painless    After the exam:           Any tissue samples removed during the exam will be sent to a lab for evaluation. It may take 5-7 working days for you to be notified of the results.     A nurse will provide you with complete discharge instructions before you leave the  endoscopy center. Be sure to ask the nurse for specific instructions if you take blood thinners such as Aspirin, Coumadin or Plavix.     The doctor will prepare a full report for you and for the physician who referred you for the procedure.     Your doctor will talk with you about the initial results of your exam.      Medication given during the exam will prohibit you from driving for the rest of the day.     Following the exam, you may resume your normal diet. Your first meal should be light, no greasy foods. Avoid alcohol until the next day.     You may resume your regular activities the day after the procedure.         LOW-FIBER DIET    Foods RECOMMENDED Foods to AVOID   Breads, Cereal, Rice and Pasta:   White bread, rolls, biscuits, croissant and jose luis toast.   Waffles, Tongan toast and pancakes.   White rice, noodles, pasta, macaroni and peeled cooked potatoes.   Plain crackers and saltines.   Cooked cereals: farina, cream of rice.   Cold cereals: Puffed Rice , Rice Krispies , Corn Flakes  and Special K    Breads, Cereal, Rice and Pasta:   Breads or rolls with nuts, seeds or fruit.   Whole wheat, pumpernickel, rye breads and cornbread.   Potatoes with skin, brown or wild rice, and kasha (buckwheat).     Vegetables:   Tender cooked and canned vegetables without seeds: carrots, asparagus tips, green or wax beans, pumpkin, spinach, lima beans. Vegetables:   Raw or steamed vegetables.   Vegetables with seeds.   Sauerkraut.   Winter squash, peas, broccoli, Brussel sprouts, cabbage, onions, cauliflower, baked beans, peas and corn.   Fruits:   Strained fruit juice.   Canned fruit, except pineapple.   Ripe bananas and melon. Fruits:   Prunes and prune juice.   Raw fruits.   Dried fruits: figs, dates and raisins.   Milk/Dairy:   Milk: plain or flavored.   Yogurt, custard and ice cream.   Cheese and cottage cheese Milk/Dairy:     Meat and other proteins:   ground, well-cooked tender beef, lamb, ham, veal, pork, fish,  poultry and organ meats.   Eggs.   Peanut butter without nuts. Meat and other proteins:   Tough, fibrous meats with gristle.   Dry beans, peas and lentils.   Peanut butter with nuts.   Tofu.   Fats, Snack, Sweets, Condiments and Beverages:   Margarine, butter, oils, mayonnaise, sour cream and salad dressing, plain gravy.   Sugar, hard candy, clear jelly, honey and syrup.   Spices, cooked herbs, bouillon, broth and soups made with allowed vegetable, ketchup and mustard.   Coffee, tea and carbonated drinks.   Plain cakes, cookies and pretzels.   Gelatin, plain puddings, custard, ice cream, sherbet and popsicles. Fats, Snack, Sweets, Condiments and Beverages:   Nuts, seeds and coconut.   Jam, marmalade and preserves.   Pickles, olives, relish and horseradish.   All desserts containing nuts, seeds, dried fruit and coconut; or made from whole grains or bran.   Candy made with nuts or seeds.   Popcorn.     DIRECTIONS TO THE ENDOSCOPY DEPARTMENT    From the north ([facility])  Take [address] South, exit on [address] [address]. Get into the left hand moe, turn left (east), go one-half mile to [address] and turn left. Go north to the second stoplight, take a right on [address] and follow it to the Main Hospital entrance.    From the south ([facility])  Take 35N to the 35E split and exit on [address] [address]. On [address] [address], turn left (west) to [address]. Turn right (north) on [address]. Go north to the second stoplight, take a right on [address] and follow it to the Main Hospital entrance.    From the east via 35E ([facility])  Take 35E south to [address] [address] exit. Turn right on [address] [address]. Go west to [address]. Turn right (north) on [address]. Go to the second stoplight, take a right on [address] to the Main Hospital entrance.    From the east via [address] ([facility])  Take [address] to [address]. Turn left  (south) on [address] to [address], turn left (east) on [address] and follow it to the Main Hospital entrance.    From the west via [address] ([place])  Take [address] east to [address]. Turn right (south) on [address] to [address], turn left (east) on [address] and follow it to the Main Hospital entrance.

## 2022-03-16 ENCOUNTER — OFFICE VISIT (OUTPATIENT)
Dept: VASCULAR SURGERY | Facility: CLINIC | Age: 62
End: 2022-03-16

## 2022-03-16 DIAGNOSIS — I83.92 ASYMPTOMATIC VARICOSE VEINS OF LEFT LOWER EXTREMITY: ICD-10-CM

## 2022-03-16 DIAGNOSIS — I83.812 VARICOSE VEINS OF LEG WITH PAIN, LEFT: Primary | ICD-10-CM

## 2022-03-16 DIAGNOSIS — I78.1 SPIDER TELANGIECTASIS OF SKIN: ICD-10-CM

## 2022-03-16 PROCEDURE — 99207 PR NO CHARGE NURSE ONLY: CPT

## 2022-03-16 PROCEDURE — 36468 NJX SCLRSNT SPIDER VEINS: CPT | Performed by: SURGERY

## 2022-03-16 PROCEDURE — A6533 GC STOCKING THIGHLNGTH 18-30: HCPCS

## 2022-03-16 PROCEDURE — S9999 SALES TAX: HCPCS | Performed by: SURGERY

## 2022-03-16 NOTE — PROGRESS NOTES
Patient purchased 1 pair(s) of Black, Thigh high, Open-toe, size 2 compression hose from the clinic today.     Informed patient all compression hose purchases are final.    Linh Bolden on 3/16/2022 at 8:58 AM

## 2022-03-16 NOTE — LETTER
3/16/2022         RE: Whitney Aceves  [address] [address] [facility] [address]        Dear Colleague,    Thank you for referring your patient, Whitney Aceves, to the [facility] [place]. Please see a copy of my visit note below.        Vein Clinic Sclerotherapy Note     Indications:  1.  Left medial leg and pretibial, asymptomatic varicose veins of cosmetic concern  2.  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  Ultrasound-guided sclerotherapy asymptomatic left medial calf and pretibial varicose vein of cosmetic concern  2.  Bilateral lower extremity cosmetic sclerotherapy spider telangiectasias     Procedure description  Details of procedure including risks of allergic reaction, deep vein thrombosis, ulceration, superficial thrombophlebitis and hyperpigmentation were discussed.  The patient voiced understanding and wished to proceed.  Informed consent was obtained.    Patient was most concerned cosmetically about a varicosity in the left medial calf coursing down the left pretibial area that was completely asymptomatic.  She also had proliferative telangiectasias on the left greater than right ankles, medially and laterally.    I imaged the left medial calf with ultrasound and identified a tributary from the left great saphenous vein coursing anteriorly and laterally down the pretibial area.  I isolated this vein and the proximal third of the calf with ultrasound, directed a 27-gauge needle into it and injected with 1% polidocanol foam.  I then moved down the leg to the distal third of the leg and injected the vein at this level with 1% polidocanol foam filling the vein in its entirety from the ankle to the proximal calf.  1 syringe of 1% polidocanol foam was used.    I donned the Syris headlight and injected numerous telangiectasias most proliferative around the ankles and scattered over the legs and thighs circumferentially.  I used a total of 3  syringes of 0.5% polidocanol foam.    After completing the sclerotherapy I had the patient perform ankle pumps.  We cleaned her legs, had her don thigh-high compression, then had a walk for 10 minutes prior to getting in her car drive home.  She will return in 6 weeks for follow-up with ultrasound guidance, 30-minute session.    She tolerated the procedure well without evidence of allergic reaction or other complications.    Sclerotherapy    Date/Time: 3/16/2022 9:03 AM  Performed by: Zi Schwab MD  Authorized by: Zi Schwab MD     Time out: Immediately prior to the procedure a time out was called    Type:  Cosmetic  Session:  Full  Procedure side:  Bilateral  Solution/Amount:  1% POLIDOCANOL  Syringes:  1-1%; 3-0.5%  Patient tolerance:  Patient tolerated the procedure well with no immediate complications  Wrap/Hose:  Fatoumatae        PADILLA Schwab MD    Dictated using Dragon voice recognition software which may result in transcription errors      Again, thank you for allowing me to participate in the care of your patient.        Sincerely,        Zi Schwab MD

## 2022-03-16 NOTE — PROGRESS NOTES
Vein Clinic Sclerotherapy Note     Indications:  1.  Left medial leg and pretibial, asymptomatic varicose veins of cosmetic concern  2.  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  Ultrasound-guided sclerotherapy asymptomatic left medial calf and pretibial varicose vein of cosmetic concern  2.  Bilateral lower extremity cosmetic sclerotherapy spider telangiectasias     Procedure description  Details of procedure including risks of allergic reaction, deep vein thrombosis, ulceration, superficial thrombophlebitis and hyperpigmentation were discussed.  The patient voiced understanding and wished to proceed.  Informed consent was obtained.    Patient was most concerned cosmetically about a varicosity in the left medial calf coursing down the left pretibial area that was completely asymptomatic.  She also had proliferative telangiectasias on the left greater than right ankles, medially and laterally.    I imaged the left medial calf with ultrasound and identified a tributary from the left great saphenous vein coursing anteriorly and laterally down the pretibial area.  I isolated this vein and the proximal third of the calf with ultrasound, directed a 27-gauge needle into it and injected with 1% polidocanol foam.  I then moved down the leg to the distal third of the leg and injected the vein at this level with 1% polidocanol foam filling the vein in its entirety from the ankle to the proximal calf.  1 syringe of 1% polidocanol foam was used.    I donned the Syris headlight and injected numerous telangiectasias most proliferative around the ankles and scattered over the legs and thighs circumferentially.  I used a total of 3 syringes of 0.5% polidocanol foam.    After completing the sclerotherapy I had the patient perform ankle pumps.  We cleaned her legs, had her don thigh-high compression, then had a walk for 10 minutes prior to getting in her car drive home.  She will  return in 6 weeks for follow-up with ultrasound guidance, 30-minute session.    She tolerated the procedure well without evidence of allergic reaction or other complications.    Sclerotherapy    Date/Time: 3/16/2022 9:03 AM  Performed by: Zi Schwab MD  Authorized by: Zi Schwab MD     Time out: Immediately prior to the procedure a time out was called    Type:  Cosmetic  Session:  Full  Procedure side:  Bilateral  Solution/Amount:  1% POLIDOCANOL  Syringes:  1-1%; 3-0.5%  Patient tolerance:  Patient tolerated the procedure well with no immediate complications  Wrap/Hose:  True Schwab MD    Dictated using Dragon voice recognition software which may result in transcription errors

## 2022-05-03 ENCOUNTER — OFFICE VISIT (OUTPATIENT)
Dept: VASCULAR SURGERY | Facility: CLINIC | Age: 62
End: 2022-05-03

## 2022-05-03 DIAGNOSIS — I78.1 SPIDER TELANGIECTASIS OF SKIN: Primary | ICD-10-CM

## 2022-05-03 PROCEDURE — 36468 NJX SCLRSNT SPIDER VEINS: CPT | Performed by: SURGERY

## 2022-05-03 PROCEDURE — S9999 SALES TAX: HCPCS | Performed by: SURGERY

## 2022-05-03 NOTE — LETTER
5/3/2022         RE: Whitney Aceves  [address] [facility] [address]        Dear Colleague,    Thank you for referring your patient, Whitney Aceves, to the [facility] [place]. Please see a copy of my visit note below.        Vein Clinic Sclerotherapy Note     Indications:  Residual bilateral extremity spider telangiectasias of cosmetic concern; status post 1 session of ultrasound-guided, cosmetic sclerotherapy for asymptomatic varicose veins and direct vision sclerotherapy of spider telangiectasias     Procedure:  Bilateral extremity cosmetic sclerotherapy-direct vision     Procedure description  Details of procedure including risks of allergic reaction, deep vein thrombosis, ulceration, superficial thrombophlebitis and hyperpigmentation were discussed.  The patient voiced understanding and wished to proceed.  Informed consent was obtained.    We had treated the patient as above about 6 weeks ago.  She was very happy with the improvement she had made.    On exam, she had a few residual telangiectasias about the left greater than right ankles and a few scattered on the left greater than right lateral thighs.  There were few areas of trapped blood in the treated veins on the distal posterior lateral left thigh, the proximal medial left leg, the distal anterior left leg and a few scattered areas of trapped blood in telangiectasias over the ankles.    I donned the Syris headlight and injected residual telangiectasias about the ankles and lateral thighs using 0.5% polidocanol foam, 3 mL.  I had a perform ankle pumps.    We cleaned the areas of trapped blood with alcohol, made stab wounds with an 18-gauge needle and then expressed thrombus.    We cleaned her legs, had her don thigh-high compression, then had her walk for 10 minutes.  She will likely see us in 6 weeks to check for trapped blood but likely will not need treatment.  She will not need ultrasound  guidance.    Sclerotherapy    Date/Time: 5/3/2022 8:36 PM  Performed by: Zi Schwab MD  Authorized by: Zi Schwab MD     Time out: Immediately prior to the procedure a time out was called    Type:  Cosmetic  Session:  Limited  Procedure side:  Bilateral  Solution/Amount:  .5 POLIDOCANOL  Syringes:  1  Evacuation of intraluminal thrombus under sterile conditions without complications.    Patient tolerance:  Patient tolerated the procedure well with no immediate complications  Wrap/Hose:  True Schwab MD    Dictated using Dragon voice recognition software which may result in transcription errors      Again, thank you for allowing me to participate in the care of your patient.        Sincerely,        Zi Schwab MD

## 2022-05-04 NOTE — PROGRESS NOTES
Vein Clinic Sclerotherapy Note     Indications:  Residual bilateral extremity spider telangiectasias of cosmetic concern; status post 1 session of ultrasound-guided, cosmetic sclerotherapy for asymptomatic varicose veins and direct vision sclerotherapy of spider telangiectasias     Procedure:  Bilateral extremity cosmetic sclerotherapy-direct vision     Procedure description  Details of procedure including risks of allergic reaction, deep vein thrombosis, ulceration, superficial thrombophlebitis and hyperpigmentation were discussed.  The patient voiced understanding and wished to proceed.  Informed consent was obtained.    We had treated the patient as above about 6 weeks ago.  She was very happy with the improvement she had made.    On exam, she had a few residual telangiectasias about the left greater than right ankles and a few scattered on the left greater than right lateral thighs.  There were few areas of trapped blood in the treated veins on the distal posterior lateral left thigh, the proximal medial left leg, the distal anterior left leg and a few scattered areas of trapped blood in telangiectasias over the ankles.    I donned the Syris headlight and injected residual telangiectasias about the ankles and lateral thighs using 0.5% polidocanol foam, 3 mL.  I had a perform ankle pumps.    We cleaned the areas of trapped blood with alcohol, made stab wounds with an 18-gauge needle and then expressed thrombus.    We cleaned her legs, had her don thigh-high compression, then had her walk for 10 minutes.  She will likely see us in 6 weeks to check for trapped blood but likely will not need treatment.  She will not need ultrasound guidance.    Sclerotherapy    Date/Time: 5/3/2022 8:36 PM  Performed by: Zi Schwab MD  Authorized by: Zi Schwab MD     Time out: Immediately prior to the procedure a time out was called    Type:  Cosmetic  Session:  Limited  Procedure side:   Bilateral  Solution/Amount:  .5 POLIDOCANOL  Syringes:  1  Evacuation of intraluminal thrombus under sterile conditions without complications.    Patient tolerance:  Patient tolerated the procedure well with no immediate complications  Wrap/Hose:  True Schwab MD    Dictated using Dragon voice recognition software which may result in transcription errors

## 2022-06-15 ENCOUNTER — OFFICE VISIT (OUTPATIENT)
Dept: VASCULAR SURGERY | Facility: CLINIC | Age: 62
End: 2022-06-15
Payer: COMMERCIAL

## 2022-06-15 DIAGNOSIS — I83.92 ASYMPTOMATIC VARICOSE VEINS OF LEFT LOWER EXTREMITY: Primary | ICD-10-CM

## 2022-06-15 DIAGNOSIS — I78.1 SPIDER TELANGIECTASIS OF SKIN: ICD-10-CM

## 2022-06-15 PROCEDURE — 99207 PR VEINSOLUTIONS NO CHARGE VISIT: CPT | Performed by: SURGERY

## 2022-06-15 NOTE — LETTER
6/15/2022         RE: Whitney Aceves  [address] [facility] [address]        Dear Colleague,    Thank you for referring your patient, Whitney Aceves, to the [facility] VEIN CL[facility] [place]. Please see a copy of my visit note below.    [facility] Vein [facility] [place] Office Note  Whitney Aceves returns following two sessions of cosmetic sclerotherapy, the first under U/S guidance for asymptomatic VV left medial calf and pretibial area.  The second session was simply to treat spider veins from a cosmetic standpoint.     She is elated with the results!    Exam  No residual VV on either leg.  A few, fine, red telangiectasias left medial ankle telangiectasias remain but are of no concern to the patient.    No trapped blood.    A/P:  Good result.  She will return on an as-needed basis.    CATERINA Schwab MD      Again, thank you for allowing me to participate in the care of your patient.        Sincerely,        Zi Schwab MD

## 2022-06-15 NOTE — PROGRESS NOTES
[facility] Vein [facility] [place] Office Note  Whitney Aceves returns following two sessions of cosmetic sclerotherapy, the first under U/S guidance for asymptomatic VV left medial calf and pretibial area.  The second session was simply to treat spider veins from a cosmetic standpoint.     She is elated with the results!    Exam  No residual VV on either leg.  A few, fine, red telangiectasias left medial ankle telangiectasias remain but are of no concern to the patient.    No trapped blood.    A/P:  Good result.  She will return on an as-needed basis.    CATERINA Schwab MD

## 2022-07-11 ENCOUNTER — VIRTUAL VISIT (OUTPATIENT)
Dept: FAMILY MEDICINE | Facility: CLINIC | Age: 62
End: 2022-07-11
Payer: COMMERCIAL

## 2022-07-11 DIAGNOSIS — Z91.09 ENVIRONMENTAL ALLERGIES: ICD-10-CM

## 2022-07-11 DIAGNOSIS — E78.5 HYPERLIPIDEMIA LDL GOAL <130: ICD-10-CM

## 2022-07-11 DIAGNOSIS — U07.1 INFECTION DUE TO 2019 NOVEL CORONAVIRUS: ICD-10-CM

## 2022-07-11 DIAGNOSIS — R61 NIGHT SWEATS: ICD-10-CM

## 2022-07-11 DIAGNOSIS — Z79.899 CONTROLLED SUBSTANCE AGREEMENT SIGNED: ICD-10-CM

## 2022-07-11 DIAGNOSIS — G47.00 INSOMNIA, UNSPECIFIED TYPE: ICD-10-CM

## 2022-07-11 DIAGNOSIS — R05.9 COUGH: Primary | ICD-10-CM

## 2022-07-11 PROCEDURE — 99214 OFFICE O/P EST MOD 30 MIN: CPT | Mod: 95 | Performed by: FAMILY MEDICINE

## 2022-07-11 RX ORDER — BENZONATATE 200 MG/1
200 CAPSULE ORAL 3 TIMES DAILY PRN
Qty: 30 CAPSULE | Refills: 0 | Status: SHIPPED | OUTPATIENT
Start: 2022-07-11 | End: 2022-07-21

## 2022-07-11 RX ORDER — MONTELUKAST SODIUM 10 MG/1
10 TABLET ORAL AT BEDTIME
Qty: 90 TABLET | Refills: 3 | Status: SHIPPED | OUTPATIENT
Start: 2022-07-11 | End: 2023-02-14

## 2022-07-11 NOTE — PROGRESS NOTES
"Whitney is a 61 year old who is being evaluated via a billable video visit.      How would you like to obtain your AVS? MyChart  If the video visit is dropped, the invitation should be resent by: [phone]  Will anyone else be joining your video visit? No      Assessment & Plan :       ICD-10-CM    1. Cough  R05.9 benzonatate (TESSALON) 200 MG capsule   2. Infection due to 2019 novel coronavirus  U07.1 nirmatrelvir and ritonavir (PAXLOVID) therapy pack     benzonatate (TESSALON) 200 MG capsule   3. Environmental allergies  Z91.09 montelukast (SINGULAIR) 10 MG tablet   4. Hyperlipidemia LDL goal <130- on atorvastatin   E78.5    5. Controlled substance agreement signed -11/29/2021 ok for #30 -10mg ambien (takes 1/2 tab) with 1 refill every 6 months or so   Z79.899    6. Insomnia, unspecified type-Controlled substance agreement signed -11/29/2021 ok for #30 -10mg ambien (takes 1/2 tab) with 1 refill every 6 months or so   G47.00    7. Night sweats- on estrace 1mg already - discussed progesterone deficiency usually causing that - pt would like to try black cohosh first prior to micronized progesterone 2nd to breast ca risk   R61      Patient feels out of it \"brain fog like when taking Claritin or Allegra for her.allergies  .  Discussed that the drying sensation Claritin Allegra even though they are approved for  can sometimes cause mild cognitive issues for some people older than 60.  We will have patient try montelukast instead.  She will check with pharmacist on this and whether she can take this with Paxlovid.  I did not see a direct drug to drug interaction on this.    Having said that.  Patient will stop all the rest of her regular medications while taking Paxlovid plus for 2 days afterward, including but not limited to her atorvastatin, Ambien, and her hormone replacement therapy as well as meloxicam.    Please, call our clinic or go to the ER immediately if signs or symptoms worsen or fail to " "improve as anticipated.     No follow-ups on file.     Future Appointments 7/11/2022 - 1/7/2023            None         Patient is a candidate for Paxlovid therapy based on her BMI greater than equal to 25 as well as her history of anxiety and depression.  She has met criteria.  Discussed risk benefits and alternatives of Paxlovid, monoclonal antibody treatment, and observation with patient and patient would like to proceed with Paxlovid treatment.  Her  has a history of coronary artery disease and has had   Prescription drug management         BMI:   Estimated body mass index is 25.06 kg/m  as calculated from the following:    Height as of 2/21/22: 1.626 m (5' 4\").    Weight as of 2/21/22: 66.2 kg (146 lb).   Weight management plan: Discussed healthy diet and exercise guidelines    MEDICATIONS:   Orders Placed This Encounter   Medications     nirmatrelvir and ritonavir (PAXLOVID) therapy pack     Sig: Take 3 tablets by mouth 2 times daily for 5 days Take 2 Nirmatrelvir tablets and 1 Ritonavir tablet twice daily for 5 days.     Dispense:  30 each     Refill:  0     Date of symptom onset: 7/7/2022; Risk criteria met: Yes; Positive Covid-19 test: Yes; Weight >40 kg Yes; Renal fxn: normal;  Drug-Drug interactions reviewed & addressed: Yes     benzonatate (TESSALON) 200 MG capsule     Sig: Take 1 capsule (200 mg) by mouth 3 times daily as needed for cough     Dispense:  30 capsule     Refill:  0     montelukast (SINGULAIR) 10 MG tablet     Sig: Take 1 tablet (10 mg) by mouth At Bedtime     Dispense:  90 tablet     Refill:  3          - Continue other medications without change  See Patient Instructions    No follow-ups on file.        Lisbeth Gonzalez MD  [facility] KAROLYN Olson is a 61 year old, presenting for the following health issues:  COVID Treatment  has been vaxed and 2 booster shots with Pfizer - all 4 shots.     HPI       COVID-19 Symptom Review:   How many days " ago did these symptoms start? 7/7/2022    Are any of the following symptoms significant for you?    New or worsening difficulty breathing? No    Worsening cough? Yes, I am coughing up mucus.    Fever or chills? No    Headache: Yes    Sore throat: YES    Chest pain: No    Diarrhea: No    Body aches? YES- diffuse.     Almost went to urgent care - for really thick mucus in the back of her throat.  Really bad barking cough.     Doesn't have a pulse oximeter at home.      Feels exhausted.      Feels a little SOB with exertion = a little winded.      Started erythromycin for supposed sinus infection    Tested positive : at home last night .     What treatments has patient tried? Guaifenesin (mucinex) and Cough syrup   Does patient live in a nursing home, group home, or shelter? No  Does patient have a way to get food/medications during quarantined? Yes, I have a friend or family member who can help me.      Patient Active Problem List   Diagnosis     Panic disorder without agoraphobia     Anxiety     Overactive bladder     Hyperlipidemia LDL goal <130- on atorvastatin      Gastroesophageal reflux disease without esophagitis     Symptomatic menopausal or female climacteric states     Fibrocystic breast changes of both breasts     Primary osteoarthritis of both hands     Family history of early CAD     Insomnia, unspecified type-Controlled substance agreement signed -11/29/2021 ok for #30 -10mg ambien (takes 1/2 tab) with 1 refill every 6 months or so      Expressive aphasia     Urinary urgency     Fatigue, unspecified type     Night sweats- on estrace 1mg already - discussed progesterone deficiency usually causing that - pt would like to try black cohosh first prior to micronized progesterone 2nd to breast ca risk      Controlled substance agreement signed -11/29/2021 ok for #30 -10mg ambien (takes 1/2 tab) with 1 refill every 6 months or so      Acute recurrent maxillary sinusitis     Multiple dysplastic nevi-Going to  "dermatology for her full body skin exam annually - next appt=12/8/2021 [facility] - Dr. Carmen Dowling - for her early dysplastic melanoma.       RUQ abdominal pain- comes and goes - not related to foods or fatty foods, worse bending over with or without lifting and getting out of her car- ? muscle spasm - CT and US negative 10/5/2020      Rotator cuff syndrome, left     Other osteoarthritis involving multiple joints- left middle finger s/p fracture /dislocation - causing some chronic pain - pt requests refill of ibuprofen 800mg tabs - takes with food      Lumbosacral dysfunction     Sacral pain     Family history of colonic polyps- precancerous      Basal cell carcinoma (BCC) of skin of nose     Vitamin D deficiency       Current Outpatient Medications   Medication Sig Dispense Refill     atorvastatin (LIPITOR) 10 MG tablet Take 1 tablet (10 mg) by mouth daily 90 tablet 3     Black Cohosh-SoyIsoflav-Magnol (ESTROVEN MENOPAUSE RELIEF) CAPS   0     calcium-vitamin D (OSCAL) 250-125 MG-UNIT TABS per tablet Take 1 tablet by mouth daily       estradiol (ESTRACE) 1 MG tablet Take 1 tablet (1 mg) by mouth daily 90 tablet 3     ibuprofen (ADVIL/MOTRIN) 800 MG tablet TAKE 1 TABLET BY MOUTH EVERY 8 HOURS WITH FOOD AS NEEDED FOR MODERATE PAIN 90 tablet 0     meloxicam (MOBIC) 15 MG tablet Take 1 tablet (15 mg) by mouth daily as needed for pain 30 tablet 3     Multiple Vitamins-Minerals (MULTIVITAMIN ADULT PO) Take by mouth daily       Omega-3 Fatty Acids (OMEGA 3 PO)        zolpidem (AMBIEN) 10 MG tablet TAKE 1/2 TABLET BY MOUTH EVERY NIGHT AT BEDTIME AS NEEDED FOR SLEEP 30 tablet 3     Estimated body mass index is 25.06 kg/m  as calculated from the following:    Height as of 2/21/22: 1.626 m (5' 4\").    Weight as of 2/21/22: 66.2 kg (146 lb).       Allergies   Allergen Reactions     Rosamaria Sun Screen Spf 30 [Abuval Sport Sunscreen] Hives and Rash     Septra [Sulfamethoxazole W/Trimethoprim]      Sulfa Drugs Hives "     Murine Ear [Carbamide Peroxide] Swelling and Rash     Severe pain , swelling after debrox type ear drops - had to go to the clinic to have ears flushed out emergently.        Review of Systems   Constitutional, HEENT, cardiovascular, pulmonary, GI, , musculoskeletal, neuro, skin, endocrine and psych systems are negative, except as otherwise noted.      Objective  :          Vitals:  No vitals were obtained today due to virtual visit.    Physical Exam :   GENERAL: Healthy, alert and no distress  EYES: Eyes grossly normal to inspection.  No discharge or erythema, or obvious scleral/conjunctival abnormalities.  RESP: No audible wheeze, cough, or visible cyanosis.  No visible retractions or increased work of breathing.    SKIN: Visible skin clear. No significant rash, abnormal pigmentation or lesions.  NEURO: Cranial nerves grossly intact.  Mentation and speech appropriate for age.  PSYCH: Mentation appears normal, affect normal/bright, judgement and insight intact, normal speech and appearance well-groomed.    Admission on 02/21/2022, Discharged on 02/21/2022   Component Date Value Ref Range Status     COLONOSCOPY 02/21/2022    Final                    Value:[facility]  _______________________________________________________________________________  Patient Name: Whitney Aceves           Procedure Date: 2/21/2022 9:10 AM  MRN: [id number]                       Account Number: [id number]  YOB: 1960              Admit Type: Outpatient  Age: 61                               Gender: Female  Attending MD: Pool Cardenas MD     Total Sedation Time: 30 mins  Instrument Name: 252 - Pediatric Colonoscope   _______________________________________________________________________________     Procedure:                Colonoscopy  Indications:              Colon cancer screening in patient at increased                             risk: Family history of colon polyps in multiple                              1st-degree relatives  Providers:                Pool Cardenas MD (Doctor)  Referring MD:               Medicines:                Midazolam 4 mg IV, Fentanyl 150 micrograms IV,                             Ondans                          etron 4 mg IV  Complications:            No immediate complications.  _______________________________________________________________________________  Procedure:                Pre-Anesthesia Assessment:                            - Prior to the procedure, a History and Physical                             was performed, and patient medications and                             allergies were reviewed. The risks and benefits of                             the procedure and the sedation options and risks                             were discussed with the patient. All questions were                             answered and informed consent was obtained. Patient                             identification and proposed procedure were verified                             by the physician. Mental Status Examination:                             normal. Prophylactic Antibiotics: The patient does                             not require prophylactic antibiotics. Prior                             A                          nticoagulants: The patient has taken no                             anticoagulant or antiplatelet agents. ASA Grade                             Assessment: I - A normal, healthy patient. After                             reviewing the risks and benefits, the patient was                             deemed in satisfactory condition to undergo the                             procedure. The anesthesia plan was to use moderate                             sedation / analgesia (conscious sedation).                             Immediately prior to administration of medications,                             the patient was re-assessed for adequacy to receive                              sedatives. The heart rate, respiratory rate, oxygen                             saturations, blood pressure, adequacy of pulmonary                             ventilation, and response to care were monitored                             throughout the procedure. The physical status of                             th                          e patient was re-assessed after the procedure.                            After obtaining informed consent, the colonoscope                             was passed under direct vision. Throughout the                             procedure, the patient's blood pressure, pulse, and                             oxygen saturations were monitored continuously. The                             Olympus Pediatric Colonoscope Model # [id number],                             [place] #252, SN# [id number] was introduced through the                             anus and advanced to the terminal ileum, with                             identification of the appendiceal orifice and IC                             valve. The colonoscopy was performed without                             difficulty. The patient tolerated the procedure                             well. The quality of the bowel preparation was                             evaluated using the BBPS ([place] Bowel Preparation                             S                          hedler) with scores of: Right Colon = 3, Transverse                             Colon = 3 and Left Colon = 3 (entire mucosa seen                             well with no residual staining, small fragments of                             stool or opaque liquid). The total BBPS score                             equals 9.                                                                                   Findings:       Skin tags were found on perianal exam.       The terminal ileum appeared normal.       The colon (entire examined portion) appeared normal.        External and internal hemorrhoids were found during retroflexion. The        hemorrhoids were small.                                                                                   Impression:               - Perianal skin tags found on perianal exam.                            - The examined portion of the ileum was normal.                            - The entire examined colon is normal.                            - External and i                          nternal hemorrhoids.                            - No specimens collected.  Recommendation:           - Discharge patient to home.                            - Resume regular diet.                            - Continue present medications.                            - Repeat colonoscopy in 5 years for screening                             purposes.                                                                                   Procedure Code(s):       --- Professional ---       , Colorectal cancer screening; colonoscopy on individual at high        risk  Diagnosis Code(s):       --- Professional ---       Z83.71, Family history of colonic polyps       K64.8, Other hemorrhoids       K64.4, Residual hemorrhoidal skin tags    CPT copyright 2020 [employer]. All rights reserved.    The codes documented in this report are preliminary and upon  review may   be revised to meet current compliance requirements.    Electronic Signature by Dr. Pool Cardenas  _________                          _________  Pool Cardenas MD  2/21/2022 9:50:15 AM  I was physically present for the entire viewing portion of the exam.  __________________________Pool Cardenas MD  Number of Addenda: 0    Note Initiated On: 2/21/2022 9:10 AM  MRN:                      [id number]  Procedure Date:           2/21/2022 9:10:47 AM  Scope Withdrawal Time: 0 hours 12 minutes 1 second   Total Procedure Duration: 0 hours 21 minutes 17 seconds   Estimated Blood Loss:        Scope In: 9:24:32 AM  Scope Out: 9:45:49 AM             Video-Visit Details:    Video Start Time: 9:04 AM    Type of service:  Video Visit    Video End Time:9:34 AM    Originating Location (pt. Location): Home    Distant Location (provider location):  [facility]     Platform used for Video Visit: [employer]    .  Belinda.

## 2022-07-11 NOTE — PATIENT INSTRUCTIONS
[facility]  [address] [place] [id number]  Office: [phone]   Fax:    [phone]     Get pulse oximeter at pharmacy  -go to emergency room if oxygen saturations are 89% or lower consistently and not increasing with deep breathing.      Instructions for Patients      What are the symptoms of COVID-19?  Symptoms can include fever, cough, shortness of breath, chills, headache, muscle pain sore throat, fatigue, runny or stuffy nose, and loss of taste and smell. Other less common symptoms include nausea, vomiting, or diarrhea (watery stools).    Know when to call 911. Emergency warning signs include:  Trouble breathing or shortness of breath  Pain or pressure in the chest that doesn't go away  Feeling confused like you haven't felt before, or not being able to wake up  Bluish-colored lips or face    How can I take care of myself?  Get lots of rest. Drink extra fluids (unless a doctor has told you not to).  Take Tylenol (acetaminophen) for fever or pain. If you have liver or kidney problems, ask your family doctor if it's okay to take Tylenol   Adults:   650 mg (two 325 mg pills or tablets) every 4 to 6 hours, or...   1,000 mg (two 500 mg pills or tablets) every 8 hours as needed.  Note: Don't take more than 3,000 mg in one day. Acetaminophen is found in many medicines (both prescribed and over the counter). Read all labels to be sure you don't take too much.  For children, check the Tylenol bottle for the right dose. The dose is based on the child's age or weight.  Take over the counter medicines for your symptoms as needed. Talk to your pharmacist.  If you have other health problems (like cancer, heart failure, an organ transplant, or severe kidney disease): Call your specialty clinic if you don't feel better in the next 2 days.    These guidelines are for isolating and quarantining before returning to work, school or .   For employers, schools and day cares: This is an  official notice for this person s medical guidelines for returning in-person.   For health care sites: The CDC gives different isolation and quarantine guidelines for healthcare sites, please check with these sites before arriving.     How do I self-isolate?  You isolate when you have symptoms of COVID or a test shows you have COVID, even if you don t have symptoms.   If you DO have symptoms:  Stay home and away from others  For at least 5 days after your symptoms started, AND   You are fever free for 24 hours (with no medicine that reduces fever), AND  Your other symptoms are better.  Wear a mask for 10 full days any time you are around others.  If you DON T have symptoms:  Stay at home and away from others for at least 5 days after your positive test.  Wear a mask for 10 full days any time you are around others.    How and when do I quarantine?  You quarantine when you may have been exposed to the virus and DON T have symptoms.   Stay home and away from others.   You must quarantine for 5 days after your last contact with a person who has COVID.  Note: If you are fully vaccinated, you don t need to quarantine. You should still follow the steps below.   Wear a mask for 10 full days any time you re around others.  Get tested at least 5 days after you were exposed, even if you don t have symptoms.   If you start to have symptoms, isolate right away and get tested.    Where can I get more information?  [facility] [place] COVID-19 Resource Hub: www.[facility].org/covid19/   CDC Quarantine & Isolation: https://www.cdc.gov/coronavirus/2019-ncov/your-health/quarantine-isolation.html   CDC - What to Do If You're Sick: https://www.cdc.gov/coronavirus/2019-ncov/if-you-are-sick/index.html  [facility] clinical trials (COVID-19 research studies): clinicalaffairs.[facility]/umn-clinical-trials  [place] Department of Health COVID-19 Public Hotline: 1-284.909.3562  Coping with Life After COVID-19  Being in the  hospital because of COVID-19 is scary. Going home can be scary, too. You may face changes to your life, the way you work or what you can eat. It s hard to adjust to change, and it s normal to feel afraid, frustrated or even angry. These feelings usually go away over time. If your feelings don t start to get better, it s called  adjustment disorder.      What signs should I look out for?  Adjustment disorder can happen to anyone in a time of stress. It makes it hard to cope with daily life. You may feel lonely or fight with loved ones, even if you re glad to be home. Watch for these signs:  Fear or worry  Hard time focusing  Sadness or anger  Trouble talking to family or friends  Feeling like you don t fit in or isolating yourself  Problems with sleep   Drinking alcohol or taking drugs to cope    What can I do?  You can help yourself get better. Feeling you have control helps you move forward. You may wonder if you ll be able to do things you did before. Be patient. Do your best to make the most of every day. Try to build relationships, be as active as you can, eat right and keep a sense of humor. Avoid smoking and drinking too much alcohol. Call your family doctor or clinic if you re not sure what to do. They can guide you to care or other services.    When should I get help?  Think about getting counseling if your sadness or frustration gets worse. Together with a trained counselor, you can talk about your problems adjusting to life after your hospital stay. You can come up with new ways to handle changes that give you more control. Your family doctor or care team can help you find a counselor.     Get help if you re thinking about hurting yourself. If you need help right away, call 911 or go to the nearest emergency room. You can also try the Crisis Text Line.    Crisis Text Line: 394-500 (http://www.crisistextline.org)  The Crisis Text Line serves anyone, in any crisis. It gives free, 24/7 support. Here's how it  works:  Text HOME to 932767 from anywhere in the [place], anytime, about any type of crisis.  A live, trained Crisis Counselor will text you back quickly.  The volunteer Crisis Counselor can help you move from a  hot moment  to a  cool moment.  They can also help you work out a safety plan.            Thank you so much for doing a video visit with us today. And, again, thank you  for choosing our [facility].  Please contact us with any questions that you may have.   We appreciate the opportunity to serve you now and look forward to supporting your healthcare needs for a long time to come!    Our clinic for the foreseeable future and until further notice , will continue to offer virtual visits, including telephone visits, video visits,  and e-visits, in addition to in person visits,  especially during this period of COVID-19 coronavirus pandemic.  Please call to schedule another one if you need it!        Most Sincerely,     Lisbeth Gonzalez MD                                              To reach your [facility] care team after hours call:   [phone]    PLEASE NOTE OUR HOURS HAVE CHANGED secondary to COVID-19 coronavirus pandemic, as we are trying to minimize patient exposure to the virus,  which is now widespread in the [facility].  These hours may change with very little notice.  We apologize for any inconvenience.       Our current clinic hours are:         Monday- Thursday   7:00am - 6:00pm  in person.      Friday  7:00am- 5:00pm in person                       Saturday and Sunday : Closed to in person and virtual visits            We have telephone and virtual visit times available between 7:00am - 6pm on             Monday-Friday as well.                                                Phone:  [phone]    Our pharmacy hours: Monday  through Friday 8:00am to 5:00pm                        Saturday - 9:00 am to 12 noon         Sunday : Closed.     ###   Please note: at this time we are not accepting any walk-in visits. ###      There is also information available at our web site:  [email or web address]    If your provider ordered any lab tests and you do not receive the results within 10 business days, please call the clinic.    If you need a medication refill please contact your pharmacy.  Please allow 3 business days for your refill to be completed.    Our clinic offers telephone visits and e visits.  Please ask one of your team members to explain more.      Use [employer] (secure email communication and access to your chart) to send your primary care provider a message or make an appointment. Ask someone on your Team how to sign up for [employer].

## 2022-07-14 ENCOUNTER — NURSE TRIAGE (OUTPATIENT)
Dept: FAMILY MEDICINE | Facility: CLINIC | Age: 62
End: 2022-07-14

## 2022-07-14 ENCOUNTER — TELEPHONE (OUTPATIENT)
Dept: FAMILY MEDICINE | Facility: CLINIC | Age: 62
End: 2022-07-14

## 2022-07-14 ENCOUNTER — MYC MEDICAL ADVICE (OUTPATIENT)
Dept: FAMILY MEDICINE | Facility: CLINIC | Age: 62
End: 2022-07-14

## 2022-07-14 NOTE — TELEPHONE ENCOUNTER
Called patient back and explained Dr. Gonzalez's note below.     Reviewed red flag symptoms versus minor rash/home care     encouraged to Keep track of when she takes benadryl   Paxlovid added to allergy list per provider.     Patient denied ?'s and agreed to plan  Katia SHIPLEY RN   [facility] Triage

## 2022-07-14 NOTE — TELEPHONE ENCOUNTER
"Situation  Situation  Called patient due to [facility] [employer] message sent about hives/reaction to Paxlovid.     Background  Patient is on day 6 of of Covid symptoms.  She has taken 3 tablets of Paxlovid on Monday am and pm,Tuesday am and pm and wed am starting itching before the night dose on the 3 day.    Assessment for Rash   See attached rash photo - The rash and itching started last evening around 10 pm. The rash is present on the majority of both thighs, back, arms have the pink flat itchy pink rash that moved to her chest during the night.      Denied swelling, itching, rash and hives on face, tongue, lips, eyes/lids and throat-voice is normal, no swallowing issues or tight feeling .      Patient does not have an epi pen at home, her  is home with her.     Covid symptoms started improving yesterday  No headache   Less Nasal congestion  Breathing \"feels normal\"  No SOB  No chest pain   Body aches are \"way better\"     recommendations  Take benadryl 50 mg now.   Monitor 50 mg of benadryl every 4-6 hours As directed on the package.   Seek care if benadryl is not improving rash and or itching.     Reviewed signs and symptoms of allergic reaction - swelling, itching, rash and hives on face, tongue, lips, eyes/lids and throat feeling tight or swallowing issues call 911    any sign of worsening call 911 do not drive seek care-ED  Stay hydrated/push Fluids   Will call back with further provider recommendation  calamine lotion to help reduce itching.     Katia SHIPLEY RN   [facility] [place] Triage       "

## 2022-07-14 NOTE — TELEPHONE ENCOUNTER
See MyChart encounter from 7/14/22 for hives reaction to Paxlovid     See MyChart encounter for more assessment of hives/rash.     Reason for Disposition    Taking a new medicine now or within last 3 days (Exception: antihistamine, decongestant or other OTC cough/cold medicines)    Hives or itching    Additional Information    Negative: Difficulty breathing or wheezing now    Negative: Rapid onset of swollen tongue    Negative: Rapid onset of hoarseness or cough    Negative: Very weak (can't stand)    Negative: Difficult to awaken or acting confused (e.g., disoriented, slurred speech)    Negative: Life-threatening reaction (anaphylaxis) in the past to similar substance (e.g., food, insect bite/sting, chemical, etc.) and < 2 hours since exposure    Negative: Sounds like a life-threatening emergency to the triager    Negative: Bee, wasp, or yellow jacket sting within last 24 hours    Negative: Difficulty breathing or wheezing    Negative: Hoarseness or cough that started soon after 1st dose of drug    Negative: Swollen tongue that started soon after first dose of drug    Negative: Fever and purple or blood-colored spots or dots    Negative: Too weak or sick to stand    Negative: Sounds like a life-threatening emergency to the triager    Negative: Rash is only on 1 part of the body (localized)    Negative: Taking new non-prescription (OTC) antihistamine, decongestant, ear drops, eye drops, or other OTC cough/cold medicine    Negative: Taking new prescription antihistamine, allergy medicine, asthma medicine, eye drops, ear drops or nose drops    Negative: Rash started more than 3 days after stopping new prescription medicine    Negative: Swollen tongue    Negative: Widespread hives and onset < 2 hours of exposure to 1st dose of drug    Negative: Patient sounds very sick or weak to the triager    Negative: Fever    Negative: Face or lip swelling    Negative: Purple or blood-colored spots or dots (no fever and sounds well  to triager)    Negative: Joint pain or swelling    Negative: Bloody crusts on lips or in mouth    Negative: Large or small blisters on skin (i.e., fluid filled bubbles or sacs)    Negative: Pregnant    Negative: Rash beginning within 4 hours of a new prescription medication    Protocols used: HIVES-A-OH, RASH - WIDESPREAD ON DRUGS-A-OH    See care advice and recommendation under other 7/14 MyChart encounter   Katia SHIPLEY RN   [facility] Triage

## 2022-07-14 NOTE — TELEPHONE ENCOUNTER
Hold paxlovid and do benadryl 25-50mg every 6 hours.  Watch for red flag symptoms Please, call our clinic or go to the ER immediately if signs or symptoms worsen or fail to improve as anticipated.

## 2022-07-19 ENCOUNTER — MYC MEDICAL ADVICE (OUTPATIENT)
Dept: FAMILY MEDICINE | Facility: CLINIC | Age: 62
End: 2022-07-19

## 2022-07-19 DIAGNOSIS — R05.9 COUGH: ICD-10-CM

## 2022-07-19 DIAGNOSIS — U07.1 INFECTION DUE TO 2019 NOVEL CORONAVIRUS: ICD-10-CM

## 2022-07-21 RX ORDER — BENZONATATE 200 MG/1
200 CAPSULE ORAL 3 TIMES DAILY PRN
Qty: 30 CAPSULE | Refills: 0 | Status: SHIPPED | OUTPATIENT
Start: 2022-07-21 | End: 2022-09-16

## 2022-07-21 NOTE — TELEPHONE ENCOUNTER
Please review and advise MyChart medication request.     Thank you!  Katia SHIPLEY RN   [facility] Triage

## 2022-09-14 ENCOUNTER — MYC MEDICAL ADVICE (OUTPATIENT)
Dept: FAMILY MEDICINE | Facility: CLINIC | Age: 62
End: 2022-09-14

## 2022-09-15 NOTE — TELEPHONE ENCOUNTER
Routing to pcp-[facility]     estradiol (ESTRACE) 1 MG tablet 90 tablet 3 11/29/2021  No   Sig - Route: Take 1 tablet (1 mg) by mouth daily - Oral   Sent to pharmacy as: Estradiol 1 MG Oral Tablet (ESTRACE       Thank you!  Katia SHIPLEY RN   [facility] Triage

## 2022-09-16 ENCOUNTER — VIRTUAL VISIT (OUTPATIENT)
Dept: FAMILY MEDICINE | Facility: CLINIC | Age: 62
End: 2022-09-16
Payer: COMMERCIAL

## 2022-09-16 DIAGNOSIS — N95.1 SYMPTOMATIC MENOPAUSAL OR FEMALE CLIMACTERIC STATES: Primary | ICD-10-CM

## 2022-09-16 DIAGNOSIS — N39.46 MIXED INCONTINENCE URGE AND STRESS (MALE)(FEMALE): ICD-10-CM

## 2022-09-16 DIAGNOSIS — Z12.31 VISIT FOR SCREENING MAMMOGRAM: ICD-10-CM

## 2022-09-16 PROCEDURE — 99214 OFFICE O/P EST MOD 30 MIN: CPT | Mod: 95 | Performed by: FAMILY MEDICINE

## 2022-09-16 RX ORDER — OXYBUTYNIN CHLORIDE 10 MG/1
10 TABLET, EXTENDED RELEASE ORAL DAILY
Qty: 90 TABLET | Refills: 1 | Status: SHIPPED | OUTPATIENT
Start: 2022-09-16 | End: 2022-12-14

## 2022-09-16 RX ORDER — VENLAFAXINE HYDROCHLORIDE 37.5 MG/1
37.5 CAPSULE, EXTENDED RELEASE ORAL DAILY
Qty: 90 CAPSULE | Refills: 1 | Status: SHIPPED | OUTPATIENT
Start: 2022-09-16 | End: 2022-10-28

## 2022-09-16 RX ORDER — GABAPENTIN 100 MG/1
100 CAPSULE ORAL AT BEDTIME
Qty: 90 CAPSULE | Refills: 1 | Status: SHIPPED | OUTPATIENT
Start: 2022-09-16 | End: 2022-12-14

## 2022-09-16 RX ORDER — ESTRADIOL 0.05 MG/D
1 PATCH, EXTENDED RELEASE TRANSDERMAL
Qty: 24 PATCH | Refills: 1 | Status: SHIPPED | OUTPATIENT
Start: 2022-09-19 | End: 2022-12-14

## 2022-09-16 NOTE — PROGRESS NOTES
Whitney is a 61 year old who is being evaluated via a billable video visit.      How would you like to obtain your AVS? MyChart  If the video visit is dropped, the invitation should be resent by: Text to cell phone: [phone]  Will anyone else be joining your video visit? No          Assessment & Plan :       ICD-10-CM    1. Symptomatic menopausal or female climacteric states  N95.1 estradiol (VIVELLE-DOT) 0.05 MG/24HR bi-weekly patch     venlafaxine (EFFEXOR XR) 37.5 MG 24 hr capsule     gabapentin (NEURONTIN) 100 MG capsule   2. Mixed incontinence urge and stress (male)(female)  N39.46 oxybutynin ER (DITROPAN XL) 10 MG 24 hr tablet   3. Visit for screening mammogram  Z12.31 MA Screening Bilateral w/ Jonny      31 minutes on video directly with pt today.     Ordering of each unique test  Prescription drug management  35 minutes spent on the date of the encounter doing chart review, history and exam, documentation and further activities per the note       MEDICATIONS:   Orders Placed This Encounter   Medications     estradiol (VIVELLE-DOT) 0.05 MG/24HR bi-weekly patch     Sig: Place 1 patch onto the skin twice a week     Dispense:  24 patch     Refill:  1     venlafaxine (EFFEXOR XR) 37.5 MG 24 hr capsule     Sig: Take 1 capsule (37.5 mg) by mouth daily     Dispense:  90 capsule     Refill:  1     gabapentin (NEURONTIN) 100 MG capsule     Sig: Take 1 capsule (100 mg) by mouth At Bedtime For menopausal symptoms     Dispense:  90 capsule     Refill:  1     oxybutynin ER (DITROPAN XL) 10 MG 24 hr tablet     Sig: Take 1 tablet (10 mg) by mouth daily     Dispense:  90 tablet     Refill:  1          - Continue other medications without change  Regular exercise  See Patient Instructions    Return in 3 months (on 12/14/2022) for Physical/Preventative Visit, w/ Dr. DUPONT for 40 minute appointment.    Lisbeth Gonzalez MD  [facility]   Whitney is a 61 year old, presenting for the  following health issues:  No chief complaint on file.      History of Present Illness       Reason for visit:  Hot flashes  Symptom onset:  More than a month  Symptoms include:  Hot flashes keep me up most of the night and are starting during the day as well  Symptom intensity:  Severe  Symptom progression:  Worsening  Had these symptoms before:  No    She eats 2-3 servings of fruits and vegetables daily.She consumes 0 sweetened beverage(s) daily.She exercises with enough effort to increase her heart rate 10 to 19 minutes per day.  She exercises with enough effort to increase her heart rate 4 days per week.   She is taking medications regularly.   has been on Estroven otc for > 1 year.     Menopause :     Onset: 1 year(s) ago - worse last month - sleeping on couch for 3 weeks- secondary to severe night sweats and hot flashes.  Getting less than 4 hours of sleep secondary to that.      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   Menstrual history of past year: partial hysterectomy   Night sweats: YES - over 1 year    Duration:  not applicable    History:    Age of first menses:14 approx.   Mother's age of menopause:unsure    Accompanying Signs & Symptoms:   Depression: No  Dyspareunia: No  Insomnia: YES- hot flashes unable to sleep  Irritability: YES- sometimes due to lack of sleep  Libido: unsure  Urinary frequency: YES- every 1.5-2 hours  Vaginal dryness: No  Vaginal itching: No    Precipitating and/or Alleviating factors:    Given a pill to decrease frequency of urination - has a couple long driving trips coming up    Progression of Symptoms:  Worse - 1 month - progressively weekly getting worse    Therapies tried and outcome: nothing  ROS: review:  General, GI, Gu   Pt's mother  at age 89 from a stroke.  No hx of breast cancer in mom or on dad's side at all.   fam hx: of breast cancer in 1 maternal aunt-  at age 57.   Pt's last mammogram:  10/26/2021   Pt is s/p hysterectomy in 3/4/1998 for myomatous uterus with large fibroid. - she was on the estrogen  patch -Pablo ly , then insurance didn't cover that and Vivelle dot changed its formulation to really large plastic patch 4in x 3 in  - uncomfortable , then switched to oral estrogen.  Pt hasn't been off estrogen since  hysterectomy.      Also up to urinate - has to travel a lot for work.  Has to urinate every 1-2 hours.  Discussed no caffeine, she doesn't do that or any carbonated beverages.   No artificial sweeteners. Drinks at least 8-10 glasses of water >84 oz/day.   Discussed keeping water to 40-60 oz/day especially with travelling. Discussed taking oxybutynin to help with urgency and pt would like to do that.  Having some mixed stress/urge incontinence too. Wears a panti-liner daily.           Family History   Problem Relation Age of Onset     Hypertension Mother      Hyperlipidemia Mother      Diabetes Mother      Cerebrovascular Disease Mother 89        right hemispheric stroke -  11 days after stroke - 10/2019      C.A.D. Father 45          age 45 massive MI      Heart Failure Father      Hyperlipidemia Father      C.A.D. Maternal Grandmother      C.A.D. Maternal Grandfather      C.A.D. Paternal Grandmother      C.A.D. Paternal Grandfather      Cancer Other         9 members on maternal side. Bone CA, Breast CA (m. aunt),       Coronary Artery Disease Brother 55        Heart attack- s/p sudden cardiac arrest survived 2019      C.A.D. Sister 57        MI age 50- CABG - 2 vessels - 4 MI's since age 55 - 3 stents placed 2017      Diabetes Sister      Hypertension Sister      Breast Cancer Maternal Aunt         50s-Mom had 9 sisters. MAunt->50's     Hypertension Sister      Hyperlipidemia Sister      Prostate Cancer Brother 66     Testicular cancer Brother 37     Psoriasis Son 29        after returning from the service - lives in [place]     Psoriatic Arthritis Son 29        on  Enbrel      Colon Cancer No family hx of         Patient Active Problem List   Diagnosis     Panic disorder without agoraphobia     Anxiety     Overactive bladder     Hyperlipidemia LDL goal <130- on atorvastatin      Gastroesophageal reflux disease without esophagitis     Symptomatic menopausal or female climacteric states     Fibrocystic breast changes of both breasts     Primary osteoarthritis of both hands     Family history of early CAD     Insomnia, unspecified type-Controlled substance agreement signed -11/29/2021 ok for #30 -10mg ambien (takes 1/2 tab) with 1 refill every 6 months or so      Expressive aphasia     Urinary urgency     Fatigue, unspecified type     Night sweats- on estrace 1mg already - discussed progesterone deficiency usually causing that - pt would like to try black cohosh first prior to micronized progesterone 2nd to breast ca risk      Controlled substance agreement signed -11/29/2021 ok for #30 -10mg ambien (takes 1/2 tab) with 1 refill every 6 months or so      Acute recurrent maxillary sinusitis     Multiple dysplastic nevi-Going to dermatology for her full body skin exam annually - next appt=12/8/2021 [facility] - Dr. Carmen Dowling - for her early dysplastic melanoma.       RUQ abdominal pain- comes and goes - not related to foods or fatty foods, worse bending over with or without lifting and getting out of her car- ? muscle spasm - CT and US negative 10/5/2020      Rotator cuff syndrome, left     Other osteoarthritis involving multiple joints- left middle finger s/p fracture /dislocation - causing some chronic pain - pt requests refill of ibuprofen 800mg tabs - takes with food      Lumbosacral dysfunction     Sacral pain     Family history of colonic polyps- precancerous      Basal cell carcinoma (BCC) of skin of nose     Vitamin D deficiency       Current Outpatient Medications   Medication Sig Dispense Refill     atorvastatin (LIPITOR) 10 MG tablet Take 1 tablet (10 mg)  by mouth daily 90 tablet 3     Black Cohosh-SoyIsoflav-Magnol (ESTROVEN MENOPAUSE RELIEF) CAPS   0     calcium-vitamin D (OSCAL) 250-125 MG-UNIT TABS per tablet Take 1 tablet by mouth daily       estradiol (ESTRACE) 1 MG tablet Take 1 tablet (1 mg) by mouth daily 90 tablet 3     ibuprofen (ADVIL/MOTRIN) 800 MG tablet TAKE 1 TABLET BY MOUTH EVERY 8 HOURS WITH FOOD AS NEEDED FOR MODERATE PAIN 90 tablet 0     meloxicam (MOBIC) 15 MG tablet Take 1 tablet (15 mg) by mouth daily as needed for pain 30 tablet 3     montelukast (SINGULAIR) 10 MG tablet Take 1 tablet (10 mg) by mouth At Bedtime 90 tablet 3     Multiple Vitamins-Minerals (MULTIVITAMIN ADULT PO) Take by mouth daily       Omega-3 Fatty Acids (OMEGA 3 PO)        zolpidem (AMBIEN) 10 MG tablet TAKE 1/2 TABLET BY MOUTH EVERY NIGHT AT BEDTIME AS NEEDED FOR SLEEP 30 tablet 3          Allergies   Allergen Reactions     Rosamaria Sun Screen Spf 30 [Abuval Sport Sunscreen] Hives and Rash     Septra [Sulfamethoxazole W/Trimethoprim]      Sulfa Drugs Hives     Murine Ear [Carbamide Peroxide] Swelling and Rash     Severe pain , swelling after debrox type ear drops - had to go to the clinic to have ears flushed out emergently.      Paxlovid [Nirmatrelvir-Ritonavir] Hives, Itching and Rash            Review of Systems   Constitutional, HEENT, cardiovascular, pulmonary, GI, , musculoskeletal, neuro, skin, endocrine and psych systems are negative, except as otherwise noted.      Objective           Vitals:  No vitals were obtained today due to virtual visit.    Physical Exam   GENERAL: Healthy, alert and no distress  EYES: Eyes grossly normal to inspection.  No discharge or erythema, or obvious scleral/conjunctival abnormalities.  RESP: No audible wheeze, cough, or visible cyanosis.  No visible retractions or increased work of breathing.    SKIN: Visible skin clear. No significant rash, abnormal pigmentation or lesions.  NEURO: Cranial nerves grossly intact.  Mentation and  speech appropriate for age.  PSYCH: Mentation appears normal, affect normal/bright, judgement and insight intact, normal speech and appearance well-groomed.    Admission on 02/21/2022, Discharged on 02/21/2022   Component Date Value Ref Range Status     COLONOSCOPY 02/21/2022    Final                    Value:M [facility]  _______________________________________________________________________________  Patient Name: Whitney Aceves           Procedure Date: 2/21/2022 9:10 AM  MRN: [id number]                       Account Number: [id number]  YOB: 1960              Admit Type: Outpatient  Age: 61                               Gender: Female  Attending MD: Pool Cardenas MD     Total Sedation Time: 30 mins  Instrument Name: 252 - Pediatric Colonoscope   _______________________________________________________________________________     Procedure:                Colonoscopy  Indications:              Colon cancer screening in patient at increased                             risk: Family history of colon polyps in multiple                             1st-degree relatives  Providers:                Pool Cardenas MD (Doctor)  Referring MD:               Medicines:                Midazolam 4 mg IV, Fentanyl 150 micrograms IV,                             Ondans                          etron 4 mg IV  Complications:            No immediate complications.  _______________________________________________________________________________  Procedure:                Pre-Anesthesia Assessment:                            - Prior to the procedure, a History and Physical                             was performed, and patient medications and                             allergies were reviewed. The risks and benefits of                             the procedure and the sedation options and risks                             were discussed with the patient. All questions were                              answered and informed consent was obtained. Patient                             identification and proposed procedure were verified                             by the physician. Mental Status Examination:                             normal. Prophylactic Antibiotics: The patient does                             not require prophylactic antibiotics. Prior                             A                          nticoagulants: The patient has taken no                             anticoagulant or antiplatelet agents. ASA Grade                             Assessment: I - A normal, healthy patient. After                             reviewing the risks and benefits, the patient was                             deemed in satisfactory condition to undergo the                             procedure. The anesthesia plan was to use moderate                             sedation / analgesia (conscious sedation).                             Immediately prior to administration of medications,                             the patient was re-assessed for adequacy to receive                             sedatives. The heart rate, respiratory rate, oxygen                             saturations, blood pressure, adequacy of pulmonary                             ventilation, and response to care were monitored                             throughout the procedure. The physical status of                             th                          e patient was re-assessed after the procedure.                            After obtaining informed consent, the colonoscope                             was passed under direct vision. Throughout the                             procedure, the patient's blood pressure, pulse, and                             oxygen saturations were monitored continuously. The                             Olympus Pediatric Colonoscope Model # [id number]-CA833Q,                             [place] #252, SN# [id number] was introduced  through the                             anus and advanced to the terminal ileum, with                             identification of the appendiceal orifice and IC                             valve. The colonoscopy was performed without                             difficulty. The patient tolerated the procedure                             well. The quality of the bowel preparation was                             evaluated using the BBPS (Oglesby Bowel Preparation                             S                          helder) with scores of: Right Colon = 3, Transverse                             Colon = 3 and Left Colon = 3 (entire mucosa seen                             well with no residual staining, small fragments of                             stool or opaque liquid). The total BBPS score                             equals 9.                                                                                   Findings:       Skin tags were found on perianal exam.       The terminal ileum appeared normal.       The colon (entire examined portion) appeared normal.       External and internal hemorrhoids were found during retroflexion. The        hemorrhoids were small.                                                                                   Impression:               - Perianal skin tags found on perianal exam.                            - The examined portion of the ileum was normal.                            - The entire examined colon is normal.                            - External and i                          nternal hemorrhoids.                            - No specimens collected.  Recommendation:           - Discharge patient to home.                            - Resume regular diet.                            - Continue present medications.                            - Repeat colonoscopy in 5 years for screening                             purposes.                                                                                    Procedure Code(s):       --- Professional ---       , Colorectal cancer screening; colonoscopy on individual at high        risk  Diagnosis Code(s):       --- Professional ---       Z83.71, Family history of colonic polyps       K64.8, Other hemorrhoids       K64.4, Residual hemorrhoidal skin tags    CPT copyright 2020 American Medical Association. All rights reserved.    The codes documented in this report are preliminary and upon  review may   be revised to meet current compliance requirements.    Electronic Signature by Dr. Pool Cardenas  _________                          _________  Pool Cardenas MD  2/21/2022 9:50:15 AM  I was physically present for the entire viewing portion of the exam.  __________________________Pool Cardenas MD  Number of Addenda: 0    Note Initiated On: 2/21/2022 9:10 AM  MRN:                      [id number]  Procedure Date:           2/21/2022 9:10:47 AM  Scope Withdrawal Time: 0 hours 12 minutes 1 second   Total Procedure Duration: 0 hours 21 minutes 17 seconds   Estimated Blood Loss:       Scope In: 9:24:32 AM  Scope Out: 9:45:49 AM               Video-Visit Details    Video Start Time: 1:48 PM    Type of service:  Video Visit    Video End Time:2:19 PM    Originating Location (pt. Location): Home    Distant Location (provider location):  [facility]     Platform used for Video Visit: [employer]

## 2022-09-16 NOTE — PATIENT INSTRUCTIONS
[facility]  [address] [place] [address]  Office: [phone]   Fax:    [phone]     Return in 3 months (on 12/14/2022) for Physical/Preventative Visit, w/ Dr. DUPONT for 40 minute appointment.   Future Appointments 9/16/2022 - 3/15/2023                  Date Visit Type Length Department Provider     12/14/2022  7:00 AM PREVENTATIVE ADULT            40 min [facility] Lisbeth Gonzalez MD    Location Instructions:     [facility] is located at [address], [address] Free parking is available; access the lot by turning north from [address] onto [address], then west onto [address].                            Thank you so much for doing a video visit with us today. And, again, thank you  for choosing our [facility].  Please contact us with any questions that you may have.   We appreciate the opportunity to serve you now and look forward to supporting your healthcare needs for a long time to come!    Our clinic for the foreseeable future and until further notice , will continue to offer virtual visits, including telephone visits, video visits,  and e-visits, in addition to in person visits,  especially during this period of COVID-19 coronavirus pandemic.  Please call to schedule another one if you need it!        Most Sincerely,     Lisbeth Gonzalez MD                                              To reach your [facility] care team after hours call:   [phone]    PLEASE NOTE OUR HOURS HAVE CHANGED secondary to COVID-19 coronavirus pandemic, as we are trying to minimize patient exposure to the virus,  which is now widespread in the [facility].  These hours may change with very little notice.  We apologize for any inconvenience.       Our current clinic hours are:         Monday- Thursday   7:00am - 6:00pm  in person.      Friday  7:00am- 5:00pm in  person                       Saturday and Sunday : Closed to in person and virtual visits            We have telephone and virtual visit times available between 7:00am - 6pm on             Monday-Friday as well.                                                Phone:  [phone]    Our pharmacy hours: Monday  through Friday 8:00am to 5:00pm                        Saturday - 9:00 am to 12 noon         Sunday : Closed.     ###  Please note: at this time we are not accepting any walk-in visits. ###      There is also information available at our web site:  [email or web address]    If your provider ordered any lab tests and you do not receive the results within 10 business days, please call the clinic.    If you need a medication refill please contact your pharmacy.  Please allow 3 business days for your refill to be completed.    Our clinic offers telephone visits and e visits.  Please ask one of your team members to explain more.      Use [employer] (secure email communication and access to your chart) to send your primary care provider a message or make an appointment. Ask someone on your Team how to sign up for [employer].

## 2022-10-16 ENCOUNTER — HEALTH MAINTENANCE LETTER (OUTPATIENT)
Age: 62
End: 2022-10-16

## 2022-10-24 ENCOUNTER — MYC MEDICAL ADVICE (OUTPATIENT)
Dept: FAMILY MEDICINE | Facility: CLINIC | Age: 62
End: 2022-10-24

## 2022-10-24 DIAGNOSIS — N95.1 SYMPTOMATIC MENOPAUSAL OR FEMALE CLIMACTERIC STATES: ICD-10-CM

## 2022-10-27 NOTE — TELEPHONE ENCOUNTER
Patient calling back, needs to know if can increase dose of medications. night sweats are still happening.  Please send new RX for higher dose.   Helps during the day but not at night so far.         Zeina Quevedo

## 2022-10-28 ENCOUNTER — TELEPHONE (OUTPATIENT)
Dept: FAMILY MEDICINE | Facility: CLINIC | Age: 62
End: 2022-10-28

## 2022-10-28 DIAGNOSIS — N95.1 SYMPTOMATIC MENOPAUSAL OR FEMALE CLIMACTERIC STATES: ICD-10-CM

## 2022-10-28 RX ORDER — VENLAFAXINE HYDROCHLORIDE 37.5 MG/1
75 CAPSULE, EXTENDED RELEASE ORAL DAILY
Qty: 180 CAPSULE | Refills: 1 | Status: SHIPPED | OUTPATIENT
Start: 2022-10-28 | End: 2022-11-01

## 2022-10-28 NOTE — TELEPHONE ENCOUNTER
Have pt increase her venlafaxine to  2-37.5mg tabs daily = 75mg total. I Adjusted med list.   Continue vivelle-dot 0.05mg patch twice weekly.

## 2022-10-28 NOTE — TELEPHONE ENCOUNTER
Reason for Call:  Form, our goal is to have forms completed with 72 hours, however, some forms may require a visit or additional information.    Type of letter, form or note:  Prior authorization    Who is the form from?: [employer] (if other please explain)    Where did the form come from: form was faxed in    What clinic location was the form placed at?: [facility]    Where the form was placed: Dr. Gonzalez Box/Folder    What number is listed as a contact on the form?: [phone]    Call taken on 10/28/2022 at 3:23 PM by Caridad Muhammad

## 2022-10-31 NOTE — TELEPHONE ENCOUNTER
Prior Authorization Retail Medication Request    Medication/Dose: Venlafaxine ER 37.5MG capsules  ICD code (if different than what is on RX):    Previously Tried and Failed:    Rationale:      Insurance Name:  In Chart  Insurance ID:        Pharmacy Information (if different than what is on RX)  Name:     [id number] - [place], [place] - [address] AT [facility] [facility] [address]  Phone:  [phone]    Plane does not cover. Step therapy required/quantity limist apply maximum daily does of 1.0. Please change RX or advise to start a PA.       Luis Carcamo

## 2022-11-01 RX ORDER — VENLAFAXINE HYDROCHLORIDE 75 MG/1
75 CAPSULE, EXTENDED RELEASE ORAL DAILY
Qty: 90 CAPSULE | Refills: 0 | Status: SHIPPED | OUTPATIENT
Start: 2022-11-01 | End: 2022-12-14

## 2022-11-01 NOTE — TELEPHONE ENCOUNTER
My chart message sent     Marisabel Farmer RN, BSN  [facility]

## 2022-11-07 NOTE — TELEPHONE ENCOUNTER
Please see my chart message below     Please review and advise     Thank you     Marisabel Farmer RN, BSN  [facility] [place] [facility]      -pt's family reports pt's diuretics had been decreased due to variation in BP  -CT Chest showed no evidence of PNA, but did note b/l small pleural effusions and pt's pro-BNP was high at 6253  -c/w Lasix 40mg IVP q12h and will likely de-escalate to oral tomorrow if pt continues to improve per discussion with cardio  -c/w Spironolactone  -c/w rate control for afib as AF w/ RVR increases chance of exacerbation in pt with dCHF  -symptomatically and clinically improving and now back down to home level of supplemental oxygen  -monitor daily weight, I&Os  -TTE performed - no significant change in EF or valvular pathology  -monitor on tele  -monitor renal function  -cardio (Neal), recs appreciated -pt's family reports pt's diuretics had been decreased due to variation in BP  -CT Chest showed no evidence of PNA, but did note b/l small pleural effusions and pt's pro-BNP was high at 6253  -c/w Lasix 40mg IVP q12h and will likely de-escalate to oral tomorrow if pt continues to improve per discussion with cardio  -c/w Spironolactone  -c/w rate control for afib as AF w/ RVR increases chance of exacerbation in pt with dCHF  -symptomatically and clinically improving and now back down to home level of supplemental oxygen  -monitor daily weight, I&Os  -TTE performed - no significant change in EF or valvular pathology  -monitor on tele  -monitor renal function  -cardio (Neal), recs appreciated

## 2022-11-17 ENCOUNTER — ANCILLARY PROCEDURE (OUTPATIENT)
Dept: MAMMOGRAPHY | Facility: CLINIC | Age: 62
End: 2022-11-17
Attending: FAMILY MEDICINE
Payer: COMMERCIAL

## 2022-11-17 DIAGNOSIS — Z12.31 VISIT FOR SCREENING MAMMOGRAM: ICD-10-CM

## 2022-11-17 PROCEDURE — 77063 BREAST TOMOSYNTHESIS BI: CPT | Mod: TC | Performed by: RADIOLOGY

## 2022-11-17 PROCEDURE — 77067 SCR MAMMO BI INCL CAD: CPT | Mod: TC | Performed by: RADIOLOGY

## 2022-11-18 NOTE — RESULT ENCOUNTER NOTE
Your mammogram was normal.     Thank you so much for choosing [facility].  Please contact us with any questions that you may have.   We appreciate the opportunity to serve you now and look forward to supporting your healthcare needs for a long time to come!    Most Sincerely,     Lisbeth Gonzalez MD

## 2022-12-10 ASSESSMENT — ENCOUNTER SYMPTOMS
COUGH: 0
HEADACHES: 1
ABDOMINAL PAIN: 0
BREAST MASS: 0
FREQUENCY: 0
SHORTNESS OF BREATH: 0
NAUSEA: 0
PALPITATIONS: 0
PARESTHESIAS: 0
NERVOUS/ANXIOUS: 0
DYSURIA: 0
MYALGIAS: 0
CHILLS: 0
HEARTBURN: 0
HEMATOCHEZIA: 0
FEVER: 0
DIZZINESS: 0
WEAKNESS: 0
HEMATURIA: 0
DIARRHEA: 0
CONSTIPATION: 0
EYE PAIN: 0
ARTHRALGIAS: 1
SORE THROAT: 0
JOINT SWELLING: 0

## 2022-12-14 ENCOUNTER — OFFICE VISIT (OUTPATIENT)
Dept: FAMILY MEDICINE | Facility: CLINIC | Age: 62
End: 2022-12-14
Payer: COMMERCIAL

## 2022-12-14 VITALS
HEART RATE: 82 BPM | WEIGHT: 159 LBS | OXYGEN SATURATION: 95 % | SYSTOLIC BLOOD PRESSURE: 118 MMHG | BODY MASS INDEX: 27.14 KG/M2 | HEIGHT: 64 IN | TEMPERATURE: 97.8 F | DIASTOLIC BLOOD PRESSURE: 80 MMHG

## 2022-12-14 DIAGNOSIS — Z82.49 FAMILY HISTORY OF EARLY CAD: ICD-10-CM

## 2022-12-14 DIAGNOSIS — Z79.899 CONTROLLED SUBSTANCE AGREEMENT SIGNED: ICD-10-CM

## 2022-12-14 DIAGNOSIS — Z13.220 SCREENING FOR HYPERLIPIDEMIA: ICD-10-CM

## 2022-12-14 DIAGNOSIS — E78.5 HYPERLIPIDEMIA LDL GOAL <130: ICD-10-CM

## 2022-12-14 DIAGNOSIS — G47.00 INSOMNIA, UNSPECIFIED TYPE: ICD-10-CM

## 2022-12-14 DIAGNOSIS — N95.1 SYMPTOMATIC MENOPAUSAL OR FEMALE CLIMACTERIC STATES: ICD-10-CM

## 2022-12-14 DIAGNOSIS — Z00.00 ROUTINE GENERAL MEDICAL EXAMINATION AT A HEALTH CARE FACILITY: Primary | ICD-10-CM

## 2022-12-14 DIAGNOSIS — D23.9 MULTIPLE DYSPLASTIC NEVI: ICD-10-CM

## 2022-12-14 DIAGNOSIS — E55.9 VITAMIN D DEFICIENCY: ICD-10-CM

## 2022-12-14 LAB
ALBUMIN SERPL BCG-MCNC: 4.3 G/DL (ref 3.5–5.2)
ALP SERPL-CCNC: 97 U/L (ref 35–104)
ALT SERPL W P-5'-P-CCNC: 23 U/L (ref 10–35)
ANION GAP SERPL CALCULATED.3IONS-SCNC: 10 MMOL/L (ref 7–15)
AST SERPL W P-5'-P-CCNC: 21 U/L (ref 10–35)
BILIRUB SERPL-MCNC: 0.8 MG/DL
BUN SERPL-MCNC: 17.7 MG/DL (ref 8–23)
CALCIUM SERPL-MCNC: 8.8 MG/DL (ref 8.8–10.2)
CHLORIDE SERPL-SCNC: 104 MMOL/L (ref 98–107)
CHOLEST SERPL-MCNC: 233 MG/DL
CREAT SERPL-MCNC: 0.76 MG/DL (ref 0.51–0.95)
CREAT UR-MCNC: 209 MG/DL
DEPRECATED HCO3 PLAS-SCNC: 26 MMOL/L (ref 22–29)
ERYTHROCYTE [DISTWIDTH] IN BLOOD BY AUTOMATED COUNT: 12.8 % (ref 10–15)
GFR SERPL CREATININE-BSD FRML MDRD: 88 ML/MIN/1.73M2
GLUCOSE SERPL-MCNC: 92 MG/DL (ref 70–99)
HCT VFR BLD AUTO: 39.3 % (ref 35–47)
HDLC SERPL-MCNC: 63 MG/DL
HGB BLD-MCNC: 13.4 G/DL (ref 11.7–15.7)
LDLC SERPL CALC-MCNC: 156 MG/DL
MCH RBC QN AUTO: 29.5 PG (ref 26.5–33)
MCHC RBC AUTO-ENTMCNC: 34.1 G/DL (ref 31.5–36.5)
MCV RBC AUTO: 87 FL (ref 78–100)
MICROALBUMIN UR-MCNC: <12 MG/L
MICROALBUMIN/CREAT UR: NORMAL MG/G{CREAT}
NONHDLC SERPL-MCNC: 170 MG/DL
PLATELET # BLD AUTO: 204 10E3/UL (ref 150–450)
POTASSIUM SERPL-SCNC: 4.1 MMOL/L (ref 3.4–5.3)
PROT SERPL-MCNC: 6.9 G/DL (ref 6.4–8.3)
RBC # BLD AUTO: 4.54 10E6/UL (ref 3.8–5.2)
SODIUM SERPL-SCNC: 140 MMOL/L (ref 136–145)
TRIGL SERPL-MCNC: 70 MG/DL
TSH SERPL DL<=0.005 MIU/L-ACNC: 1.94 UIU/ML (ref 0.3–4.2)
WBC # BLD AUTO: 3.6 10E3/UL (ref 4–11)

## 2022-12-14 PROCEDURE — 80061 LIPID PANEL: CPT | Performed by: FAMILY MEDICINE

## 2022-12-14 PROCEDURE — 82043 UR ALBUMIN QUANTITATIVE: CPT | Performed by: FAMILY MEDICINE

## 2022-12-14 PROCEDURE — 80053 COMPREHEN METABOLIC PANEL: CPT | Performed by: FAMILY MEDICINE

## 2022-12-14 PROCEDURE — 84443 ASSAY THYROID STIM HORMONE: CPT | Performed by: FAMILY MEDICINE

## 2022-12-14 PROCEDURE — 99214 OFFICE O/P EST MOD 30 MIN: CPT | Mod: 25 | Performed by: FAMILY MEDICINE

## 2022-12-14 PROCEDURE — 82306 VITAMIN D 25 HYDROXY: CPT | Performed by: FAMILY MEDICINE

## 2022-12-14 PROCEDURE — 99396 PREV VISIT EST AGE 40-64: CPT | Mod: 25 | Performed by: FAMILY MEDICINE

## 2022-12-14 PROCEDURE — 36415 COLL VENOUS BLD VENIPUNCTURE: CPT | Performed by: FAMILY MEDICINE

## 2022-12-14 PROCEDURE — 85027 COMPLETE CBC AUTOMATED: CPT | Performed by: FAMILY MEDICINE

## 2022-12-14 RX ORDER — VENLAFAXINE HYDROCHLORIDE 75 MG/1
75 CAPSULE, EXTENDED RELEASE ORAL DAILY
Qty: 90 CAPSULE | Refills: 1 | Status: SHIPPED | OUTPATIENT
Start: 2022-12-14 | End: 2023-06-13

## 2022-12-14 RX ORDER — ZOLPIDEM TARTRATE 10 MG/1
TABLET ORAL
Qty: 30 TABLET | Refills: 3 | Status: SHIPPED | OUTPATIENT
Start: 2022-12-14 | End: 2023-12-15

## 2022-12-14 RX ORDER — GABAPENTIN 100 MG/1
100 CAPSULE ORAL AT BEDTIME
Qty: 90 CAPSULE | Refills: 1 | Status: SHIPPED | OUTPATIENT
Start: 2022-12-14 | End: 2023-02-09

## 2022-12-14 RX ORDER — ATORVASTATIN CALCIUM 10 MG/1
10 TABLET, FILM COATED ORAL DAILY
Qty: 90 TABLET | Refills: 1 | Status: SHIPPED | OUTPATIENT
Start: 2022-12-14 | End: 2023-04-25

## 2022-12-14 RX ORDER — ESTRADIOL 0.05 MG/D
1 PATCH, EXTENDED RELEASE TRANSDERMAL
Qty: 24 PATCH | Refills: 3 | Status: SHIPPED | OUTPATIENT
Start: 2022-12-15 | End: 2023-12-15

## 2022-12-14 ASSESSMENT — ENCOUNTER SYMPTOMS
SHORTNESS OF BREATH: 0
CONSTIPATION: 0
HEMATOCHEZIA: 0
BREAST MASS: 0
NERVOUS/ANXIOUS: 0
HEARTBURN: 0
SORE THROAT: 0
ARTHRALGIAS: 1
CHILLS: 0
DIARRHEA: 0
MYALGIAS: 0
WEAKNESS: 0
EYE PAIN: 0
HEMATURIA: 0
FEVER: 0
FREQUENCY: 0
COUGH: 0
HEADACHES: 1
DIZZINESS: 0
NAUSEA: 0
PARESTHESIAS: 0
ABDOMINAL PAIN: 0
JOINT SWELLING: 0
DYSURIA: 0
PALPITATIONS: 0

## 2022-12-14 NOTE — PROGRESS NOTES
SUBJECTIVE:   CC: Whitney is an 62 year old who presents for preventive health visit and the following other medical problems:      1. Routine general medical examination at a health care facility    2. Screening for hyperlipidemia    3. Hyperlipidemia LDL goal <130- needs labs done and meds refilled     4. Symptomatic menopausal or female climacteric states    5. Family history of early CAD    6. Hyperlipidemia LDL goal <130- on atorvastatin - uncertain control - needs lab follow up and refill on medicine     7. Insomnia, unspecified type- mostly while travelling -CSA re-signed 12/14/2022-  ? related to menopause as well - Ambien 10mg - takes 1/2 tab at bedtime PRN    8. Controlled substance agreement re-signed today -  12/14/2022  ok for #30 -10mg ambien (takes 1/2 tab) with 1 refill every 6 months or so     9. Multiple dysplastic nevi-Going to dermatology for her full body skin exam annually - just had appt=12/2022[facility] - Dr. Carmen Dowling - for her early dysplastic melanoma.      10. Vitamin D deficiency- needs levels rechecked today       Patient has been advised of split billing requirements and indicates understanding: Yes  Healthy Habits:     Getting at least 3 servings of Calcium per day:  Yes    Bi-annual eye exam:  Yes    Dental care twice a year:  Yes    Sleep apnea or symptoms of sleep apnea:  None    Diet:  Other    Frequency of exercise:  2-3 days/week    Duration of exercise:  15-30 minutes    Taking medications regularly:  Yes    Medication side effects:  None    PHQ-2 Total Score: 0    Additional concerns today:  No    Saw Derm this week for 1 year check - Dr Dowling.  Clear skin check. No recurrence of basal cell skin cancer.       Hyperlipidemia Follow-Up      Are you regularly taking any medication or supplement to lower your cholesterol?   Yes- Atorvastatin    Are you having muscle aches or other side effects that you think could be caused by your cholesterol lowering medication?   No    Is fasting this am.   Recent Labs   Lab Test 11/29/21  1210 10/22/20  1231   CHOL 180 206*   HDL 76 70   LDL 82 109*   TRIG 112 137      controlled substance agreement discussed in detail and renewed with  pt in detail today line by line item and signed with pt. Pt voices understanding of responsibility of taking and storing a controlled substance as well.      Doing well without micronized progesterone for night sweats - just doing venlafaxine 75mg with estradiol patch and gabapentin 100mg.  No problems with daytime grogginess.      Taking ambien about 1/2 the nights of the month.     No problems or side effects with her meds.     Had mammogram earlier this month - normal.         Today's PHQ-2 Score:   PHQ-2 ( 1999 Pfizer) 12/10/2022   Q1: Little interest or pleasure in doing things 0   Q2: Feeling down, depressed or hopeless 0   PHQ-2 Score 0   PHQ-2 Total Score (12-17 Years)- Positive if 3 or more points; Administer PHQ-A if positive -   Q1: Little interest or pleasure in doing things Not at all   Q2: Feeling down, depressed or hopeless Not at all   PHQ-2 Score 0           Social History     Tobacco Use     Smoking status: Never     Smokeless tobacco: Never   Substance Use Topics     Alcohol use: No     If you drink alcohol do you typically have >3 drinks per day or >7 drinks per week? Not applicable    Alcohol Use 12/14/2022   Prescreen: >3 drinks/day or >7 drinks/week? -   Prescreen: >3 drinks/day or >7 drinks/week? Not Applicable       Reviewed orders with patient.  Reviewed health maintenance and updated orders accordingly - Yes  Lab work is in process  Labs reviewed in EPIC  BP Readings from Last 3 Encounters:   12/14/22 118/80   02/21/22 97/71   11/29/21 118/68    Wt Readings from Last 3 Encounters:   12/14/22 72.1 kg (159 lb)   02/21/22 66.2 kg (146 lb)   11/29/21 69.4 kg (153 lb)                  Patient Active Problem List   Diagnosis     Panic disorder without agoraphobia     Anxiety      Overactive bladder     Hyperlipidemia LDL goal <130- on atorvastatin      Gastroesophageal reflux disease without esophagitis     Symptomatic menopausal or female climacteric states     Fibrocystic breast changes of both breasts     Primary osteoarthritis of both hands     Family history of early CAD     Insomnia, unspecified type-Controlled substance agreement signed -2022 ok for #30 -10mg ambien (takes 1/2 tab) with 1 refill every 6 months or so      Expressive aphasia     Urinary urgency     Fatigue, unspecified type     Night sweats- on estrace 1mg already - discussed progesterone deficiency usually causing that - pt would like to try black cohosh first prior to micronized progesterone 2nd to breast ca risk      Controlled substance agreement signed -2022 ok for #30 -10mg ambien (takes 1/2 tab) with 1 refill every 6 months or so      Acute recurrent maxillary sinusitis     Multiple dysplastic nevi-Going to dermatology for her full body skin exam annually - just had appt=2022[facility] - Dr. Carmen Dowling - for her early dysplastic melanoma.       RUQ abdominal pain- comes and goes - not related to foods or fatty foods, worse bending over with or without lifting and getting out of her car- ? muscle spasm - CT and US negative 10/5/2020      Rotator cuff syndrome, left     Other osteoarthritis involving multiple joints- left middle finger s/p fracture /dislocation - causing some chronic pain - pt requests refill of ibuprofen 800mg tabs - takes with food      Lumbosacral dysfunction     Sacral pain     Family history of colonic polyps- precancerous      Basal cell carcinoma (BCC) of skin of nose     Vitamin D deficiency     Past Surgical History:   Procedure Laterality Date      SECTION       COLONOSCOPY  2012    Procedure:COLONOSCOPY; Colonoscopy ; Surgeon:SILVERIO ARRIETA; Location: [facility]     COLONOSCOPY N/A 2017    Procedure: COLONOSCOPY;  Surgeon: Silverio Arrieta  MD Scott;  Location:  [facility]     COLONOSCOPY N/A 2022    Procedure: COLONOSCOPY;  Surgeon: Pool Cardenas MD;  Location:  [facility]     COLPOSCOPY, BIOPSY, COMBINED  1991    Mild squamous epithelial dysplasia (SABIHA 1)     FISSURECTOMY RECTUM  01     HYSTERECTOMY TOTAL ABDOMINAL  3/4/1998    Myomatous uterus; pelvic pain. Started HRT right afterward with vasomotor sx.     HYSTERECTOMY, PAP NO LONGER INDICATED N/A     had for large fibroid     LIPOSUCTION (LOCATION)       MOHS MICROGRAPHIC PROCEDURE         Social History     Tobacco Use     Smoking status: Never     Smokeless tobacco: Never   Substance Use Topics     Alcohol use: No     Family History   Problem Relation Age of Onset     Hypertension Mother      Hyperlipidemia Mother      Diabetes Mother      Cerebrovascular Disease Mother         right hemispheric stroke -  11 days after stroke - 10/2019      C.A.D. Father 45          age 45 massive MI      Heart Failure Father      Hyperlipidemia Father      Coronary Artery Disease Father      C.A.D. Maternal Grandmother      C.A.D. Maternal Grandfather      C.A.D. Paternal Grandmother      C.A.D. Paternal Grandfather      Cancer Other         9 members on maternal side. Bone CA, Breast CA (m. aunt),       Coronary Artery Disease Brother         Heart attack- s/p sudden cardiac arrest survived 2019      C.A.D. Sister 57        MI age 50- CABG - 2 vessels - 4 MI's since age 55 - 3 stents placed 2017      Diabetes Sister      Hypertension Sister      Breast Cancer Maternal Aunt         50s-Mom had 9 sisters. MAunt->50's     Hypertension Sister      Hyperlipidemia Sister      Coronary Artery Disease Sister      Prostate Cancer Brother      Testicular cancer Brother 37     Psoriasis Son 29        after returning from the service - lives in [place]     Psoriatic Arthritis Son 29        on Enbrel      Colon Cancer No family hx of          Current Outpatient Medications   Medication Sig Dispense Refill      atorvastatin (LIPITOR) 10 MG tablet Take 1 tablet (10 mg) by mouth daily 90 tablet 1     calcium-vitamin D (OSCAL) 250-125 MG-UNIT TABS per tablet Take 1 tablet by mouth daily       [START ON 12/15/2022] estradiol (VIVELLE-DOT) 0.05 MG/24HR bi-weekly patch Place 1 patch onto the skin twice a week 24 patch 3     gabapentin (NEURONTIN) 100 MG capsule Take 1 capsule (100 mg) by mouth At Bedtime For menopausal symptoms 90 capsule 1     ibuprofen (ADVIL/MOTRIN) 800 MG tablet TAKE 1 TABLET BY MOUTH EVERY 8 HOURS WITH FOOD AS NEEDED FOR MODERATE PAIN 90 tablet 0     montelukast (SINGULAIR) 10 MG tablet Take 1 tablet (10 mg) by mouth At Bedtime 90 tablet 3     Multiple Vitamins-Minerals (MULTIVITAMIN ADULT PO) Take by mouth daily       Omega-3 Fatty Acids (OMEGA 3 PO)        venlafaxine (EFFEXOR XR) 75 MG 24 hr capsule Take 1 capsule (75 mg) by mouth daily 90 capsule 1     zolpidem (AMBIEN) 10 MG tablet TAKE 1/2 TABLET BY MOUTH EVERY NIGHT AT BEDTIME AS NEEDED FOR SLEEP 30 tablet 3     Allergies   Allergen Reactions     Rosamaria Sun Screen Spf 30 [Abuval Sport Sunscreen] Hives and Rash     Septra [Sulfamethoxazole W/Trimethoprim]      Sulfa Drugs Hives     Murine Ear [Carbamide Peroxide] Swelling and Rash     Severe pain , swelling after debrox type ear drops - had to go to the clinic to have ears flushed out emergently.      Paxlovid [Nirmatrelvir-Ritonavir] Hives, Itching and Rash     Recent Labs   Lab Test 11/29/21  1210 05/06/21  1559 10/22/20  1231 10/05/20  1139 09/18/19  1005   LDL 82  --  109*  --  76   HDL 76  --  70  --  72   TRIG 112  --  137  --  135   ALT 32 25 31   < > 25   CR 0.70 0.71 0.69   < > 0.66   GFRESTIMATED >90 >90 >90   < > >90   GFRESTBLACK  --  >90 >90   < > >90   POTASSIUM 3.6 3.7 3.5   < > 3.4   TSH 1.48  --  1.57  --  1.44    < > = values in this interval not displayed.        Breast Cancer Screening:    FHS-7:   Breast CA Risk Assessment (FHS-7) 10/26/2021 10/27/2021 11/17/2022 12/10/2022    Did any of your first-degree relatives have breast or ovarian cancer? No No No No   Did any of your relatives have bilateral breast cancer? No Unknown No Unknown   Did any man in your family have breast cancer? No No No No   Did any woman in your family have breast and ovarian cancer? No No No Yes   Did any woman in your family have breast cancer before age 50 y? No No No Unknown   Do you have 2 or more relatives with breast and/or ovarian cancer? No Unknown No No   Do you have 2 or more relatives with breast and/or bowel cancer? No Unknown No No       Mammogram Screening: Recommended annual mammography  Pertinent mammograms are reviewed under the imaging tab.    History of abnormal Pap smear: NO - age 30- 65 PAP every 3 years recommended  PAP / HPV 2015   PAP (Historical) Normal     Reviewed and updated as needed this visit by clinical staff   Tobacco  Allergies  Meds  Problems  Med Hx  Surg Hx  Fam Hx          Reviewed and updated as needed this visit by Provider   Tobacco  Allergies  Meds  Problems  Med Hx  Surg Hx  Fam Hx         Past Medical History:   Diagnosis Date     Anxiety     on effexor in past. Off since      Cancer (H) 2021    SKIN CANCER     CMC DJD(carpometacarpal degenerative joint disease), localized primary      Edema      Fatigue, unspecified type 2018     Hormone replacement therapy      IBS (irritable bowel syndrome)     Diarrhea predominant     Insomnia, unspecified     On Ambien     Lichen sclerosus      Multiple dysplastic nevi     Going to dermatology for her full body skin exam tomorrow - [facility] - Dr. Carmen Dowling - for her early dysplastic melanoma.     Pap smear of cervix not needed      Skin cancer      Urge incontinence       Past Surgical History:   Procedure Laterality Date      SECTION       COLONOSCOPY  2012    Procedure:COLONOSCOPY; Colonoscopy ; Surgeon:DEANDRE SHORT; Location: [facility]     COLONOSCOPY N/A 2017     Procedure: COLONOSCOPY;  Surgeon: Silverio Arrieta MD;  Location:  [facility]     COLONOSCOPY N/A 2022    Procedure: COLONOSCOPY;  Surgeon: Pool Cardenas MD;  Location:  [facility]     COLPOSCOPY, BIOPSY, COMBINED  1991    Mild squamous epithelial dysplasia (SABIHA 1)     FISSURECTOMY RECTUM  01     HYSTERECTOMY TOTAL ABDOMINAL  3/4/1998    Myomatous uterus; pelvic pain. Started HRT right afterward with vasomotor sx.     HYSTERECTOMY, PAP NO LONGER INDICATED N/A     had for large fibroid     LIPOSUCTION (LOCATION)       MOHS MICROGRAPHIC PROCEDURE       OB History    Para Term  AB Living   2 2 2 0 0 2   SAB IAB Ectopic Multiple Live Births   0 0 0 0 2      # Outcome Date GA Lbr Nick/2nd Weight Sex Delivery Anes PTL Lv   2 Term 91   2.594 kg (5 lb 11.5 oz) M -SEC   TERELL      Birth Comments: Breech      Name: Tj Velazquez Term 87   3.005 kg (6 lb 10 oz) F Vag-Vacuum   TERELL      Name: Sam       Review of Systems   Constitutional: Negative for chills and fever.   HENT: Negative for congestion, ear pain, hearing loss and sore throat.    Eyes: Positive for visual disturbance. Negative for pain.   Respiratory: Negative for cough and shortness of breath.    Cardiovascular: Negative for chest pain, palpitations and peripheral edema.   Gastrointestinal: Negative for abdominal pain, constipation, diarrhea, heartburn, hematochezia and nausea.   Breasts:  Negative for tenderness, breast mass and discharge.   Genitourinary: Negative for dysuria, frequency, genital sores, hematuria, pelvic pain, urgency, vaginal bleeding and vaginal discharge.   Musculoskeletal: Positive for arthralgias. Negative for joint swelling and myalgias.   Skin: Negative for rash.   Neurological: Positive for headaches. Negative for dizziness, weakness and paresthesias.   Psychiatric/Behavioral: Negative for mood changes. The patient is not nervous/anxious.           OBJECTIVE:   /80 (BP Location: Left  "arm, Patient Position: Chair, Cuff Size: Adult Regular)   Pulse 82   Temp 97.8  F (36.6  C) (Tympanic)   Ht 1.621 m (5' 3.8\")   Wt 72.1 kg (159 lb)   LMP  (LMP Unknown)   SpO2 95%   BMI 27.46 kg/m    Physical Exam  GENERAL APPEARANCE: healthy, alert and no distress  EYES: Eyes grossly normal to inspection, PERRL and conjunctivae and sclerae normal  HENT: ear canals and TM's normal, nose and mouth without ulcers or lesions, oropharynx clear and oral mucous membranes moist  NECK: no adenopathy, no asymmetry, masses, or scars and thyroid normal to palpation  RESP: lungs clear to auscultation - no rales, rhonchi or wheezes  BREAST: normal without masses, tenderness or nipple discharge and no palpable axillary masses or adenopathy  CV: regular rate and rhythm, normal S1 S2, no S3 or S4, no murmur, click or rub, no peripheral edema and peripheral pulses strong  ABDOMEN: soft, nontender, no hepatosplenomegaly, no masses and bowel sounds normal  MS: no musculoskeletal defects are noted and gait is age appropriate without ataxia  SKIN: no suspicious lesions or rashes  NEURO: Normal strength and tone, sensory exam grossly normal, mentation intact and speech normal  PSYCH: mentation appears normal and affect normal/bright    Diagnostic Test Results:  Labs reviewed in Epic    ASSESSMENT/PLAN:       ICD-10-CM    1. Routine general medical examination at a health care facility  Z00.00       2. Screening for hyperlipidemia  Z13.220 Lipid panel reflex to direct LDL Non-fasting      3. Hyperlipidemia LDL goal <130- needs labs done and meds refilled   E78.5 Lipid panel reflex to direct LDL Non-fasting     Albumin Random Urine Quantitative with Creat Ratio     CBC with platelets     Comprehensive metabolic panel     TSH with free T4 reflex      4. Symptomatic menopausal or female climacteric states  N95.1 venlafaxine (EFFEXOR XR) 75 MG 24 hr capsule     gabapentin (NEURONTIN) 100 MG capsule     estradiol (VIVELLE-DOT) 0.05 " MG/24HR bi-weekly patch      5. Family history of early CAD  Z82.49 atorvastatin (LIPITOR) 10 MG tablet      6. Hyperlipidemia LDL goal <130- on atorvastatin - uncertain control - needs lab follow up and refill on medicine   E78.5 atorvastatin (LIPITOR) 10 MG tablet      7. Insomnia, unspecified type- mostly while travelling -CSA re-signed 12/14/2022-  ? related to menopause as well - Ambien 10mg - takes 1/2 tab at bedtime PRN  G47.00 zolpidem (AMBIEN) 10 MG tablet      8. Controlled substance agreement re-signed today -  12/14/2022  ok for #30 -10mg ambien (takes 1/2 tab) with 1 refill every 6 months or so   Z79.899 zolpidem (AMBIEN) 10 MG tablet      9. Multiple dysplastic nevi-Going to dermatology for her full body skin exam annually - just had appt=12/2022[facility] - Dr. Carmen Dowling - for her early dysplastic melanoma.    D23.9       10. Vitamin D deficiency- needs levels rechecked today   E55.9 25 Hydroxyvitamin D2 and D3        controlled substance agreement discussed in detail and renewed with  pt in detail today line by line item and signed with pt. Pt voices understanding of responsibility of taking and storing a controlled substance as well.    Patient has been advised of split billing requirements and indicates understanding: Yes      COUNSELING:  Reviewed preventive health counseling, as reflected in patient instructions    Return in 6 months (on 6/27/2023) for cholesterol/lipids and recheck menopause  and sleep , w/ Dr. DUPONT for 40 minute appointment.     She reports that she has never smoked. She has never used smokeless tobacco.           Lisbeth Gonzalez MD  [facility]

## 2022-12-14 NOTE — PATIENT INSTRUCTIONS
Preventive Health Recommendations  Female Ages 50 - 64    Yearly exam: See your health care provider every year in order to  Review health changes.   Discuss preventive care.    Review your medicines if your doctor has prescribed any.    Get a Pap test every three years (unless you have an abnormal result and your provider advises testing more often).  If you get Pap tests with HPV test, you only need to test every 5 years, unless you have an abnormal result.   You do not need a Pap test if your uterus was removed (hysterectomy) and you have not had cancer.  You should be tested each year for STDs (sexually transmitted diseases) if you're at risk.   Have a mammogram every 1 to 2 years.  Have a colonoscopy at age 50, or have a yearly FIT test (stool test). These exams screen for colon cancer.    Have a cholesterol test every 5 years, or more often if advised.  Have a diabetes test (fasting glucose) every three years. If you are at risk for diabetes, you should have this test more often.   If you are at risk for osteoporosis (brittle bone disease), think about having a bone density scan (DEXA).    Shots: Get a flu shot each year. Get a tetanus shot every 10 years.    Nutrition:   Eat at least 5 servings of fruits and vegetables each day.  Eat whole-grain bread, whole-wheat pasta and brown rice instead of white grains and rice.  Get adequate Calcium and Vitamin D.     Lifestyle  Exercise at least 150 minutes a week (30 minutes a day, 5 days a week). This will help you control your weight and prevent disease.  Limit alcohol to one drink per day.  No smoking.   Wear sunscreen to prevent skin cancer.   See your dentist every six months for an exam and cleaning.  See your eye doctor every 1 to 2 years.  Thank you so much or choosing [facility]  for your Health Care. It was a pleasure seeing you at your visit today! Please contact us with any questions or concerns you may have.                  "  Lisbeth Gonzalez MD                              To reach your [facility] care team after hours call:   [phone] press #2 \"to speak with your care team\".  This will get you to our clinic instead of routing to central [facility]  scheduling.     PLEASE NOTE OUR HOURS HAVE CHANGED secondary to COVID-19 coronavirus pandemic, as we are trying to minimize patient exposure to the virus,  which is now widespread in the state.  These hours may change with very little notice.  We apologize for any inconvenience.       Our current clinic hours are:          Monday- Thursday   7:00am - 6:00pm  in person.      Friday  7:00am- 5:00pm                       Saturday and Sunday : Closed to in person and virtual visits        We have telephone and virtual visit times available between    7:00am - 6pm on Monday-Friday as well.                                                Phone:  [phone]      Our pharmacy hours: Monday through Friday 8:00am to 5:00pm                        Saturday - 9:00 am to 12 noon       Sunday : Closed.              Phone:  [phone]              ###  Please note: at this time we are not accepting any walk-in visits. ###      There is also information available at our web site:  [email or web address]    If your provider ordered any lab tests and you do not receive the results within 10 business days, please call the clinic.    If you need a medication refill please contact your pharmacy.  Please allow 3 business days for your refill to be completed.    Our clinic offers telephone visits and e visits.  Please ask one of your team members to explain more.      Use [employer] (secure email communication and access to your chart) to send your primary care provider a message or make an appointment. Ask someone on your Team how to sign up for [employer].                "

## 2022-12-19 LAB
DEPRECATED CALCIDIOL+CALCIFEROL SERPL-MC: <54 UG/L (ref 20–75)
VITAMIN D2 SERPL-MCNC: <5 UG/L
VITAMIN D3 SERPL-MCNC: 49 UG/L

## 2023-01-26 NOTE — LETTER
[facility] [place]  12/14/22  Patient: Whitney Aceves  YOB: 1960  Medical Record Number: [id number]                                                                                  Non-Opioid Controlled Substance Agreement    This is an agreement between you and your provider regarding safe and appropriate use of controlled substances prescribed by your care team. Controlled substances are?medicines that can cause physical and mental dependence (abuse).     There are strict laws about having and using these medicines. We here at [facility] are  committed to working with you in your efforts to get better. To support you in this work, we'll help you schedule regular office appointments for medicine refills. If we must cancel or change your appointment for any reason, we'll make sure you have enough medicine to last until your next appointment.     As a Provider, I will:     Listen carefully to your concerns while treating you with respect.     Recommend a treatment plan that I believe is in your best interest and may involve therapies other than medicine.      Talk with you often about the possible benefits and the risk of harm of any medicine that we prescribe for you.    Assess the safety of this medicine and check how well it works.      Provide a plan on how to taper (discontinue or go off) using this medicine if the decision is made to stop its use.      ::  As a Patient, I understand controlled substances:       Are prescribed by my care provider to help me function or work and manage my condition(s).?    Are strong medicines and can cause serious side effects.       Need to be taken exactly as prescribed.?Combining controlled substances with certain medicines or chemicals (such as illegal drugs, alcohol, sedatives, sleeping pills, and benzodiazepines) can be dangerous or even fatal.? If I stop taking my medicines suddenly, I may have severe withdrawal symptoms.     The risks,  benefits, and side effects of these medicine(s) were explained to me. I agree that:    1. I will take part in other treatments as advised by my care team. This may be psychiatry or counseling, physical therapy, behavioral therapy, group treatment or a referral to specialist.    2. I will keep all my appointments and understand this is part of the monitoring of controlled substances.?My care team may require an office visit for EVERY controlled substance refill. If I miss appointments or don t follow instructions, my care team may stop my medicine    3. I will take my medicines as prescribed. I will not change the dose or schedule unless my care team tells me to. There will be no refills if I run out early.      4. I may be asked to come to the clinic and complete a urine drug test or complete a pill count. If I don t give a urine sample or participate in a pill count, the care team may stop my medicine.    5. I will only receive controlled substance prescriptions from this clinic. If I am treated by another provider, I will tell them that I am taking controlled substances and that I have a treatment agreement with this provider. I will inform my [facility] care team within one business day if I am given a prescription for any controlled substance by another healthcare provider. My [facility] care team can contact other providers and pharmacists about my use of any medicines.    6. It is up to me to make sure that I don't run out of my medicines on weekends or holidays.?If my care team is willing to refill my prescription without a visit, I must request refills only during office hours. Refills may take up to 3 business days to process. I will use one pharmacy to fill all my controlled substance prescriptions. I will notify the clinic about any changes to my insurance or medicine availability.    7. I am responsible for my prescriptions. If the medicine/prescription is lost, stolen or destroyed, it will  not be replaced.?I also agree not to share controlled substance medicines with anyone.     8. I am aware I should not use any illegal or recreational drugs. I agree not to drink alcohol unless my care team says I can.     9. If I enroll in the [place] Medical Cannabis program, I will tell my care team before my next refill.    10. I will tell my care team right away if I become pregnant, have a new medical problem treated outside of my regular clinic, or have a change in my medicines.     11. I understand that this medicine can affect my thinking, judgment and reaction time.? Alcohol and drugs affect the brain and body, which can affect the safety of my driving. Being under the influence of alcohol or drugs can affect my decision-making, behaviors, personal safety and the safety of others. Driving while impaired (DWI) can occur if a person is driving, operating or in physical control of a car, motorcycle, boat, snowmobile, ATV, motorbike, off-road vehicle or any other motor vehicle ([place] Statute 169A.20). I understand the risk if I choose to drive or operate any vehicle or machinery.    I understand that if I do not follow any of the conditions above, my prescriptions or treatment may be stopped or changed.   I agree that my provider, clinic care team and pharmacy may work with any city, state or federal law enforcement agency that investigates the misuse, sale or other diversion of my controlled medicine. I will allow my provider to discuss my care with, or share a copy of, this agreement with any other treating provider, pharmacy or emergency room where I receive care.     I have read this agreement and have asked questions about anything I did not understand.    ________________________________________________________  Patient Signature - Whitney Aceves     ___________________                   Date     ________________________________________________________  Provider Signature - Lisbeth Gonzalez MD        ___________________                   Date     ________________________________________________________  Witness Signature (required if provider not present while patient signing)          ___________________                   Date      47.7 no

## 2023-02-09 ENCOUNTER — TELEPHONE (OUTPATIENT)
Dept: FAMILY MEDICINE | Facility: CLINIC | Age: 63
End: 2023-02-09
Payer: COMMERCIAL

## 2023-02-09 DIAGNOSIS — N95.1 SYMPTOMATIC MENOPAUSAL OR FEMALE CLIMACTERIC STATES: ICD-10-CM

## 2023-02-09 RX ORDER — GABAPENTIN 100 MG/1
100 CAPSULE ORAL AT BEDTIME
Qty: 90 CAPSULE | Refills: 1 | Status: SHIPPED | OUTPATIENT
Start: 2023-02-09 | End: 2023-07-27

## 2023-02-09 NOTE — TELEPHONE ENCOUNTER
Called patient, will do labs on 2/14 when she comes in to see Dr Gonzalez. Will keep the follow up appointment for Elva Quevedo

## 2023-02-09 NOTE — TELEPHONE ENCOUNTER
Reason for Call:  Other call back    Detailed comments: patient called and has an appointment with Carlos Melendez at [facility] in June.    States that provider asked her to be seen in May.    Wondering okay to keep appointment?  Or can she get in provider in May.    Please contact patient.  Thank you.    Phone Number Patient can be reached at: Cell number on file:    Telephone Information:   Mobile [phone]       Best Time: any    Can we leave a detailed message on this number? YES    Call taken on 2/9/2023 at 12:37 PM by Christina Schmidt

## 2023-02-14 ENCOUNTER — OFFICE VISIT (OUTPATIENT)
Dept: FAMILY MEDICINE | Facility: CLINIC | Age: 63
End: 2023-02-14
Payer: COMMERCIAL

## 2023-02-14 VITALS
HEIGHT: 64 IN | OXYGEN SATURATION: 97 % | SYSTOLIC BLOOD PRESSURE: 119 MMHG | TEMPERATURE: 97.7 F | HEART RATE: 91 BPM | BODY MASS INDEX: 27.14 KG/M2 | DIASTOLIC BLOOD PRESSURE: 78 MMHG | WEIGHT: 159 LBS

## 2023-02-14 DIAGNOSIS — E78.5 HYPERLIPIDEMIA LDL GOAL <130: ICD-10-CM

## 2023-02-14 DIAGNOSIS — Z91.09 ENVIRONMENTAL ALLERGIES: ICD-10-CM
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
## 2023-02-14 DIAGNOSIS — M40.00 ACQUIRED POSTURAL KYPHOSIS: ICD-10-CM

## 2023-02-14 LAB
ALT SERPL W P-5'-P-CCNC: 28 U/L (ref 10–35)
AST SERPL W P-5'-P-CCNC: 25 U/L (ref 10–35)
CHOLEST SERPL-MCNC: 159 MG/DL
HDLC SERPL-MCNC: 64 MG/DL
LDLC SERPL CALC-MCNC: 74 MG/DL
NONHDLC SERPL-MCNC: 95 MG/DL
TRIGL SERPL-MCNC: 105 MG/DL

## 2023-02-14 PROCEDURE — 84450 TRANSFERASE (AST) (SGOT): CPT | Performed by: FAMILY MEDICINE

## 2023-02-14 PROCEDURE — 80061 LIPID PANEL: CPT | Performed by: FAMILY MEDICINE

## 2023-02-14 PROCEDURE — 84460 ALANINE AMINO (ALT) (SGPT): CPT | Performed by: FAMILY MEDICINE

## 2023-02-14 PROCEDURE — 99214 OFFICE O/P EST MOD 30 MIN: CPT | Performed by: FAMILY MEDICINE

## 2023-02-14 PROCEDURE — 36415 COLL VENOUS BLD VENIPUNCTURE: CPT | Performed by: FAMILY MEDICINE

## 2023-02-14 RX ORDER — MONTELUKAST SODIUM 10 MG/1
10 TABLET ORAL AT BEDTIME
Qty: 90 TABLET | Refills: 3 | Status: SHIPPED | OUTPATIENT
Start: 2023-02-14 | End: 2023-12-15

## 2023-02-14 NOTE — PROGRESS NOTES
"  Assessment & Plan       ICD-10-CM    1. Fall, initial encounter- mild bruising right upper chest - left upper arm - resolving   W19.XXXA       2. Environmental allergies  Z91.09 montelukast (SINGULAIR) 10 MG tablet      3. Hyperlipidemia LDL goal <130- needs labs rechecked this am - has atorvastatin 10mg at home - assess for med adjustment   E78.5 **AST FUTURE 2mo     **ALT FUTURE 2mo     Lipid panel reflex to direct LDL Fasting      4. Acquired postural kyphosis  M40.00         Future Appointments 2/14/2023 - 8/13/2023      Date Visit Type Length Department Provider     6/28/2023  9:00 AM OFFICE VISIT 20 min  FAMILY PRACTICE Lisbeth Gonzalez MD    Location Instructions:     [facility] is located at [address] [place] [address], along [address] Free parking is available; access the lot by turning north from [address] onto [address], then west onto [address] [facility] [address].                ok for gentle massage for neck and upper back.     Ordering of each unique test  Prescription drug management  32 minutes spent on the date of the encounter doing chart review, history and exam, documentation and further activities per the note       BMI:   Estimated body mass index is 27.46 kg/m  as calculated from the following:    Height as of this encounter: 1.621 m (5' 3.8\").    Weight as of this encounter: 72.1 kg (159 lb).   Weight management plan: Discussed healthy diet and exercise guidelines    MEDICATIONS:  Continue current medications without change - await fasting lipids recheck today.   Work on weight loss  Regular exercise  See Patient Instructions    Return in 4 months (on 6/28/2023) for cholesterol/lipids, w/ Dr. DUPONT for 20 minute appointment.         Lisbeth Gonzalez MD  [facility] KAROLYN Olson is a 62 year old, presenting for the following health issues:  Fall  and the following other medical problems:      1. Fall, initial encounter- mild " bruising right upper chest - left upper arm - resolving     2. Environmental allergies    3. Hyperlipidemia LDL goal <130- needs labs rechecked this am - has atorvastatin 10mg at home - assess for med adjustment           History of Present Illness       Reason for visit:  Injury  Symptom onset:  1-2 weeks ago  Symptoms include:  Bruising and concerned about chest from  Symptom intensity:  Mild  Symptom progression:  Staying the same  Had these symptoms before:  No    She eats 4 or more servings of fruits and vegetables daily.She consumes 0 sweetened beverage(s) daily.She exercises with enough effort to increase her heart rate 30 to 60 minutes per day.  She exercises with enough effort to increase her heart rate 5 days per week.   She is taking medications regularly.     x15 days ago - fell - was carrying a small hard plastic garbage can, turned to shut/lock door tripped over door plate and on the garbage can. Hit left arm on something and unsure of what she hit it on, was down for approx 15 seconds. Does not remember hitting head.   Chest still hurts - wants to be sure chest is fine.     No headache, no nausea.  Thinks hit right upper chest against the garbage can - feels a little sore when carrying her dog - feels a little pressure in the right upper chest.  No pain with taking a big deep breath.  No shortness of breath . able to exercise fully and do all her yoga moves.  Hasn't been able to fully do push ups though secondary to right upper chest discomfort.   otherwise feels well.      Hyperlipidemia Follow-Up      Are you regularly taking any medication or supplement to lower your cholesterol?   Yes- atorvastatin    Are you having muscle aches or other side effects that you think could be caused by your cholesterol lowering medication?  No    Recent Labs   Lab Test 12/14/22  0817 11/29/21  1210   CHOL 233* 180   HDL 63 76   * 82   TRIG 70 112      had just come off 3 vacations when she had her fasting  lipids done in 12/2022.        Patient Active Problem List   Diagnosis     Panic disorder without agoraphobia     Anxiety     Overactive bladder     Hyperlipidemia LDL goal <130- on atorvastatin      Gastroesophageal reflux disease without esophagitis     Symptomatic menopausal or female climacteric states     Fibrocystic breast changes of both breasts     Primary osteoarthritis of both hands     Family history of early CAD     Insomnia, unspecified type-Controlled substance agreement signed -12/14/2022 ok for #30 -10mg ambien (takes 1/2 tab) with 1 refill every 6 months or so      Expressive aphasia     Urinary urgency     Fatigue, unspecified type     Night sweats- on estrace 1mg already - discussed progesterone deficiency usually causing that - pt would like to try black cohosh first prior to micronized progesterone 2nd to breast ca risk      Controlled substance agreement signed -12/14/2022 ok for #30 -10mg ambien (takes 1/2 tab) with 1 refill every 6 months or so      Acute recurrent maxillary sinusitis     Multiple dysplastic nevi-Going to dermatology for her full body skin exam annually - just had appt=12/2022[facility] - Dr. Carmen Dowling - for her early dysplastic melanoma.       RUQ abdominal pain- comes and goes - not related to foods or fatty foods, worse bending over with or without lifting and getting out of her car- ? muscle spasm - CT and US negative 10/5/2020      Rotator cuff syndrome, left     Other osteoarthritis involving multiple joints- left middle finger s/p fracture /dislocation - causing some chronic pain - pt requests refill of ibuprofen 800mg tabs - takes with food      Lumbosacral dysfunction     Sacral pain     Family history of colonic polyps- precancerous      Basal cell carcinoma (BCC) of skin of nose     Vitamin D deficiency       Current Outpatient Medications   Medication Sig Dispense Refill     atorvastatin (LIPITOR) 10 MG tablet Take 1 tablet (10 mg) by mouth daily 90  "tablet 1     calcium-vitamin D (OSCAL) 250-125 MG-UNIT TABS per tablet Take 1 tablet by mouth daily       estradiol (VIVELLE-DOT) 0.05 MG/24HR bi-weekly patch Place 1 patch onto the skin twice a week 24 patch 3     gabapentin (NEURONTIN) 100 MG capsule Take 1 capsule (100 mg) by mouth At Bedtime For menopausal symptoms 90 capsule 1     ibuprofen (ADVIL/MOTRIN) 800 MG tablet TAKE 1 TABLET BY MOUTH EVERY 8 HOURS WITH FOOD AS NEEDED FOR MODERATE PAIN 90 tablet 0     montelukast (SINGULAIR) 10 MG tablet Take 1 tablet (10 mg) by mouth At Bedtime 90 tablet 3     Multiple Vitamins-Minerals (MULTIVITAMIN ADULT PO) Take by mouth daily       Omega-3 Fatty Acids (OMEGA 3 PO)        venlafaxine (EFFEXOR XR) 75 MG 24 hr capsule Take 1 capsule (75 mg) by mouth daily 90 capsule 1     zolpidem (AMBIEN) 10 MG tablet TAKE 1/2 TABLET BY MOUTH EVERY NIGHT AT BEDTIME AS NEEDED FOR SLEEP 30 tablet 3          Allergies   Allergen Reactions     Rosamaria Sun Screen Spf 30 [Abuval Sport Sunscreen] Hives and Rash     Septra [Sulfamethoxazole W/Trimethoprim]      Sulfa Drugs Hives     Murine Ear [Carbamide Peroxide] Swelling and Rash     Severe pain , swelling after debrox type ear drops - had to go to the clinic to have ears flushed out emergently.      Paxlovid [Nirmatrelvir-Ritonavir] Hives, Itching and Rash       Review of Systems   Constitutional, HEENT, cardiovascular, pulmonary, GI, , musculoskeletal, neuro, skin, endocrine and psych systems are negative, except as otherwise noted.      Objective    /78 (BP Location: Right arm, Patient Position: Chair, Cuff Size: Adult Regular)   Pulse 91   Temp 97.7  F (36.5  C) (Tympanic)   Ht 1.621 m (5' 3.8\")   Wt 72.1 kg (159 lb)   LMP  (LMP Unknown)   SpO2 97%   BMI 27.46 kg/m    Body mass index is 27.46 kg/m .  Physical Exam   GENERAL: healthy, alert and no distress  EYES: Eyes grossly normal to inspection, PERRL and conjunctivae and sclerae normal  HENT: ear canals and TM's normal, " nose and mouth without ulcers or lesions  NECK: no adenopathy, no asymmetry, masses, or scars and thyroid normal to palpation. Tight and mildly tender sternocleidomastoid muscle and trapezius musculature is noted.    RESP: lungs clear to auscultation - no rales, rhonchi or wheezes  CV: regular rate and rhythm, normal S1 S2, no S3 or S4, no murmur, click or rub, no peripheral edema and peripheral pulses strong  Chest: normal except for some mild resolving superficial upper chest bruising with very mild tenderness in those bruised spots.  No rib crepitus. No bony tenderness at all.   ABDOMEN: soft, nontender, no hepatosplenomegaly, no masses and bowel sounds normal  MS: no gross musculoskeletal defects noted, no edema  SKIN: no suspicious lesions or rashes  NEURO: Normal strength and tone, mentation intact and speech normal  PSYCH: mentation appears normal, affect normal/bright.   Cervical, thoracic and lumbar spine exam is normal without tenderness, masses or  scoliosis. But Acquired postural kyphosis is noted. Full range of motion without pain is noted.     Office Visit on 12/14/2022   Component Date Value Ref Range Status     Cholesterol 12/14/2022 233 (H)  <200 mg/dL Final     Triglycerides 12/14/2022 70  <150 mg/dL Final     Direct Measure HDL 12/14/2022 63  >=50 mg/dL Final     LDL Cholesterol Calculated 12/14/2022 156 (H)  <=100 mg/dL Final     Non HDL Cholesterol 12/14/2022 170 (H)  <130 mg/dL Final     Albumin Urine mg/L 12/14/2022 <12.0  mg/L Final    The reference ranges have not been established in urine albumin. The results should be integrated into the clinical context for interpretation.     Albumin Urine mg/g Cr 12/14/2022    Final    Unable to calculate, urine albumin and/or urine creatinine is outside detectable limits.  Microalbuminuria is defined as an albumin:creatinine ratio of 17 to 299 for males and 25 to 299 for females. A ratio of albumin:creatinine of 300 or higher is indicative of overt  proteinuria.  Due to biologic variability, positive results should be confirmed by a second, first-morning random or 24-hour timed urine specimen. If there is discrepancy, a third specimen is recommended. When 2 out of 3 results are in the microalbuminuria range, this is evidence for incipient nephropathy and warrants increased efforts at glucose control, blood pressure control, and institution of therapy with an angiotensin-converting-enzyme (ACE) inhibitor (if the patient can tolerate it).       Creatinine Urine mg/dL 12/14/2022 209.0  mg/dL Final    The reference ranges have not been established in urine creatinine. The results should be integrated into the clinical context for interpretation.     WBC Count 12/14/2022 3.6 (L)  4.0 - 11.0 10e3/uL Final     RBC Count 12/14/2022 4.54  3.80 - 5.20 10e6/uL Final     Hemoglobin 12/14/2022 13.4  11.7 - 15.7 g/dL Final     Hematocrit 12/14/2022 39.3  35.0 - 47.0 % Final     MCV 12/14/2022 87  78 - 100 fL Final     MCH 12/14/2022 29.5  26.5 - 33.0 pg Final     MCHC 12/14/2022 34.1  31.5 - 36.5 g/dL Final     RDW 12/14/2022 12.8  10.0 - 15.0 % Final     Platelet Count 12/14/2022 204  150 - 450 10e3/uL Final     Sodium 12/14/2022 140  136 - 145 mmol/L Final     Potassium 12/14/2022 4.1  3.4 - 5.3 mmol/L Final     Chloride 12/14/2022 104  98 - 107 mmol/L Final     Carbon Dioxide (CO2) 12/14/2022 26  22 - 29 mmol/L Final     Anion Gap 12/14/2022 10  7 - 15 mmol/L Final     Urea Nitrogen 12/14/2022 17.7  8.0 - 23.0 mg/dL Final     Creatinine 12/14/2022 0.76  0.51 - 0.95 mg/dL Final     Calcium 12/14/2022 8.8  8.8 - 10.2 mg/dL Final     Glucose 12/14/2022 92  70 - 99 mg/dL Final     Alkaline Phosphatase 12/14/2022 97  35 - 104 U/L Final     AST 12/14/2022 21  10 - 35 U/L Final     ALT 12/14/2022 23  10 - 35 U/L Final     Protein Total 12/14/2022 6.9  6.4 - 8.3 g/dL Final     Albumin 12/14/2022 4.3  3.5 - 5.2 g/dL Final     Bilirubin Total 12/14/2022 0.8  <=1.2 mg/dL Final      GFR Estimate 12/14/2022 88  >60 mL/min/1.73m2 Final    Effective December 21, 2021 eGFRcr in adults is calculated using the 2021 CKD-EPI creatinine equation which includes age and gender (Yaz et al., NEJ, DOI: 10.1056/LBGKqe4365324)     TSH 12/14/2022 1.94  0.30 - 4.20 uIU/mL Final     25 OH Vitamin D2 12/14/2022 <5  ug/L Final     25 OH Vitamin D3 12/14/2022 49  ug/L Final     25 OH Vit D Total 12/14/2022 <54  20 - 75 ug/L Final    Season, race, dietary intake, and treatment affect the concentration of 25-hydroxy-Vitamin D. Values may decrease during winter months and increase during summer months. Values 20-29 ug/L may indicate Vitamin D insufficiency and values <20 ug/L may indicate Vitamin D deficiency.

## 2023-02-17 NOTE — RESULT ENCOUNTER NOTE
Dear Whitney,     All your recent labs are normal, improved, or pretty stable from previous.     Please, continue your current medications and/or supplements and follow up as we discussed at your last visit.     For additional lab test information, [email or web address] is an excellent reference.    Thank you so much for choosing [facility].  Please contact us with any questions that you may have.   We appreciate the opportunity to serve you now and look forward to supporting your healthcare needs for a long time to come!    Most Sincerely,     Lisbeth Gonzalez MD

## 2023-03-05 DIAGNOSIS — M75.102 ROTATOR CUFF SYNDROME, LEFT: ICD-10-CM

## 2023-03-05 DIAGNOSIS — M15.8 OTHER OSTEOARTHRITIS INVOLVING MULTIPLE JOINTS: ICD-10-CM

## 2023-03-06 RX ORDER — IBUPROFEN 800 MG/1
TABLET, FILM COATED ORAL
Qty: 90 TABLET | Refills: 0 | Status: SHIPPED | OUTPATIENT
Start: 2023-03-06 | End: 2023-12-03

## 2023-03-06 NOTE — TELEPHONE ENCOUNTER
Routing refill request to provider for review/approval because:  Labs out of range:  CBC  Genevieve BATISTA RN, BSN

## 2023-05-31 ENCOUNTER — NURSE TRIAGE (OUTPATIENT)
Dept: FAMILY MEDICINE | Facility: CLINIC | Age: 63
End: 2023-05-31
Payer: COMMERCIAL

## 2023-05-31 NOTE — TELEPHONE ENCOUNTER
"Whitney is calling stated that she has a lump in her hairline above rt ear (aprox 1 in up). She states that over the past couple of weeks it's started growing. States it originally was \"one finger\" wide and is now \"two fingers\" wide. Wondering if she should have it looked at prior to her px at the end of June with her PCP.    Whitney- [phone], ok to leave detailed message.    Hilda Benavides     "

## 2023-06-05 NOTE — TELEPHONE ENCOUNTER
Patient called right above right ear for about a month.     allergies have been flared - has been taking allergy medication.     Not painful   Lump is hard   No redness on lump or around   No fevers   raised of skin     Scheduled appointment.   No dizziness   Denied any other symptoms     Katia SHIPLEY RN   [facility] Triage       Reason for Disposition    Small swelling or lump present > 1 week    Additional Information    Negative: Sounds like a life-threatening emergency to the triager    Negative: Small growth, spot, bump, or pigmented area of skin (e.g., moles, skin tags, wart, melanoma, skin cancer)    Negative: Followed a skin injury    Negative: Follows an insect bite    Negative: Swelling of lymph node suspected    Negative: Swelling of vaccination site    Negative: Swelling of entire face    Negative: Swelling of scrotum    Negative: Swelling of labia    Negative: Swelling of surgical incision    Negative: Swelling with a skin rash    Negative: Hernia suspected (bulge in groin or abdomen) and painful or vomiting    Negative: Swollen lump in groin and pulsating (like heartbeat)    Negative: Patient sounds very sick or weak to the triager    Negative: SEVERE pain (e.g., excruciating)    Negative: Swelling is painful to touch AND fever    Negative: Swelling is red and fever    Negative: Swelling is red and size > 2 inches (5.0 cm)    Negative: Swelling is painful to touch and no fever    Negative: Looks like a boil, infected sore, deep ulcer or other infected rash    Protocols used: SKIN LUMP OR LOCALIZED SWELLING-A-OH

## 2023-06-06 ENCOUNTER — OFFICE VISIT (OUTPATIENT)
Dept: FAMILY MEDICINE | Facility: CLINIC | Age: 63
End: 2023-06-06
Payer: COMMERCIAL

## 2023-06-06 VITALS
HEART RATE: 85 BPM | OXYGEN SATURATION: 96 % | SYSTOLIC BLOOD PRESSURE: 110 MMHG | BODY MASS INDEX: 28.05 KG/M2 | DIASTOLIC BLOOD PRESSURE: 66 MMHG | TEMPERATURE: 98.7 F | RESPIRATION RATE: 16 BRPM | HEIGHT: 64 IN | WEIGHT: 164.3 LBS

## 2023-06-06 DIAGNOSIS — L98.9 SKIN LESION: Primary | ICD-10-CM

## 2023-06-06 PROCEDURE — 99213 OFFICE O/P EST LOW 20 MIN: CPT | Performed by: PHYSICIAN ASSISTANT

## 2023-06-06 RX ORDER — ALUMINUM CHLORIDE 20 %
SOLUTION, NON-ORAL TOPICAL
COMMUNITY
Start: 2023-04-25 | End: 2023-06-06 | Stop reason: SINTOL

## 2023-06-06 RX ORDER — CLOBETASOL PROPIONATE 0.5 MG/G
OINTMENT TOPICAL
Start: 2023-06-06

## 2023-06-06 RX ORDER — TACROLIMUS 1 MG/G
OINTMENT TOPICAL
COMMUNITY
Start: 2022-12-18

## 2023-06-06 RX ORDER — CLOBETASOL PROPIONATE 0.5 MG/G
OINTMENT TOPICAL
COMMUNITY
Start: 2022-12-09 | End: 2023-06-06

## 2023-06-06 NOTE — PROGRESS NOTES
"  Assessment & Plan     Skin lesion  Suspect localized seborrheic keratosis of the scalp.  Recommend clobetasol (patient has this prescription at home from dermatology) twice daily  - clobetasol (TEMOVATE) 0.05 % external ointment    Return in about 22 days (around 6/28/2023) for scheduled follow up with PCP.       Odilia Everett PA-C  [facility] KAROLYN Olson is a 62 year old, presenting for the following health issues:  Derm Problem        6/6/2023     9:30 AM   Additional Questions   Roomed by Cecy Camara CMA   Accompanied by Self     History of Present Illness       Reason for visit:  Small lump above right ear    She eats 2-3 servings of fruits and vegetables daily.She consumes 2 sweetened beverage(s) daily.She exercises with enough effort to increase her heart rate 10 to 19 minutes per day.  She exercises with enough effort to increase her heart rate 4 days per week.   She is taking medications regularly.       Concern - Lump above right ear  Onset: 1 month ago   Description: Small bump above ear skin colored Patient denies pain no falls  Intensity: mild  Progression of Symptoms: Thinks that it has increased in size since first noticed a month ago  Accompanying Signs & Symptoms: None  Previous history of similar problem: none  Precipitating factors:        Worsened by: none  Alleviating factors:        Improved by: none  Therapies tried and outcome: None    Does have historical scalp psoriasis and is under the treatment of dermatologist Dr. Dowling.      Review of Systems   Constitutional, HEENT, cardiovascular, pulmonary, gi and gu systems are negative, except as otherwise noted.      Objective    /66   Pulse 85   Temp 98.7  F (37.1  C) (Tympanic)   Resp 16   Ht 1.621 m (5' 3.8\")   Wt 74.5 kg (164 lb 4.8 oz)   LMP  (LMP Unknown)   SpO2 96%   BMI 28.38 kg/m    Body mass index is 28.38 kg/m .  Physical Exam   GENERAL: healthy, alert and no distress  EYES: Eyes " grossly normal to inspection  MS: no gross musculoskeletal defects noted, no edema  SKIN: Well-defined area of yellow hued, slightly hypertrophied velvety tissue above the right ear measuring 13 mm x 20 mm.  Soft and boggy to palpation  NEURO: Normal strength and tone, mentation intact and speech normal  PSYCH: mentation appears normal, affect normal/bright            No results found for any visits on 06/06/23.

## 2023-06-10 DIAGNOSIS — N95.1 SYMPTOMATIC MENOPAUSAL OR FEMALE CLIMACTERIC STATES: ICD-10-CM

## 2023-06-13 RX ORDER — VENLAFAXINE HYDROCHLORIDE 75 MG/1
CAPSULE, EXTENDED RELEASE ORAL
Qty: 90 CAPSULE | Refills: 0 | Status: SHIPPED | OUTPATIENT
Start: 2023-06-13 | End: 2023-09-07

## 2023-06-28 ENCOUNTER — OFFICE VISIT (OUTPATIENT)
Dept: FAMILY MEDICINE | Facility: CLINIC | Age: 63
End: 2023-06-28
Payer: COMMERCIAL

## 2023-06-28 VITALS
OXYGEN SATURATION: 96 % | DIASTOLIC BLOOD PRESSURE: 80 MMHG | BODY MASS INDEX: 28.17 KG/M2 | SYSTOLIC BLOOD PRESSURE: 122 MMHG | RESPIRATION RATE: 16 BRPM | WEIGHT: 165 LBS | TEMPERATURE: 97.3 F | HEIGHT: 64 IN | HEART RATE: 78 BPM

## 2023-06-28 DIAGNOSIS — G47.00 INSOMNIA, UNSPECIFIED TYPE: ICD-10-CM

## 2023-06-28 DIAGNOSIS — R63.5 WEIGHT GAIN: Primary | ICD-10-CM

## 2023-06-28 DIAGNOSIS — E78.5 HYPERLIPIDEMIA LDL GOAL <130: ICD-10-CM

## 2023-06-28 DIAGNOSIS — T88.7XXA MEDICATION SIDE EFFECTS: ICD-10-CM

## 2023-06-28 LAB — TSH SERPL DL<=0.005 MIU/L-ACNC: 1.29 UIU/ML (ref 0.3–4.2)

## 2023-06-28 PROCEDURE — 99214 OFFICE O/P EST MOD 30 MIN: CPT | Performed by: FAMILY MEDICINE

## 2023-06-28 PROCEDURE — 36415 COLL VENOUS BLD VENIPUNCTURE: CPT | Performed by: FAMILY MEDICINE

## 2023-06-28 PROCEDURE — 84443 ASSAY THYROID STIM HORMONE: CPT | Performed by: FAMILY MEDICINE

## 2023-06-28 ASSESSMENT — ANXIETY QUESTIONNAIRES
7. FEELING AFRAID AS IF SOMETHING AWFUL MIGHT HAPPEN: NOT AT ALL
6. BECOMING EASILY ANNOYED OR IRRITABLE: NOT AT ALL
7. FEELING AFRAID AS IF SOMETHING AWFUL MIGHT HAPPEN: NOT AT ALL
IF YOU CHECKED OFF ANY PROBLEMS ON THIS QUESTIONNAIRE, HOW DIFFICULT HAVE THESE PROBLEMS MADE IT FOR YOU TO DO YOUR WORK, TAKE CARE OF THINGS AT HOME, OR GET ALONG WITH OTHER PEOPLE: SOMEWHAT DIFFICULT
2. NOT BEING ABLE TO STOP OR CONTROL WORRYING: SEVERAL DAYS
8. IF YOU CHECKED OFF ANY PROBLEMS, HOW DIFFICULT HAVE THESE MADE IT FOR YOU TO DO YOUR WORK, TAKE CARE OF THINGS AT HOME, OR GET ALONG WITH OTHER PEOPLE?: SOMEWHAT DIFFICULT
5. BEING SO RESTLESS THAT IT IS HARD TO SIT STILL: NOT AT ALL
GAD7 TOTAL SCORE: 4
3. WORRYING TOO MUCH ABOUT DIFFERENT THINGS: SEVERAL DAYS
GAD7 TOTAL SCORE: 4
4. TROUBLE RELAXING: SEVERAL DAYS
GAD7 TOTAL SCORE: 4
1. FEELING NERVOUS, ANXIOUS, OR ON EDGE: SEVERAL DAYS

## 2023-06-28 ASSESSMENT — PATIENT HEALTH QUESTIONNAIRE - PHQ9
SUM OF ALL RESPONSES TO PHQ QUESTIONS 1-9: 6
SUM OF ALL RESPONSES TO PHQ QUESTIONS 1-9: 6
10. IF YOU CHECKED OFF ANY PROBLEMS, HOW DIFFICULT HAVE THESE PROBLEMS MADE IT FOR YOU TO DO YOUR WORK, TAKE CARE OF THINGS AT HOME, OR GET ALONG WITH OTHER PEOPLE: SOMEWHAT DIFFICULT

## 2023-06-28 NOTE — PROGRESS NOTES
Assessment & Plan :       ICD-10-CM    1. Weight gain  R63.5 TSH with free T4 reflex      2. Medication side effects- morning drowsiness - ? from gabapentin and/ or effexor , and/ or sleep disturbance   T88.7XXA       3. Hyperlipidemia LDL goal <130- on atorvastatin   E78.5       4. Insomnia, unspecified type-Controlled substance agreement signed -12/14/2022 ok for #30 -10mg ambien (takes 1/2 tab) with 1 refill every 6 months or so   G47.00            morning drowsiness - ? from gabapentin and/ or effexor , and/ or sleep disturbance  - stop your gabapentin at night first - see how you feel after 2-3 days.  Then if still not feeling rested in the am, then think about trying  to decrease venlafaxine to 37.5mg and increase vivelle-dot dosage from 0.05mg dosage.      Montelukast probably causing the vivid dreams.  ? Causing some sleep disturbance.      Consider sleep study as well.      Ordering of each unique test  Prescription drug management  38 minutes spent by me on the date of the encounter doing chart review, history and exam, documentation and further activities per the note    Return in about 6 months (around 12/15/2023) for Wellness/Preventative Visit, w/ Dr Gonzalez.     Future Appointments 6/28/2023 - 12/25/2023      Date Visit Type Length Department Provider     12/15/2023  8:40 AM PREVENTATIVE ADULT 40 min  FAMILY PRACTICE Lisbeth Gonzalez MD    Location Instructions:     [facility] is located at [address] [place] [address], along [address] Free parking is available; access the lot by turning north from [address] onto [address], then west onto [address].                         MEDICATIONS:   No orders of the defined types were placed in this encounter.         - Continue other medications without change  Work on weight loss  Regular exercise  See Patient Instructions          Lisbeth Gonzalez MD  [facility]  KAROLYN Olson is a 62 year old, presenting for the following health issues:  Recheck Medication  frustrated with weight gain. Has to wear a dress to son's wedding.     Wt Readings from Last 5 Encounters:   06/28/23 74.8 kg (165 lb)   06/06/23 74.5 kg (164 lb 4.8 oz)   02/14/23 72.1 kg (159 lb)   12/14/22 72.1 kg (159 lb)   02/21/22 66.2 kg (146 lb)      Usually is right around 155 lbs.   Does strength training - has a weight lifting set at home.   Works out at least 30 minutes/day - treadmill . Max is at 5 for incline otherwise she gets knee pain.  Is really worried about her weight and that is making her feel a bit depressed.    Discussed really clean eating - lean protein - with green veggies for the next 3 weeks.  See AVS.     Is only eating 1100 calories/day right now.         TSH   Date Value Ref Range Status   12/14/2022 1.94 0.30 - 4.20 uIU/mL Final   11/29/2021 1.48 0.40 - 4.00 mU/L Final   10/22/2020 1.57 0.40 - 4.00 mU/L Final            6/28/2023     9:14 AM   Additional Questions   Roomed by jerad june   Accompanied by self         6/28/2023     9:14 AM   Patient Reported Additional Medications   Patient reports taking the following new medications none     History of Present Illness       Reason for visit:  Follow up on blood work results and hot flashes, fatigue    She eats 2-3 servings of fruits and vegetables daily.She consumes 0 sweetened beverage(s) daily.She exercises with enough effort to increase her heart rate 20 to 29 minutes per day.  She exercises with enough effort to increase her heart rate 4 days per week.   She is taking medications regularly.    Today's PHQ-9         PHQ-9 Total Score: 6    PHQ-9 Q9 Thoughts of better off dead/self-harm past 2 weeks :   Not at all    How difficult have these problems made it for you to do your work, take care of things at home, or get along with other people: Somewhat difficult  Today's JOSE ANTONIO-7 Score: 4       Hyperlipidemia Follow-Up: well  controlled       Are you regularly taking any medication or supplement to lower your cholesterol?   Yes- lipitor    Are you having muscle aches or other side effects that you think could be caused by your cholesterol lowering medication?  No     Recent Labs   Lab Test 02/14/23  0806 12/14/22  0817   CHOL 159 233*   HDL 64 63   LDL 74 156*   TRIG 105 70          Medication Followup of ambien:  CSA valid until 12/14/2022.   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  Hasn't taken it for 1 month.  Getting morning grogginess until 10am.  Also taking gabapentin 100mg nightly - discussed that the gabapentin could be causing am drowsiness.  Doesn't drink any alcohol at all.      Taking Medication as prescribed: yes    Side Effects:  None    Medication Helping Symptoms:  yes    Menopausal symptoms - much better with vivelle-dot , but also Takes Effexor for hot flashes but ?  having side effects. ? Causing some drowsiness as well.      Allergies- taking Montelukast for allergies.  Causing some weird dreams, but we discussed that shouldn't be causing her drowsiness.  Claritin and Allegra even cause drowsiness and am grogginess for her.      Hasn't been sleeping well for some time.discussed that not sleeping well could cause am grogginess as well.    Never gets morning  grogginess with Ambien.      Saw Dermatology - sees Dr. Carmen Dowling [facility] - last week - had several things biopsied- no news so far.        Patient Active Problem List   Diagnosis     Panic disorder without agoraphobia     Anxiety     Overactive bladder     Hyperlipidemia LDL goal <130- on atorvastatin      Gastroesophageal reflux disease without esophagitis     Symptomatic menopausal or female climacteric states     Fibrocystic breast changes of both breasts     Primary osteoarthritis of both hands     Family history of early CAD     Insomnia, unspecified type-Controlled substance agreement signed -12/14/2022 ok for #30 -10mg  ambien (takes 1/2 tab) with 1 refill every 6 months or so      Expressive aphasia     Urinary urgency     Fatigue, unspecified type     Night sweats- on estrace 1mg already - discussed progesterone deficiency usually causing that - pt would like to try black cohosh first prior to micronized progesterone 2nd to breast ca risk      Controlled substance agreement signed -12/14/2022 ok for #30 -10mg ambien (takes 1/2 tab) with 1 refill every 6 months or so      Acute recurrent maxillary sinusitis     Multiple dysplastic nevi-Going to dermatology for her full body skin exam annually - just had appt=12/2022[facility] - Dr. Carmen Dowling - for her early dysplastic melanoma.       RUQ abdominal pain- comes and goes - not related to foods or fatty foods, worse bending over with or without lifting and getting out of her car- ? muscle spasm - CT and US negative 10/5/2020      Rotator cuff syndrome, left     Other osteoarthritis involving multiple joints- left middle finger s/p fracture /dislocation - causing some chronic pain - pt requests refill of ibuprofen 800mg tabs - takes with food      Lumbosacral dysfunction     Sacral pain     Family history of colonic polyps- precancerous      Basal cell carcinoma (BCC) of skin of nose     Vitamin D deficiency       Current Outpatient Medications   Medication Sig Dispense Refill     atorvastatin (LIPITOR) 10 MG tablet TAKE 1 TABLET(10 MG) BY MOUTH DAILY 90 tablet 1     calcium-vitamin D (OSCAL) 250-125 MG-UNIT TABS per tablet Take 1 tablet by mouth daily       clobetasol (TEMOVATE) 0.05 % external ointment APPLY TO RIGHT TEMPLE LESION TWICE DAILY UNTIL RESOLUTION       estradiol (VIVELLE-DOT) 0.05 MG/24HR bi-weekly patch Place 1 patch onto the skin twice a week 24 patch 3     gabapentin (NEURONTIN) 100 MG capsule Take 1 capsule (100 mg) by mouth At Bedtime For menopausal symptoms 90 capsule 1     ibuprofen (ADVIL/MOTRIN) 800 MG tablet TAKE 1 TABLET BY MOUTH EVERY 8 HOURS  "WITH FOOD AS NEEDED FOR MODERATE PAIN 90 tablet 0     metroNIDAZOLE (METROCREAM) 0.75 % external cream APPLY TOPICALLY TO THE AFFECTED AREA TWICE DAILY FOR ROSACEA       montelukast (SINGULAIR) 10 MG tablet Take 1 tablet (10 mg) by mouth At Bedtime 90 tablet 3     Multiple Vitamins-Minerals (MULTIVITAMIN ADULT PO) Take by mouth daily       Omega-3 Fatty Acids (OMEGA 3 PO)        tacrolimus (PROTOPIC) 0.1 % external ointment        venlafaxine (EFFEXOR XR) 75 MG 24 hr capsule TAKE 1 CAPSULE(75 MG) BY MOUTH DAILY 90 capsule 0     zolpidem (AMBIEN) 10 MG tablet TAKE 1/2 TABLET BY MOUTH EVERY NIGHT AT BEDTIME AS NEEDED FOR SLEEP 30 tablet 3          Allergies   Allergen Reactions     Rosamaria Sun Screen Spf 30 [Solbar Pf Spf15] Hives and Rash     Drysol [Aluminum Chloride] Hives     Septra [Sulfamethoxazole-Trimethoprim]      Sulfa Antibiotics Hives     Murine Ear [Carbamide Peroxide] Swelling and Rash     Severe pain , swelling after debrox type ear drops - had to go to the clinic to have ears flushed out emergently.      Paxlovid [Nirmatrelvir-Ritonavir] Hives, Itching and Rash         Review of Systems   Constitutional, HEENT, cardiovascular, pulmonary, GI, , musculoskeletal, neuro, skin, endocrine and psych systems are negative, except as otherwise noted.      Objective    /80   Pulse 78   Temp 97.3  F (36.3  C)   Resp 16   Ht 1.621 m (5' 3.8\")   Wt 74.8 kg (165 lb)   LMP  (LMP Unknown)   SpO2 96%   BMI 28.50 kg/m    Body mass index is 28.5 kg/m .  Physical Exam   GENERAL: healthy, alert and no distress  EYES: Eyes grossly normal to inspection, PERRL and conjunctivae and sclerae normal  HENT: ear canals and TM's normal, nose and mouth without ulcers or lesions  NECK: no adenopathy, no asymmetry, masses, or scars and thyroid normal to palpation  RESP: lungs clear to auscultation - no rales, rhonchi or wheezes  CV: regular rate and rhythm, normal S1 S2, no S3 or S4, no murmur, click or rub, no " peripheral edema and peripheral pulses strong  ABDOMEN: soft, nontender, no hepatosplenomegaly, no masses and bowel sounds normal  MS: no gross musculoskeletal defects noted, no edema  SKIN: no suspicious lesions or rashes  NEURO: Normal strength and tone, mentation intact and speech normal  PSYCH: mentation appears normal, affect normal/bright    Office Visit on 02/14/2023   Component Date Value Ref Range Status     AST 02/14/2023 25  10 - 35 U/L Final     ALT 02/14/2023 28  10 - 35 U/L Final     Cholesterol 02/14/2023 159  <200 mg/dL Final     Triglycerides 02/14/2023 105  <150 mg/dL Final     Direct Measure HDL 02/14/2023 64  >=50 mg/dL Final     LDL Cholesterol Calculated 02/14/2023 74  <=100 mg/dL Final     Non HDL Cholesterol 02/14/2023 95  <130 mg/dL Final

## 2023-06-28 NOTE — PATIENT INSTRUCTIONS
" [facility]  [address] [place] [address]  Office: [phone]   Fax:    [phone]     morning drowsiness - ? from gabapentin and/ or effexor , and/ or sleep disturbance  - stop your gabapentin at night first - see how you feel after 2-3 days.  Then if still not feeling rested in the am, then think about trying  to decrease venlafaxine to 37.5mg and increase vivelle-dot dosage.      Consider sleep study as well.      Unfortunately there is no \"magic wand\" or \" magic pill\" for weight loss.  It requires combination of good nutrition, portion size, slight calorie deficit between consumption and exercise, and perseverance.    Keeping track of calories and intake can really help.  I recommend the OSIsoftpal or Gridsum katerin(s) to help with this.     Dr. Gonzalez's recommended diet to rev-up metabolism for weight loss:   eat every 2-3 hours.    Try to keep your gram to gram ratio of carbs:protein to 12-15grams of each /small meal 5-6x/day.     Lean protein = 2 egg whites or 1 whole egg, or turkey, chicken, fish.  Low-glycemic fruits = strawberries, blueberries, raspberries, blackberries, nectarines, grapefruit, oranges,  apples.      2 oz. Lean protein + 1/2 cup  low-glycemic fruit --- breakfast and midmorning snack .     2  oz. Lean protein + 1/4 cup whole grain rice or sweet potato + 1 cup green veggie - lunch, dinner ( increase protein to 3 oz) , and afternoon snack.      Evening snack : 1/2 cup of low glycemic fruit or an apple.        Try to keep carb: protein gram ratio about 1:1 with 12-15g of each per meal with small amount of monounsaturated fat -like olive oil.     If you can't kill it and grill it, or pick it off a plant and eat it - DON'T EAT IT!     For occasional pasta after you lose your desired weight  try Barilla Plus - has protein in it. - keep to 1/2 cup instead of your 1/4 cup of rice or potato.     Take your fish oil capsules 2,000mg daily - with your " "probiotic, if you take one. Take a multivitamin daily.     Try to keep your gram to gram ratio of carbs:protein to 12-15grams of each /small meal 5-6x/day.     If 5-6 small meals/day - too labor intensive, then keep to 3 meals plus 1 snack with 3 oz lean protein per meal with 2- 3 oz protein in your snack.     AVOID DAIRY AND GRAINS, if you can.   Keep your sodium to 600-1000g per day or less.     Substitutions:   In place of 1 meal/snack a day - you can have- 10 almonds, 4 walnut halves, 5 cashews, 6 pecan halves, or 1/8 cup of sunflower seeds or pistachios  (shelled).      In place of 1 meal/snack a day (1-2x/week) - 1/4 of an Avocado with lettuce wrap -like regan , etc.     In place of 1 meal/snack per day - can have a protein bar no more than 130 calories and 15grams each  of sugar /total carbs and protein.   Built bars from builtbar.com or  1/2 of a Fit Crunch Bars from [employer]) or Cocodril[employer] Dog Protein bars - look at the labels.     Drink a lot of water - approx 60 oz/day.      Add in 45 minutes of brisk walking/day on flat.      Thank you so much for choosing [facility].  Please contact us with any questions that you may have.   We appreciate the opportunity to serve you now and look forward to supporting your healthcare needs for a long time to come!    Most Sincerely,     Lisbeth Gonzalez MD      Thank you so much or choosing [facility]  for your Health Care. It was a pleasure seeing you at your visit today! Please contact us with any questions or concerns you may have.                   Lisbeth Gonzalez MD                              To reach your [facility] care team after hours call:   [phone] press #[phone] \"to speak with your care team\".  This will get you to our clinic instead of routing to central [facility]  scheduling.     PLEASE NOTE OUR HOURS HAVE CHANGED secondary to COVID-19 coronavirus " pandemic, as we are trying to minimize patient exposure to the virus,  which is now widespread in the state.  These hours may change with very little notice.  We apologize for any inconvenience.       Our current clinic hours are:          Monday- Thursday   7:00am - 6:00pm  in person.      Friday  7:00am- 5:00pm                       Saturday and Sunday : Closed to in person and virtual visits        We have telephone and virtual visit times available between    7:00am - 6pm on Monday-Friday as well.                                                Phone:  [phone]      Our pharmacy hours: Monday through Friday 8:00am to 5:00pm                        Saturday - 9:00 am to 12 noon       Sunday : Closed.              Phone:  [phone]              ###  Please note: at this time we are not accepting any walk-in visits. ###      There is also information available at our web site:  [email or web address]    If your provider ordered any lab tests and you do not receive the results within 10 business days, please call the clinic.    If you need a medication refill please contact your pharmacy.  Please allow 3 business days for your refill to be completed.    Our clinic offers telephone visits and e visits.  Please ask one of your team members to explain more.      Use [employer] (secure email communication and access to your chart) to send your primary care provider a message or make an appointment. Ask someone on your Team how to sign up for [employer].

## 2023-06-28 NOTE — LETTER
Opioid / Opioid Plus Controlled Substance Agreement    This is an agreement between you and your provider about the safe and appropriate use of controlled substance/opioids prescribed by your care team. Controlled substances are medicines that can cause physical and mental dependence (abuse).    There are strict laws about having and using these medicines. We here at [facility] are committing to working with you in your efforts to get better. To support you in this work, we ll help you schedule regular office appointments for medicine refills. If we must cancel or change your appointment for any reason, we ll make sure you have enough medicine to last until your next appointment.     As a Provider, I will:  Listen carefully to your concerns and treat you with respect.   Recommend a treatment plan that I believe is in your best interest. This plan may involve therapies other than opioid pain medication.   Talk with you often about the possible benefits, and the risk of harm of any medicine that we prescribe for you.   Provide a plan on how to taper (discontinue or go off) using this medicine if the decision is made to stop its use.    As a Patient, I understand that opioid(s):   Are a controlled substance prescribed by my care team to help me function or work and manage my condition(s).   Are strong medicines and can cause serious side effects such as:  Drowsiness, which can seriously affect my driving ability  A lower breathing rate, enough to cause death  Harm to my thinking ability   Depression   Abuse of and addiction to this medicine  Need to be taken exactly as prescribed. Combining opioids with certain medicines or chemicals (such as illegal drugs, sedatives, sleeping pills, and benzodiazepines) can be dangerous or even fatal. If I stop opioids suddenly, I may have severe withdrawal symptoms.  Do not work for all types of pain nor for all patients. If they re not helpful, I may be asked to stop  them.    {Benzo / Stimulant (Optional):520281}    The risks, benefits and side effects of these medicine(s) were explained to me. I agree that:  I will take part in other treatments as advised by my care team. This may be psychiatry or counseling, physical therapy, behavioral therapy, group treatment or a referral to a specialist.     I will keep all my appointments. I understand that this is part of the monitoring of opioids. My care team may require an office visit for EVERY opioid/controlled substance refill. If I miss appointments or don t follow instructions, my care team may stop my medicine.    I will take my medicines as prescribed. I will not change the dose or schedule unless my care team tells me to. There will be no refills if I run out early.     I may be asked to come to the clinic and complete a urine drug test or complete a pill count at any time. If I don t give a urine sample or participate in a pill count, the care team may stop my medicine.    I will only receive prescriptions from this clinic for chronic pain. If I am treated by another provider for acute pain issues, I will tell them that I am taking opioid pain medication for chronic pain and that I have a treatment agreement with this provider. I will inform my [facility] care team within one business day if I am given a prescription for any pain medication by another healthcare provider. My [facility] care team can contact other providers and pharmacists about my use of any medicines.    It is up to me to make sure that I don t run out of my medicines on weekends or holidays. If my care team is willing to refill my opioid prescription without a visit, I must request refills only during office hours. Refills may take up to 3 business days to process. I will use one pharmacy to fill all my opioid and other controlled substance prescriptions. I will notify the clinic about any changes to my insurance or medication  availability.    I am responsible for my prescriptions. If the medicine/prescription is lost, stolen or destroyed, it will not be replaced. I also agree not to share controlled substance medicines with anyone.    I am aware I should not use any illegal or recreational drugs. I agree not to drink alcohol unless my care team says I can.       If I enroll in the [place] [facility] Cannabis program, I will tell my care team prior to my next refill.     I will tell my care team right away if I become pregnant, have a new medical problem treated outside of my regular clinic, or have a change in my medications.    I understand that this medicine can affect my thinking, judgment and reaction time. Alcohol and drugs affect the brain and body, which can affect the safety of my driving. Being under the influence of alcohol or drugs can affect my decision-making, behaviors, personal safety, and the safety of others. Driving while impaired (DWI) can occur if a person is driving, operating, or in physical control of a car, motorcycle, boat, snowmobile, ATV, motorbike, off-road vehicle, or any other motor vehicle ([place] Statute 169A.20). I understand the risk if I choose to drive or operate any vehicle or machinery.    I understand that if I do not follow any of the conditions above, my prescriptions or treatment may be stopped or changed.          Opioids  What You Need to Know    What are opioids?   Opioids are pain medicines that must be prescribed by a doctor. They are also known as narcotics.     Examples are:   morphine (MS Contin, Charlene)  oxycodone (Oxycontin)  oxycodone and acetaminophen (Percocet)  hydrocodone and acetaminophen (Vicodin, Norco)   fentanyl patch (Duragesic)   hydromorphone (Dilaudid)   methadone  codeine (Tylenol #3)     What do opioids do well?   Opioids are best for severe short-term pain such as after a surgery or injury. They may work well for cancer pain. They may help some people with long-lasting  (chronic) pain.     What do opioids NOT do well?   Opioids never get rid of pain entirely, and they don t work well for most patients with chronic pain. Opioids don t reduce swelling, one of the causes of pain.                                    Other ways to manage chronic pain and improve function include:     Treat the health problem that may be causing pain  Anti-inflammation medicines, which reduce swelling and tenderness, such as ibuprofen (Advil, Motrin) or naproxen (Aleve)  Acetaminophen (Tylenol)  Antidepressants and anti-seizure medicines, especially for nerve pain  Topical treatments such as patches or creams  Injections or nerve blocks  Chiropractic or osteopathic treatment  Acupuncture, massage, deep breathing, meditation, visual imagery, aromatherapy  Use heat or ice at the pain site  Physical therapy   Exercise  Stop smoking  Take part in therapy       Risks and side effects     Talk to your doctor before you start or decide to keep taking opioids. Possible side effects include:    Lowering your breathing rate enough to cause death  Overdose, including death, especially if taking higher than prescribed doses  Worse depression symptoms; less pleasure in things you usually enjoy  Feeling tired or sluggish  Slower thoughts or cloudy thinking  Being more sensitive to pain over time; pain is harder to control  Trouble sleeping or restless sleep  Changes in hormone levels (for example, less testosterone)  Changes in sex drive or ability to have sex  Constipation  Unsafe driving  Itching and sweating  Dizziness  Nausea, throwing up and dry mouth    What else should I know about opioids?    Opioids may lead to dependence, tolerance, or addiction.    Dependence means that if you stop or reduce the medicine too quickly, you will have withdrawal symptoms. These include loose poop (diarrhea), jitters, flu-like symptoms, nervousness and tremors. Dependence is not the same as addiction.                      Tolerance means needing higher doses over time to get the same effect. This may increase the chance of serious side effects.    Addiction is when people improperly use a substance that harms their body, their mind or their relations with others. Use of opiates can cause a relapse of addiction if you have a history of drug or alcohol abuse.    People who have used opioids for a long time may have a lower quality of life, worse depression, higher levels of pain and more visits to doctors.    You can overdose on opioids. Take these steps to lower your risk of overdose:    Recognize the signs:  Signs of overdose include decrease or loss of consciousness (blackout), slowed breathing, trouble waking up and blue lips. If someone is worried about overdose, they should call 911.    Talk to your doctor about Narcan (naloxone).   If you are at risk for overdose, you may be given a prescription for Narcan. This medicine very quickly reverses the effects of opioids.   If you overdose, a friend or family member can give you Narcan while waiting for the ambulance. They need to know the signs of overdose and how to give Narcan.     Don't use alcohol or street drugs.   Taking them with opioids can cause death.    Do not take any of these medicines unless your doctor says it s OK. Taking these with opioids can cause death:  Benzodiazepines, such as lorazepam (Ativan), alprazolam (Xanax) or diazepam (Valium)  Muscle relaxers, such as cyclobenzaprine (Flexeril)  Sleeping pills like zolpidem (Ambien)   Other opioids      How to keep you and other people safe while taking opioids:    Never share your opioids with others.  Opioid medicines are regulated by the Drug Enforcement Agency (MICHAEL). Selling or sharing medications is a criminal act.    2. Be sure to store opioids in a secure place, locked up if possible. Young children can easily swallow them and overdose.    3. When you are traveling with your medicines, keep them in the  original bottles. If you use a pill box, be sure you also carry a copy of your medicine list from your clinic or pharmacy.    4. Safe disposal of opioids    Most pharmacies have places to get rid of medicine, called disposal kiosks. Medicine disposal options are also available in every [facility]. Search your county and  medication disposal  to find more options. You can find more details at:  https://www.pca.[facility].[place]./living-green/managing-unwanted-medications     I agree that my provider, clinic care team, and pharmacy may work with any city, state or federal law enforcement agency that investigates the misuse, sale, or other diversion of my controlled medicine. I will allow my provider to discuss my care with, or share a copy of, this agreement with any other treating provider, pharmacy or emergency room where I receive care.    I have read this agreement and have asked questions about anything I did not understand.    _______________________________________________________  Patient Signature - Whitney Aceves _____________________                   Date     _______________________________________________________  Provider Signature - Lisbeth Gonzalez MD   _____________________                   Date     _______________________________________________________  Witness Signature (required if provider not present while patient signing)   _____________________                   Date

## 2023-06-28 NOTE — LETTER
Opioid / Opioid Plus Controlled Substance Agreement    This is an agreement between you and your provider about the safe and appropriate use of controlled substance/opioids prescribed by your care team. Controlled substances are medicines that can cause physical and mental dependence (abuse).    There are strict laws about having and using these medicines. We here at [facility] are committing to working with you in your efforts to get better. To support you in this work, we ll help you schedule regular office appointments for medicine refills. If we must cancel or change your appointment for any reason, we ll make sure you have enough medicine to last until your next appointment.     As a Provider, I will:  Listen carefully to your concerns and treat you with respect.   Recommend a treatment plan that I believe is in your best interest. This plan may involve therapies other than opioid pain medication.   Talk with you often about the possible benefits, and the risk of harm of any medicine that we prescribe for you.   Provide a plan on how to taper (discontinue or go off) using this medicine if the decision is made to stop its use.    As a Patient, I understand that opioid(s):   Are a controlled substance prescribed by my care team to help me function or work and manage my condition(s).   Are strong medicines and can cause serious side effects such as:  Drowsiness, which can seriously affect my driving ability  A lower breathing rate, enough to cause death  Harm to my thinking ability   Depression   Abuse of and addiction to this medicine  Need to be taken exactly as prescribed. Combining opioids with certain medicines or chemicals (such as illegal drugs, sedatives, sleeping pills, and benzodiazepines) can be dangerous or even fatal. If I stop opioids suddenly, I may have severe withdrawal symptoms.  Do not work for all types of pain nor for all patients. If they re not helpful, I may be asked to stop  them.    {Benzo / Stimulant (Optional):264055}    The risks, benefits and side effects of these medicine(s) were explained to me. I agree that:  I will take part in other treatments as advised by my care team. This may be psychiatry or counseling, physical therapy, behavioral therapy, group treatment or a referral to a specialist.     I will keep all my appointments. I understand that this is part of the monitoring of opioids. My care team may require an office visit for EVERY opioid/controlled substance refill. If I miss appointments or don t follow instructions, my care team may stop my medicine.    I will take my medicines as prescribed. I will not change the dose or schedule unless my care team tells me to. There will be no refills if I run out early.     I may be asked to come to the clinic and complete a urine drug test or complete a pill count at any time. If I don t give a urine sample or participate in a pill count, the care team may stop my medicine.    I will only receive prescriptions from this clinic for chronic pain. If I am treated by another provider for acute pain issues, I will tell them that I am taking opioid pain medication for chronic pain and that I have a treatment agreement with this provider. I will inform my [facility] care team within one business day if I am given a prescription for any pain medication by another healthcare provider. My [facility] care team can contact other providers and pharmacists about my use of any medicines.    It is up to me to make sure that I don t run out of my medicines on weekends or holidays. If my care team is willing to refill my opioid prescription without a visit, I must request refills only during office hours. Refills may take up to 3 business days to process. I will use one pharmacy to fill all my opioid and other controlled substance prescriptions. I will notify the clinic about any changes to my insurance or medication  availability.    I am responsible for my prescriptions. If the medicine/prescription is lost, stolen or destroyed, it will not be replaced. I also agree not to share controlled substance medicines with anyone.    I am aware I should not use any illegal or recreational drugs. I agree not to drink alcohol unless my care team says I can.       If I enroll in the [place] [facility] Cannabis program, I will tell my care team prior to my next refill.     I will tell my care team right away if I become pregnant, have a new medical problem treated outside of my regular clinic, or have a change in my medications.    I understand that this medicine can affect my thinking, judgment and reaction time. Alcohol and drugs affect the brain and body, which can affect the safety of my driving. Being under the influence of alcohol or drugs can affect my decision-making, behaviors, personal safety, and the safety of others. Driving while impaired (DWI) can occur if a person is driving, operating, or in physical control of a car, motorcycle, boat, snowmobile, ATV, motorbike, off-road vehicle, or any other motor vehicle ([place] Statute 169[place].20). I understand the risk if I choose to drive or operate any vehicle or machinery.    I understand that if I do not follow any of the conditions above, my prescriptions or treatment may be stopped or changed.          Opioids  What You Need to Know    What are opioids?   Opioids are pain medicines that must be prescribed by a doctor. They are also known as narcotics.     Examples are:   morphine (MS Contin, Charlene)  oxycodone (Oxycontin)  oxycodone and acetaminophen (Percocet)  hydrocodone and acetaminophen (Vicodin, Norco)   fentanyl patch (Duragesic)   hydromorphone (Dilaudid)   methadone  codeine (Tylenol #3)     What do opioids do well?   Opioids are best for severe short-term pain such as after a surgery or injury. They may work well for cancer pain. They may help some people with long-lasting  (chronic) pain.     What do opioids NOT do well?   Opioids never get rid of pain entirely, and they don t work well for most patients with chronic pain. Opioids don t reduce swelling, one of the causes of pain.                                    Other ways to manage chronic pain and improve function include:     Treat the health problem that may be causing pain  Anti-inflammation medicines, which reduce swelling and tenderness, such as ibuprofen (Advil, Motrin) or naproxen (Aleve)  Acetaminophen (Tylenol)  Antidepressants and anti-seizure medicines, especially for nerve pain  Topical treatments such as patches or creams  Injections or nerve blocks  Chiropractic or osteopathic treatment  Acupuncture, massage, deep breathing, meditation, visual imagery, aromatherapy  Use heat or ice at the pain site  Physical therapy   Exercise  Stop smoking  Take part in therapy       Risks and side effects     Talk to your doctor before you start or decide to keep taking opioids. Possible side effects include:    Lowering your breathing rate enough to cause death  Overdose, including death, especially if taking higher than prescribed doses  Worse depression symptoms; less pleasure in things you usually enjoy  Feeling tired or sluggish  Slower thoughts or cloudy thinking  Being more sensitive to pain over time; pain is harder to control  Trouble sleeping or restless sleep  Changes in hormone levels (for example, less testosterone)  Changes in sex drive or ability to have sex  Constipation  Unsafe driving  Itching and sweating  Dizziness  Nausea, throwing up and dry mouth    What else should I know about opioids?    Opioids may lead to dependence, tolerance, or addiction.    Dependence means that if you stop or reduce the medicine too quickly, you will have withdrawal symptoms. These include loose poop (diarrhea), jitters, flu-like symptoms, nervousness and tremors. Dependence is not the same as addiction.                      Tolerance means needing higher doses over time to get the same effect. This may increase the chance of serious side effects.    Addiction is when people improperly use a substance that harms their body, their mind or their relations with others. Use of opiates can cause a relapse of addiction if you have a history of drug or alcohol abuse.    People who have used opioids for a long time may have a lower quality of life, worse depression, higher levels of pain and more visits to doctors.    You can overdose on opioids. Take these steps to lower your risk of overdose:    Recognize the signs:  Signs of overdose include decrease or loss of consciousness (blackout), slowed breathing, trouble waking up and blue lips. If someone is worried about overdose, they should call 911.    Talk to your doctor about Narcan (naloxone).   If you are at risk for overdose, you may be given a prescription for Narcan. This medicine very quickly reverses the effects of opioids.   If you overdose, a friend or family member can give you Narcan while waiting for the ambulance. They need to know the signs of overdose and how to give Narcan.     Don't use alcohol or street drugs.   Taking them with opioids can cause death.    Do not take any of these medicines unless your doctor says it s OK. Taking these with opioids can cause death:  Benzodiazepines, such as lorazepam (Ativan), alprazolam (Xanax) or diazepam (Valium)  Muscle relaxers, such as cyclobenzaprine (Flexeril)  Sleeping pills like zolpidem (Ambien)   Other opioids      How to keep you and other people safe while taking opioids:    Never share your opioids with others.  Opioid medicines are regulated by the Drug Enforcement Agency (MICHAEL). Selling or sharing medications is a criminal act.    2. Be sure to store opioids in a secure place, locked up if possible. Young children can easily swallow them and overdose.    3. When you are traveling with your medicines, keep them in the  original bottles. If you use a pill box, be sure you also carry a copy of your medicine list from your clinic or pharmacy.    4. Safe disposal of opioids    Most pharmacies have places to get rid of medicine, called disposal kiosks. Medicine disposal options are also available in every [facility]. Search your county and  medication disposal  to find more options. You can find more details at:  https://www.pca.[facility].[email or web address]./living-green/managing-unwanted-medications     I agree that my provider, clinic care team, and pharmacy may work with any city, state or federal law enforcement agency that investigates the misuse, sale, or other diversion of my controlled medicine. I will allow my provider to discuss my care with, or share a copy of, this agreement with any other treating provider, pharmacy or emergency room where I receive care.    I have read this agreement and have asked questions about anything I did not understand.    _______________________________________________________  Patient Signature - Whitney Aceves _____________________                   Date     _______________________________________________________  Provider Signature - Lisbeth Gonzalez MD   _____________________                   Date     _______________________________________________________  Witness Signature (required if provider not present while patient signing)   _____________________                   Date

## 2023-07-27 DIAGNOSIS — N95.1 SYMPTOMATIC MENOPAUSAL OR FEMALE CLIMACTERIC STATES: ICD-10-CM

## 2023-07-27 RX ORDER — GABAPENTIN 100 MG/1
CAPSULE ORAL
Qty: 90 CAPSULE | Refills: 1 | Status: SHIPPED | OUTPATIENT
Start: 2023-07-27 | End: 2023-12-15

## 2023-09-07 DIAGNOSIS — N95.1 SYMPTOMATIC MENOPAUSAL OR FEMALE CLIMACTERIC STATES: ICD-10-CM

## 2023-09-07 RX ORDER — VENLAFAXINE HYDROCHLORIDE 75 MG/1
CAPSULE, EXTENDED RELEASE ORAL
Qty: 90 CAPSULE | Refills: 0 | Status: SHIPPED | OUTPATIENT
Start: 2023-09-07 | End: 2023-12-03

## 2023-09-07 NOTE — TELEPHONE ENCOUNTER
Medication refilled x 1 due to future appointment scheduled.    Appointments in Next Year      Dec 15, 2023  8:40 AM  (Arrive by 8:20 AM)  Adult Preventative Visit with Lisbeth Gonzalez MD  [facility] ([facility] ) [phone]

## 2023-10-18 ENCOUNTER — PATIENT OUTREACH (OUTPATIENT)
Dept: CARE COORDINATION | Facility: CLINIC | Age: 63
End: 2023-10-18
Payer: COMMERCIAL

## 2023-10-28 DIAGNOSIS — Z82.49 FAMILY HISTORY OF EARLY CAD: ICD-10-CM

## 2023-10-28 DIAGNOSIS — E78.5 HYPERLIPIDEMIA LDL GOAL <130: ICD-10-CM

## 2023-10-30 RX ORDER — ATORVASTATIN CALCIUM 10 MG/1
TABLET, FILM COATED ORAL
Qty: 90 TABLET | Refills: 0 | Status: SHIPPED | OUTPATIENT
Start: 2023-10-30 | End: 2023-12-03

## 2023-11-21 ENCOUNTER — ANCILLARY PROCEDURE (OUTPATIENT)
Dept: MAMMOGRAPHY | Facility: CLINIC | Age: 63
End: 2023-11-21
Attending: FAMILY MEDICINE
Payer: COMMERCIAL

## 2023-11-21 DIAGNOSIS — Z12.31 VISIT FOR SCREENING MAMMOGRAM: ICD-10-CM

## 2023-11-21 PROCEDURE — 77067 SCR MAMMO BI INCL CAD: CPT | Mod: TC | Performed by: RADIOLOGY

## 2023-11-21 PROCEDURE — 77063 BREAST TOMOSYNTHESIS BI: CPT | Mod: TC | Performed by: RADIOLOGY

## 2023-11-30 DIAGNOSIS — M75.102 ROTATOR CUFF SYNDROME, LEFT: ICD-10-CM

## 2023-11-30 DIAGNOSIS — Z82.49 FAMILY HISTORY OF EARLY CAD: ICD-10-CM

## 2023-11-30 DIAGNOSIS — E78.5 HYPERLIPIDEMIA LDL GOAL <130: ICD-10-CM

## 2023-11-30 DIAGNOSIS — N95.1 SYMPTOMATIC MENOPAUSAL OR FEMALE CLIMACTERIC STATES: ICD-10-CM

## 2023-11-30 DIAGNOSIS — M15.8 OTHER OSTEOARTHRITIS INVOLVING MULTIPLE JOINTS: ICD-10-CM

## 2023-12-03 RX ORDER — VENLAFAXINE HYDROCHLORIDE 75 MG/1
CAPSULE, EXTENDED RELEASE ORAL
Qty: 90 CAPSULE | Refills: 0 | Status: SHIPPED | OUTPATIENT
Start: 2023-12-03 | End: 2023-12-15

## 2023-12-03 RX ORDER — ATORVASTATIN CALCIUM 10 MG/1
TABLET, FILM COATED ORAL
Qty: 90 TABLET | Refills: 0 | Status: SHIPPED | OUTPATIENT
Start: 2023-12-03 | End: 2023-12-15

## 2023-12-03 RX ORDER — IBUPROFEN 800 MG/1
TABLET, FILM COATED ORAL
Qty: 90 TABLET | Refills: 0 | Status: SHIPPED | OUTPATIENT
Start: 2023-12-03 | End: 2023-12-15

## 2023-12-15 ENCOUNTER — OFFICE VISIT (OUTPATIENT)
Dept: FAMILY MEDICINE | Facility: CLINIC | Age: 63
End: 2023-12-15
Payer: COMMERCIAL

## 2023-12-15 VITALS
HEART RATE: 83 BPM | OXYGEN SATURATION: 96 % | HEIGHT: 63 IN | WEIGHT: 173 LBS | BODY MASS INDEX: 30.65 KG/M2 | DIASTOLIC BLOOD PRESSURE: 80 MMHG | TEMPERATURE: 96.7 F | RESPIRATION RATE: 16 BRPM | SYSTOLIC BLOOD PRESSURE: 112 MMHG

## 2023-12-15 DIAGNOSIS — R07.9 CHEST PAIN, UNSPECIFIED TYPE: ICD-10-CM

## 2023-12-15 DIAGNOSIS — Z82.49 FAMILY HISTORY OF EARLY CAD: ICD-10-CM

## 2023-12-15 DIAGNOSIS — N95.1 SYMPTOMATIC MENOPAUSAL OR FEMALE CLIMACTERIC STATES: ICD-10-CM

## 2023-12-15 DIAGNOSIS — Z91.09 ENVIRONMENTAL ALLERGIES: ICD-10-CM

## 2023-12-15 DIAGNOSIS — G47.00 INSOMNIA, UNSPECIFIED TYPE: ICD-10-CM

## 2023-12-15 DIAGNOSIS — E78.5 HYPERLIPIDEMIA LDL GOAL <130: ICD-10-CM

## 2023-12-15 DIAGNOSIS — N64.4 BREAST PAIN, LEFT: ICD-10-CM

## 2023-12-15 DIAGNOSIS — R63.5 WEIGHT GAIN: ICD-10-CM

## 2023-12-15 DIAGNOSIS — Z82.49 FAMILY HISTORY OF PREMATURE CORONARY ARTERY DISEASE: ICD-10-CM

## 2023-12-15 DIAGNOSIS — Z00.00 ROUTINE GENERAL MEDICAL EXAMINATION AT A HEALTH CARE FACILITY: Primary | ICD-10-CM

## 2023-12-15 DIAGNOSIS — M15.8 OTHER OSTEOARTHRITIS INVOLVING MULTIPLE JOINTS: ICD-10-CM

## 2023-12-15 DIAGNOSIS — Z79.899 CONTROLLED SUBSTANCE AGREEMENT SIGNED: ICD-10-CM

## 2023-12-15 DIAGNOSIS — E55.9 VITAMIN D DEFICIENCY: ICD-10-CM

## 2023-12-15 DIAGNOSIS — R73.09 ELEVATED HEMOGLOBIN A1C: ICD-10-CM

## 2023-12-15 DIAGNOSIS — M75.102 ROTATOR CUFF SYNDROME, LEFT: ICD-10-CM

## 2023-12-15 LAB
ALBUMIN SERPL BCG-MCNC: 4.2 G/DL (ref 3.5–5.2)
ALP SERPL-CCNC: 105 U/L (ref 40–150)
ALT SERPL W P-5'-P-CCNC: 27 U/L (ref 0–50)
ANION GAP SERPL CALCULATED.3IONS-SCNC: 9 MMOL/L (ref 7–15)
AST SERPL W P-5'-P-CCNC: 21 U/L (ref 0–45)
BILIRUB SERPL-MCNC: 0.7 MG/DL
BUN SERPL-MCNC: 14.5 MG/DL (ref 8–23)
CALCIUM SERPL-MCNC: 8.9 MG/DL (ref 8.8–10.2)
CHLORIDE SERPL-SCNC: 105 MMOL/L (ref 98–107)
CHOLEST SERPL-MCNC: 160 MG/DL
CREAT SERPL-MCNC: 0.64 MG/DL (ref 0.51–0.95)
DEPRECATED HCO3 PLAS-SCNC: 28 MMOL/L (ref 22–29)
EGFRCR SERPLBLD CKD-EPI 2021: >90 ML/MIN/1.73M2
ERYTHROCYTE [DISTWIDTH] IN BLOOD BY AUTOMATED COUNT: 13.2 % (ref 10–15)
FASTING STATUS PATIENT QL REPORTED: YES
GLUCOSE SERPL-MCNC: 92 MG/DL (ref 70–99)
HBA1C MFR BLD: 5.6 % (ref 0–5.6)
HCT VFR BLD AUTO: 37.9 % (ref 35–47)
HDLC SERPL-MCNC: 67 MG/DL
HGB BLD-MCNC: 12.9 G/DL (ref 11.7–15.7)
LDLC SERPL CALC-MCNC: 81 MG/DL
MCH RBC QN AUTO: 30.1 PG (ref 26.5–33)
MCHC RBC AUTO-ENTMCNC: 34 G/DL (ref 31.5–36.5)
MCV RBC AUTO: 88 FL (ref 78–100)
NONHDLC SERPL-MCNC: 93 MG/DL
PLATELET # BLD AUTO: 209 10E3/UL (ref 150–450)
POTASSIUM SERPL-SCNC: 3.8 MMOL/L (ref 3.4–5.3)
PROT SERPL-MCNC: 6.8 G/DL (ref 6.4–8.3)
RBC # BLD AUTO: 4.29 10E6/UL (ref 3.8–5.2)
SODIUM SERPL-SCNC: 142 MMOL/L (ref 135–145)
TRIGL SERPL-MCNC: 59 MG/DL
TSH SERPL DL<=0.005 MIU/L-ACNC: 1.75 UIU/ML (ref 0.3–4.2)
WBC # BLD AUTO: 4.3 10E3/UL (ref 4–11)

## 2023-12-15 PROCEDURE — 85027 COMPLETE CBC AUTOMATED: CPT | Performed by: FAMILY MEDICINE

## 2023-12-15 PROCEDURE — 84443 ASSAY THYROID STIM HORMONE: CPT | Performed by: FAMILY MEDICINE

## 2023-12-15 PROCEDURE — 99215 OFFICE O/P EST HI 40 MIN: CPT | Mod: 25 | Performed by: FAMILY MEDICINE

## 2023-12-15 PROCEDURE — 80061 LIPID PANEL: CPT | Performed by: FAMILY MEDICINE

## 2023-12-15 PROCEDURE — 80053 COMPREHEN METABOLIC PANEL: CPT | Performed by: FAMILY MEDICINE

## 2023-12-15 PROCEDURE — 93000 ELECTROCARDIOGRAM COMPLETE: CPT | Performed by: FAMILY MEDICINE

## 2023-12-15 PROCEDURE — 82306 VITAMIN D 25 HYDROXY: CPT | Performed by: FAMILY MEDICINE

## 2023-12-15 PROCEDURE — 83036 HEMOGLOBIN GLYCOSYLATED A1C: CPT | Performed by: FAMILY MEDICINE

## 2023-12-15 PROCEDURE — 36415 COLL VENOUS BLD VENIPUNCTURE: CPT | Performed by: FAMILY MEDICINE

## 2023-12-15 PROCEDURE — 99396 PREV VISIT EST AGE 40-64: CPT | Mod: 25 | Performed by: FAMILY MEDICINE

## 2023-12-15 RX ORDER — ATORVASTATIN CALCIUM 10 MG/1
10 TABLET, FILM COATED ORAL DAILY
Qty: 90 TABLET | Refills: 1 | Status: SHIPPED | OUTPATIENT
Start: 2023-12-15 | End: 2024-05-15

## 2023-12-15 RX ORDER — MONTELUKAST SODIUM 10 MG/1
10 TABLET ORAL AT BEDTIME
Qty: 90 TABLET | Refills: 3 | Status: SHIPPED | OUTPATIENT
Start: 2023-12-15 | End: 2024-06-17 | Stop reason: SINTOL

## 2023-12-15 RX ORDER — ESTRADIOL 0.05 MG/D
1 PATCH, EXTENDED RELEASE TRANSDERMAL
Qty: 24 PATCH | Refills: 3 | Status: SHIPPED | OUTPATIENT
Start: 2023-12-18 | End: 2024-09-20 | Stop reason: DRUGHIGH

## 2023-12-15 RX ORDER — IBUPROFEN 800 MG/1
TABLET, FILM COATED ORAL
Qty: 90 TABLET | Refills: 1 | Status: SHIPPED | OUTPATIENT
Start: 2023-12-15

## 2023-12-15 RX ORDER — VENLAFAXINE HYDROCHLORIDE 75 MG/1
75-150 CAPSULE, EXTENDED RELEASE ORAL DAILY
Qty: 180 CAPSULE | Refills: 1 | Status: SHIPPED | OUTPATIENT
Start: 2023-12-15 | End: 2024-07-22

## 2023-12-15 RX ORDER — GABAPENTIN 100 MG/1
100 CAPSULE ORAL AT BEDTIME
Qty: 90 CAPSULE | Refills: 3 | Status: SHIPPED | OUTPATIENT
Start: 2023-12-15 | End: 2024-08-05

## 2023-12-15 RX ORDER — METOPROLOL TARTRATE 50 MG
TABLET ORAL
Qty: 2 TABLET | Refills: 0 | Status: SHIPPED | OUTPATIENT
Start: 2023-12-15 | End: 2024-07-22

## 2023-12-15 RX ORDER — ZOLPIDEM TARTRATE 10 MG/1
TABLET ORAL
Qty: 30 TABLET | Refills: 3 | Status: SHIPPED | OUTPATIENT
Start: 2023-12-15

## 2023-12-15 ASSESSMENT — ANXIETY QUESTIONNAIRES
GAD7 TOTAL SCORE: 2
GAD7 TOTAL SCORE: 2
1. FEELING NERVOUS, ANXIOUS, OR ON EDGE: NOT AT ALL
2. NOT BEING ABLE TO STOP OR CONTROL WORRYING: SEVERAL DAYS
3. WORRYING TOO MUCH ABOUT DIFFERENT THINGS: NOT AT ALL
5. BEING SO RESTLESS THAT IT IS HARD TO SIT STILL: NOT AT ALL
6. BECOMING EASILY ANNOYED OR IRRITABLE: NOT AT ALL
IF YOU CHECKED OFF ANY PROBLEMS ON THIS QUESTIONNAIRE, HOW DIFFICULT HAVE THESE PROBLEMS MADE IT FOR YOU TO DO YOUR WORK, TAKE CARE OF THINGS AT HOME, OR GET ALONG WITH OTHER PEOPLE: SOMEWHAT DIFFICULT
4. TROUBLE RELAXING: SEVERAL DAYS
7. FEELING AFRAID AS IF SOMETHING AWFUL MIGHT HAPPEN: NOT AT ALL

## 2023-12-15 ASSESSMENT — ENCOUNTER SYMPTOMS
JOINT SWELLING: 0
EYE PAIN: 0
ABDOMINAL PAIN: 0
COUGH: 0
MYALGIAS: 1
PARESTHESIAS: 0
BREAST MASS: 0
NERVOUS/ANXIOUS: 0
HEMATURIA: 0
CHILLS: 0
DIARRHEA: 0
DYSURIA: 0
CONSTIPATION: 0
NAUSEA: 0
DIZZINESS: 0
ARTHRALGIAS: 1
PALPITATIONS: 1
WEAKNESS: 1
HEADACHES: 1
HEMATOCHEZIA: 0
SHORTNESS OF BREATH: 0
FREQUENCY: 0
HEARTBURN: 0
FEVER: 0
SORE THROAT: 0

## 2023-12-15 ASSESSMENT — PATIENT HEALTH QUESTIONNAIRE - PHQ9
SUM OF ALL RESPONSES TO PHQ QUESTIONS 1-9: 4
10. IF YOU CHECKED OFF ANY PROBLEMS, HOW DIFFICULT HAVE THESE PROBLEMS MADE IT FOR YOU TO DO YOUR WORK, TAKE CARE OF THINGS AT HOME, OR GET ALONG WITH OTHER PEOPLE: SOMEWHAT DIFFICULT
SUM OF ALL RESPONSES TO PHQ QUESTIONS 1-9: 4

## 2023-12-15 ASSESSMENT — ASTHMA QUESTIONNAIRES: ACT_TOTALSCORE: 24

## 2023-12-15 NOTE — LETTER
Opioid / Opioid Plus Controlled Substance Agreement    This is an agreement between you and your provider about the safe and appropriate use of controlled substance/opioids prescribed by your care team. Controlled substances are medicines that can cause physical and mental dependence (abuse).    There are strict laws about having and using these medicines. We here at [facility] are committing to working with you in your efforts to get better. To support you in this work, we ll help you schedule regular office appointments for medicine refills. If we must cancel or change your appointment for any reason, we ll make sure you have enough medicine to last until your next appointment.     As a Provider, I will:  Listen carefully to your concerns and treat you with respect.   Recommend a treatment plan that I believe is in your best interest. This plan may involve therapies other than opioid pain medication.   Talk with you often about the possible benefits, and the risk of harm of any medicine that we prescribe for you.   Provide a plan on how to taper (discontinue or go off) using this medicine if the decision is made to stop its use.    As a Patient, I understand that opioid(s):   Are a controlled substance prescribed by my care team to help me function or work and manage my condition(s).   Are strong medicines and can cause serious side effects such as:  Drowsiness, which can seriously affect my driving ability  A lower breathing rate, enough to cause death  Harm to my thinking ability   Depression   Abuse of and addiction to this medicine  Need to be taken exactly as prescribed. Combining opioids with certain medicines or chemicals (such as illegal drugs, sedatives, sleeping pills, and benzodiazepines) can be dangerous or even fatal. If I stop opioids suddenly, I may have severe withdrawal symptoms.  Do not work for all types of pain nor for all patients. If they re not helpful, I may be asked to stop  them.        The risks, benefits and side effects of these medicine(s) were explained to me. I agree that:  I will take part in other treatments as advised by my care team. This may be psychiatry or counseling, physical therapy, behavioral therapy, group treatment or a referral to a specialist.     I will keep all my appointments. I understand that this is part of the monitoring of opioids. My care team may require an office visit for EVERY opioid/controlled substance refill. If I miss appointments or don t follow instructions, my care team may stop my medicine.    I will take my medicines as prescribed. I will not change the dose or schedule unless my care team tells me to. There will be no refills if I run out early.     I may be asked to come to the clinic and complete a urine drug test or complete a pill count at any time. If I don t give a urine sample or participate in a pill count, the care team may stop my medicine.    I will only receive prescriptions from this clinic for chronic pain. If I am treated by another provider for acute pain issues, I will tell them that I am taking opioid pain medication for chronic pain and that I have a treatment agreement with this provider. I will inform my [facility] care team within one business day if I am given a prescription for any pain medication by another healthcare provider. My [facility] care team can contact other providers and pharmacists about my use of any medicines.    It is up to me to make sure that I don t run out of my medicines on weekends or holidays. If my care team is willing to refill my opioid prescription without a visit, I must request refills only during office hours. Refills may take up to 3 business days to process. I will use one pharmacy to fill all my opioid and other controlled substance prescriptions. I will notify the clinic about any changes to my insurance or medication availability.    I am responsible for my  prescriptions. If the medicine/prescription is lost, stolen or destroyed, it will not be replaced. I also agree not to share controlled substance medicines with anyone.    I am aware I should not use any illegal or recreational drugs. I agree not to drink alcohol unless my care team says I can.       If I enroll in the [place] [facility] program, I will tell my care team prior to my next refill.     I will tell my care team right away if I become pregnant, have a new medical problem treated outside of my regular clinic, or have a change in my medications.    I understand that this medicine can affect my thinking, judgment and reaction time. Alcohol and drugs affect the brain and body, which can affect the safety of my driving. Being under the influence of alcohol or drugs can affect my decision-making, behaviors, personal safety, and the safety of others. Driving while impaired (DWI) can occur if a person is driving, operating, or in physical control of a car, motorcycle, boat, snowmobile, ATV, motorbike, off-road vehicle, or any other motor vehicle ([place] Statute 169A.20). I understand the risk if I choose to drive or operate any vehicle or machinery.    I understand that if I do not follow any of the conditions above, my prescriptions or treatment may be stopped or changed.          Opioids  What You Need to Know    What are opioids?   Opioids are pain medicines that must be prescribed by a doctor. They are also known as narcotics.     Examples are:   morphine (MS Contin, Charlene)  oxycodone (Oxycontin)  oxycodone and acetaminophen (Percocet)  hydrocodone and acetaminophen (Vicodin, Norco)   fentanyl patch (Duragesic)   hydromorphone (Dilaudid)   methadone  codeine (Tylenol #3)     What do opioids do well?   Opioids are best for severe short-term pain such as after a surgery or injury. They may work well for cancer pain. They may help some people with long-lasting (chronic) pain.     What do opioids NOT do  well?   Opioids never get rid of pain entirely, and they don t work well for most patients with chronic pain. Opioids don t reduce swelling, one of the causes of pain.                                    Other ways to manage chronic pain and improve function include:     Treat the health problem that may be causing pain  Anti-inflammation medicines, which reduce swelling and tenderness, such as ibuprofen (Advil, Motrin) or naproxen (Aleve)  Acetaminophen (Tylenol)  Antidepressants and anti-seizure medicines, especially for nerve pain  Topical treatments such as patches or creams  Injections or nerve blocks  Chiropractic or osteopathic treatment  Acupuncture, massage, deep breathing, meditation, visual imagery, aromatherapy  Use heat or ice at the pain site  Physical therapy   Exercise  Stop smoking  Take part in therapy       Risks and side effects     Talk to your doctor before you start or decide to keep taking opioids. Possible side effects include:    Lowering your breathing rate enough to cause death  Overdose, including death, especially if taking higher than prescribed doses  Worse depression symptoms; less pleasure in things you usually enjoy  Feeling tired or sluggish  Slower thoughts or cloudy thinking  Being more sensitive to pain over time; pain is harder to control  Trouble sleeping or restless sleep  Changes in hormone levels (for example, less testosterone)  Changes in sex drive or ability to have sex  Constipation  Unsafe driving  Itching and sweating  Dizziness  Nausea, throwing up and dry mouth    What else should I know about opioids?    Opioids may lead to dependence, tolerance, or addiction.    Dependence means that if you stop or reduce the medicine too quickly, you will have withdrawal symptoms. These include loose poop (diarrhea), jitters, flu-like symptoms, nervousness and tremors. Dependence is not the same as addiction.                     Tolerance means needing higher doses over time to  get the same effect. This may increase the chance of serious side effects.    Addiction is when people improperly use a substance that harms their body, their mind or their relations with others. Use of opiates can cause a relapse of addiction if you have a history of drug or alcohol abuse.    People who have used opioids for a long time may have a lower quality of life, worse depression, higher levels of pain and more visits to doctors.    You can overdose on opioids. Take these steps to lower your risk of overdose:    Recognize the signs:  Signs of overdose include decrease or loss of consciousness (blackout), slowed breathing, trouble waking up and blue lips. If someone is worried about overdose, they should call 911.    Talk to your doctor about Narcan (naloxone).   If you are at risk for overdose, you may be given a prescription for Narcan. This medicine very quickly reverses the effects of opioids.   If you overdose, a friend or family member can give you Narcan while waiting for the ambulance. They need to know the signs of overdose and how to give Narcan.     Don't use alcohol or street drugs.   Taking them with opioids can cause death.    Do not take any of these medicines unless your doctor says it s OK. Taking these with opioids can cause death:  Benzodiazepines, such as lorazepam (Ativan), alprazolam (Xanax) or diazepam (Valium)  Muscle relaxers, such as cyclobenzaprine (Flexeril)  Sleeping pills like zolpidem (Ambien)   Other opioids      How to keep you and other people safe while taking opioids:    Never share your opioids with others.  Opioid medicines are regulated by the Drug Enforcement Agency (MICHAEL). Selling or sharing medications is a criminal act.    2. Be sure to store opioids in a secure place, locked up if possible. Young children can easily swallow them and overdose.    3. When you are traveling with your medicines, keep them in the original bottles. If you use a pill box, be sure you also  carry a copy of your medicine list from your clinic or pharmacy.    4. Safe disposal of opioids    Most pharmacies have places to get rid of medicine, called disposal kiosks. Medicine disposal options are also available in every [facility]. Search your county and  medication disposal  to find more options. You can find more details at:  https://[email or web address].[facility].[place]./living-green/managing-unwanted-medications     I agree that my provider, clinic care team, and pharmacy may work with any city, state or federal law enforcement agency that investigates the misuse, sale, or other diversion of my controlled medicine. I will allow my provider to discuss my care with, or share a copy of, this agreement with any other treating provider, pharmacy or emergency room where I receive care.    I have read this agreement and have asked questions about anything I did not understand.    _______________________________________________________  Patient Signature - Whitney Aceves _____________________                   Date     _______________________________________________________  Provider Signature - Lisbeth Gonzalez MD   _____________________                   Date     _______________________________________________________  Witness Signature (required if provider not present while patient signing)   _____________________                   Date

## 2023-12-15 NOTE — PATIENT INSTRUCTIONS
Preventive Health Recommendations  Female Ages 50 - 64    Yearly exam: See your health care provider every year in order to  Review health changes.   Discuss preventive care.    Review your medicines if your doctor has prescribed any.    Get a Pap test every three years (unless you have an abnormal result and your provider advises testing more often).  If you get Pap tests with HPV test, you only need to test every 5 years, unless you have an abnormal result.   You do not need a Pap test if your uterus was removed (hysterectomy) and you have not had cancer.  You should be tested each year for STDs (sexually transmitted diseases) if you're at risk.   Have a mammogram every 1 to 2 years.  Have a colonoscopy at age 45, or have a yearly FIT test (stool test). These exams screen for colon cancer.    Have a cholesterol test every 5 years, or more often if advised.  Have a diabetes test (fasting glucose) every three years. If you are at risk for diabetes, you should have this test more often.   If you are at risk for osteoporosis (brittle bone disease), think about having a bone density scan (DEXA).    Shots: Get a flu shot each year. Get a tetanus shot every 10 years.    Nutrition:   Eat at least 5 servings of fruits and vegetables each day.  Eat whole-grain bread, whole-wheat pasta and brown rice instead of white grains and rice.  Get adequate Calcium and Vitamin D.     Lifestyle  Exercise at least 150 minutes a week (30 minutes a day, 5 days a week). This will help you control your weight and prevent disease.  Limit alcohol to one drink per day.  No smoking.   Wear sunscreen to prevent skin cancer.   See your dentist every six months for an exam and cleaning.  See your eye doctor every 1 to 2 years.      Thought model for change of thinking and actions:   Circumstance  Thought  Feeling  Action  Results.     Discussed need for resistance training to help keep bones strong and decrease age -related muscle loss, decreasing  insulin resistance and increasing basal metabolic rate to help burn more calories at rest and with exertion.        Clinical References    Resistance Training With Free Weights: Exercises  Introduction  Here are some examples of exercises for resistance training. Start each exercise slowly. Ease off the exercise if you start to have pain.  Your doctor or physical therapist will tell you when you can start these exercises and which ones will work best for you.  How to do the exercises  Chest fly    Lie on a bench or exercise ball, and hold the weights straight up over your chest. Do not lock your elbows. You can keep them slightly bent if that is comfortable for you.  Slowly lower your arms, keeping them extended, until the weights are level with your chest, or slightly lower.  Slowly raise your arms until you are in the starting position.  Repeat 8 to 12 times.  Rest for a minute, and repeat the exercise.  Arm raise to the side (elbows bent)    Stand with your feet shoulder-width apart and your knees slightly bent. Or sit up straight in a chair.  Hold a 1- to 2-pound weight in each hand. The weight may be a dumbbell, a can of food, or a filled water bottle.  Bend your elbows 90 degrees while keeping them at your sides. With your palms facing in, hold the weights straight in front of you.  Slowly lift the weights and your elbows out to the sides to shoulder level, keeping your elbows bent. Keep your shoulders down and relaxed as you lift. If you find that you are shrugging your shoulders up toward your ears, your weights may be too heavy. Try using lighter weights (or even no weights).  Slowly lower the weights and your elbows until your elbows are back at your sides.  Repeat 8 to 12 times.  Biceps curls    Sit leaning forward with your legs slightly spread and your left hand on your left thigh.  Hold the weight in your right hand, and place your right elbow on your right thigh.  Slowly curl the weight up and toward  your chest.  Slowly lower the weight to the original position.  Repeat 8 to 12 times.  Rest for a minute, and repeat the exercise.  Do the same exercise with your other arm.  Follow-up care is a key part of your treatment and safety. Be sure to make and go to all appointments, and call your doctor if you are having problems. It's also a good idea to know your test results and keep a list of the medicines you take.  Current as of: June 6, 2023               Content Version: 13.8    8159-0223 [employer].   Care instructions adapted under license by your healthcare professional. If you have questions about a medical condition or this instruction, always ask your healthcare professional. [employer] disclaims any warranty or liability for your use of this information.        Resistance Training With Surgical Tubing: Exercises  Introduction  Here are some examples of exercises for resistance training. Start each exercise slowly. Ease off the exercise if you start to have pain.  Your doctor or physical therapist will tell you when you can start these exercises and which ones will work best for you.  How to do the exercises  Side pull    Sit or stand up straight. Grasp an exercise band with your hands about shoulder-width apart.  Raise both arms overhead, palms of your hands facing forward.  Slowly pull one arm down and to the side, bending your elbow and stretching the band until your elbow is at shoulder height. Hold for 1 to 2 seconds.  Slowly return to the starting position with your arms straight up.  Repeat with the other arm.  Repeat 8 to 12 times with each arm.  Overhead pull    Sit or stand up straight. Grasp an exercise band with your hands about shoulder-width apart.  Raise both arms overhead, palms of your hands facing forward.  Slowly pull your hands apart, stretching the band. Hold for 1 to 2 seconds.  Slowly return to the starting position with your arms straight up.  Repeat 8 to  12 times.  Up-down pull    Sit or stand up straight. Grasp an exercise band with your hands about shoulder width apart.  Raise both arms overhead.  Bend your elbows until they are at shoulder height, with the stretched band either behind or in front of your head. Hold for 1 to 2 seconds.  Slowly return to the starting position with your arms straight up.  Repeat 8 to 12 times.  Chest-level pull    Sit or stand up straight. Grasp an exercise band with your hands about shoulder-width apart.  Raise your arms to chest level and bend your elbows.  Slowly pull your hands apart and your shoulder blades together, stretching the band. Hold for 1 to 2 seconds. Try to keep your hands up at your chest level, and do not pull your shoulders up toward your ears.  Slowly return to your starting position.  Repeat 8 to 12 times.  Hip-level pull    Stand or sit up straight in a chair without arms. Grasp an exercise band with your hands about shoulder-width apart.  Hold your hands at the level of your hips, or near your lap if you are sitting down.  Slowly pull your hands apart, stretching the band. Hold for 1 or 2 seconds.  Slowly return to your starting position.  Repeat 8 to 12 times.  Follow-up care is a key part of your treatment and safety. Be sure to make and go to all appointments, and call your doctor if you are having problems. It's also a good idea to know your test results and keep a list of the medicines you take.  Current as of: June 6, 2023               Content Version: 13.8    2755-5804 [employer].   Care instructions adapted under license by your healthcare professional. If you have questions about a medical condition or this instruction, always ask your healthcare professional. [employer] disclaims any warranty or liability for your use of this information.         Fitness: Increasing Your Core Stability (02:03)  Your health professional recommends that you watch this short online health  video.  Learn how to keep your core strong and prevent injury with two simple exercises.  Purpose:  Shows belly tightening and bridging to improve core stability. Emphasizes how a strong core helps prevent injury.  Goal:  The user will learn belly tightening and bridging to improve core stability.      How to watch the video     Scan the QR code   OR Visit the website    [email or web address]   Current as of: July 18, 2023               Content Version: 13.8    2149-8400 [employer].   Care instructions adapted under license by your healthcare professional. If you have questions about a medical condition or this instruction, always ask your healthcare professional. [employer] disclaims any warranty or liability for your use of this information.          Muscle Conditioning: Exercises  Introduction  Here are some examples of exercises for muscle conditioning. Start each exercise slowly. Ease off the exercise if you start to have pain.  Your doctor or physical therapist will tell you when you can start these exercises and which ones will work best for you.  How to do the exercises  Wall push-ups    When you can do this exercise against a wall comfortably (without your muscles feeling tired), you can try it against a counter. Start with 5 repetitions again and work up to 8 to 12. You can then slowly progress to the end of a couch or a sturdy chair, and finally to the floor.  Stand facing a wall, about 12 to 18 inches away.  Place your hands on the wall at shoulder height.  Slowly bend your elbows and bring your face toward the wall, moving your hips and shoulders forward together.  Push slowly back to the starting position.  Start with 5 repetitions and work up to 8 to 12.  Rest for a minute, and repeat the exercise.    Side-lying leg lift    If this exercise becomes easy, you can add a light weight around your ankle or tie an elastic resistance band to both ankles.  Lie  on your side, with your legs extended. Keep your hips straight up and down during this exercise. Do not let your top hip rock toward the back. Support your head with your hand, and place the other hand on the floor near your waist.  Slowly raise your upper leg until it is about in line with your shoulder. Keep your toes pointed forward.  Slowly lower your leg to the starting position.  Repeat 8 to 12 times.  Rest for a minute, and repeat the exercise.  Turn to your other side and do the same exercise with your other leg.  Shallow standing knee bends    Stand with your hands lightly resting on a counter or chair in front of you with your feet shoulder-width apart.  Slowly bend your knees so that you squat down just like you were going to sit in a chair. Make sure your knees do not go in front of your toes.  Lower yourself about 6 inches. Your heels should remain on the floor at all times.  Rise slowly to a standing position.  Repeat 8 to 12 times.  Rest for a minute, and repeat the exercise.  Follow-up care is a key part of your treatment and safety. Be sure to make and go to all appointments, and call your doctor if you are having problems. It's also a good idea to know your test results and keep a list of the medicines you take.  Current as of: June 6, 2023               Content Version: 13.8    6820-8543 [employer].   Care instructions adapted under license by your healthcare professional. If you have questions about a medical condition or this instruction, always ask your healthcare professional. [employer] disclaims any warranty or liability for your use of this information.         How to Do the Wall Sit Exercise (00:46)  Your health professional recommends that you watch this short online health video.  Learn how to do the wall sit exercise to increase strength and stability in your core and your legs.  Purpose:  Demonstrates the steps required to perform the wall sit exercise to  increase strength and stability in the core and legs.  Goal:  The user will learn how to do the wall sit exercise to increase strength and stability in the core and legs.      How to watch the video     Scan the QR code   OR Visit the website    [email or web address]   Current as of: July 18, 2023               Content Version: 13.8    3099-1741 [employer].   Care instructions adapted under license by your healthcare professional. If you have questions about a medical condition or this instruction, always ask your healthcare professional. [employer] disclaims any warranty or liability for your use of this information.               .

## 2023-12-15 NOTE — PROGRESS NOTES
SUBJECTIVE:   Whitney is a 63 year old, presenting for the followin. Routine general medical examination at a health care facility    2. Weight gain    3. Chest pain, unspecified type- left upper chest    4. Family history of premature coronary artery disease    5. Hyperlipidemia LDL goal <130- on atorvastatin     6. Family history of early CAD    7. Hyperlipidemia LDL goal <130- on atorvastatin - uncertain control - needs lab follow up and refill on medicine     8. Symptomatic menopausal or female climacteric states    9. Environmental allergies    10. Insomnia, unspecified type- mostly while travelling -CSA re-signed 12/15/2023-  ? related to menopause as well - Ambien 10mg - takes 1/2 tab at bedtime PRN    11. Controlled substance agreement re-signed today -  12/15/2023-  ok for #30 -10mg ambien (takes 1/2 tab) with 1 refill every 6 months or so    12. Vitamin D deficiency    13. Elevated hemoglobin A1c    14. Breast pain, left    15. Rotator cuff syndrome, left    16. Other osteoarthritis involving multiple joints- left middle finger s/p fracture /dislocation - causing some chronic pain - pt requests refill of ibuprofen 800mg tabs - takes with food         Physical        12/15/2023     8:18 AM   Additional Questions   Roomed by Marilu       Healthy Habits:     Getting at least 3 servings of Calcium per day:  Yes    Bi-annual eye exam:  Yes    Dental care twice a year:  Yes    Sleep apnea or symptoms of sleep apnea:  None    Diet:  Regular (no restrictions)    Frequency of exercise:  2-3 days/week    Duration of exercise:  15-30 minutes    Taking medications regularly:  Yes    Medication side effects:  Not applicable    Additional concerns today:  No    Concerned about 30 lbs weight gain in the last 2 years.  Did Ideal protein 2 years ago, lost weight.  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 Body mass  index is 30.65 kg/m . . Discussed thought model change , inflammation and resistance training and changed metabolism  Will check thyroid as well.      Left sided chest pressure - comes and goes - upper chest . Hasn't noticed it this week.   Normal mammogram.  Stopped doing her weight training , which she usually does 3-4x/week when she's home.  Treadmill - flat 3.5 mph - 35 minutes when she's home every other day.   Sometimes gets left upper chest pain or discomfort with that.   Signif. CAD in family. Sister with MI's starting in her early 50's . Little brother's MI was at 57.      Family history of type 2 diabetes. Sister with MI's has type 2 diabetes as well .   Lab Results   Component Value Date    A1C 5.8 10/25/2010     Had a normal CT coronary calcium scan 11/12/2019:   Procedure: CT CALCIUM SCREENING      Examination Date: 11/12/2019 3:43 PM      Clinical Information: Hyperlipidemia LDL goal <130      Ordering Provider: Partha Roberts MD     PROCEDURE: High-resolution, ECG synchronized multi-slice computed  tomography was performed without incident. Coronary calcification was  analyzed using [employer] calcium scoring software. Scan protocol was  optimized to minimize radiation exposure. The total radiation exposure  was calculated to be 19 DLP and 0.35 mSv.     FINDINGS:     Overall quality of the study: Excellent.      CORONARY ARTERY CALCIUM SCORES:      Left main coronary artery: 0  Left anterior descending coronary artery: 0  Circumflex coronary artery: 0   Right coronary artery: 0     TOTAL CALCIUM SCORE: 0     The total Agatston calcium score is 0.      Please review Radiology report for incidental noncardiac findings that  will follow separately        CALCIUM SCORE OF ZERO: A calcium score of zero places this individual  in the lowest quartile when compared to an age and gender matched  control group. If the patient has more than one cardiac risk factor  then the risk of coronary heart disease, death or  myocardial  infarction is less than 0.4% per year (LESTER Briceno et al. [facility],  2007; 49:378-402).      Still taking ambien when on the road for sleep - travels a lot.       Hyperlipidemia Follow-Up:     Are you regularly taking any medication or supplement to lower your cholesterol?   Yes- Lipitor  Are you having muscle aches or other side effects that you think could be caused by your cholesterol lowering medication?  No    Recent Labs   Lab Test 02/14/23  0806 12/14/22  0817   CHOL 159 233*   HDL 64 63   LDL 74 156*   TRIG 105 70         Social History     Tobacco Use    Smoking status: Never    Smokeless tobacco: Never   Substance Use Topics    Alcohol use: No    Drug use: No         9/17/2018     9:28 AM 6/28/2023     8:39 AM 12/15/2023     8:27 AM   PHQ   PHQ-9 Total Score 8 6 4   Q9: Thoughts of better off dead/self-harm past 2 weeks Not at all Not at all Not at all         9/17/2018     9:28 AM 6/28/2023     8:40 AM 12/15/2023     8:28 AM   JOSE ANTONIO-7 SCORE   Total Score  4 (minimal anxiety) 2 (minimal anxiety)   Total Score 3 4 2         12/15/2023     8:27 AM   Last PHQ-9   1.  Little interest or pleasure in doing things 1   2.  Feeling down, depressed, or hopeless 1   3.  Trouble falling or staying asleep, or sleeping too much 1   4.  Feeling tired or having little energy 1   5.  Poor appetite or overeating 0   6.  Feeling bad about yourself 0   7.  Trouble concentrating 0   8.  Moving slowly or restless 0   Q9: Thoughts of better off dead/self-harm past 2 weeks 0   PHQ-9 Total Score 4         12/15/2023     8:28 AM   JOSE ANTONIO-7    1. Feeling nervous, anxious, or on edge 0   2. Not being able to stop or control worrying 1   3. Worrying too much about different things 0   4. Trouble relaxing 1   5. Being so restless that it is hard to sit still 0   6. Becoming easily annoyed or irritable 0   7. Feeling afraid, as if something awful might happen 0   JOSE ANTONIO-7 Total Score 2   If you checked any problems, how difficult have  they made it for you to do your work, take care of things at home, or get along with other people? Somewhat difficult     0956}  Asthma Follow-Up:     Was ACT completed today?  Yes        12/15/2023     8:32 AM   ACT Total Scores   ACT TOTAL SCORE (Goal Greater than or Equal to 20) 24   In the past 12 months, how many times did you visit the emergency room for your asthma without being admitted to the hospital? 0   In the past 12 months, how many times were you hospitalized overnight because of your asthma? 0        How many days per week do you miss taking your asthma controller medication?  I do not have an asthma controller medication  Please describe any recent triggers for your asthma:  SOB  Have you had any Emergency Room Visits, Urgent Care Visits, or Hospital Admissions since your last office visit?  No      Social History     Tobacco Use    Smoking status: Never    Smokeless tobacco: Never   Substance Use Topics    Alcohol use: No             12/15/2023     8:31 AM   Alcohol Use   Prescreen: >3 drinks/day or >7 drinks/week? Not Applicable     Reviewed orders with patient.  Reviewed health maintenance and updated orders accordingly - Yes  Lab work is in process  Labs reviewed in EPIC  BP Readings from Last 3 Encounters:   12/15/23 112/80   06/28/23 122/80   06/06/23 110/66    Wt Readings from Last 3 Encounters:   12/15/23 78.5 kg (173 lb)   06/28/23 74.8 kg (165 lb)   06/06/23 74.5 kg (164 lb 4.8 oz)                  Patient Active Problem List   Diagnosis    Panic disorder without agoraphobia    Anxiety    Overactive bladder    Hyperlipidemia LDL goal <130- on atorvastatin     Gastroesophageal reflux disease without esophagitis    Symptomatic menopausal or female climacteric states    Fibrocystic breast changes of both breasts    Primary osteoarthritis of both hands    Family history of early CAD    Insomnia, unspecified type-Controlled substance agreement signed -12/14/2022 ok for #30 -10mg ambien (takes  1/2 tab) with 1 refill every 6 months or so     Expressive aphasia    Urinary urgency    Fatigue, unspecified type    Night sweats- on estrace 1mg already - discussed progesterone deficiency usually causing that - pt would like to try black cohosh first prior to micronized progesterone 2nd to breast ca risk     Controlled substance agreement signed -redone 12/15/2023 ok for #30 -10mg ambien (takes 1/2 tab) with 1 refill every 6 months or so    Acute recurrent maxillary sinusitis    Multiple dysplastic nevi-Going to dermatology for her full body skin exam annually - just had appt=2022[facility] - Dr. Carmen Dowling - for her early dysplastic melanoma.      RUQ abdominal pain- comes and goes - not related to foods or fatty foods, worse bending over with or without lifting and getting out of her car- ? muscle spasm - CT and US negative 10/5/2020     Rotator cuff syndrome, left    Other osteoarthritis involving multiple joints- left middle finger s/p fracture /dislocation - causing some chronic pain - pt requests refill of ibuprofen 800mg tabs - takes with food     Lumbosacral dysfunction    Sacral pain    Family history of colonic polyps- precancerous     Basal cell carcinoma (BCC) of skin of nose    Vitamin D deficiency     Past Surgical History:   Procedure Laterality Date     SECTION      COLONOSCOPY  2012    Procedure:COLONOSCOPY; Colonoscopy ; Surgeon:DEANDRE ARRIETA; Location: [facility]    COLONOSCOPY N/A 2017    Procedure: COLONOSCOPY;  Surgeon: Deandre Arrieta MD;  Location:  GI    COLONOSCOPY N/A 2022    Procedure: COLONOSCOPY;  Surgeon: Pool Cardenas MD;  Location:  GI    COLPOSCOPY, BIOPSY, COMBINED  1991    Mild squamous epithelial dysplasia (SABIHA 1)    FISSURECTOMY RECTUM  01    HYSTERECTOMY TOTAL ABDOMINAL  3/4/1998    Myomatous uterus; pelvic pain. Started HRT right afterward with vasomotor sx.    HYSTERECTOMY, PAP NO LONGER INDICATED N/A      had for large fibroid    LIPOSUCTION (LOCATION)      MOHS MICROGRAPHIC PROCEDURE         Social History     Tobacco Use    Smoking status: Never    Smokeless tobacco: Never   Substance Use Topics    Alcohol use: No     Family History   Problem Relation Age of Onset    Hypertension Mother     Hyperlipidemia Mother     Diabetes Mother     Cerebrovascular Disease Mother 89        right hemispheric stroke -  11 days after stroke - 10/2019     C.A.D. Father 45          age 45 massive MI     Heart Failure Father     Hyperlipidemia Father     Coronary Artery Disease Father     C.A.D. Maternal Grandmother     C.A.D. Maternal Grandfather     C.A.D. Paternal Grandmother     C.A.D. Paternal Grandfather     Cancer Other         9 members on maternal side. Bone CA, Breast CA (m. aunt),      Coronary Artery Disease Brother 55        Heart attack- s/p sudden cardiac arrest survived 2019     C.A.LAMBERT. Sister 57        MI age 50- CABG - 2 vessels - 4 MI's since age 55 - 3 stents placed 2017     Diabetes Sister     Hypertension Sister     Breast Cancer Maternal Aunt         50s-Mom had 9 sisters. MAunt->50's    Hypertension Sister     Hyperlipidemia Sister     Coronary Artery Disease Sister     Prostate Cancer Brother 66    Testicular cancer Brother 37    Psoriasis Son 29        after returning from the service - lives in [place]    Psoriatic Arthritis Son 29        on Enbrel     Colon Cancer No family hx of          Current Outpatient Medications   Medication Sig Dispense Refill    atorvastatin (LIPITOR) 10 MG tablet Take 1 tablet (10 mg) by mouth daily 90 tablet 1    calcium-vitamin D (OSCAL) 250-125 MG-UNIT TABS per tablet Take 1 tablet by mouth daily      estradiol (VIVELLE-DOT) 0.05 MG/24HR bi-weekly patch Place 1 patch onto the skin twice a week 24 patch 3    gabapentin (NEURONTIN) 100 MG capsule Take 1 capsule (100 mg) by mouth at bedtime For menopausal symptoms 90 capsule 3    ibuprofen (ADVIL/MOTRIN) 800 MG tablet  TAKE 1 TABLET BY MOUTH EVERY 8 HOURS WITH FOOD AS NEEDED FOR MODERATE PAIN 90 tablet 1    metoprolol tartrate (LOPRESSOR) 50 MG tablet Take 1 tab the night before CT angiogram and the morning of angiogram 2 tablet 0    montelukast (SINGULAIR) 10 MG tablet Take 1 tablet (10 mg) by mouth at bedtime 90 tablet 3    Multiple Vitamins-Minerals (MULTIVITAMIN ADULT PO) Take by mouth daily      Omega-3 Fatty Acids (OMEGA 3 PO)       venlafaxine (EFFEXOR XR) 75 MG 24 hr capsule Take 1-2 capsules ( mg) by mouth daily 180 capsule 1    zolpidem (AMBIEN) 10 MG tablet TAKE 1/2 TABLET BY MOUTH EVERY NIGHT AT BEDTIME AS NEEDED FOR SLEEP 30 tablet 3    clobetasol (TEMOVATE) 0.05 % external ointment APPLY TO RIGHT TEMPLE LESION TWICE DAILY UNTIL RESOLUTION      metroNIDAZOLE (METROCREAM) 0.75 % external cream APPLY TOPICALLY TO THE AFFECTED AREA TWICE DAILY FOR ROSACEA      tacrolimus (PROTOPIC) 0.1 % external ointment        Allergies   Allergen Reactions    Rosamaria Sun Screen Spf 30 [Solbar Pf Spf15] Hives and Rash    Drysol [Aluminum Chloride] Hives    Septra [Sulfamethoxazole-Trimethoprim]     Sulfa Antibiotics Hives    Murine Ear [Carbamide Peroxide] Swelling and Rash     Severe pain , swelling after debrox type ear drops - had to go to the clinic to have ears flushed out emergently.     Paxlovid [Nirmatrelvir-Ritonavir] Hives, Itching and Rash     Recent Labs   Lab Test 12/15/23  1046 06/28/23  1040 02/14/23  0806 12/14/22  0817 11/29/21  1210 05/06/21  1559 10/22/20  1231   A1C 5.6  --   --   --   --   --   --    LDL 81  --  74 156*   < >  --  109*   HDL 67  --  64 63   < >  --  70   TRIG 59  --  105 70   < >  --  137   ALT 27  --  28 23   < > 25 31   CR 0.64  --   --  0.76   < > 0.71 0.69   GFRESTIMATED >90  --   --  88   < > >90 >90   GFRESTBLACK  --   --   --   --   --  >90 >90   POTASSIUM 3.8  --   --  4.1   < > 3.7 3.5   TSH 1.75 1.29  --  1.94   < >  --  1.57    < > = values in this interval not displayed.         Breast Cancer Screening:  Any new diagnosis of family breast, ovarian, or bowel cancer? No       FHS-7:       10/26/2021    10:43 AM 10/27/2021     8:31 AM 11/17/2022    11:10 AM 12/10/2022    12:08 AM 11/21/2023     8:25 AM 12/15/2023     8:33 AM   Breast CA Risk Assessment (FHS-7)   Did any of your first-degree relatives have breast or ovarian cancer? No No No No No No   Did any of your relatives have bilateral breast cancer? No Unknown No Unknown No Unknown   Did any man in your family have breast cancer? No No No No No No   Did any woman in your family have breast and ovarian cancer? No No No Yes Yes Yes   Did any woman in your family have breast cancer before age 50 y? No No No Unknown No No   Do you have 2 or more relatives with breast and/or ovarian cancer? No Unknown No No No No   Do you have 2 or more relatives with breast and/or bowel cancer? No Unknown No No No No     click delete button to remove this line now  Mammogram Screening: Recommended annual mammography  Pertinent mammograms are reviewed under the imaging tab.    History of abnormal Pap smear: Status post benign hysterectomy. Health Maintenance and Surgical History updated.      6/20/2015    12:00 AM   PAP / HPV   PAP (Historical) Normal           This result is from an external source.     Reviewed and updated as needed this visit by clinical staff   Tobacco  Allergies  Meds  Problems  Med Hx  Surg Hx  Fam Hx          Reviewed and updated as needed this visit by Provider   Tobacco  Allergies  Meds  Problems  Med Hx  Surg Hx  Fam Hx         Past Medical History:   Diagnosis Date    Anxiety     on effexor in past. Off since 2010    Cancer (H) 01/2021    SKIN CANCER    [facility] DJD(carpometacarpal degenerative joint disease), localized primary     Edema     Fatigue, unspecified type 09/17/2018    Hormone replacement therapy     IBS (irritable bowel syndrome)     Diarrhea predominant    Insomnia, unspecified     On Ambien    Lichen  sclerosus     Multiple dysplastic nevi     Going to dermatology for her full body skin exam tomorrow - [facility] - Dr. Carmen Dowling - for her early dysplastic melanoma.    Pap smear of cervix not needed     Skin cancer     Urge incontinence       Past Surgical History:   Procedure Laterality Date     SECTION      COLONOSCOPY  2012    Procedure:COLONOSCOPY; Colonoscopy ; Surgeon:DEANDRE ARRIETA; Location: GI    COLONOSCOPY N/A 2017    Procedure: COLONOSCOPY;  Surgeon: Deandre Arrieta MD;  Location:  GI    COLONOSCOPY N/A 2022    Procedure: COLONOSCOPY;  Surgeon: Pool Cardenas MD;  Location:  GI    COLPOSCOPY, BIOPSY, COMBINED  1991    Mild squamous epithelial dysplasia (SABIHA 1)    FISSURECTOMY RECTUM  01    HYSTERECTOMY TOTAL ABDOMINAL  3/4/1998    Myomatous uterus; pelvic pain. Started HRT right afterward with vasomotor sx.    HYSTERECTOMY, PAP NO LONGER INDICATED N/A     had for large fibroid    LIPOSUCTION (LOCATION)      MOHS MICROGRAPHIC PROCEDURE       OB History    Para Term  AB Living   2 2 2 0 0 2   SAB IAB Ectopic Multiple Live Births   0 0 0 0 2      # Outcome Date GA Lbr Nick/2nd Weight Sex Delivery Anes PTL Lv   2 Term 91   2.594 kg (5 lb 11.5 oz) M -SEC   TERELL      Birth Comments: Breech      Name: Tj Velazquez Term 87   3.005 kg (6 lb 10 oz) F Vag-Vacuum   TERELL      Name: Sam       Review of Systems   Constitutional:  Negative for chills and fever.   HENT:  Negative for congestion, ear pain, hearing loss and sore throat.    Eyes:  Negative for pain and visual disturbance.   Respiratory:  Negative for cough and shortness of breath.    Cardiovascular:  Positive for palpitations and peripheral edema. Negative for chest pain.   Gastrointestinal:  Negative for abdominal pain, constipation, diarrhea, heartburn, hematochezia and nausea.   Breasts:  Positive for tenderness. Negative for breast mass and discharge.  "  Genitourinary:  Negative for dysuria, frequency, genital sores, hematuria, pelvic pain, urgency, vaginal bleeding and vaginal discharge.   Musculoskeletal:  Positive for arthralgias and myalgias. Negative for joint swelling.   Skin:  Negative for rash.   Neurological:  Positive for weakness and headaches. Negative for dizziness and paresthesias.   Psychiatric/Behavioral:  Positive for mood changes. The patient is not nervous/anxious.           OBJECTIVE:   /80   Pulse 83   Temp (!) 96.7  F (35.9  C) (Tympanic)   Resp 16   Ht 1.6 m (5' 3\")   Wt 78.5 kg (173 lb)   LMP  (LMP Unknown)   SpO2 96%   BMI 30.65 kg/m    Physical Exam  GENERAL APPEARANCE: healthy, alert and no distress  EYES: Eyes grossly normal to inspection, PERRL and conjunctivae and sclerae normal  HENT: ear canals and TM's normal, nose and mouth without ulcers or lesions, oropharynx clear and oral mucous membranes moist  NECK: no adenopathy, no asymmetry, masses, or scars and thyroid normal to palpation  RESP: lungs clear to auscultation - no rales, rhonchi or wheezes  BREAST: normal without masses, tenderness or nipple discharge and no palpable axillary masses or adenopathy  CV: regular rate and rhythm, normal S1 S2, no S3 or S4, no murmur, click or rub, no peripheral edema and peripheral pulses strong  ABDOMEN: soft, nontender, no hepatosplenomegaly, no masses and bowel sounds normal  MS: no musculoskeletal defects are noted and gait is age appropriate without ataxia  SKIN: no suspicious lesions or rashes  NEURO: Normal strength and tone, sensory exam grossly normal, mentation intact and speech normal  PSYCH: mentation appears normal and affect normal/bright    Diagnostic Test Results:  Labs reviewed in Epic    ASSESSMENT/PLAN:       ICD-10-CM    1. Routine general medical examination at a health care facility  Z00.00       2. Weight gain  R63.5 Nutrition Referral     TSH with free T4 reflex     Med Therapy Management Referral     TSH " with free T4 reflex      3. Chest pain, unspecified type- left upper chest  R07.9 CT Angiogram coronary artery     Comprehensive metabolic panel     CBC with platelets     metoprolol tartrate (LOPRESSOR) 50 MG tablet     EKG 12-lead complete w/read - Clinics     Comprehensive metabolic panel     CBC with platelets      4. Family history of premature coronary artery disease  Z82.49 CT Angiogram coronary artery     metoprolol tartrate (LOPRESSOR) 50 MG tablet     EKG 12-lead complete w/read - Clinics      5. Hyperlipidemia LDL goal <130- on atorvastatin   E78.5 REVIEW OF HEALTH MAINTENANCE PROTOCOL ORDERS     PRIMARY CARE FOLLOW-UP SCHEDULING     Lipid panel reflex to direct LDL Fasting     Comprehensive metabolic panel     Lipid panel reflex to direct LDL Fasting     Comprehensive metabolic panel      6. Family history of early CAD  Z82.49 atorvastatin (LIPITOR) 10 MG tablet      7. Hyperlipidemia LDL goal <130- on atorvastatin - uncertain control - needs lab follow up and refill on medicine   E78.5 REVIEW OF HEALTH MAINTENANCE PROTOCOL ORDERS     Albumin Random Urine Quantitative with Creat Ratio     atorvastatin (LIPITOR) 10 MG tablet     Albumin Random Urine Quantitative with Creat Ratio      8. Symptomatic menopausal or female climacteric states  N95.1 CBC with platelets     estradiol (VIVELLE-DOT) 0.05 MG/24HR bi-weekly patch     gabapentin (NEURONTIN) 100 MG capsule     venlafaxine (EFFEXOR XR) 75 MG 24 hr capsule     CBC with platelets      9. Environmental allergies  Z91.09 montelukast (SINGULAIR) 10 MG tablet      10. Insomnia, unspecified type- mostly while travelling -CSA re-signed 12/15/2023-  ? related to menopause as well - Ambien 10mg - takes 1/2 tab at bedtime PRN  G47.00 zolpidem (AMBIEN) 10 MG tablet      11. Controlled substance agreement re-signed today -  12/15/2023-  ok for #30 -10mg ambien (takes 1/2 tab) with 1 refill every 6 months or so  Z79.899 zolpidem (AMBIEN) 10 MG tablet      12.  "Vitamin D deficiency  E55.9 25 Hydroxyvitamin D2 and D3     25 Hydroxyvitamin D2 and D3      13. Elevated hemoglobin A1c  R73.09 Hemoglobin A1c     Hemoglobin A1c      14. Breast pain, left  N64.4 US Breast Left Complete 4 Quadrants     MA Diagnostic Left w/ Jonny      15. Rotator cuff syndrome, left  M75.102 ibuprofen (ADVIL/MOTRIN) 800 MG tablet      16. Other osteoarthritis involving multiple joints- left middle finger s/p fracture /dislocation - causing some chronic pain - pt requests refill of ibuprofen 800mg tabs - takes with food   M15.8 ibuprofen (ADVIL/MOTRIN) 800 MG tablet          Patient has been advised of split billing requirements and indicates understanding: Yes    Return in about 6 months (around 6/15/2024) for cholesterol, w/ Dr. DUPONT for 40 min appt. Please, call our clinic or go to the ER immediately if signs or symptoms worsen or fail to improve as anticipated.     COUNSELING:  Reviewed preventive health counseling, as reflected in patient instructions    Discussed weight gain in detail and how to combat that.see AVS.      BMI:   Estimated body mass index is 30.65 kg/m  as calculated from the following:    Height as of this encounter: 1.6 m (5' 3\").    Weight as of this encounter: 78.5 kg (173 lb).   Weight management plan: Discussed healthy diet and exercise guidelines      She reports that she has never smoked. She has never used smokeless tobacco.   60 minutes face to face time, 50 minutes on nonpreventative care.              Lisbeth Gonzalez MD  [facility] [place]  "

## 2023-12-18 ENCOUNTER — TELEPHONE (OUTPATIENT)
Dept: FAMILY MEDICINE | Facility: CLINIC | Age: 63
End: 2023-12-18
Payer: COMMERCIAL

## 2023-12-18 NOTE — TELEPHONE ENCOUNTER
MTM referral from: [facility] visit (referral by provider)    MTM referral outreach attempt #1 on December 18, 2023 at 3:36 PM      Outcome: Patient is not interested at this time because her insurance does not cover (she will check with tamiko and see if it's covered), will route to [facility] Pharmacist/Provider as an FYI. Thank you for the referral.     Use private pay gfe needed  for the carrier/Plan on the flowsheet          Malia Griffin  [facility]

## 2023-12-19 ENCOUNTER — APPOINTMENT (OUTPATIENT)
Dept: LAB | Facility: CLINIC | Age: 63
End: 2023-12-19
Payer: COMMERCIAL

## 2023-12-19 LAB
CREAT UR-MCNC: 77.6 MG/DL
DEPRECATED CALCIDIOL+CALCIFEROL SERPL-MC: <60 UG/L (ref 20–75)
MICROALBUMIN UR-MCNC: <12 MG/L
MICROALBUMIN/CREAT UR: NORMAL MG/G{CREAT}
VITAMIN D2 SERPL-MCNC: <5 UG/L
VITAMIN D3 SERPL-MCNC: 55 UG/L

## 2023-12-19 PROCEDURE — 82570 ASSAY OF URINE CREATININE: CPT | Performed by: FAMILY MEDICINE

## 2023-12-19 PROCEDURE — 82043 UR ALBUMIN QUANTITATIVE: CPT | Performed by: FAMILY MEDICINE

## 2023-12-20 ENCOUNTER — HOSPITAL ENCOUNTER (OUTPATIENT)
Dept: ULTRASOUND IMAGING | Facility: CLINIC | Age: 63
Discharge: HOME | End: 2023-12-20
Attending: FAMILY MEDICINE
Payer: COMMERCIAL

## 2023-12-20 DIAGNOSIS — N64.4 BREAST PAIN, LEFT: ICD-10-CM

## 2023-12-20 PROCEDURE — 76642 ULTRASOUND BREAST LIMITED: CPT | Mod: LT

## 2023-12-22 ENCOUNTER — HOSPITAL ENCOUNTER (OUTPATIENT)
Dept: CT IMAGING | Facility: CLINIC | Age: 63
Discharge: HOME | End: 2023-12-22
Attending: FAMILY MEDICINE
Payer: COMMERCIAL

## 2023-12-22 VITALS
HEART RATE: 53 BPM | OXYGEN SATURATION: 97 % | SYSTOLIC BLOOD PRESSURE: 130 MMHG | RESPIRATION RATE: 16 BRPM | DIASTOLIC BLOOD PRESSURE: 85 MMHG

## 2023-12-22 DIAGNOSIS — Z82.49 FAMILY HISTORY OF PREMATURE CORONARY ARTERY DISEASE: ICD-10-CM

## 2023-12-22 DIAGNOSIS — R07.9 CHEST PAIN, UNSPECIFIED TYPE: ICD-10-CM

## 2023-12-22 PROCEDURE — 250N000013 HC RX MED GY IP 250 OP 250 PS 637: Performed by: INTERNAL MEDICINE

## 2023-12-22 PROCEDURE — 75574 CT ANGIO HRT W/3D IMAGE: CPT | Mod: 26 | Performed by: INTERNAL MEDICINE

## 2023-12-22 PROCEDURE — 75574 CT ANGIO HRT W/3D IMAGE: CPT

## 2023-12-22 PROCEDURE — 250N000011 HC RX IP 250 OP 636: Performed by: INTERNAL MEDICINE

## 2023-12-22 RX ORDER — DILTIAZEM HYDROCHLORIDE 5 MG/ML
10-15 INJECTION INTRAVENOUS
Status: DISCONTINUED | OUTPATIENT
Start: 2023-12-22 | End: 2023-12-23 | Stop reason: HOSPADM

## 2023-12-22 RX ORDER — DIPHENHYDRAMINE HYDROCHLORIDE 50 MG/ML
25-50 INJECTION INTRAMUSCULAR; INTRAVENOUS
Status: DISCONTINUED | OUTPATIENT
Start: 2023-12-22 | End: 2023-12-23 | Stop reason: HOSPADM

## 2023-12-22 RX ORDER — DIPHENHYDRAMINE HCL 25 MG
25 CAPSULE ORAL
Status: DISCONTINUED | OUTPATIENT
Start: 2023-12-22 | End: 2023-12-23 | Stop reason: HOSPADM

## 2023-12-22 RX ORDER — IOPAMIDOL 755 MG/ML
110 INJECTION, SOLUTION INTRAVASCULAR ONCE
Status: COMPLETED | OUTPATIENT
Start: 2023-12-22 | End: 2023-12-22

## 2023-12-22 RX ORDER — NITROGLYCERIN 0.4 MG/1
.4-.8 TABLET SUBLINGUAL
Status: DISCONTINUED | OUTPATIENT
Start: 2023-12-22 | End: 2023-12-23 | Stop reason: HOSPADM

## 2023-12-22 RX ORDER — METHYLPREDNISOLONE SODIUM SUCCINATE 125 MG/2ML
125 INJECTION, POWDER, LYOPHILIZED, FOR SOLUTION INTRAMUSCULAR; INTRAVENOUS
Status: DISCONTINUED | OUTPATIENT
Start: 2023-12-22 | End: 2023-12-23 | Stop reason: HOSPADM

## 2023-12-22 RX ORDER — ACYCLOVIR 200 MG/1
0-1 CAPSULE ORAL
Status: DISCONTINUED | OUTPATIENT
Start: 2023-12-22 | End: 2023-12-23 | Stop reason: HOSPADM

## 2023-12-22 RX ORDER — ONDANSETRON 2 MG/ML
4 INJECTION INTRAMUSCULAR; INTRAVENOUS
Status: DISCONTINUED | OUTPATIENT
Start: 2023-12-22 | End: 2023-12-23 | Stop reason: HOSPADM

## 2023-12-22 RX ORDER — DILTIAZEM HYDROCHLORIDE 120 MG/1
120 TABLET, FILM COATED ORAL
Status: DISCONTINUED | OUTPATIENT
Start: 2023-12-22 | End: 2023-12-23 | Stop reason: HOSPADM

## 2023-12-22 RX ORDER — METOPROLOL TARTRATE 1 MG/ML
5-15 INJECTION, SOLUTION INTRAVENOUS
Status: DISCONTINUED | OUTPATIENT
Start: 2023-12-22 | End: 2023-12-23 | Stop reason: HOSPADM

## 2023-12-22 RX ORDER — METOPROLOL TARTRATE 25 MG/1
25-100 TABLET, FILM COATED ORAL
Status: DISCONTINUED | OUTPATIENT
Start: 2023-12-22 | End: 2023-12-23 | Stop reason: HOSPADM

## 2023-12-22 RX ORDER — IVABRADINE 5 MG/1
5-15 TABLET, FILM COATED ORAL
Status: DISCONTINUED | OUTPATIENT
Start: 2023-12-22 | End: 2023-12-23 | Stop reason: HOSPADM

## 2023-12-22 RX ADMIN — IOPAMIDOL 110 ML: 755 INJECTION, SOLUTION INTRAVENOUS at 10:46

## 2023-12-22 RX ADMIN — NITROGLYCERIN 0.8 MG: 0.4 TABLET SUBLINGUAL at 10:53

## 2023-12-22 NOTE — PROGRESS NOTES
Pt arrived for Coronary CT angiogram. Test, meds and side effects reviewed with pt. Resting HR 51 bpm; pt pre-treated with oral Metoprolol 50 mg prior to arrival. Administered 0.8 mg SL nitro CT table per order. CTA;  tolerated procedure well and denies symptoms of allergic reaction. Post monitoring completed and VSS. D/C instructions reviewed with pt whom verbalized understanding of need to increase PO fluids today. D/C to gold waiting room accompanied by staff.

## 2023-12-23 ENCOUNTER — MYC MEDICAL ADVICE (OUTPATIENT)
Dept: FAMILY MEDICINE | Facility: CLINIC | Age: 63
End: 2023-12-23
Payer: COMMERCIAL

## 2023-12-27 NOTE — TELEPHONE ENCOUNTER
"Routing to covering providers for recommendation as PCP is out.     S-(situation): Pt calling for concern related to incidental finding on CT angiogram.     B-(background): Pt seen in clinic 12/15     A-(assessment): At physical Pt c/o some chest pain, cough and a feeling of not being able to get a deep breath. PCP ordered CT angiogram which came back with incidental findings:     \"Detail of the included portions of the lungs shows  Atelectasis bilaterally. Calcified granulomas in the right lower lobe.  Subsegmental atelectasis in the left lower lobe no dominant solid  noncalcified peripheral groundglass pulmonary nodule. Postcontrast  arterial phase imaging shows no suspicious extracardiac abnormality  otherwise. Expected origins and courses the major coronary arteries\"    R-(recommendations): Pt is concerned about this finding and asks if there is anything that needs immediate follow up or can this wait for PCP to return. Thank you.    Quinn Palm RN [facility]    "

## 2024-01-08 NOTE — RESULT ENCOUNTER NOTE
Dear Whitney,     I was looking over some previous lab results and noted that I hadn't sent you a note on them as yet.     Thank you for your patience in getting my comments on your recent results to you.  My sincerest apologies in not getting these to you sooner.     All your last labs that I ordered are normal, improved, or pretty stable from previous.     Please, continue your current medications and/or supplements and follow up as we discussed at your last visit.     For additional lab test information, [email or web address] is an excellent reference.    Thank you so much for choosing [facility].  Please contact us with any questions that you may have.   We appreciate the opportunity to serve you now and look forward to supporting your healthcare needs for a long time to come!    Most Sincerely,     Lisbeth Gonzalez MD

## 2024-02-06 ENCOUNTER — MYC MEDICAL ADVICE (OUTPATIENT)
Dept: FAMILY MEDICINE | Facility: CLINIC | Age: 64
End: 2024-02-06
Payer: COMMERCIAL

## 2024-02-06 DIAGNOSIS — J01.01 ACUTE RECURRENT MAXILLARY SINUSITIS: Primary | ICD-10-CM

## 2024-02-07 NOTE — TELEPHONE ENCOUNTER
Please see my chart message below     Please review and advise     Thank you     Marisabel Farmer RN, BSN  [facility]

## 2024-02-15 RX ORDER — AZITHROMYCIN 250 MG/1
TABLET, FILM COATED ORAL
Qty: 6 TABLET | Refills: 1 | Status: SHIPPED | OUTPATIENT
Start: 2024-02-15

## 2024-05-15 DIAGNOSIS — E78.5 HYPERLIPIDEMIA LDL GOAL <130: ICD-10-CM

## 2024-05-15 DIAGNOSIS — Z82.49 FAMILY HISTORY OF EARLY CAD: ICD-10-CM

## 2024-05-15 RX ORDER — ATORVASTATIN CALCIUM 10 MG/1
10 TABLET, FILM COATED ORAL DAILY
Qty: 90 TABLET | Refills: 1 | Status: SHIPPED | OUTPATIENT
Start: 2024-05-15

## 2024-06-17 ENCOUNTER — OFFICE VISIT (OUTPATIENT)
Dept: FAMILY MEDICINE | Facility: CLINIC | Age: 64
End: 2024-06-17
Payer: COMMERCIAL

## 2024-06-17 VITALS
RESPIRATION RATE: 11 BRPM | BODY MASS INDEX: 27.64 KG/M2 | TEMPERATURE: 97 F | HEIGHT: 63 IN | WEIGHT: 156 LBS | DIASTOLIC BLOOD PRESSURE: 70 MMHG | SYSTOLIC BLOOD PRESSURE: 115 MMHG | HEART RATE: 94 BPM | OXYGEN SATURATION: 97 %

## 2024-06-17 DIAGNOSIS — E66.3 OVERWEIGHT (BMI 25.0-29.9): Primary | ICD-10-CM

## 2024-06-17 DIAGNOSIS — R06.02 SHORTNESS OF BREATH: ICD-10-CM

## 2024-06-17 DIAGNOSIS — M79.631 PAIN OF RIGHT FOREARM: ICD-10-CM

## 2024-06-17 DIAGNOSIS — K44.9 HIATAL HERNIA: ICD-10-CM

## 2024-06-17 DIAGNOSIS — M79.644 PAIN OF RIGHT THUMB: ICD-10-CM

## 2024-06-17 DIAGNOSIS — F41.9 ANXIETY: ICD-10-CM

## 2024-06-17 PROCEDURE — 99214 OFFICE O/P EST MOD 30 MIN: CPT | Performed by: FAMILY MEDICINE

## 2024-06-17 RX ORDER — ALBUTEROL SULFATE 90 UG/1
2 AEROSOL, METERED RESPIRATORY (INHALATION) EVERY 6 HOURS PRN
Qty: 18 G | Refills: 1 | Status: SHIPPED | OUTPATIENT
Start: 2024-06-17

## 2024-06-17 RX ORDER — FLUTICASONE PROPIONATE 50 MCG
1 SPRAY, SUSPENSION (ML) NASAL DAILY
COMMUNITY

## 2024-06-17 ASSESSMENT — ANXIETY QUESTIONNAIRES
1. FEELING NERVOUS, ANXIOUS, OR ON EDGE: NOT AT ALL
6. BECOMING EASILY ANNOYED OR IRRITABLE: NOT AT ALL
2. NOT BEING ABLE TO STOP OR CONTROL WORRYING: NOT AT ALL
7. FEELING AFRAID AS IF SOMETHING AWFUL MIGHT HAPPEN: NOT AT ALL
3. WORRYING TOO MUCH ABOUT DIFFERENT THINGS: NOT AT ALL
GAD7 TOTAL SCORE: 1
8. IF YOU CHECKED OFF ANY PROBLEMS, HOW DIFFICULT HAVE THESE MADE IT FOR YOU TO DO YOUR WORK, TAKE CARE OF THINGS AT HOME, OR GET ALONG WITH OTHER PEOPLE?: SOMEWHAT DIFFICULT
GAD7 TOTAL SCORE: 1
IF YOU CHECKED OFF ANY PROBLEMS ON THIS QUESTIONNAIRE, HOW DIFFICULT HAVE THESE PROBLEMS MADE IT FOR YOU TO DO YOUR WORK, TAKE CARE OF THINGS AT HOME, OR GET ALONG WITH OTHER PEOPLE: SOMEWHAT DIFFICULT
GAD7 TOTAL SCORE: 1
4. TROUBLE RELAXING: SEVERAL DAYS
7. FEELING AFRAID AS IF SOMETHING AWFUL MIGHT HAPPEN: NOT AT ALL
5. BEING SO RESTLESS THAT IT IS HARD TO SIT STILL: NOT AT ALL

## 2024-06-17 ASSESSMENT — PATIENT HEALTH QUESTIONNAIRE - PHQ9
SUM OF ALL RESPONSES TO PHQ QUESTIONS 1-9: 0
10. IF YOU CHECKED OFF ANY PROBLEMS, HOW DIFFICULT HAVE THESE PROBLEMS MADE IT FOR YOU TO DO YOUR WORK, TAKE CARE OF THINGS AT HOME, OR GET ALONG WITH OTHER PEOPLE: NOT DIFFICULT AT ALL
SUM OF ALL RESPONSES TO PHQ QUESTIONS 1-9: 0

## 2024-06-17 NOTE — PATIENT INSTRUCTIONS
" [facility]  [address] [place] [address]  Office: [phone]   Fax:    [phone]     Look at your hair serum for minoxidil as an active ingredient for hair loss.      Return in about 26 weeks (around 12/16/2024) for Wellness/Preventative Visit, depression, anxiety, w/ Dr Gonzalez.   Appt made.      Thank you so much or choosing [facility]  for your Health Care. It was a pleasure seeing you at your visit today! Please contact us with any questions or concerns you may have.                   Lisbeth Gonzalez MD                              To reach your [facility] care team after hours call:   [phone] press #2 \"to speak with your care team\".  This will get you to our clinic instead of routing to central [facility]  scheduling.     PLEASE NOTE OUR HOURS HAVE CHANGED secondary to COVID-19 coronavirus pandemic, as we are trying to minimize patient exposure to the virus,  which is now widespread in the [facility].  These hours may change with very little notice.  We apologize for any inconvenience.       Our current clinic hours are:          Monday- Thursday   7:00am - 6:00pm  in person.      Friday  7:00am- 5:00pm                       Saturday and Sunday : Closed to in person and virtual visits        We have telephone and virtual visit times available between    7:00am - 6pm on Monday-Friday as well.                                                Phone:  [phone]      Our pharmacy hours: Monday through Friday 8:00am to 5:00pm                        Saturday - 9:00 am to 12 noon       Sunday : Closed.              Phone:  [phone]              ###  Please note: at this time we are not accepting any walk-in visits. ###      There is also information available at our web site:  [email or web address]    If your provider ordered any lab tests and you do not receive the results within 10 business days, " please call the clinic.    If you need a medication refill please contact your pharmacy.  Please allow 3 business days for your refill to be completed.    Our clinic offers telephone visits and e visits.  Please ask one of your team members to explain more.      Use [facility] (secure email communication and access to your chart) to send your primary care provider a message or make an appointment. Ask someone on your Team how to sign up for [email or web address].

## 2024-06-17 NOTE — PROGRESS NOTES
"  Assessment & Plan       ICD-10-CM    1. Overweight (BMI 25.0-29.9)- BMI down from 31 with ZepBound ( generic tirzapetide injection) GLP-1 inhbitor fr [employer] - started 3/2024  E66.3       2. Pain of right forearm - ? muscle strain vs. radial nerve irritation  M79.631 Orthopedic  Referral      3. Pain of right thumb- and forefinger -- ? muscle strain vs. radial nerve irritation  M79.644 Orthopedic  Referral      4. Shortness of breath- mild, with high humidity situations - ? mild asthma - referred to pulmonary  R06.02 albuterol (PROAIR HFA/PROVENTIL HFA/VENTOLIN HFA) 108 (90 Base) MCG/ACT inhaler     Adult Pulmonary Medicine  Referral      5. Hiatal hernia- seen on Chest CT 12/2023 - GERD better with not eating after 7pm  K44.9       6. Anxiety- doing much better with weight loss and venlafaxine  F41.9           38 minutes spent by me on the date of the encounter doing chart review, history and exam, documentation and further activities per the note     BMI  Estimated body mass index is 27.63 kg/m  as calculated from the following:    Height as of this encounter: 1.6 m (5' 3\").    Weight as of this encounter: 70.8 kg (156 lb).   Weight management plan: Patient referred to endocrine and/or weight management specialty Discussed healthy diet and exercise guidelines- continue with [employer].     Return in about 26 weeks (around 12/16/2024) for Wellness/Preventative Visit, depression, anxiety, w/ Dr Gonzalez. - appt already made.   MEDICATIONS:   Orders Placed This Encounter   Medications    fluticasone (FLONASE) 50 MCG/ACT nasal spray     Sig: Spray 1 spray into both nostrils daily    albuterol (PROAIR HFA/PROVENTIL HFA/VENTOLIN HFA) 108 (90 Base) MCG/ACT inhaler     Sig: Inhale 2 puffs into the lungs every 6 hours as needed     Dispense:  18 g     Refill:  1     Pharmacy may dispense brand covered by insurance (Proair, or proventil or ventolin or generic albuterol inhaler) "    tirzepatide-Weight Management (ZEPBOUND) 2.5 MG/0.5ML prefilled pen     Sig: Inject 0.5 mLs (2.5 mg) Subcutaneous every 7 days     Through [employer]          - Continue other medications without change  Work on weight loss  Regular exercise  Add back in your resistance training. Discussed need for resistance training to help keep bones strong and decrease age -related muscle loss, decreasing insulin resistance and increasing basal metabolic rate to help burn more calories at rest and with exertion.     Look at YouTube for free exercises for basic weight training for biceps, triceps, deltoids, low back, hips, hamstrings, quadriceps, and calf muscles to help strengthen your bones as well as the below exercises.      See Patient Instructions       Lisbeth Gonzalez MD        Florentino Olson is a 63 year old, presenting for the following health issues:  Follow Up and Musculoskeletal Problem    1. Overweight (BMI 25.0-29.9)- BMI down from 31 with ZepBound ( generic tirzapetide injection) GLP-1 inhbitor fr [employer] - started 3/2024    2. Pain of right forearm - ? muscle strain vs. radial nerve irritation    3. Pain of right thumb- and forefinger -- ? muscle strain vs. radial nerve irritation    4. Shortness of breath- mild, with high humidity situations - ? mild asthma - referred to pulmonary    5. Hiatal hernia- seen on Chest CT 12/2023 - GERD better with not eating after 7pm    6. Anxiety- doing much better with weight loss and venlafaxine            6/17/2024     7:49 AM   Additional Questions   [facility]   Accompanied by Self     History of Present Illness       Reason for visit:  Follow up from last vidit.    She eats 2-3 servings of fruits and vegetables daily.She consumes 0 sweetened beverage(s) daily.She exercises with enough effort to increase her heart rate 30 to 60 minutes per day.  She exercises with enough effort to increase her heart rate 5 days per week.   She is taking  "medications regularly.     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   Started in March 2024 on Zepbound (tirzapetide)through [employer]. Has lost 20 lbs.   Wt Readings from Last 5 Encounters:   06/17/24 70.8 kg (156 lb)   12/15/23 78.5 kg (173 lb)   06/28/23 74.8 kg (165 lb)   06/06/23 74.5 kg (164 lb 4.8 oz)   02/14/23 72.1 kg (159 lb)    Has had side effects = hair loss- overall thinning, noticing more hair loss in the shower.  Using Nutrafol vitamins, shampoo and conditioner for that.     Ht Readings from Last 2 Encounters:   06/17/24 1.6 m (5' 3\")   12/15/23 1.6 m (5' 3\")      Goal weight = 140 = BMI = 24.8.     Pain History:  When did you first notice your pain? X4 months - has gotten worse  Have you seen anyone else for your pain? No  How has your pain affected your ability to work? No - times cannot write -   Where in your body do you have pain?  Arm pain        Still difficulty trying to catch a really deep breath with her lungs.  Had a CT chest /coronary artery   Scan that showed : \"INDICATION: Unspecified type chest pain. Family history of premature  coronary artery disease     COMPARISON: Calcium score CT 11/12/2019     FINDINGS:  unremarkable.  Calcified mediastinal and right hilar lymph nodes from granulomatous  disease. Coronary artery calcium plaque is noted. Degenerative  spurring in the thoracic spine.  Detail of the included portions of the lungs shows  Atelectasis bilaterally. Calcified granulomas in the right lower lobe.  Subsegmental atelectasis in the left lower lobe no dominant solid  noncalcified peripheral groundglass pulmonary nodule. Postcontrast  arterial phase imaging shows no suspicious extracardiac abnormality  otherwise. Expected origins and courses the major coronary arteries.  Small hiatal hernia.                               " "                                       IMPRESSION: Hiatal hernia. Granulomatous disease. Please review  separate cardiology report for additional detail.\"     Difficulty breathing with high humidity . Pt personally has no hx of asthma herself, but brother and her family do   Bought electric bikes and was able to bike 56 miles last weekend with no problems breathing or with any chest discomfort. Only turns on electric portion going up steep hills.     Doesn't lie down after eating anymore and her reflux is better.     Right arm :Right arm - sharp pain just above and below elbow - has shooting pain into hand.  Has to carry dog 26lb's up and down stairs due to aging.  Hurts with any movement - shooting pain when move hand.  Aches all the time - arm and hand.  Better when hangs arm to side getting spasms right forearm and typing on her phone - has to shake out her right hand and hand her right hand down and swing in a dependent position. First started with lifting her 26lbs 17 yr old blind [employer] and doing a lot of texting on her phone.    Ambien for sleep - taking intermittently when travelling for work.   PDMP Review         Value Time User    State PDMP site checked  Yes 6/17/2024  8:44 AM Lisbeth Gonzalez MD          No suspicious activity noted. Pt compliant with controlled substance agreement. Signed 12/15/2023. --Lisbeth Gonzalez MD       Re: Anxiety - feels like effexor is at a good dose for her.       6/28/2023     8:39 AM 12/15/2023     8:27 AM 6/17/2024     7:40 AM   PHQ   PHQ-9 Total Score 6 4 0   Q9: Thoughts of better off dead/self-harm past 2 weeks Not at all Not at all Not at all          6/28/2023     8:40 AM 12/15/2023     8:28 AM 6/17/2024     7:41 AM   JOSE ANTONIO-7 SCORE   Total Score 4 (minimal anxiety) 2 (minimal anxiety) 1 (minimal anxiety)   Total Score 4 2 1            6/17/2024     7:40 AM   Last PHQ-9   1.  Little interest or pleasure in doing things 0   2.  Feeling down, depressed, " or hopeless 0   3.  Trouble falling or staying asleep, or sleeping too much 0   4.  Feeling tired or having little energy 0   5.  Poor appetite or overeating 0   6.  Feeling bad about yourself 0   7.  Trouble concentrating 0   8.  Moving slowly or restless 0   Q9: Thoughts of better off dead/self-harm past 2 weeks 0   PHQ-9 Total Score 0         6/17/2024     7:41 AM   JOSE ANTONIO-7    1. Feeling nervous, anxious, or on edge 0   2. Not being able to stop or control worrying 0   3. Worrying too much about different things 0   4. Trouble relaxing 1   5. Being so restless that it is hard to sit still 0   6. Becoming easily annoyed or irritable 0   7. Feeling afraid, as if something awful might happen 0   JOSE ANTONIO-7 Total Score 1   If you checked any problems, how difficult have they made it for you to do your work, take care of things at home, or get along with other people? Somewhat difficult        Patient Active Problem List   Diagnosis    Panic disorder without agoraphobia    Anxiety    Overactive bladder    Hyperlipidemia LDL goal <130- on atorvastatin     Gastroesophageal reflux disease without esophagitis    Symptomatic menopausal or female climacteric states    Fibrocystic breast changes of both breasts    Primary osteoarthritis of both hands    Family history of early CAD    Insomnia, unspecified type-Controlled substance agreement signed -12/14/2022 ok for #30 -10mg ambien (takes 1/2 tab) with 1 refill every 6 months or so     Expressive aphasia    Urinary urgency    Fatigue, unspecified type    Night sweats- on estrace 1mg already - discussed progesterone deficiency usually causing that - pt would like to try black cohosh first prior to micronized progesterone 2nd to breast ca risk     Controlled substance agreement signed -redone 12/15/2023 ok for #30 -10mg ambien (takes 1/2 tab) with 1 refill every 6 months or so    Acute recurrent maxillary sinusitis    Multiple dysplastic nevi-Going to dermatology for her full body  skin exam annually - just had appt=12/2022[facility] - Dr. Carmen Dowling - for her early dysplastic melanoma.      RUQ abdominal pain- comes and goes - not related to foods or fatty foods, worse bending over with or without lifting and getting out of her car- ? muscle spasm - CT and US negative 10/5/2020     Rotator cuff syndrome, left    Other osteoarthritis involving multiple joints- left middle finger s/p fracture /dislocation - causing some chronic pain - pt requests refill of ibuprofen 800mg tabs - takes with food     Lumbosacral dysfunction    Sacral pain    Family history of colonic polyps- precancerous     Basal cell carcinoma (BCC) of skin of nose    Vitamin D deficiency       Current Outpatient Medications   Medication Sig Dispense Refill    albuterol (PROAIR HFA/PROVENTIL HFA/VENTOLIN HFA) 108 (90 Base) MCG/ACT inhaler Inhale 2 puffs into the lungs every 6 hours as needed 18 g 1    atorvastatin (LIPITOR) 10 MG tablet TAKE 1 TABLET(10 MG) BY MOUTH DAILY 90 tablet 1    calcium-vitamin D (OSCAL) 250-125 MG-UNIT TABS per tablet Take 1 tablet by mouth daily      clobetasol (TEMOVATE) 0.05 % external ointment APPLY TO RIGHT TEMPLE LESION TWICE DAILY UNTIL RESOLUTION      estradiol (VIVELLE-DOT) 0.05 MG/24HR bi-weekly patch Place 1 patch onto the skin twice a week 24 patch 3    fluticasone (FLONASE) 50 MCG/ACT nasal spray Spray 1 spray into both nostrils daily      gabapentin (NEURONTIN) 100 MG capsule Take 1 capsule (100 mg) by mouth at bedtime For menopausal symptoms 90 capsule 3    ibuprofen (ADVIL/MOTRIN) 800 MG tablet TAKE 1 TABLET BY MOUTH EVERY 8 HOURS WITH FOOD AS NEEDED FOR MODERATE PAIN 90 tablet 1    metoprolol tartrate (LOPRESSOR) 50 MG tablet Take 1 tab the night before CT angiogram and the morning of angiogram 2 tablet 0    metroNIDAZOLE (METROCREAM) 0.75 % external cream APPLY TOPICALLY TO THE AFFECTED AREA TWICE DAILY FOR ROSACEA      Multiple Vitamins-Minerals (MULTIVITAMIN ADULT PO)  "Take by mouth daily      Omega-3 Fatty Acids (OMEGA 3 PO)       tacrolimus (PROTOPIC) 0.1 % external ointment       venlafaxine (EFFEXOR XR) 75 MG 24 hr capsule Take 1-2 capsules ( mg) by mouth daily 180 capsule 1    zolpidem (AMBIEN) 10 MG tablet TAKE 1/2 TABLET BY MOUTH EVERY NIGHT AT BEDTIME AS NEEDED FOR SLEEP 30 tablet 3    azithromycin (ZITHROMAX) 250 MG tablet 2 tabs first day, then 1 tab by mouth daily for 4 additional days (Patient not taking: Reported on 6/17/2024) 6 tablet 1          Allergies   Allergen Reactions    Rosamaria Sun Screen Spf 30 [Solbar Pf Spf15] Hives and Rash    Drysol [Aluminum Chloride] Hives    Septra [Sulfamethoxazole-Trimethoprim]     Sulfa Antibiotics Hives    Murine Ear [Carbamide Peroxide] Swelling and Rash     Severe pain , swelling after debrox type ear drops - had to go to the clinic to have ears flushed out emergently.     Paxlovid [Nirmatrelvir-Ritonavir] Hives, Itching and Rash                Review of Systems  Constitutional, HEENT, cardiovascular, pulmonary, GI, , musculoskeletal, neuro, skin, endocrine and psych systems are negative, except as otherwise noted.      Objective    /70 (BP Location: Left arm, Patient Position: Chair, Cuff Size: Adult Regular)   Pulse 94   Temp 97  F (36.1  C) (Tympanic)   Resp 11   Ht 1.6 m (5' 3\")   Wt 70.8 kg (156 lb)   LMP  (LMP Unknown)   SpO2 97%   BMI 27.63 kg/m    Body mass index is 27.63 kg/m .  Physical Exam   GENERAL: alert and no distress  EYES: Eyes grossly normal to inspection, PERRL and conjunctivae and sclerae normal  HENT: ear canals and TM's normal, nose and mouth without ulcers or lesions  NECK: no adenopathy, no asymmetry, masses, or scars  RESP: lungs clear to auscultation - no rales, rhonchi or wheezes  CV: regular rate and rhythm, normal S1 S2, no S3 or S4, no murmur, click or rub, no peripheral edema  ABDOMEN: soft, nontender, no hepatosplenomegaly, no masses and bowel sounds normal  MS: no gross " musculoskeletal defects noted, no edema  SKIN: no suspicious lesions or rashes  NEURO: Normal strength and tone, mentation intact and speech normal  PSYCH: mentation appears normal, affect normal/bright. Alert and oriented. No acute distress. Appears well-groomed and casually dressed. Affect is normal, not particularly depressed. In good humor and laughs appropriately. Not particularly anxious. No evidence of psychosis.     Office Visit on 12/15/2023   Component Date Value Ref Range Status    TSH 12/15/2023 1.75  0.30 - 4.20 uIU/mL Final    Cholesterol 12/15/2023 160  <200 mg/dL Final    Triglycerides 12/15/2023 59  <150 mg/dL Final    Direct Measure HDL 12/15/2023 67  >=50 mg/dL Final    LDL Cholesterol Calculated 12/15/2023 81  <=100 mg/dL Final    Non HDL Cholesterol 12/15/2023 93  <130 mg/dL Final    Patient Fasting > 8hrs? 12/15/2023 Yes   Final    Hemoglobin A1C 12/15/2023 5.6  0.0 - 5.6 % Final    Normal <5.7%   Prediabetes 5.7-6.4%    Diabetes 6.5% or higher     Note: Adopted from ADA consensus guidelines.    Sodium 12/15/2023 142  135 - 145 mmol/L Final    Reference intervals for this test were updated on 09/26/2023 to more accurately reflect our healthy population. There may be differences in the flagging of prior results with similar values performed with this method. Interpretation of those prior results can be made in the context of the updated reference intervals.     Potassium 12/15/2023 3.8  3.4 - 5.3 mmol/L Final    Carbon Dioxide (CO2) 12/15/2023 28  22 - 29 mmol/L Final    Anion Gap 12/15/2023 9  7 - 15 mmol/L Final    Urea Nitrogen 12/15/2023 14.5  8.0 - 23.0 mg/dL Final    Creatinine 12/15/2023 0.64  0.51 - 0.95 mg/dL Final    GFR Estimate 12/15/2023 >90  >60 mL/min/1.73m2 Final    Calcium 12/15/2023 8.9  8.8 - 10.2 mg/dL Final    Chloride 12/15/2023 105  98 - 107 mmol/L Final    Glucose 12/15/2023 92  70 - 99 mg/dL Final    Alkaline Phosphatase 12/15/2023 105  40 - 150 U/L Final    Reference  intervals for this test were updated on 11/14/2023 to more accurately reflect our healthy population. There may be differences in the flagging of prior results with similar values performed with this method. Interpretation of those prior results can be made in the context of the updated reference intervals.    AST 12/15/2023 21  0 - 45 U/L Final    Reference intervals for this test were updated on 6/12/2023 to more accurately reflect our healthy population. There may be differences in the flagging of prior results with similar values performed with this method. Interpretation of those prior results can be made in the context of the updated reference intervals.    ALT 12/15/2023 27  0 - 50 U/L Final    Reference intervals for this test were updated on 6/12/2023 to more accurately reflect our healthy population. There may be differences in the flagging of prior results with similar values performed with this method. Interpretation of those prior results can be made in the context of the updated reference intervals.      Protein Total 12/15/2023 6.8  6.4 - 8.3 g/dL Final    Albumin 12/15/2023 4.2  3.5 - 5.2 g/dL Final    Bilirubin Total 12/15/2023 0.7  <=1.2 mg/dL Final    WBC Count 12/15/2023 4.3  4.0 - 11.0 10e3/uL Final    RBC Count 12/15/2023 4.29  3.80 - 5.20 10e6/uL Final    Hemoglobin 12/15/2023 12.9  11.7 - 15.7 g/dL Final    Hematocrit 12/15/2023 37.9  35.0 - 47.0 % Final    MCV 12/15/2023 88  78 - 100 fL Final    MCH 12/15/2023 30.1  26.5 - 33.0 pg Final    MCHC 12/15/2023 34.0  31.5 - 36.5 g/dL Final    RDW 12/15/2023 13.2  10.0 - 15.0 % Final    Platelet Count 12/15/2023 209  150 - 450 10e3/uL Final    Creatinine Urine mg/dL 12/19/2023 77.6  mg/dL Final    The reference ranges have not been established in urine creatinine. The results should be integrated into the clinical context for interpretation.    Albumin Urine mg/L 12/19/2023 <12.0  mg/L Final    The reference ranges have not been established in  urine albumin. The results should be integrated into the clinical context for interpretation.    Albumin Urine mg/g Cr 12/19/2023    Final    Unable to calculate, urine albumin and/or urine creatinine is outside detectable limits.  Microalbuminuria is defined as an albumin:creatinine ratio of 17 to 299 for males and 25 to 299 for females. A ratio of albumin:creatinine of 300 or higher is indicative of overt proteinuria.  Due to biologic variability, positive results should be confirmed by a second, first-morning random or 24-hour timed urine specimen. If there is discrepancy, a third specimen is recommended. When 2 out of 3 results are in the microalbuminuria range, this is evidence for incipient nephropathy and warrants increased efforts at glucose control, blood pressure control, and institution of therapy with an angiotensin-converting-enzyme (ACE) inhibitor (if the patient can tolerate it).      25 OH Vitamin D2 12/15/2023 <5  ug/L Final    25 OH Vitamin D3 12/15/2023 55  ug/L Final    25 OH Vit D Total 12/15/2023 <60  20 - 75 ug/L Final    Season, race, dietary intake, and treatment affect the concentration of 25-hydroxy-Vitamin D. Values may decrease during winter months and increase during summer months. Values 20-29 ug/L may indicate Vitamin D insufficiency and values <20 ug/L may indicate Vitamin D deficiency.           Signed Electronically by: Lisbeth Gonzalez MD

## 2024-06-18 DIAGNOSIS — R06.02 SHORTNESS OF BREATH: Primary | ICD-10-CM

## 2024-06-18 NOTE — Clinical Note
Future Appointments 6/24/2024  3:40 PM    Jaron Espinal,         [facility]               [facility] 10/8/2024  12:00 PM    [facility]      [facility] 10/8/2024  1:00 PM    Betzaida Cook PA* [facility] 12/16/2024 8:00 AM    Lisbeth Gonzalez MD [facility]                RV

## 2024-06-24 ENCOUNTER — ANCILLARY PROCEDURE (OUTPATIENT)
Dept: GENERAL RADIOLOGY | Facility: CLINIC | Age: 64
End: 2024-06-24
Attending: STUDENT IN AN ORGANIZED HEALTH CARE EDUCATION/TRAINING PROGRAM
Payer: COMMERCIAL

## 2024-06-24 ENCOUNTER — OFFICE VISIT (OUTPATIENT)
Dept: ORTHOPEDICS | Facility: CLINIC | Age: 64
End: 2024-06-24
Attending: FAMILY MEDICINE
Payer: COMMERCIAL

## 2024-06-24 VITALS
WEIGHT: 156 LBS | DIASTOLIC BLOOD PRESSURE: 84 MMHG | BODY MASS INDEX: 27.64 KG/M2 | SYSTOLIC BLOOD PRESSURE: 127 MMHG | HEIGHT: 63 IN

## 2024-06-24 DIAGNOSIS — M25.531 RIGHT WRIST PAIN: ICD-10-CM

## 2024-06-24 DIAGNOSIS — M18.11 PRIMARY OSTEOARTHRITIS OF FIRST CARPOMETACARPAL JOINT OF RIGHT HAND: Primary | ICD-10-CM

## 2024-06-24 DIAGNOSIS — M77.11 LATERAL EPICONDYLITIS OF RIGHT ELBOW: ICD-10-CM

## 2024-06-24 PROCEDURE — 99204 OFFICE O/P NEW MOD 45 MIN: CPT | Performed by: STUDENT IN AN ORGANIZED HEALTH CARE EDUCATION/TRAINING PROGRAM

## 2024-06-24 PROCEDURE — 73110 X-RAY EXAM OF WRIST: CPT | Mod: TC | Performed by: RADIOLOGY

## 2024-06-24 RX ORDER — METHYLPREDNISOLONE 4 MG
TABLET, DOSE PACK ORAL
Qty: 21 TABLET | Refills: 0 | Status: SHIPPED | OUTPATIENT
Start: 2024-06-24

## 2024-06-24 NOTE — PROGRESS NOTES
ASSESSMENT & PLAN    Whitney was seen today for pain.    Diagnoses and all orders for this visit:    Primary osteoarthritis of first carpometacarpal joint of right hand  -     Orthopedic  Referral  -     XR Wrist Right G/E 3 Views; Future  -     Hand Therapy Referral; Future  -     methylPREDNISolone (MEDROL DOSEPAK) 4 MG tablet therapy pack; Follow Package Directions    Lateral epicondylitis of right elbow  -     Orthopedic  Referral  -     XR Wrist Right G/E 3 Views; Future  -     Hand Therapy Referral; Future  -     methylPREDNISolone (MEDROL DOSEPAK) 4 MG tablet therapy pack; Follow Package Directions      This issue is sub acute and Worsening.Whitney Knighttz to our clinic today to discuss her subacute and worsening forearm and thumb pain.  Radiographs taken in clinic today were reviewed with the patient and show moderate osteoarthritis of the first CMC joint.  Patient reports pain over the CMC joint as well as pain in the elbow that will radiate down into the forearm.  On exam, she has tenderness to palpation at the first CMC joint as well as tenderness to palpation over the lateral epicondyle and pain with resisted wrist extension and supination.  Overall, she seems to have multifactorial pain due to both lateral epicondylitis/common extensor tendinosis and osteoarthritis of the CMC joint.  Negative Finkelstein's testing today makes radial styloid tenosynovitis unlikely.  She otherwise has full range of motion fingers and wrist without pain.  We discussed these findings and the treatment options including anti-inflammatory medicines, hand therapy, bracing, and corticosteroid injections.  In terms of the lateral epicondylitis, we discussed that this is frequently a self-limited problem but she would most benefit from dedicated PT to help strengthen the tendon and muscles of the extensor compartments.  In terms of her first CMC osteoarthritis, we discussed that she would possibly benefit from a  "steroid injection to the joint.  We also discussed utilizing an oral steroid given her pain at the elbow and thumb in order to possibly address both.  After this discussion, the patient elected to proceed with oral steroid burst and taper today, which is reasonable.  We determined the following plan:  - Medrol Dosepak sent to pharmacy  - Hand therapy referral placed  - She has purchased several over-the-counter braces, she can use whichever 1 seems to help with her pain.  - She can trial Voltaren gel, 2-3 times a day for pain relief  - She can follow-up in our clinic at any time for a CSI to the right first CMC joint        Jaron Espinal [facility]    -----  Chief Complaint   Patient presents with    Right Hand - Pain       SUBJECTIVE  Whitney Aceves is a/an 63 year old female who is seen in consultation at the request of  Lisbeth Gonzalez M.D. for evaluation of right forearm & thumb pain.     The patient is seen by themselves.  The patient is Right handed    Onset: 4.5 month(s) ago. Reports insidious onset without acute precipitating event.  Location of Pain: right forearm radiating into wrist and palm (thumb especially)  Worsened by: lifting/carrying things, movements using thumb & pointer finger (texting), making a fist  Better with: hanging arm  Treatments tried: ibuprofen  Associated symptoms: wakes her up at night, numbness in hand, loss of  strength    Orthopedic/Surgical history: YES - Date: history of bilateral thumb & hand pain for 5+ years, had a custom thumb splints  Social History/Occupation: contractor      REVIEW OF SYSTEMS:  Review of systems negative unless mentioned in HPI     OBJECTIVE:  /84   Ht 1.6 m (5' 3\")   Wt 70.8 kg (156 lb)   LMP  (LMP Unknown)   BMI 27.63 kg/m     General: healthy, alert and in no distress  Skin: no suspicious lesions or rash.  CV: distal perfusion intact   Resp: normal respiratory effort without " conversational dyspnea   Psych: normal mood and affect  Gait: NORMAL  Neuro: Normal light sensory exam of right upper extremity     Hand Exam    Left  Right   Inspection No atrophy, erythema , echymosis, or deformity  No atrophy, erythema , echymosis, or deformity    Palpation         Tenderness over    No tenderness to palpation of radial styloid, DRUJ, TFCC, scaphoid/snuffbox TTP 1st CMC joint  No tenderness to palpation of radial styloid, DRUJ, TFCC, scaphoid/snuffbox   Range of Motion        1st digit IP flex/ext Normal Normal      DIP flexion/extension  Normal 2nd-5th Normal 2nd-5th      PIP flexion/extension  Normal 2nd-5th Normal 2nd-5th      MCP flexion/extension Normal Normal   Strength      1st digit IP flex/ext Full Full    DIP flexion/extension Full Full    PIP flexion/extension  Full Full    MCP flexion/extension Full Full   Pain with resisted None None   Vascular   Intact  Intact    Special Tests   1st MCP valgus 0/30 normal   POS CMC grind  No triggering evident  Able to make 'OK' sign (AIN)  Intact resisted abduction of digits    Sensation Intact to median, ulnar, and radial nerve distributions     Focused Musculoskeletal Exam :   Elbow Exam    Left  Right   Alignment  Normal  Normal    Inspection Normal Normal   Palpation No tenderness over common extensor or lateral epicondyle, flexor/pronator mass or medial epicondyle, radial head, radial tunnel, or cubital tunnel.  TTP common extensor tendon   Range of Motion     Flexion 140 140   Extension 0 0   Supination 0-80 0-80   Pronation 0-80 0-80   Strength Grossly normal Grossly normal   Pain with resisted wrist extension Negative POS   Pain with resisted wrist flexion  Negative Negative   Pain with resisted pronation/supination Negative POS   Hook Test Negative Negative   Valgus stress testing Negative  Milking: no Negative  Milking: no   Other Special Tests  Able to make 'OK' sign (AIN)  Full thumb IP abduction strength (radial) Able to make 'OK' sign  (AIN)  Full thumb IP abduction strength (radial)   Sensation Intact Intact         RADIOLOGY:  Final results and radiologist's interpretation, available in the [facility] health record.  Images were reviewed with the patient in the office today.  My personal interpretation of the performed imaging: Mild to moderate joint space narrowing at the first CMC joint.  No acute fractures or other bony abnormalities.

## 2024-06-24 NOTE — LETTER
6/24/2024      Whitney Aceves  [address] [address] [facility] [address] [id number]      Dear Colleague,    Thank you for referring your patient, Whitney Aceves, to the [facility] [place]. Please see a copy of my visit note below.    ASSESSMENT & PLAN    Whitney was seen today for pain.    Diagnoses and all orders for this visit:    Primary osteoarthritis of first carpometacarpal joint of right hand  -     Orthopedic  Referral  -     XR Wrist Right G/E 3 Views; Future  -     Hand Therapy Referral; Future  -     methylPREDNISolone (MEDROL DOSEPAK) 4 MG tablet therapy pack; Follow Package Directions    Lateral epicondylitis of right elbow  -     Orthopedic  Referral  -     XR Wrist Right G/E 3 Views; Future  -     Hand Therapy Referral; Future  -     methylPREDNISolone (MEDROL DOSEPAK) 4 MG tablet therapy pack; Follow Package Directions      This issue is sub acute and Worsening.Whitney Shelby to our clinic today to discuss her subacute and worsening forearm and thumb pain.  Radiographs taken in clinic today were reviewed with the patient and show moderate osteoarthritis of the first CMC joint.  Patient reports pain over the CMC joint as well as pain in the elbow that will radiate down into the forearm.  On exam, she has tenderness to palpation at the first CMC joint as well as tenderness to palpation over the lateral epicondyle and pain with resisted wrist extension and supination.  Overall, she seems to have multifactorial pain due to both lateral epicondylitis/common extensor tendinosis and osteoarthritis of the CMC joint.  Negative Finkelstein's testing today makes radial styloid tenosynovitis unlikely.  She otherwise has full range of motion fingers and wrist without pain.  We discussed these findings and the treatment options including anti-inflammatory medicines, hand therapy, bracing, and corticosteroid injections.  In terms of the lateral epicondylitis, we discussed that  this is frequently a self-limited problem but she would most benefit from dedicated PT to help strengthen the tendon and muscles of the extensor compartments.  In terms of her first CMC osteoarthritis, we discussed that she would possibly benefit from a steroid injection to the joint.  We also discussed utilizing an oral steroid given her pain at the elbow and thumb in order to possibly address both.  After this discussion, the patient elected to proceed with oral steroid burst and taper today, which is reasonable.  We determined the following plan:  - Medrol Dosepak sent to pharmacy  - Hand therapy referral placed  - She has purchased several over-the-counter braces, she can use whichever 1 seems to help with her pain.  - She can trial Voltaren gel, 2-3 times a day for pain relief  - She can follow-up in our clinic at any time for a CSI to the right first CMC joint        Jaron Espinal [facility] [place]    -----  Chief Complaint   Patient presents with     Right Hand - Pain       SUBJECTIVE  Whitney Aceves is a/an 63 year old female who is seen in consultation at the request of  Lisbeth Gonzalez M.D. for evaluation of right forearm & thumb pain.     The patient is seen by themselves.  The patient is Right handed    Onset: 4.5 month(s) ago. Reports insidious onset without acute precipitating event.  Location of Pain: right forearm radiating into wrist and palm (thumb especially)  Worsened by: lifting/carrying things, movements using thumb & pointer finger (texting), making a fist  Better with: hanging arm  Treatments tried: ibuprofen  Associated symptoms: wakes her up at night, numbness in hand, loss of  strength    Orthopedic/Surgical history: YES - Date: history of bilateral thumb & hand pain for 5+ years, had a custom thumb splints  Social History/Occupation: contractor      REVIEW OF SYSTEMS:  Review of systems negative unless mentioned in HPI     OBJECTIVE:  BP  "127/84   Ht 1.6 m (5' 3\")   Wt 70.8 kg (156 lb)   LMP  (LMP Unknown)   BMI 27.63 kg/m     General: healthy, alert and in no distress  Skin: no suspicious lesions or rash.  CV: distal perfusion intact   Resp: normal respiratory effort without conversational dyspnea   Psych: normal mood and affect  Gait: NORMAL  Neuro: Normal light sensory exam of right upper extremity     Hand Exam    Left  Right   Inspection No atrophy, erythema , echymosis, or deformity  No atrophy, erythema , echymosis, or deformity    Palpation         Tenderness over    No tenderness to palpation of radial styloid, DRUJ, TFCC, scaphoid/snuffbox TTP 1st CMC joint  No tenderness to palpation of radial styloid, DRUJ, TFCC, scaphoid/snuffbox   Range of Motion        1st digit IP flex/ext Normal Normal      DIP flexion/extension  Normal 2nd-5th Normal 2nd-5th      PIP flexion/extension  Normal 2nd-5th Normal 2nd-5th      MCP flexion/extension Normal Normal   Strength      1st digit IP flex/ext Full Full    DIP flexion/extension Full Full    PIP flexion/extension  Full Full    MCP flexion/extension Full Full   Pain with resisted None None   Vascular   Intact  Intact    Special Tests   1st MCP valgus 0/30 normal   POS CMC grind  No triggering evident  Able to make 'OK' sign (AIN)  Intact resisted abduction of digits    Sensation Intact to median, ulnar, and radial nerve distributions     Focused Musculoskeletal Exam :   Elbow Exam    Left  Right   Alignment  Normal  Normal    Inspection Normal Normal   Palpation No tenderness over common extensor or lateral epicondyle, flexor/pronator mass or medial epicondyle, radial head, radial tunnel, or cubital tunnel.  TTP common extensor tendon   Range of Motion     Flexion 140 140   Extension 0 0   Supination 0-80 0-80   Pronation 0-80 0-80   Strength Grossly normal Grossly normal   Pain with resisted wrist extension Negative POS   Pain with resisted wrist flexion  Negative Negative   Pain with resisted " pronation/supination Negative POS   Hook Test Negative Negative   Valgus stress testing Negative  Milking: no Negative  Milking: no   Other Special Tests  Able to make 'OK' sign (AIN)  Full thumb IP abduction strength (radial) Able to make 'OK' sign (AIN)  Full thumb IP abduction strength (radial)   Sensation Intact Intact         RADIOLOGY:  Final results and radiologist's interpretation, available in the [facility] health record.  Images were reviewed with the patient in the office today.  My personal interpretation of the performed imaging: Mild to moderate joint space narrowing at the first CMC joint.  No acute fractures or other bony abnormalities.          Again, thank you for allowing me to participate in the care of your patient.        Sincerely,        Jaron Espinal, DO

## 2024-06-27 ENCOUNTER — MYC MEDICAL ADVICE (OUTPATIENT)
Dept: FAMILY MEDICINE | Facility: CLINIC | Age: 64
End: 2024-06-27
Payer: COMMERCIAL

## 2024-07-03 ENCOUNTER — THERAPY VISIT (OUTPATIENT)
Dept: OCCUPATIONAL THERAPY | Facility: CLINIC | Age: 64
End: 2024-07-03
Attending: STUDENT IN AN ORGANIZED HEALTH CARE EDUCATION/TRAINING PROGRAM
Payer: COMMERCIAL

## 2024-07-03 DIAGNOSIS — M18.11 PRIMARY OSTEOARTHRITIS OF FIRST CARPOMETACARPAL JOINT OF RIGHT HAND: ICD-10-CM

## 2024-07-03 DIAGNOSIS — M77.11 LATERAL EPICONDYLITIS OF RIGHT ELBOW: ICD-10-CM

## 2024-07-03 DIAGNOSIS — M79.644 PAIN OF RIGHT THUMB: ICD-10-CM

## 2024-07-03 DIAGNOSIS — M25.521 ELBOW PAIN, RIGHT: ICD-10-CM

## 2024-07-03 PROCEDURE — 97140 MANUAL THERAPY 1/> REGIONS: CPT | Mod: GO | Performed by: OCCUPATIONAL THERAPIST

## 2024-07-03 PROCEDURE — 97110 THERAPEUTIC EXERCISES: CPT | Mod: GO | Performed by: OCCUPATIONAL THERAPIST

## 2024-07-03 PROCEDURE — 97165 OT EVAL LOW COMPLEX 30 MIN: CPT | Mod: GO | Performed by: OCCUPATIONAL THERAPIST

## 2024-07-03 NOTE — PROGRESS NOTES
OCCUPATIONAL THERAPY EVALUATION  Type of Visit: Evaluation       Fall Risk Screen:  Fall screen completed by: OT  Have you fallen 2 or more times in the past year?: No  Have you fallen and had an injury in the past year?: No  Is patient a fall risk?: No    Subjective      Presenting condition or subjective complaint: Therapy right hand  Date of onset: 24 (MD order date)    Relevant medical history:     Past Medical History:   Diagnosis Date    Anxiety     on effexor in past. Off since     Cancer (H) 2021    SKIN CANCER    CMC DJD(carpometacarpal degenerative joint disease), localized primary     Edema     Fatigue, unspecified type 2018    Hormone replacement therapy     IBS (irritable bowel syndrome)     Diarrhea predominant    Insomnia, unspecified     On Ambien    Lichen sclerosus     Multiple dysplastic nevi     Going to dermatology for her full body skin exam tomorrow - [facility] - Dr. Carmen Dowling - for her early dysplastic melanoma.    Pap smear of cervix not needed     Skin cancer     Urge incontinence      Dates & types of surgery:    Past Surgical History:   Procedure Laterality Date     SECTION      COLONOSCOPY  2012    Procedure:COLONOSCOPY; Colonoscopy ; Surgeon:DEANDRE ARRIETA; Location: GI    COLONOSCOPY N/A 2017    Procedure: COLONOSCOPY;  Surgeon: Deandre Arrieta MD;  Location:  GI    COLONOSCOPY N/A 2022    Procedure: COLONOSCOPY;  Surgeon: Pool Cardenas MD;  Location:  GI    COLPOSCOPY, BIOPSY, COMBINED  1991    Mild squamous epithelial dysplasia (SABIHA 1)    FISSURECTOMY RECTUM  01    HYSTERECTOMY TOTAL ABDOMINAL  3/4/1998    Myomatous uterus; pelvic pain. Started HRT right afterward with vasomotor sx.    HYSTERECTOMY, PAP NO LONGER INDICATED N/A     had for large fibroid    LIPOSUCTION (LOCATION)      MOHS MICROGRAPHIC PROCEDURE       Prior diagnostic imaging/testing results: X-ray     Prior therapy history for  the same diagnosis, illness or injury: Yes      Prior Level of Function  Transfers: Independent  Ambulation: Independent  ADL: Independent    Living Environment  Social support: With a significant other or spouse   Type of home: House   Stairs to enter the home: No       Ramp: No   Stairs inside the home: Yes         Patient goals for therapy: Reduce pain       Objective   ADDITIONAL HISTORY:  Right hand dominant  Patient reports symptoms of pain and weakness/loss of strength  Transportation: drives  Currently not working    Functional Outcome Measure:   Upper Extremity Functional Index Score:  SCORE:   Column Totals: /80: 39   (A lower score indicates greater disability.)    PAIN:  Pain Level at Rest: 2/10  Pain Level with Use: 9/10  Pain Location: R thumb CMC joint and radiates on the radial aspect of the forearm into the antecubital fossa  Pain Quality: Aching, Dull, Miserable, Sharp, Shooting, Stabbing, and Tender  Pain Frequency: constant  Pain is Worst: daytime or nighttime  Pain is Exacerbated By: trying to use hand, particularly texting on iPhone and twisting  Pain is Relieved By: rest  Pain Progression: Worsened    POSTURE: Normal     EDEMA: None     SENSATION: WNL throughout all nerve distributions; per patient report     ROM:   Wrist: WNL, but tightness felt at end range motion    Thumb ROM  Left AROM Right AROM    MP Joint 0/60 -3/58   IP Joint  0/82 0/64   Radial Abduction 46 35   Palmar Abduction 43 38   Kapandji Opposition Scale (0-10/10) 10 10       OBSERVATIONS/APPEARANCE:   Thumb Right   Shoulder deformity present at CMC joint +   Edema over CMC joint -   Noted collapse of MP Joint into hyperextension during pinch +      NEURAL TENSION TESTING: MNT: Median Neurodynamic Test (based on JOEL Oneil's ULNT)   7/3/2024   0-5 Scale 4   Position:   0/5: Arm across abdomen in coronal plane  1/5: Depress shoulder, ER to neutral ABD shoulder to 45 degrees  2/5: ER shoulder to end range, keep elbow at 90  degrees  3/5: Extend elbow to 0 degrees  4/5: Fully supinate forearm  5/5: Extend wrist, fingers and thumb  Notes:  (+) indicates beyond grade level but less than skilled nursing to next level  (-) indicates over skilled nursing to level  S1 onset/change of patient's symptoms  S2 definite stop point based on patient's discomfort level    RNT: Radial Neurodynamic Test (based on JOEL Oneil's ULNT)   7/3/2024   0-5 Scale 4   Position:   0/5: Arm across abdomen in coronal plane  1/5: Depress shoulder, ER to neutral ABD shoulder to 45 degrees  2/5: IR shoulder to end range, keep elbow at 90 degrees  3/5: Extend elbow to 0 degrees  4/5: Fully pronate forearm  5/5: Flex wrist and fingers with UD  Notes:  (+) indicates beyond grade level but less than skilled nursing to next level  (-) indicates over skilled nursing to level  S1 onset/change of patient's symptoms  S2 definite stop point based on patient's discomfort level    RESISTED TESTING: Resisted Testing (pain report)   Right   Elbow Extension 5/5   Elbow Flexion 5/5, pain 3/10   Supination  4/5, 5-6/10 pain   Pronation 5/5, 0/10   Wrist Ext, Elbow at side 4/5, 3-4/10 pain   Wrist Ext, Elbow Ext 4/5, 4/10 pain   Long Finger Test 5/5, 0/10     STRENGTH:     Measured in pounds 7/3/2024 7/3/2024    Left Right   Trial 1 38 15   Average 38 15     Lateral Pinch  Measured in pounds 7/3/2024 7/3/2024    Left Right   Trial 1 9 9   Average 9 9     3 Point Pinch  Measured in pounds 7/3/2024 7/3/2024    Left Right   Trial 1 8 10   Average 8 10     Assessment & Plan   CLINICAL IMPRESSIONS  Medical Diagnosis: R CMC OA and LEP    Treatment Diagnosis: R thumb and elbow pain    Impression/Assessment: Pt is a 63 year old female presenting to Occupational Therapy due to gradual onset of right forearm pain and right thumb arthritis.  The following significant findings have been identified: Impaired activity tolerance, Impaired ROM, Impaired strength, and Pain.  These identified deficits interfere with their ability  to perform self care tasks, recreational activities, household chores, driving , and meal planning and preparation as compared to previous level of function.   Patient's limitations or Problem List includes: Pain, Decreased ROM/motion, Weakness, Decreased , and Tightness in musculature of the right elbow and thumb which interferes with the patient's ability to perform Self Care Tasks (dressing, eating, bathing), Sleep Patterns, Recreational Activities, Household Chores, and Driving  as compared to previous level of function.    Clinical Decision Making (Complexity):  Assessment of Occupational Performance: 3-5 Performance Deficits  Occupational Performance Limitations: bathing/showering, dressing, hygiene and grooming, driving and community mobility, home establishment and management, meal preparation and cleanup, shopping, sleep, and leisure activities  Clinical Decision Making (Complexity): Low complexity    PLAN OF CARE  Treatment Interventions:  Therapeutic Exercise:  AROM, AAROM, PROM, Place and Hold, Isotonics, Isometrics, and Stabilization  Neuromuscular re-education:  Kinesiotaping  Manual Techniques:  Coordination/Dexterity, Joint mobilization, and Myofascial release  Orthotic Fabrication:  Static and Hand based  Self Care:  Ergonomic Considerations    Long Term Goals   OT Goal 1  Goal Identifier: Household Chores  Goal Description: Pt will be able to open a new jar without pain in order to maximize independence with ADLs  Target Date: 08/14/24      Frequency of Treatment: 1x/week  Duration of Treatment: 6 weeks     Recommended Referrals to Other Professionals:  none  Education Assessment: Learner/Method: Patient;No Barriers to Learning     Risks and benefits of evaluation/treatment have been explained.   Patient/Family/caregiver agrees with Plan of Care.     Evaluation Time:    OT Eval, Low Complexity Minutes (87298): 28   Present: Not applicable     Signing Clinician: Nicole Ribera  OT

## 2024-07-11 ENCOUNTER — THERAPY VISIT (OUTPATIENT)
Dept: OCCUPATIONAL THERAPY | Facility: CLINIC | Age: 64
End: 2024-07-11
Payer: COMMERCIAL

## 2024-07-11 DIAGNOSIS — M79.644 PAIN OF RIGHT THUMB: ICD-10-CM

## 2024-07-11 DIAGNOSIS — M18.11 PRIMARY OSTEOARTHRITIS OF FIRST CARPOMETACARPAL JOINT OF RIGHT HAND: ICD-10-CM

## 2024-07-11 DIAGNOSIS — M25.521 ELBOW PAIN, RIGHT: Primary | ICD-10-CM

## 2024-07-11 DIAGNOSIS — M77.11 LATERAL EPICONDYLITIS OF RIGHT ELBOW: ICD-10-CM

## 2024-07-11 PROCEDURE — 97112 NEUROMUSCULAR REEDUCATION: CPT | Mod: GO | Performed by: OCCUPATIONAL THERAPIST

## 2024-07-11 PROCEDURE — 97140 MANUAL THERAPY 1/> REGIONS: CPT | Mod: GO | Performed by: OCCUPATIONAL THERAPIST

## 2024-07-15 ENCOUNTER — MYC MEDICAL ADVICE (OUTPATIENT)
Dept: FAMILY MEDICINE | Facility: CLINIC | Age: 64
End: 2024-07-15
Payer: COMMERCIAL

## 2024-07-17 ENCOUNTER — THERAPY VISIT (OUTPATIENT)
Dept: OCCUPATIONAL THERAPY | Facility: CLINIC | Age: 64
End: 2024-07-17
Attending: STUDENT IN AN ORGANIZED HEALTH CARE EDUCATION/TRAINING PROGRAM
Payer: COMMERCIAL

## 2024-07-17 DIAGNOSIS — M77.11 LATERAL EPICONDYLITIS OF RIGHT ELBOW: ICD-10-CM

## 2024-07-17 DIAGNOSIS — M79.644 PAIN OF RIGHT THUMB: ICD-10-CM

## 2024-07-17 DIAGNOSIS — M18.11 PRIMARY OSTEOARTHRITIS OF FIRST CARPOMETACARPAL JOINT OF RIGHT HAND: ICD-10-CM

## 2024-07-17 DIAGNOSIS — M25.521 ELBOW PAIN, RIGHT: Primary | ICD-10-CM

## 2024-07-17 PROCEDURE — 97140 MANUAL THERAPY 1/> REGIONS: CPT | Mod: GO | Performed by: OCCUPATIONAL THERAPIST

## 2024-07-17 PROCEDURE — 97110 THERAPEUTIC EXERCISES: CPT | Mod: GO | Performed by: OCCUPATIONAL THERAPIST

## 2024-07-22 ENCOUNTER — VIRTUAL VISIT (OUTPATIENT)
Dept: FAMILY MEDICINE | Facility: CLINIC | Age: 64
End: 2024-07-22
Payer: COMMERCIAL

## 2024-07-22 DIAGNOSIS — N95.1 SYMPTOMATIC MENOPAUSAL OR FEMALE CLIMACTERIC STATES: ICD-10-CM

## 2024-07-22 DIAGNOSIS — R61 DIAPHORESIS: Primary | ICD-10-CM

## 2024-07-22 PROCEDURE — 99214 OFFICE O/P EST MOD 30 MIN: CPT | Mod: 95 | Performed by: NURSE PRACTITIONER

## 2024-07-22 RX ORDER — VENLAFAXINE HYDROCHLORIDE 75 MG/1
75 CAPSULE, EXTENDED RELEASE ORAL DAILY
COMMUNITY
Start: 2024-07-22

## 2024-07-22 RX ORDER — VENLAFAXINE HYDROCHLORIDE 37.5 MG/1
37.5 CAPSULE, EXTENDED RELEASE ORAL DAILY
Qty: 90 CAPSULE | Refills: 0 | Status: SHIPPED | OUTPATIENT
Start: 2024-07-22

## 2024-07-22 NOTE — PROGRESS NOTES
Whitney is a 63 year old who is being evaluated via a billable video visit.    How would you like to obtain your AVS? MyChart  If the video visit is dropped, the invitation should be resent by: Text to cell phone: [phone]  Will anyone else be joining your video visit? No    Assessment & Plan     Diaphoresis  This sounds like we need to do more to check is this diaphoresis versus actual hot flashes.  It is happening at all times day.  Recent Increase of venlafaxine from 75 to 150 mg with a greater than 10% reported rate of diaphoresis could be at least compounding the issue.  Recommend decreasing venlafaxine by 37.5 mg.  Will take a dose of 112.5 mg daily.  Would also like to check labs to ensure nothing sinister is going on.  Follow-up with PCP to discuss medication options we should have all her labs back by then.  Whitney verbalizes understanding of plan of care and is in agreement.    - venlafaxine (EFFEXOR XR) 37.5 MG 24 hr capsule  Dispense: 90 capsule; Refill: 0  - Lab Blood Morphology Pathologist Review  - TSH  - T4, free  - T3, Free  - Thyroid peroxidase antibody  - Basic metabolic panel  (Ca, Cl, CO2, Creat, Gluc, K, Na, BUN)    Symptomatic menopausal or female climacteric states    - venlafaxine (EFFEXOR XR) 75 MG 24 hr capsule    Return in about 2 weeks (around 8/5/2024) for Recheck.     Subjective   Whitney is a 63 year old, presenting for the following health issues:  Sweats (Day and Night)      7/22/2024     7:46 AM   Additional Questions   Roomed by Valerie EVANGELISTA   Accompanied by Self     History of Present Illness       Reason for visit:  Super frequent hot flashes. Night and day    She eats 4 or more servings of fruits and vegetables daily.She consumes 0 sweetened beverage(s) daily.She exercises with enough effort to increase her heart rate 20 to 29 minutes per day.  She exercises with enough effort to increase her heart rate 3 or less days per week.   She is taking medications regularly.     Day and  night sweats   Medication Followup of Venlafaxine - Gabapentin - Estradiol Patch  Taking Medication as prescribed: yes  Side Effects:  None  Medication Helping Symptoms:  NO-has been on above meds for at least 2 years NO SWEATS until the past 1 MONTH has been day and night, waking up wet. Sleeping on towel. Has to change shirt at work, works in office.     Reports symptoms as frequent dripping/sweating.  Thorough review of chart notes estradiol since 2013 after hysterectomy.  Gabapentin since December 2022.  Effexor since around 2013.  Of note she did have a DOSE increase Of Effexor in the last month 75 to 150 mg.  She feels like the sweats could have started first before she increased it versus the other way around.  Has upcoming appointment with PCP to discuss hormone medication changes.    Constitutional, HEENT, cardiovascular, pulmonary, GI, , musculoskeletal, neuro, skin, endocrine and psych systems are negative, except as otherwise noted in the HPI.         Objective           Vitals:  No vitals were obtained today due to virtual visit.    Physical Exam   GENERAL: alert and no distress, very pleasant  EYES: Eyes grossly normal to inspection.  No discharge or erythema, or obvious scleral/conjunctival abnormalities.  RESP: No audible wheeze, cough, or visible cyanosis.    SKIN: Visible skin clear. No significant rash, abnormal pigmentation or lesions.  NEURO: Cranial nerves grossly intact.  Mentation and speech appropriate for age.  PSYCH: Appropriate affect, tone, and pace of words    Video-Visit Details    Type of service:  Video Visit   Originating Location (pt. Location): Home  Distant Location (provider location):  On-site  Platform used for Video Visit: Ahsan     Signed Electronically by: MUKUND Ramsay CNP

## 2024-07-29 ENCOUNTER — LAB (OUTPATIENT)
Dept: LAB | Facility: CLINIC | Age: 64
End: 2024-07-29
Payer: COMMERCIAL

## 2024-07-29 DIAGNOSIS — R61 DIAPHORESIS: ICD-10-CM

## 2024-07-29 LAB
BASOPHILS # BLD AUTO: 0 10E3/UL (ref 0–0.2)
BASOPHILS NFR BLD AUTO: 1 %
EOSINOPHIL # BLD AUTO: 0.2 10E3/UL (ref 0–0.7)
EOSINOPHIL NFR BLD AUTO: 3 %
ERYTHROCYTE [DISTWIDTH] IN BLOOD BY AUTOMATED COUNT: 12.6 % (ref 10–15)
HCT VFR BLD AUTO: 36.8 % (ref 35–47)
HGB BLD-MCNC: 12.6 G/DL (ref 11.7–15.7)
IMM GRANULOCYTES # BLD: 0 10E3/UL
IMM GRANULOCYTES NFR BLD: 0 %
LYMPHOCYTES # BLD AUTO: 1.8 10E3/UL (ref 0.8–5.3)
LYMPHOCYTES NFR BLD AUTO: 32 %
MCH RBC QN AUTO: 30 PG (ref 26.5–33)
MCHC RBC AUTO-ENTMCNC: 34.2 G/DL (ref 31.5–36.5)
MCV RBC AUTO: 88 FL (ref 78–100)
MONOCYTES # BLD AUTO: 0.4 10E3/UL (ref 0–1.3)
MONOCYTES NFR BLD AUTO: 6 %
NEUTROPHILS # BLD AUTO: 3.2 10E3/UL (ref 1.6–8.3)
NEUTROPHILS NFR BLD AUTO: 58 %
PLATELET # BLD AUTO: 220 10E3/UL (ref 150–450)
RBC # BLD AUTO: 4.2 10E6/UL (ref 3.8–5.2)
WBC # BLD AUTO: 5.6 10E3/UL (ref 4–11)

## 2024-07-29 PROCEDURE — 99207 BLOOD MORPHOLOGY PATHOLOGIST REVIEW: CPT | Performed by: PATHOLOGY

## 2024-07-29 PROCEDURE — 86376 MICROSOMAL ANTIBODY EACH: CPT

## 2024-07-29 PROCEDURE — 85045 AUTOMATED RETICULOCYTE COUNT: CPT

## 2024-07-29 PROCEDURE — 36415 COLL VENOUS BLD VENIPUNCTURE: CPT

## 2024-07-29 PROCEDURE — 85025 COMPLETE CBC W/AUTO DIFF WBC: CPT

## 2024-07-29 PROCEDURE — 80048 BASIC METABOLIC PNL TOTAL CA: CPT

## 2024-07-29 PROCEDURE — 84481 FREE ASSAY (FT-3): CPT

## 2024-07-29 PROCEDURE — 84443 ASSAY THYROID STIM HORMONE: CPT

## 2024-07-29 PROCEDURE — 84439 ASSAY OF FREE THYROXINE: CPT

## 2024-07-30 LAB
ANION GAP SERPL CALCULATED.3IONS-SCNC: 9 MMOL/L (ref 7–15)
BUN SERPL-MCNC: 11.2 MG/DL (ref 8–23)
CALCIUM SERPL-MCNC: 9.8 MG/DL (ref 8.8–10.4)
CHLORIDE SERPL-SCNC: 104 MMOL/L (ref 98–107)
CREAT SERPL-MCNC: 0.77 MG/DL (ref 0.51–0.95)
EGFRCR SERPLBLD CKD-EPI 2021: 86 ML/MIN/1.73M2
GLUCOSE SERPL-MCNC: 83 MG/DL (ref 70–99)
HCO3 SERPL-SCNC: 27 MMOL/L (ref 22–29)
POTASSIUM SERPL-SCNC: 3.6 MMOL/L (ref 3.4–5.3)
RETICS # AUTO: 0.05 10E6/UL (ref 0.03–0.1)
RETICS/RBC NFR AUTO: 1.2 % (ref 0.5–2)
SODIUM SERPL-SCNC: 140 MMOL/L (ref 135–145)
T3FREE SERPL-MCNC: 2.3 PG/ML (ref 2–4.4)
T4 FREE SERPL-MCNC: 0.94 NG/DL (ref 0.9–1.7)
TSH SERPL DL<=0.005 MIU/L-ACNC: 2.73 UIU/ML (ref 0.3–4.2)

## 2024-07-31 ENCOUNTER — THERAPY VISIT (OUTPATIENT)
Dept: OCCUPATIONAL THERAPY | Facility: CLINIC | Age: 64
End: 2024-07-31
Payer: COMMERCIAL

## 2024-07-31 DIAGNOSIS — M25.521 ELBOW PAIN, RIGHT: Primary | ICD-10-CM

## 2024-07-31 DIAGNOSIS — M77.11 LATERAL EPICONDYLITIS OF RIGHT ELBOW: ICD-10-CM

## 2024-07-31 DIAGNOSIS — M18.11 PRIMARY OSTEOARTHRITIS OF FIRST CARPOMETACARPAL JOINT OF RIGHT HAND: ICD-10-CM

## 2024-07-31 DIAGNOSIS — M79.644 PAIN OF RIGHT THUMB: ICD-10-CM

## 2024-07-31 LAB
PATH REPORT.COMMENTS IMP SPEC: NORMAL
PATH REPORT.FINAL DX SPEC: NORMAL
PATH REPORT.MICROSCOPIC SPEC OTHER STN: NORMAL
PATH REPORT.MICROSCOPIC SPEC OTHER STN: NORMAL
THYROPEROXIDASE AB SERPL-ACNC: 15 IU/ML

## 2024-07-31 PROCEDURE — 97140 MANUAL THERAPY 1/> REGIONS: CPT | Mod: GO | Performed by: OCCUPATIONAL THERAPIST

## 2024-07-31 PROCEDURE — 97110 THERAPEUTIC EXERCISES: CPT | Mod: GO | Performed by: OCCUPATIONAL THERAPIST

## 2024-08-01 NOTE — RESULT ENCOUNTER NOTE
Dear Whitney,    Here is a summary of your recent test results:    -All of your labs are normal. You have beautiful las which is great but also frustrating to not have answers sometime.   Hot flashes sweats likely from hormones as assumed. Please continue with plan to follow up for better options for you.     For additional lab test information, [email or web address] is an excellent reference.    In addition, here is a list of due or overdue Health Maintenance reminders:    There are no preventive care reminders to display for this patient.    Please call us at [phone] (or use Divided) to address the above recommendations if needed.    Thank you for choosing  [facility].  It was an honor and a privilege to participate in your care.       Healthy regards,    Kasandra Johnson, JOY   [facility]

## 2024-08-05 ENCOUNTER — VIRTUAL VISIT (OUTPATIENT)
Dept: FAMILY MEDICINE | Facility: CLINIC | Age: 64
End: 2024-08-05
Payer: COMMERCIAL

## 2024-08-05 DIAGNOSIS — L65.9 HAIR LOSS: ICD-10-CM

## 2024-08-05 DIAGNOSIS — N95.1 SYMPTOMATIC MENOPAUSAL OR FEMALE CLIMACTERIC STATES: Primary | ICD-10-CM

## 2024-08-05 DIAGNOSIS — Z12.31 VISIT FOR SCREENING MAMMOGRAM: ICD-10-CM

## 2024-08-05 PROCEDURE — 99214 OFFICE O/P EST MOD 30 MIN: CPT | Mod: 95 | Performed by: FAMILY MEDICINE

## 2024-08-05 RX ORDER — ESTRADIOL 0.1 MG/D
1 FILM, EXTENDED RELEASE TRANSDERMAL
Qty: 24 PATCH | Refills: 0 | Status: SHIPPED | OUTPATIENT
Start: 2024-08-05

## 2024-08-05 RX ORDER — GABAPENTIN 100 MG/1
100-300 CAPSULE ORAL AT BEDTIME
Qty: 270 CAPSULE | Refills: 3 | Status: SHIPPED | OUTPATIENT
Start: 2024-08-05

## 2024-08-05 NOTE — PROGRESS NOTES
Whitney is a 63 year old who is being evaluated via a billable video visit.    How would you like to obtain your AVS? MyChart  If the video visit is dropped, the invitation should be resent by: Text to cell phone: [phone]  Will anyone else be joining your video visit? No      Assessment & Plan :       ICD-10-CM    1. Symptomatic menopausal or female climacteric states  N95.1 estradiol (VIVELLE-DOT) 0.1 MG/24HR bi-weekly patch     gabapentin (NEURONTIN) 100 MG capsule      2. Hair loss- diffuse hair thinning over time - most likely familial  L65.9       3. Visit for screening mammogram  Z12.31 MA Screen Bilateral w/Jonny        For your sweats:  first - go up on vivelle-dot to 0.1mg twice weekly patch.  Give that a couple of weeks.    If that doesn't help, You can also increase your gabapentin to 2-3 capsules at bedtime.  May cause some increased morning sedation.   3rd, try to taper off your venlafaxine.   If we need to taper your venlafaxine - decrease by 37.5mg every 2 weeks. Start by going down to 75mg daily for 2 weeks, then 37.5mg daily for 2 weeks, then off.      For hair loss -Continue your Nutrafol multivitamin for post-menopausal women and  to stabilize hair loss or perhaps grow back small amount of your hair - try  Rogaine (Minoxidil is the generic name)  extra strength 5% for men - apply gently with dropper nightly and wash hands afterwards. Foam is a little easier to use than the liquid.   May take up to 6 months to notice a difference.  May just stabilize hair loss.  If you stop using the Rogaine your hair loss will go back to what it was prior to use.   Return in about 19 weeks (around 12/16/2024) for Wellness/Preventative Visit and recheck on hot flashes/sweats - scheduled w/ Dr Gonzalez.     Future Appointments 8/5/2024 - 2/1/2025        Date Visit Type Length Department Provider     8/8/2024  8:30 AM HAND THERAPY TREATMENT 30 min [facility] Nicole Ribera, OT    Location  Instructions:     Our clinic is located at:  [facility] [address] [place] [address]  How to find our clinic: Enter main entrance, elevator is on left, take elevator to 3rd floor, clinic is straight ahead, door is to the left.  Parking:  Free parking available in surfact lot or in ramp.  Questions or to Reschedule: Contact our clinic: [phone].               10/8/2024 12:00 PM FULL PFT W-WO MIP/MEP/MVV 60 min CS PULMONOLOGY  PULMONARY FUNCTION    Location Instructions:     Your appointment is at [facility] located at [address] [place] [address] [address], [place] [address]. Please check in at the  in [address].              10/8/2024  1:00 PM NEW PULMONARY 60 min CS PULMONOLOGY Betzaida Cook PA-C    Location Instructions:     Your appointment is at [facility] located at [address], [place] [address]. Please check in at the  in [address].              12/16/2024  8:00 AM PREVENTATIVE ADULT 40 min [facility] FAMILY [facility]ICE Lisbeth Gonzalez MD    Location Instructions:     [facility] is located at [address] [place] [address], along [address]. Free parking is available; access the lot by turning north from [address] onto [address], then west onto [address].                     MEDICATIONS:   Orders Placed This Encounter   Medications    estradiol (VIVELLE-DOT) 0.1 MG/24HR bi-weekly patch     Sig: Place 1 patch onto the skin twice a week     Dispense:  24 patch     Refill:  0    gabapentin (NEURONTIN) 100 MG capsule     Sig: Take 1-3 capsules (100-300 mg) by mouth at bedtime For menopausal symptoms     Dispense:  270 capsule     Refill:  3     ### Profile Rx: patient will contact pharmacy when needed ###          - Continue other medications without change  Regular  exercise  See Patient Instructions    35 minutes spent by me on the date of the encounter doing chart review, history and video call, documentation and further activities per the note.            Lisbeth Gonzalez MD      Subjective :   Whitney is a 63 year old, presenting for the following health issues:  and the following other medical problems:      1. Symptomatic menopausal or female climacteric states    2. Hair loss- diffuse hair thinning over time - most likely familial    3. Visit for screening mammogram        Recheck Medication      8/5/2024    10:43 AM   Additional Questions   Roomed by madison     History of Present Illness       Reason for visit:  Super frequent hot flashes. Night and day    She eats 4 or more servings of fruits and vegetables daily.She consumes 0 sweetened beverage(s) daily.She exercises with enough effort to increase her heart rate 20 to 29 minutes per day.  She exercises with enough effort to increase her heart rate 3 or less days per week.   She is taking medications regularly.     Tt'd Kasandra Johnson,  MUKUND CNP  on 7/22/2024 re: her symptoms. Reviewed that note today.   Had some lab work done. Discussed with pt.  All normal.     TSH   Date Value Ref Range Status   07/29/2024 2.73 0.30 - 4.20 uIU/mL Final   11/29/2021 1.48 0.40 - 4.00 mU/L Final   10/22/2020 1.57 0.40 - 4.00 mU/L Final       Free T4   Date Value Ref Range Status   07/29/2024 0.94 0.90 - 1.70 ng/dL Final     Went up on her venlafaxine to 150mg - = sweats didn't get better ? Worse.   Tried 75mg + 37.5mg = 112.5mg = no signif. Change in her sweats - has been on venlafaxine x 3 years.    Up until about 6 weeks ago her hot flushes were well controlled until then. Combination of gabapentin 100mg at bedtime and venlafaxine worked well for several years. Discussed having increased gabapentin at bedtime to 200mg or 300mg.  If that doesn't work - discussed tapering down on venlafaxine as that can sometimes cause sweats (  instead of decreasing them)  even after several years on the medication.       Whole head getting dripping wet - ruins hairstyle and soaking through clothing.  Worse the last 1-2 months. Night sweats worse as well - soaking bed clothes and sheets.   Started vivelle-dot 0.5mg/24hr patch many years ago at time of her hysterectomy at age 37.     Hair thinning as well. Diffuse - not patchy. 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   Discussed going up to 5mg rogaine instead of the 2mg that is in the [employer] product otc that she is using.      Patient Active Problem List   Diagnosis    Panic disorder without agoraphobia    Anxiety    Overactive bladder    Hyperlipidemia LDL goal <130- on atorvastatin     Gastroesophageal reflux disease without esophagitis    Symptomatic menopausal or female climacteric states    Fibrocystic breast changes of both breasts    Primary osteoarthritis of both hands    Family history of early CAD    Insomnia, unspecified type-Controlled substance agreement signed -12/14/2022 ok for #30 -10mg ambien (takes 1/2 tab) with 1 refill every 6 months or so     Expressive aphasia    Urinary urgency    Fatigue, unspecified type    Night sweats- on estrace 1mg already - discussed progesterone deficiency usually causing that - pt would like to try black cohosh first prior to micronized progesterone 2nd to breast ca risk     Controlled substance agreement signed -redone 12/15/2023 ok for #30 -10mg ambien (takes 1/2 tab) with 1 refill every 6 months or so    Acute recurrent maxillary sinusitis    Multiple dysplastic nevi-Going to dermatology for her full body skin exam annually - just had appt=12/2022[facility] - Dr. Carmen Dowling - for her early dysplastic melanoma.      RUQ abdominal pain- comes and goes - not related to foods or fatty foods, worse bending over with or without lifting and getting out of her car- ? muscle spasm - CT and US negative 10/5/2020     Rotator cuff syndrome, left    Other osteoarthritis  involving multiple joints- left middle finger s/p fracture /dislocation - causing some chronic pain - pt requests refill of ibuprofen 800mg tabs - takes with food     Lumbosacral dysfunction    Sacral pain    Family history of colonic polyps- precancerous     Basal cell carcinoma (BCC) of skin of nose    Vitamin D deficiency    Overweight (BMI 25.0-29.9)- BMI down from 31 with ZepBound ( generic tirzapetide injection) GLP-1 inhbitor fr [employer] - started 3/2024    Hiatal hernia- seen on Chest CT 12/2023 - GERD better with not eating after 7pm    Elbow pain, right    Pain of right thumb    Lateral epicondylitis of right elbow    Primary osteoarthritis of first carpometacarpal joint of right hand       Current Outpatient Medications   Medication Sig Dispense Refill    atorvastatin (LIPITOR) 10 MG tablet TAKE 1 TABLET(10 MG) BY MOUTH DAILY 90 tablet 1    calcium-vitamin D (OSCAL) 250-125 MG-UNIT TABS per tablet Take 1 tablet by mouth daily      estradiol (VIVELLE-DOT) 0.05 MG/24HR bi-weekly patch Place 1 patch onto the skin twice a week 24 patch 3    gabapentin (NEURONTIN) 100 MG capsule Take 1 capsule (100 mg) by mouth at bedtime For menopausal symptoms 90 capsule 3    Multiple Vitamins-Minerals (MULTIVITAMIN ADULT PO) Take by mouth daily      Omega-3 Fatty Acids (OMEGA 3 PO)       tirzepatide-Weight Management (ZEPBOUND) 2.5 MG/0.5ML prefilled pen Inject 0.5 mLs (2.5 mg) Subcutaneous every 7 days      venlafaxine (EFFEXOR XR) 37.5 MG 24 hr capsule Take 1 capsule (37.5 mg) by mouth daily Will take in addition to dose of 75 mg for a total daily dose of 112.5 mg 90 capsule 0    venlafaxine (EFFEXOR XR) 75 MG 24 hr capsule Take 1 capsule (75 mg) by mouth daily      albuterol (PROAIR HFA/PROVENTIL HFA/VENTOLIN HFA) 108 (90 Base) MCG/ACT inhaler Inhale 2 puffs into the lungs every 6 hours as needed 18 g 1    azithromycin (ZITHROMAX) 250 MG tablet 2 tabs first day, then 1 tab by mouth daily for 4 additional days  (Patient not taking: Reported on 8/5/2024) 6 tablet 1    clobetasol (TEMOVATE) 0.05 % external ointment APPLY TO RIGHT TEMPLE LESION TWICE DAILY UNTIL RESOLUTION      fluticasone (FLONASE) 50 MCG/ACT nasal spray Spray 1 spray into both nostrils daily      ibuprofen (ADVIL/MOTRIN) 800 MG tablet TAKE 1 TABLET BY MOUTH EVERY 8 HOURS WITH FOOD AS NEEDED FOR MODERATE PAIN 90 tablet 1    methylPREDNISolone (MEDROL DOSEPAK) 4 MG tablet therapy pack Follow Package Directions (Patient not taking: Reported on 8/5/2024) 21 tablet 0    metroNIDAZOLE (METROCREAM) 0.75 % external cream APPLY TOPICALLY TO THE AFFECTED AREA TWICE DAILY FOR ROSACEA      tacrolimus (PROTOPIC) 0.1 % external ointment       zolpidem (AMBIEN) 10 MG tablet TAKE 1/2 TABLET BY MOUTH EVERY NIGHT AT BEDTIME AS NEEDED FOR SLEEP 30 tablet 3          Allergies   Allergen Reactions    Rosamaria Sun Screen Spf 30 [Solbar Pf Spf15] Hives and Rash    Paxlovid [Nirmatrelvir-Ritonavir] Hives, Itching and Rash    Drysol [Aluminum Chloride] Hives    Septra [Sulfamethoxazole-Trimethoprim]     Sulfa Antibiotics Hives    Murine Ear [Carbamide Peroxide] Swelling and Rash     Severe pain , swelling after debrox type ear drops - had to go to the clinic to have ears flushed out emergently.                Review of Systems  Constitutional, HEENT, cardiovascular, pulmonary, GI, , musculoskeletal, neuro, skin, endocrine and psych systems are negative, except as otherwise noted.      Objective           Vitals:  No vitals were obtained today due to virtual visit.    Physical Exam   GENERAL: alert and no distress  EYES: Eyes grossly normal to inspection.  No discharge or erythema, or obvious scleral/conjunctival abnormalities.  RESP: No audible wheeze, cough, or visible cyanosis.    SKIN: Visible skin clear. No significant rash, abnormal pigmentation or lesions.  NEURO: Cranial nerves grossly intact.  Mentation and speech appropriate for age.  PSYCH: Appropriate affect, tone, and  pace of words    Lab on 07/29/2024   Component Date Value Ref Range Status    TSH 07/29/2024 2.73  0.30 - 4.20 uIU/mL Final    Free T4 07/29/2024 0.94  0.90 - 1.70 ng/dL Final    T3 Free 07/29/2024 2.3  2.0 - 4.4 pg/mL Final    Thyroid Peroxidase Antibody 07/29/2024 15  <35 IU/mL Final    Sodium 07/29/2024 140  135 - 145 mmol/L Final    Potassium 07/29/2024 3.6  3.4 - 5.3 mmol/L Final    Chloride 07/29/2024 104  98 - 107 mmol/L Final    Carbon Dioxide (CO2) 07/29/2024 27  22 - 29 mmol/L Final    Anion Gap 07/29/2024 9  7 - 15 mmol/L Final    Urea Nitrogen 07/29/2024 11.2  8.0 - 23.0 mg/dL Final    Creatinine 07/29/2024 0.77  0.51 - 0.95 mg/dL Final    GFR Estimate 07/29/2024 86  >60 mL/min/1.73m2 Final    eGFR calculated using 2021 CKD-EPI equation.    Calcium 07/29/2024 9.8  8.8 - 10.4 mg/dL Final    Reference intervals for this test were updated on 7/16/2024 to reflect our healthy population more accurately. There may be differences in the flagging of prior results with similar values performed with this method. Those prior results can be interpreted in the context of the updated reference intervals.    Glucose 07/29/2024 83  70 - 99 mg/dL Final    Final Diagnosis 07/29/2024    Final                    Value:This result contains rich text formatting which cannot be displayed here.    Peripheral Smear 07/29/2024    Final                    Value:This result contains rich text formatting which cannot be displayed here.    Peripheral Hematologic Data 07/29/2024    Final                    Value:This result contains rich text formatting which cannot be displayed here.    Performing Labs 07/29/2024    Final                    Value:This result contains rich text formatting which cannot be displayed here.    WBC Count 07/29/2024 5.6  4.0 - 11.0 10e3/uL Final    RBC Count 07/29/2024 4.20  3.80 - 5.20 10e6/uL Final    Hemoglobin 07/29/2024 12.6  11.7 - 15.7 g/dL Final    Hematocrit 07/29/2024 36.8  35.0 - 47.0 % Final     MCV 07/29/2024 88  78 - 100 fL Final    MCH 07/29/2024 30.0  26.5 - 33.0 pg Final    MCHC 07/29/2024 34.2  31.5 - 36.5 g/dL Final    RDW 07/29/2024 12.6  10.0 - 15.0 % Final    Platelet Count 07/29/2024 220  150 - 450 10e3/uL Final    % Neutrophils 07/29/2024 58  % Final    % Lymphocytes 07/29/2024 32  % Final    % Monocytes 07/29/2024 6  % Final    % Eosinophils 07/29/2024 3  % Final    % Basophils 07/29/2024 1  % Final    % Immature Granulocytes 07/29/2024 0  % Final    Absolute Neutrophils 07/29/2024 3.2  1.6 - 8.3 10e3/uL Final    Absolute Lymphocytes 07/29/2024 1.8  0.8 - 5.3 10e3/uL Final    Absolute Monocytes 07/29/2024 0.4  0.0 - 1.3 10e3/uL Final    Absolute Eosinophils 07/29/2024 0.2  0.0 - 0.7 10e3/uL Final    Absolute Basophils 07/29/2024 0.0  0.0 - 0.2 10e3/uL Final    Absolute Immature Granulocytes 07/29/2024 0.0  <=0.4 10e3/uL Final    % Reticulocyte 07/29/2024 1.2  0.5 - 2.0 % Final    Absolute Reticulocyte 07/29/2024 0.050  0.025 - 0.095 10e6/uL Final   Peripheral blood, morphology:  - Peripheral blood without diagnostic morphologic abnormality.  - Hemoglobin quantitatively within normal limits.  - WBC subsets quantitatively within normal limits.  - Platelet count quantitatively within normal limits.       Video-Visit Details    Type of service:  Video Visit   Originating Location (pt. Location): Home    Distant Location (provider location):  On-site  Platform used for Video Visit: Ahsan  Signed Electronically by: Lisbeth Gonzalez MD

## 2024-08-05 NOTE — PATIENT INSTRUCTIONS
[facility]  [address] [place] [address]  Office: [phone]   Fax:    [phone]     For your sweats:  first - go up on vivelle-dot to 0.1mg twice weekly patch.  Give that a couple of weeks.    If that doesn't help, You can also increase your gabapentin to 2-3 capsules at bedtime.  May cause some increased morning sedation.   3rd, try to taper off your venlafaxine.   If we need to taper your venlafaxine - decrease by 37.5mg every 2 weeks. Start by going down to 75mg daily for 2 weeks, then 37.5mg daily for 2 weeks, then off.      For hair loss -Continue your Nutrafol multivitamin for post-menopausal women and  to stabilize hair loss or perhaps grow back small amount of your hair - try  Rogaine (Minoxidil is the generic name)  extra strength 5% for men - apply gently with dropper nightly and wash hands afterwards. Foam is a little easier to use than the liquid.   May take up to 6 months to notice a difference.  May just stabilize hair loss.  If you stop using the Rogaine your hair loss will go back to what it was prior to use.     Can also try [employer] hair products for hair loss -  [email or web address][employer][email or web address] -- [employer] - with good reviews and dermatologist recommendations. A bit expensive, but can be worth it.      Return in about 19 weeks (around 12/16/2024) for Wellness/Preventative Visit and recheck on hot flashes/sweats - scheduled w/ Dr Gonzalez.     Please, call our clinic or go to the ER immediately if signs or symptoms worsen or fail to improve as anticipated.     Ok for a video visit in the meantime, if needed for adjustment further of meds, if the above is not successful.             Thank you so much for doing a video visit with us today. And, again, thank you  for choosing our [facility].  Please contact us with any questions that you may have.   We appreciate the opportunity to serve you now and look forward to supporting your  "healthcare needs for a long time to come!    Our clinic for the foreseeable future and until further notice , will continue to offer virtual visits, including telephone visits, video visits,  and e-visits, in addition to in person visits,  especially during this period of COVID-19 coronavirus pandemic.  Please call to schedule another one if you need it!        Most Sincerely,     Lisbeth Gonzalez MD                                              To reach your [facility] care team after hours call:   [phone] press #[phone] \"to speak with your care team\".  This will get you to our clinic instead of routing to central [facility]  scheduling.     PLEASE NOTE OUR HOURS HAVE CHANGED secondary to COVID-19 coronavirus pandemic, as we are trying to minimize patient exposure to the virus,  which is now widespread in the state.  These hours may change with very little notice.  We apologize for any inconvenience.       Our current clinic hours are:         Monday- Thursday   7:00am - 6:00pm  in person.      Friday  7:00am- 5:00pm in person                       Saturday and Sunday : Closed to in person and virtual visits            We have telephone and virtual visit times available between 7:00am - 6pm on             Monday-Friday as well.                                                Phone:  [phone]    Our pharmacy hours: Monday  through Friday 8:00am to 5:00pm                        Saturday - 9:00 am to 12 noon         Sunday : Closed.     ###  Please note: at this time we are not accepting any walk-in visits. ###      There is also information available at our web site:  [email or web address]    If your provider ordered any lab tests and you do not receive the results within 10 business days, please call the clinic.    If you need a medication refill please contact your pharmacy.  Please allow 3 business days for your refill to be completed.    Our clinic offers telephone visits and " e visits.  Please ask one of your team members to explain more.      Use [employer] (secure email communication and access to your chart) to send your primary care provider a message or make an appointment. Ask someone on your Team how to sign up for [employer].

## 2024-08-08 ENCOUNTER — THERAPY VISIT (OUTPATIENT)
Dept: OCCUPATIONAL THERAPY | Facility: CLINIC | Age: 64
End: 2024-08-08
Payer: COMMERCIAL

## 2024-08-08 DIAGNOSIS — M25.521 ELBOW PAIN, RIGHT: Primary | ICD-10-CM

## 2024-08-08 DIAGNOSIS — M77.11 LATERAL EPICONDYLITIS OF RIGHT ELBOW: ICD-10-CM

## 2024-08-08 DIAGNOSIS — M18.11 PRIMARY OSTEOARTHRITIS OF FIRST CARPOMETACARPAL JOINT OF RIGHT HAND: ICD-10-CM

## 2024-08-08 DIAGNOSIS — M79.644 PAIN OF RIGHT THUMB: ICD-10-CM

## 2024-08-08 PROCEDURE — 97112 NEUROMUSCULAR REEDUCATION: CPT | Mod: GO | Performed by: OCCUPATIONAL THERAPIST

## 2024-08-08 PROCEDURE — 97110 THERAPEUTIC EXERCISES: CPT | Mod: GO | Performed by: OCCUPATIONAL THERAPIST

## 2024-08-08 PROCEDURE — 97140 MANUAL THERAPY 1/> REGIONS: CPT | Mod: GO | Performed by: OCCUPATIONAL THERAPIST

## 2024-08-11 NOTE — PROGRESS NOTES
DISCHARGE  Reason for Discharge: Patient has met all goals.  No further expectation of progress.  Patient chooses to discontinue therapy.    Equipment Issued: none    Discharge Plan: Patient to continue home program.    Referring Provider:  Jaron Espinal

## 2024-09-13 PROBLEM — M18.11 PRIMARY OSTEOARTHRITIS OF FIRST CARPOMETACARPAL JOINT OF RIGHT HAND: Status: RESOLVED | Noted: 2024-07-03 | Resolved: 2024-09-13

## 2024-09-13 PROBLEM — M25.521 ELBOW PAIN, RIGHT: Status: RESOLVED | Noted: 2024-07-03 | Resolved: 2024-09-13

## 2024-09-13 PROBLEM — M77.11 LATERAL EPICONDYLITIS OF RIGHT ELBOW: Status: RESOLVED | Noted: 2024-07-03 | Resolved: 2024-09-13

## 2024-09-13 PROBLEM — M79.644 PAIN OF RIGHT THUMB: Status: RESOLVED | Noted: 2024-07-03 | Resolved: 2024-09-13

## 2024-09-16 ASSESSMENT — PATIENT HEALTH QUESTIONNAIRE - PHQ9
10. IF YOU CHECKED OFF ANY PROBLEMS, HOW DIFFICULT HAVE THESE PROBLEMS MADE IT FOR YOU TO DO YOUR WORK, TAKE CARE OF THINGS AT HOME, OR GET ALONG WITH OTHER PEOPLE: NOT DIFFICULT AT ALL
SUM OF ALL RESPONSES TO PHQ QUESTIONS 1-9: 1
SUM OF ALL RESPONSES TO PHQ QUESTIONS 1-9: 1

## 2024-09-16 ASSESSMENT — ANXIETY QUESTIONNAIRES
8. IF YOU CHECKED OFF ANY PROBLEMS, HOW DIFFICULT HAVE THESE MADE IT FOR YOU TO DO YOUR WORK, TAKE CARE OF THINGS AT HOME, OR GET ALONG WITH OTHER PEOPLE?: NOT DIFFICULT AT ALL
7. FEELING AFRAID AS IF SOMETHING AWFUL MIGHT HAPPEN: NOT AT ALL
GAD7 TOTAL SCORE: 0

## 2024-09-20 ENCOUNTER — VIRTUAL VISIT (OUTPATIENT)
Dept: FAMILY MEDICINE | Facility: CLINIC | Age: 64
End: 2024-09-20
Payer: COMMERCIAL

## 2024-09-20 DIAGNOSIS — N95.1 VASOMOTOR SYMPTOMS DUE TO MENOPAUSE: ICD-10-CM

## 2024-09-20 DIAGNOSIS — R61 NIGHT SWEATS: Primary | ICD-10-CM

## 2024-09-20 PROCEDURE — 99213 OFFICE O/P EST LOW 20 MIN: CPT | Mod: 95 | Performed by: FAMILY MEDICINE

## 2024-09-20 NOTE — PATIENT INSTRUCTIONS
[address] [address]  Office: [phone]   Fax:    [phone]     Stop taking the 75mg of the venlafaxine dosage now. Taper down to 37.5mg  on the venlafaxine - take 1 -37.5mg capsule daily  for at least 2 weeks, then stop. Call or [employer] message us if you don't have enough of the venlafaxine tabs for 2 weeks or more.  If you get electricity like zaps in your head after 2 weeks     Ok to go  down to 100mg nightly on the gabapentin as well to see if that still helps your hot flashes/sweats without the crazy dreams.      Stay on the 0.1mg estradiol patch twice weekly.   Consider changing that to every 72 hours and alternating days of the week.     Please, call our clinic or go to the ER immediately if signs or symptoms worsen or fail to improve as anticipated.

## 2024-09-20 NOTE — PROGRESS NOTES
Whitney is a 63 year old who is being evaluated via a billable video visit.    How would you like to obtain your AVS? [employer]  If the video visit is dropped, the invitation should be resent by: Text to cell phone: [phone]  Will anyone else be joining your video visit? No      Assessment & Plan :       ICD-10-CM    1. Night sweats- s/p KYRSTYNA - was on estrace 1mg already, now on 0.1mg estradiol patch  - pt tried black cohosh instead of micronized progesterone 2nd to breast ca risk  R61       2. 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 Taper down to 37.5mg  on the venlafaxine - take 1 -37.5mg capsule daily  for at least 2 weeks, then stop. Call or [employer] message us if you don't have enough of the venlafaxine tabs for 2 weeks or more.  If you get electricity like zaps in your head after 2 weeks,     Ok to go  down to 100mg nightly on the gabapentin as well to see if that still helps your hot flashes/sweats without the crazy dreams.      Stay on the 0.1mg estradiol patch twice weekly.  Consider changing that to every 72 hours and alternating days of the week.     Future Appointments 9/20/2024 - 3/19/2025        Date Visit Type Length Department Provider     10/1/2024  7:40 AM XR GENERAL XRAY 20 min RH XRAY RSCC [id number]    Location Instructions:     [facility] [address] [place] [address]  Parking Imaging customers can park either in the lot to the right side of the driveway (opposite the parking ramp) as you approach the [facility]ty Care Center, or in the parking ramp.  Entrance and check-in location Enter at the front entrance (pictured to the right). As you enter the lobby, the Imaging Center is on the first floor, just past the elevators. Please check-in for your appointment at the desk in the  Imaging Center waiting area, [address]  This appointment is in a hospital-based location.&nbsp; Before your visit, you may want to check with your insurance company for coverage and referral options, including cost differences between services provided in different clinic settings.&nbsp; For more information visit this link on the [employer] [facility] [employer] Website:&nbsp; [email or web address]              10/8/2024 12:00 PM FULL PFT W-WO MIP/MEP/MVV 60 min CS PULMONOLOGY CS PULMONARY FUNCTION    Location Instructions:     Your appointment is at [facility] located at [address], [place] [address]. Please check in at the  in [address].              10/8/2024  1:00 PM NEW PULMONARY 60 min CS PULMONOLOGY Betzaida Cook PA-C    Location Instructions:     Your appointment is at [facility] located at [address], [place] [address]. Please check in at the  in [address].              12/5/2024  7:30 AM MA SCREENING BILATERAL W/ MATTIE 15 min RV MAMMOGRAPHY [id number]    Location Instructions:     A staff member will escort you to the [facility] Truck.              12/16/2024  8:00 AM PREVENTATIVE ADULT 40 min RV FAMILY PRACTICE Lisbeth Gonzalez MD    Location Instructions:     [facility] is located at [address], [address] Free parking is available; access the lot by turning north from [address] onto [address], then west onto [address].                    Return in about 3 months (around 12/16/2024) for Wellness/Preventative Visit,follow up on menopause symptoms  w/ Dr. DUPONT for 40 min appt.     MEDICATIONS:   No orders of the defined types were placed in this encounter.        - Continue other medications without change  Regular exercise  See Patient Instructions       Lisbeth Gonzalez MD        Florentino Olson is a 63 year old,  "presenting for the following health issues:  Menopausal Sx (- Hot Flahes; Follow up on medication )        9/20/2024     2:00 PM   Additional Questions   Roomed by ANDREY Motley   Accompanied by Self     History of Present Illness       Reason for visit:  Hot Flashes    She eats 4 or more servings of fruits and vegetables daily.She consumes 0 sweetened beverage(s) daily.She exercises with enough effort to increase her heart rate 30 to 60 minutes per day.  She exercises with enough effort to increase her heart rate 3 or less days per week.   She is taking medications regularly.   Tried to go up to 300mg gabapentin at night = strange dreams - acting out in her sleep , talking in her sleep and seeming to have nightmares per her  saying, \"Stop\" , and she was waking him up with her talking in sleep and lashing out a bit.   Has always been a vivid dreamer.   Sweats are better - on 200mg of gabapentin.     Was taking venlafaxine 75mg , then up to 112.5mg = sweats were not better at all when she was on 100mg gabapentin nightly - \"drowning in sweat every day\" .  She tapered down to 75mg of venlafaxine and would like to taper off that as well.   We decided to increase her estradiol (vivelle-dot) patch to 0.1mg twice weekly and decrease the venlafaxine and increase gabapentin.   Sweats are still during the day, but much less than previous.     Patient Active Problem List   Diagnosis    Panic disorder without agoraphobia    Anxiety    Overactive bladder    Hyperlipidemia LDL goal <130- on atorvastatin     Gastroesophageal reflux disease without esophagitis    Vasomotor symptoms due to menopause    Fibrocystic breast changes of both breasts    Primary osteoarthritis of both hands    Family history of early CAD    Insomnia, unspecified type-Controlled substance agreement signed -12/14/2022 ok for #30 -10mg ambien (takes 1/2 tab) with 1 refill every 6 months or so     Expressive aphasia    Urinary urgency    Fatigue, " unspecified type    Night sweats- s/p KRYSTYNA - was on estrace 1mg already, now on 0.1mg estradiol patch  - pt would like to try black cohosh first prior to micronized progesterone 2nd to breast ca risk    Controlled substance agreement signed -redone 12/15/2023 ok for #30 -10mg ambien (takes 1/2 tab) with 1 refill every 6 months or so    Acute recurrent maxillary sinusitis    Multiple dysplastic nevi-Going to dermatology for her full body skin exam annually - just had appt=12/2022[facility] - Dr. Carmen Dowling - for her early dysplastic melanoma.      RUQ abdominal pain- comes and goes - not related to foods or fatty foods, worse bending over with or without lifting and getting out of her car- ? muscle spasm - CT and US negative 10/5/2020     Rotator cuff syndrome, left    Other osteoarthritis involving multiple joints- left middle finger s/p fracture /dislocation - causing some chronic pain - pt requests refill of ibuprofen 800mg tabs - takes with food     Lumbosacral dysfunction    Sacral pain    Family history of colonic polyps- precancerous     Basal cell carcinoma (BCC) of skin of nose    Vitamin D deficiency    Overweight (BMI 25.0-29.9)- BMI down from 31 with ZepBound ( generic tirzapetide injection) GLP-1 inhbitor fr [employer] - started 3/2024    Hiatal hernia- seen on Chest CT 12/2023 - GERD better with not eating after 7pm    Hair loss- diffuse hair thinning over time - most likely familial       Current Outpatient Medications   Medication Sig Dispense Refill    albuterol (PROAIR HFA/PROVENTIL HFA/VENTOLIN HFA) 108 (90 Base) MCG/ACT inhaler Inhale 2 puffs into the lungs every 6 hours as needed 18 g 1    atorvastatin (LIPITOR) 10 MG tablet TAKE 1 TABLET(10 MG) BY MOUTH DAILY 90 tablet 1    azithromycin (ZITHROMAX) 250 MG tablet 2 tabs first day, then 1 tab by mouth daily for 4 additional days 6 tablet 1    calcium-vitamin D (OSCAL) 250-125 MG-UNIT TABS per tablet Take 1 tablet by mouth daily       clobetasol (TEMOVATE) 0.05 % external ointment APPLY TO RIGHT TEMPLE LESION TWICE DAILY UNTIL RESOLUTION      estradiol (VIVELLE-DOT) 0.1 MG/24HR bi-weekly patch Place 1 patch onto the skin twice a week 24 patch 0    fluticasone (FLONASE) 50 MCG/ACT nasal spray Spray 1 spray into both nostrils daily      gabapentin (NEURONTIN) 100 MG capsule Take 1-3 capsules (100-300 mg) by mouth at bedtime For menopausal symptoms 270 capsule 3    ibuprofen (ADVIL/MOTRIN) 800 MG tablet TAKE 1 TABLET BY MOUTH EVERY 8 HOURS WITH FOOD AS NEEDED FOR MODERATE PAIN 90 tablet 1    methylPREDNISolone (MEDROL DOSEPAK) 4 MG tablet therapy pack Follow Package Directions 21 tablet 0    metroNIDAZOLE (METROCREAM) 0.75 % external cream APPLY TOPICALLY TO THE AFFECTED AREA TWICE DAILY FOR ROSACEA      Multiple Vitamins-Minerals (MULTIVITAMIN ADULT PO) Take by mouth daily      Omega-3 Fatty Acids (OMEGA 3 PO)       tacrolimus (PROTOPIC) 0.1 % external ointment       tirzepatide-Weight Management (ZEPBOUND) 2.5 MG/0.5ML prefilled pen Inject 0.5 mLs (2.5 mg) Subcutaneous every 7 days      venlafaxine (EFFEXOR XR) 37.5 MG 24 hr capsule Take 1 capsule (37.5 mg) by mouth daily Will take in addition to dose of 75 mg for a total daily dose of 112.5 mg 90 capsule 0    venlafaxine (EFFEXOR XR) 75 MG 24 hr capsule Take 1 capsule (75 mg) by mouth daily      zolpidem (AMBIEN) 10 MG tablet TAKE 1/2 TABLET BY MOUTH EVERY NIGHT AT BEDTIME AS NEEDED FOR SLEEP 30 tablet 3          Allergies   Allergen Reactions    Rosamaria Sun Screen Spf 30 [Solbar Pf Spf15] Hives and Rash    Paxlovid [Nirmatrelvir-Ritonavir] Hives, Itching and Rash    Drysol [Aluminum Chloride] Hives    Septra [Sulfamethoxazole-Trimethoprim]     Sulfa Antibiotics Hives    Murine Ear [Carbamide Peroxide] Swelling and Rash     Severe pain , swelling after debrox type ear drops - had to go to the clinic to have ears flushed out emergently.          Review of Systems  Constitutional, HEENT,  "cardiovascular, pulmonary, GI, , musculoskeletal, neuro, skin, endocrine and psych systems are negative, except as otherwise noted.      Objective    Vitals - Patient Reported  Weight (Patient Reported): 65.3 kg (144 lb)  Height (Patient Reported): 160 cm (5' 3\")  BMI (Based on Pt Reported Ht/Wt): 25.51    Physical Exam :   GENERAL: alert and no distress  EYES: Eyes grossly normal to inspection.  No discharge or erythema, or obvious scleral/conjunctival abnormalities.  RESP: No audible wheeze, cough, or visible cyanosis.    SKIN: Visible skin clear. No significant rash, abnormal pigmentation or lesions.  NEURO: Cranial nerves grossly intact.  Mentation and speech appropriate for age.  PSYCH: Appropriate affect, tone, and pace of words.     Lab on 07/29/2024   Component Date Value Ref Range Status    TSH 07/29/2024 2.73  0.30 - 4.20 uIU/mL Final    Free T4 07/29/2024 0.94  0.90 - 1.70 ng/dL Final    T3 Free 07/29/2024 2.3  2.0 - 4.4 pg/mL Final    Thyroid Peroxidase Antibody 07/29/2024 15  <35 IU/mL Final    Sodium 07/29/2024 140  135 - 145 mmol/L Final    Potassium 07/29/2024 3.6  3.4 - 5.3 mmol/L Final    Chloride 07/29/2024 104  98 - 107 mmol/L Final    Carbon Dioxide (CO2) 07/29/2024 27  22 - 29 mmol/L Final    Anion Gap 07/29/2024 9  7 - 15 mmol/L Final    Urea Nitrogen 07/29/2024 11.2  8.0 - 23.0 mg/dL Final    Creatinine 07/29/2024 0.77  0.51 - 0.95 mg/dL Final    GFR Estimate 07/29/2024 86  >60 mL/min/1.73m2 Final    eGFR calculated using 2021 CKD-EPI equation.    Calcium 07/29/2024 9.8  8.8 - 10.4 mg/dL Final    Reference intervals for this test were updated on 7/16/2024 to reflect our healthy population more accurately. There may be differences in the flagging of prior results with similar values performed with this method. Those prior results can be interpreted in the context of the updated reference intervals.    Glucose 07/29/2024 83  70 - 99 mg/dL Final    Final Diagnosis 07/29/2024    Final           "          Value:This result contains rich text formatting which cannot be displayed here.    Peripheral Smear 07/29/2024    Final                    Value:This result contains rich text formatting which cannot be displayed here.    Peripheral Hematologic Data 07/29/2024    Final                    Value:This result contains rich text formatting which cannot be displayed here.    Performing Labs 07/29/2024    Final                    Value:This result contains rich text formatting which cannot be displayed here.    WBC Count 07/29/2024 5.6  4.0 - 11.0 10e3/uL Final    RBC Count 07/29/2024 4.20  3.80 - 5.20 10e6/uL Final    Hemoglobin 07/29/2024 12.6  11.7 - 15.7 g/dL Final    Hematocrit 07/29/2024 36.8  35.0 - 47.0 % Final    MCV 07/29/2024 88  78 - 100 fL Final    MCH 07/29/2024 30.0  26.5 - 33.0 pg Final    MCHC 07/29/2024 34.2  31.5 - 36.5 g/dL Final    RDW 07/29/2024 12.6  10.0 - 15.0 % Final    Platelet Count 07/29/2024 220  150 - 450 10e3/uL Final    % Neutrophils 07/29/2024 58  % Final    % Lymphocytes 07/29/2024 32  % Final    % Monocytes 07/29/2024 6  % Final    % Eosinophils 07/29/2024 3  % Final    % Basophils 07/29/2024 1  % Final    % Immature Granulocytes 07/29/2024 0  % Final    Absolute Neutrophils 07/29/2024 3.2  1.6 - 8.3 10e3/uL Final    Absolute Lymphocytes 07/29/2024 1.8  0.8 - 5.3 10e3/uL Final    Absolute Monocytes 07/29/2024 0.4  0.0 - 1.3 10e3/uL Final    Absolute Eosinophils 07/29/2024 0.2  0.0 - 0.7 10e3/uL Final    Absolute Basophils 07/29/2024 0.0  0.0 - 0.2 10e3/uL Final    Absolute Immature Granulocytes 07/29/2024 0.0  <=0.4 10e3/uL Final    % Reticulocyte 07/29/2024 1.2  0.5 - 2.0 % Final    Absolute Reticulocyte 07/29/2024 0.050  0.025 - 0.095 10e6/uL Final         Video-Visit Details:    Type of service:  Video Visit   Originating Location (pt. Location): Home    Distant Location (provider location):  On-site  Platform used for Video Visit: Ahsan  Signed Electronically by: Lisbeth GODINEZ  MD Carlos      Answers submitted by the patient for this visit:  Patient Health Questionnaire (Submitted on 9/16/2024)  If you checked off any problems, how difficult have these problems made it for you to do your work, take care of things at home, or get along with other people?: Not difficult at all  PHQ9 TOTAL SCORE: 1  Patient Health Questionnaire (G7) (Submitted on 9/16/2024)  JOSE ANTONIO 7 TOTAL SCORE: 0  General Questionnaire (Submitted on 9/20/2024)  Chief Complaint: Chronic problems general questions HPI Form  What is the reason for your visit today? : Hot Flashes  How many servings of fruits and vegetables do you eat daily?: 4 or more  On average, how many sweetened beverages do you drink each day (Examples: soda, juice, sweet tea, etc.  Do NOT count diet or artificially sweetened beverages)?: 0  How many minutes a day do you exercise enough to make your heart beat faster?: 30 to 60  How many days a week do you exercise enough to make your heart beat faster?: 3 or less  How many days per week do you miss taking your medication?: 0

## 2024-10-01 ENCOUNTER — HOSPITAL ENCOUNTER (OUTPATIENT)
Dept: GENERAL RADIOLOGY | Facility: CLINIC | Age: 64
Discharge: HOME | End: 2024-10-01
Attending: PHYSICIAN ASSISTANT | Admitting: PHYSICIAN ASSISTANT
Payer: COMMERCIAL

## 2024-10-01 DIAGNOSIS — R06.02 SHORTNESS OF BREATH: ICD-10-CM

## 2024-10-01 PROCEDURE — 71046 X-RAY EXAM CHEST 2 VIEWS: CPT

## 2024-10-02 DIAGNOSIS — R06.02 SHORTNESS OF BREATH: Primary | ICD-10-CM

## 2024-10-02 NOTE — Clinical Note
Future Appointments 12/5/2024  7:30 AM    [id number]                      RVMAM               RV 12/16/2024 8:00 AM    Lisbeth Gonzalez MD RVFP                RV 1/10/2025  10:00 AM   CS PULMONARY FUNCTION      CSPULM              CS 1/10/2025  11:00 AM   Tj Lindo MD CSPUL              CS

## 2024-10-25 ENCOUNTER — PATIENT OUTREACH (OUTPATIENT)
Dept: CARE COORDINATION | Facility: CLINIC | Age: 64
End: 2024-10-25
Payer: COMMERCIAL

## 2024-10-31 DIAGNOSIS — E78.5 HYPERLIPIDEMIA LDL GOAL <130: ICD-10-CM

## 2024-10-31 DIAGNOSIS — Z82.49 FAMILY HISTORY OF EARLY CAD: ICD-10-CM

## 2024-10-31 RX ORDER — ATORVASTATIN CALCIUM 10 MG/1
10 TABLET, FILM COATED ORAL DAILY
Qty: 90 TABLET | Refills: 0 | Status: SHIPPED | OUTPATIENT
Start: 2024-10-31

## 2024-11-18 DIAGNOSIS — R61 DIAPHORESIS: ICD-10-CM

## 2024-11-18 DIAGNOSIS — N95.1 SYMPTOMATIC MENOPAUSAL OR FEMALE CLIMACTERIC STATES: ICD-10-CM

## 2024-11-20 RX ORDER — VENLAFAXINE HYDROCHLORIDE 75 MG/1
75 CAPSULE, EXTENDED RELEASE ORAL DAILY
Qty: 90 CAPSULE | Refills: 0 | Status: SHIPPED | OUTPATIENT
Start: 2024-11-20

## 2024-11-20 RX ORDER — VENLAFAXINE HYDROCHLORIDE 37.5 MG/1
CAPSULE, EXTENDED RELEASE ORAL
Qty: 90 CAPSULE | Refills: 0 | Status: SHIPPED | OUTPATIENT
Start: 2024-11-20

## 2024-12-16 ENCOUNTER — OFFICE VISIT (OUTPATIENT)
Dept: FAMILY MEDICINE | Facility: CLINIC | Age: 64
End: 2024-12-16
Payer: COMMERCIAL

## 2024-12-16 VITALS
WEIGHT: 138.2 LBS | HEART RATE: 99 BPM | HEIGHT: 64 IN | SYSTOLIC BLOOD PRESSURE: 122 MMHG | DIASTOLIC BLOOD PRESSURE: 70 MMHG | BODY MASS INDEX: 23.6 KG/M2 | RESPIRATION RATE: 12 BRPM | TEMPERATURE: 96.9 F | OXYGEN SATURATION: 99 %

## 2024-12-16 DIAGNOSIS — N95.1 VASOMOTOR SYMPTOMS DUE TO MENOPAUSE: ICD-10-CM

## 2024-12-16 DIAGNOSIS — Z00.00 ROUTINE GENERAL MEDICAL EXAMINATION AT A HEALTH CARE FACILITY: Primary | ICD-10-CM

## 2024-12-16 DIAGNOSIS — E55.9 VITAMIN D DEFICIENCY: ICD-10-CM

## 2024-12-16 DIAGNOSIS — F41.9 ANXIETY: ICD-10-CM

## 2024-12-16 DIAGNOSIS — E78.5 HYPERLIPIDEMIA LDL GOAL <130: ICD-10-CM

## 2024-12-16 DIAGNOSIS — N95.2 ATROPHIC VAGINITIS: ICD-10-CM

## 2024-12-16 DIAGNOSIS — N95.1 SYMPTOMATIC MENOPAUSAL OR FEMALE CLIMACTERIC STATES: ICD-10-CM

## 2024-12-16 DIAGNOSIS — Z82.49 FAMILY HISTORY OF EARLY CAD: ICD-10-CM

## 2024-12-16 DIAGNOSIS — G47.00 INSOMNIA, UNSPECIFIED TYPE: ICD-10-CM

## 2024-12-16 DIAGNOSIS — M15.8 OTHER OSTEOARTHRITIS INVOLVING MULTIPLE JOINTS: ICD-10-CM

## 2024-12-16 DIAGNOSIS — Z79.899 CONTROLLED SUBSTANCE AGREEMENT SIGNED: ICD-10-CM

## 2024-12-16 DIAGNOSIS — R61 DIAPHORESIS: ICD-10-CM

## 2024-12-16 DIAGNOSIS — M75.102 ROTATOR CUFF SYNDROME, LEFT: ICD-10-CM

## 2024-12-16 LAB
ALBUMIN SERPL BCG-MCNC: 4.3 G/DL (ref 3.5–5.2)
ALP SERPL-CCNC: 78 U/L (ref 40–150)
ALT SERPL W P-5'-P-CCNC: 19 U/L (ref 0–50)
AMPHETAMINES UR QL: NOT DETECTED
ANION GAP SERPL CALCULATED.3IONS-SCNC: 11 MMOL/L (ref 7–15)
AST SERPL W P-5'-P-CCNC: 20 U/L (ref 0–45)
BARBITURATES UR QL SCN: NOT DETECTED
BENZODIAZ UR QL SCN: NOT DETECTED
BILIRUB SERPL-MCNC: 1.2 MG/DL
BUN SERPL-MCNC: 12 MG/DL (ref 8–23)
BUPRENORPHINE UR QL: NOT DETECTED
CALCIUM SERPL-MCNC: 9.3 MG/DL (ref 8.8–10.4)
CANNABINOIDS UR QL: NOT DETECTED
CHLORIDE SERPL-SCNC: 101 MMOL/L (ref 98–107)
CHOLEST SERPL-MCNC: 169 MG/DL
COCAINE UR QL SCN: NOT DETECTED
CREAT SERPL-MCNC: 0.74 MG/DL (ref 0.51–0.95)
D-METHAMPHET UR QL: NOT DETECTED
EGFRCR SERPLBLD CKD-EPI 2021: 90 ML/MIN/1.73M2
ERYTHROCYTE [DISTWIDTH] IN BLOOD BY AUTOMATED COUNT: 13.1 % (ref 10–15)
FASTING STATUS PATIENT QL REPORTED: YES
FASTING STATUS PATIENT QL REPORTED: YES
GLUCOSE SERPL-MCNC: 78 MG/DL (ref 70–99)
HCO3 SERPL-SCNC: 26 MMOL/L (ref 22–29)
HCT VFR BLD AUTO: 40.1 % (ref 35–47)
HDLC SERPL-MCNC: 71 MG/DL
HGB BLD-MCNC: 13.7 G/DL (ref 11.7–15.7)
LDLC SERPL CALC-MCNC: 84 MG/DL
MCH RBC QN AUTO: 30.1 PG (ref 26.5–33)
MCHC RBC AUTO-ENTMCNC: 34.2 G/DL (ref 31.5–36.5)
MCV RBC AUTO: 88 FL (ref 78–100)
METHADONE UR QL SCN: NOT DETECTED
NONHDLC SERPL-MCNC: 98 MG/DL
OPIATES UR QL SCN: NOT DETECTED
OXYCODONE UR QL SCN: NOT DETECTED
PCP UR QL SCN: NOT DETECTED
PLATELET # BLD AUTO: 239 10E3/UL (ref 150–450)
POTASSIUM SERPL-SCNC: 3.6 MMOL/L (ref 3.4–5.3)
PROT SERPL-MCNC: 7 G/DL (ref 6.4–8.3)
RBC # BLD AUTO: 4.55 10E6/UL (ref 3.8–5.2)
SODIUM SERPL-SCNC: 138 MMOL/L (ref 135–145)
TRICYCLICS UR QL SCN: NOT DETECTED
TRIGL SERPL-MCNC: 69 MG/DL
TSH SERPL DL<=0.005 MIU/L-ACNC: 2.07 UIU/ML (ref 0.3–4.2)
VIT D+METAB SERPL-MCNC: 52 NG/ML (ref 20–50)
WBC # BLD AUTO: 5.9 10E3/UL (ref 4–11)

## 2024-12-16 PROCEDURE — 85027 COMPLETE CBC AUTOMATED: CPT | Performed by: FAMILY MEDICINE

## 2024-12-16 PROCEDURE — 82570 ASSAY OF URINE CREATININE: CPT | Mod: 59 | Performed by: FAMILY MEDICINE

## 2024-12-16 PROCEDURE — 82043 UR ALBUMIN QUANTITATIVE: CPT | Performed by: FAMILY MEDICINE

## 2024-12-16 PROCEDURE — 36415 COLL VENOUS BLD VENIPUNCTURE: CPT | Performed by: FAMILY MEDICINE

## 2024-12-16 PROCEDURE — 84443 ASSAY THYROID STIM HORMONE: CPT | Performed by: FAMILY MEDICINE

## 2024-12-16 PROCEDURE — 80061 LIPID PANEL: CPT | Performed by: FAMILY MEDICINE

## 2024-12-16 PROCEDURE — 82306 VITAMIN D 25 HYDROXY: CPT | Performed by: FAMILY MEDICINE

## 2024-12-16 PROCEDURE — 80306 DRUG TEST PRSMV INSTRMNT: CPT | Performed by: FAMILY MEDICINE

## 2024-12-16 PROCEDURE — 96127 BRIEF EMOTIONAL/BEHAV ASSMT: CPT | Performed by: FAMILY MEDICINE

## 2024-12-16 PROCEDURE — 99396 PREV VISIT EST AGE 40-64: CPT | Performed by: FAMILY MEDICINE

## 2024-12-16 PROCEDURE — 99214 OFFICE O/P EST MOD 30 MIN: CPT | Mod: 25 | Performed by: FAMILY MEDICINE

## 2024-12-16 PROCEDURE — 80053 COMPREHEN METABOLIC PANEL: CPT | Performed by: FAMILY MEDICINE

## 2024-12-16 RX ORDER — ZOLPIDEM TARTRATE 10 MG/1
TABLET ORAL
Qty: 30 TABLET | Refills: 3 | Status: SHIPPED | OUTPATIENT
Start: 2024-12-16

## 2024-12-16 RX ORDER — ESTRADIOL 0.1 MG/D
1 FILM, EXTENDED RELEASE TRANSDERMAL
Qty: 24 PATCH | Refills: 3 | Status: SHIPPED | OUTPATIENT
Start: 2024-12-16

## 2024-12-16 RX ORDER — IBUPROFEN 800 MG/1
TABLET, FILM COATED ORAL
Qty: 90 TABLET | Refills: 1 | Status: SHIPPED | OUTPATIENT
Start: 2024-12-16

## 2024-12-16 RX ORDER — ATORVASTATIN CALCIUM 10 MG/1
10 TABLET, FILM COATED ORAL DAILY
Qty: 90 TABLET | Refills: 1 | Status: SHIPPED | OUTPATIENT
Start: 2024-12-16

## 2024-12-16 RX ORDER — VENLAFAXINE HYDROCHLORIDE 75 MG/1
75 CAPSULE, EXTENDED RELEASE ORAL DAILY
Qty: 90 CAPSULE | Refills: 1 | Status: SHIPPED | OUTPATIENT
Start: 2024-12-16

## 2024-12-16 SDOH — HEALTH STABILITY: PHYSICAL HEALTH: ON AVERAGE, HOW MANY DAYS PER WEEK DO YOU ENGAGE IN MODERATE TO STRENUOUS EXERCISE (LIKE A BRISK WALK)?: 4 DAYS

## 2024-12-16 ASSESSMENT — PATIENT HEALTH QUESTIONNAIRE - PHQ9
SUM OF ALL RESPONSES TO PHQ QUESTIONS 1-9: 0
SUM OF ALL RESPONSES TO PHQ QUESTIONS 1-9: 0

## 2024-12-16 ASSESSMENT — ANXIETY QUESTIONNAIRES
7. FEELING AFRAID AS IF SOMETHING AWFUL MIGHT HAPPEN: NOT AT ALL
3. WORRYING TOO MUCH ABOUT DIFFERENT THINGS: NOT AT ALL
2. NOT BEING ABLE TO STOP OR CONTROL WORRYING: NOT AT ALL
1. FEELING NERVOUS, ANXIOUS, OR ON EDGE: NOT AT ALL
GAD7 TOTAL SCORE: 0
4. TROUBLE RELAXING: NOT AT ALL
5. BEING SO RESTLESS THAT IT IS HARD TO SIT STILL: NOT AT ALL
GAD7 TOTAL SCORE: 0
GAD7 TOTAL SCORE: 0
7. FEELING AFRAID AS IF SOMETHING AWFUL MIGHT HAPPEN: NOT AT ALL
6. BECOMING EASILY ANNOYED OR IRRITABLE: NOT AT ALL

## 2024-12-16 ASSESSMENT — SOCIAL DETERMINANTS OF HEALTH (SDOH): HOW OFTEN DO YOU GET TOGETHER WITH FRIENDS OR RELATIVES?: MORE THAN THREE TIMES A WEEK

## 2024-12-16 NOTE — LETTER

## 2024-12-16 NOTE — PATIENT INSTRUCTIONS
[facility]  [address] [place] [id number]  Office: [phone]   Fax:    [phone]     Look for [employer] line of products for dry mouth.      Look on YouTube for standing core exercises with dumbbells.     Return in about 6 months (around 6/16/2025) for cholesterol, mood and menopausal symptoms , w/ Dr. DUPONT for 40 min appt.       Patient Education   Preventive Care Advice   This is general advice given by our system to help you stay healthy. However, your care team may have specific advice just for you. Please talk to your care team about your preventive care needs.  Nutrition  Eat 5 or more servings of fruits and vegetables each day.  Try wheat bread, brown rice and whole grain pasta (instead of white bread, rice, and pasta).  Get enough calcium and vitamin D. Check the label on foods and aim for 100% of the RDA (recommended daily allowance).  Lifestyle  Exercise at least 150 minutes each week  (30 minutes a day, 5 days a week).  Do muscle strengthening activities 2 days a week. These help control your weight and prevent disease.  No smoking.  Wear sunscreen to prevent skin cancer.  Have a dental exam and cleaning every 6 months.  Yearly exams  See your health care team every year to talk about:  Any changes in your health.  Any medicines your care team has prescribed.  Preventive care, family planning, and ways to prevent chronic diseases.  Shots (vaccines)   HPV shots (up to age 26), if you've never had them before.  Hepatitis B shots (up to age 59), if you've never had them before.  COVID-19 shot: Get this shot when it's due.  Flu shot: Get a flu shot every year.  Tetanus shot: Get a tetanus shot every 10 years.  Pneumococcal, hepatitis A, and RSV shots: Ask your care team if you need these based on your risk.  Shingles shot (for age 50 and up)  General health tests  Diabetes screening:  Starting at age 35, Get screened for diabetes at least every 3 years.  If you are  younger than age 35, ask your care team if you should be screened for diabetes.  Cholesterol test: At age 39, start having a cholesterol test every 5 years, or more often if advised.  Bone density scan (DEXA): At age 50, ask your care team if you should have this scan for osteoporosis (brittle bones).  Hepatitis C: Get tested at least once in your life.  STIs (sexually transmitted infections)  Before age 24: Ask your care team if you should be screened for STIs.  After age 24: Get screened for STIs if you're at risk. You are at risk for STIs (including HIV) if:  You are sexually active with more than one person.  You don't use condoms every time.  You or a partner was diagnosed with a sexually transmitted infection.  If you are at risk for HIV, ask about PrEP medicine to prevent HIV.  Get tested for HIV at least once in your life, whether you are at risk for HIV or not.  Cancer screening tests  Cervical cancer screening: If you have a cervix, begin getting regular cervical cancer screening tests starting at age 21.  Breast cancer scan (mammogram): If you've ever had breasts, begin having regular mammograms starting at age 40. This is a scan to check for breast cancer.  Colon cancer screening: It is important to start screening for colon cancer at age 45.  Have a colonoscopy test every 10 years (or more often if you're at risk) Or, ask your provider about stool tests like a FIT test every year or Cologuard test every 3 years.  To learn more about your testing options, visit:   .  For help making a decision, visit:   https://bit.ly/gi10395.  Prostate cancer screening test: If you have a prostate, ask your care team if a prostate cancer screening test (PSA) at age 55 is right for you.  Lung cancer screening: If you are a current or former smoker ages 50 to 80, ask your care team if ongoing lung cancer screenings are right for you.  For informational purposes only. Not to replace the advice of your health care provider.  Copyright   2023 [facility]. All rights reserved. Clinically reviewed by the [facility] Transitions Program. [employer] [id number] - REV 01/24.  Preventing Falls: Care Instructions  Injuries and health problems such as trouble walking or poor eyesight can increase your risk of falling. So can some medicines. But there are things you can do to help prevent falls. You can exercise to get stronger. You can also arrange your home to make it safer.    Talk to your doctor about the medicines you take. Ask if any of them increase the risk of falls and whether they can be changed or stopped.   Try to exercise regularly. It can help improve your strength and balance. This can help lower your risk of falling.         Practice fall safety and prevention.   Wear low-heeled shoes that fit well and give your feet good support. Talk to your doctor if you have foot problems that make this hard.  Carry a cellphone or wear a medical alert device that you can use to call for help.  Use stepladders instead of chairs to reach high objects. Don't climb if you're at risk for falls. Ask for help, if needed.  Wear the correct eyeglasses, if you need them.        Make your home safer.   Remove rugs, cords, clutter, and furniture from walkways.  Keep your house well lit. Use night-lights in hallways and bathrooms.  Install and use sturdy handrails on stairways.  Wear nonskid footwear, even inside. Don't walk barefoot or in socks without shoes.        Be safe outside.   Use handrails, curb cuts, and ramps whenever possible.  Keep your hands free by using a shoulder bag or backpack.  Try to walk in well-lit areas. Watch out for uneven ground, changes in pavement, and debris.  Be careful in the winter. Walk on the grass or gravel when sidewalks are slippery. Use de-icer on steps and walkways. Add non-slip devices to shoes.    Put grab bars and nonskid mats in your shower or tub and near the toilet. Try to use a shower chair or  "bath bench when bathing.   Get into a tub or shower by putting in your weaker leg first. Get out with your strong side first. Have a phone or medical alert device in the bathroom with you.   Where can you learn more?  Go to https://www.FlyData.net/patiented  Enter G117 in the search box to learn more about \"Preventing Falls: Care Instructions.\"  Current as of: July 17, 2023  Content Version: 14.2 2024 [employer].   Care instructions adapted under license by your healthcare professional. If you have questions about a medical condition or this instruction, always ask your healthcare professional. [employer] disclaims any warranty or liability for your use of this information.  Thank you so much or choosing [facility]  for your Health Care. It was a pleasure seeing you at your visit today! Please contact us with any questions or concerns you may have.                   Lisbeth Gonzalez MD                              To reach your [facility] care team after hours call:   [phone] press #[phone] \"to speak with your care team\".  This will get you to our clinic instead of routing to central [facility]  scheduling.     PLEASE NOTE OUR HOURS HAVE CHANGED secondary to COVID-19 coronavirus pandemic, as we are trying to minimize patient exposure to the virus,  which is now widespread in the [facility].  These hours may change with very little notice.  We apologize for any inconvenience.       Our current clinic hours are:          Monday- Thursday   7:00am - 6:00pm  in person.      Friday  7:00am- 5:00pm                       Saturday and Sunday : Closed to in person and virtual visits        We have telephone and virtual visit times available between    7:00am - 6pm on Monday-Friday as well.                                                Phone:  [phone]      Our pharmacy hours: Monday through Friday 8:00am to 5:00pm               "          Saturday - 9:00 am to 12 noon       Sunday : Closed.              Phone:  [phone]              ###  Please note: at this time we are not accepting any walk-in visits. ###      There is also information available at our web site:  [email or web address]    If your provider ordered any lab tests and you do not receive the results within 10 business days, please call the clinic.    If you need a medication refill please contact your pharmacy.  Please allow 3 business days for your refill to be completed.    Our clinic offers telephone visits and e visits.  Please ask one of your team members to explain more.      Use [employer] (secure email communication and access to your chart) to send your primary care provider a message or make an appointment. Ask someone on your Team how to sign up for [employer].

## 2024-12-16 NOTE — PROGRESS NOTES
Preventive Care Visit  [facility] [place]  Lisbethmavis Gonzalez MD, Family Medicine  Dec 16, 2024      Assessment & Plan :       ICD-10-CM    1. Routine general medical examination at a health care facility  Z00.00       2. Family history of early CAD  Z82.49 atorvastatin (LIPITOR) 10 MG tablet      3. Hyperlipidemia LDL goal <130- on atorvastatin - uncertain control - needs lab follow up and refill on medicine   E78.5 Comprehensive metabolic panel     Albumin Random Urine Quantitative with Creat Ratio     atorvastatin (LIPITOR) 10 MG tablet      4. Symptomatic menopausal or female climacteric states  N95.1 TSH with free T4 reflex     CBC with platelets     estradiol (VIVELLE-DOT) 0.1 MG/24HR bi-weekly patch     venlafaxine (EFFEXOR XR) 75 MG 24 hr capsule     DX Bone Density      5. Rotator cuff syndrome, left  M75.102 ibuprofen (ADVIL/MOTRIN) 800 MG tablet      6. Other osteoarthritis involving multiple joints- left middle finger s/p fracture /dislocation - causing some chronic pain - pt requests refill of ibuprofen 800mg tabs - takes with food   M15.8 ibuprofen (ADVIL/MOTRIN) 800 MG tablet      7. Diaphoresis - related to menopause  R61       8. Insomnia, unspecified type- mostly while travelling -CSA re-signed 12/16/2024-  ? related to menopause as well - Ambien 10mg - takes 1/2 tab at bedtime PRN  G47.00 zolpidem (AMBIEN) 10 MG tablet      9. Controlled substance agreement re-signed today -  12/16/2024-  ok for #30 -10mg ambien (takes 1/2 tab) with 1 refill every 6 months or so  Z79.899 zolpidem (AMBIEN) 10 MG tablet      10. Atrophic vaginitis  N95.2 UNKNOWN MED DOSAGE      11. Vitamin D deficiency  E55.9 Vitamin D Deficiency      12. 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 Anxiety  F41.9         Please, call our clinic or go to the ER immediately if signs or symptoms worsen or  fail to improve as anticipated.     Patient has been advised of split billing requirements and indicates understanding: Yes    controlled substance agreement discussed in detail and renewed with  pt in detail today and signed with pt. Pt voices understanding of responsibility of taking and storing a controlled substance as well.      PDMP Review         Value Time User    State PDMP site checked  Yes 12/16/2024  8:48 AM Lisbeth Gonzalez MD          No suspicious activity noted. Pt compliant with controlled substance agreement. --Lisbeth Gonzalez MD       Counseling:   Appropriate preventive services were addressed with this patient via screening, questionnaire, or discussion as appropriate for fall prevention, nutrition, physical activity, Tobacco-use cessation, social engagement, weight loss and cognition.  Checklist reviewing preventive services available has been given to the patient.  Reviewed patient's diet, addressing concerns and/or questions.       MEDICATIONS:   Orders Placed This Encounter   Medications    atorvastatin (LIPITOR) 10 MG tablet     Sig: Take 1 tablet (10 mg) by mouth daily.     Dispense:  90 tablet     Refill:  1    estradiol (VIVELLE-DOT) 0.1 MG/24HR bi-weekly patch     Sig: Place 1 patch onto the skin twice a week.     Dispense:  24 patch     Refill:  3    ibuprofen (ADVIL/MOTRIN) 800 MG tablet     Sig: TAKE 1 TABLET BY MOUTH EVERY 8 HOURS WITH FOOD AS NEEDED FOR MODERATE PAIN     Dispense:  90 tablet     Refill:  1    venlafaxine (EFFEXOR XR) 75 MG 24 hr capsule     Sig: Take 1 capsule (75 mg) by mouth daily.     Dispense:  90 capsule     Refill:  1    zolpidem (AMBIEN) 10 MG tablet     Sig: TAKE 1/2 TABLET BY MOUTH EVERY NIGHT AT BEDTIME AS NEEDED FOR SLEEP     Dispense:  30 tablet     Refill:  3    UNKNOWN MED DOSAGE     Sig: ESTROGEN COMPOUNDED CREAM - estradiol 0.02% in HRT base compounded cream  Apply 1 inch to vagina and vulvar area twice weekly at bedtime     Dispense:   1 each     Refill:  11          - Continue other medications without change  Work on weight loss  Regular exercise  See Patient Instructions       Lisbeth Gonzalez MD      Florentino Olson is a 64 year old, presenting for the following:  Physical  and the following other medical problems:      1. Routine general medical examination at a health care facility    2. Family history of early CAD    3. Hyperlipidemia LDL goal <130- on atorvastatin - uncertain control - needs lab follow up and refill on medicine     4. Symptomatic menopausal or female climacteric states    5. Rotator cuff syndrome, left    6. Other osteoarthritis involving multiple joints- left middle finger s/p fracture /dislocation - causing some chronic pain - pt requests refill of ibuprofen 800mg tabs - takes with food     7. Diaphoresis - related to menopause    8. Insomnia, unspecified type- mostly while travelling -CSA re-signed 12/16/2024-  ? related to menopause as well - Ambien 10mg - takes 1/2 tab at bedtime PRN    9. Controlled substance agreement re-signed today -  12/16/2024-  ok for #30 -10mg ambien (takes 1/2 tab) with 1 refill every 6 months or so    10. Atrophic vaginitis    11. Vitamin D deficiency    12. 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 Anxiety            12/16/2024     7:42 AM   Additional Questions   Roomed by Yadira EVANGELISTA   Accompanied by Self         HPI:   Doing better with menopause symptoms with current meds except for vaginal pain and dryness during intercourse.    Will add in some estrogen cream as well.      Hyperlipidemia Follow-Up:     Are you regularly taking any medication or supplement to lower your cholesterol?   Yes- Atorvastatin 10 mg  Are you having muscle aches or other side effects that you think could be caused by your cholesterol lowering medication?  No    Anxiety :   How are you doing with your  anxiety since your last visit? Improved   Are you having other symptoms that might be associated with anxiety? No  Have you had a significant life event? No   Are you feeling depressed? No  Do you have any concerns with your use of alcohol or other drugs? No    Social History     Tobacco Use    Smoking status: Never    Smokeless tobacco: Never   Vaping Use    Vaping status: Never Used   Substance Use Topics    Alcohol use: No    Drug use: No         6/17/2024     7:41 AM 9/16/2024    10:18 AM 12/16/2024     7:43 AM   JOSE ANTONIO-7 SCORE   Total Score 1 (minimal anxiety) 0 (minimal anxiety) 0 (minimal anxiety)   Total Score 1 0 0        Patient-reported         6/17/2024     7:40 AM 9/16/2024    10:17 AM 12/16/2024     7:42 AM   PHQ   PHQ-9 Total Score 0 1 0    Q9: Thoughts of better off dead/self-harm past 2 weeks Not at all  Not at all  Not at all        Patient-reported         12/16/2024     7:42 AM   Last PHQ-9   1.  Little interest or pleasure in doing things 0    2.  Feeling down, depressed, or hopeless 0    3.  Trouble falling or staying asleep, or sleeping too much 0    4.  Feeling tired or having little energy 0    5.  Poor appetite or overeating 0    6.  Feeling bad about yourself 0    7.  Trouble concentrating 0    8.  Moving slowly or restless 0    Q9: Thoughts of better off dead/self-harm past 2 weeks 0    PHQ-9 Total Score 0        Patient-reported         12/16/2024     7:43 AM   JOSE ANTONIO-7    1. Feeling nervous, anxious, or on edge 0    2. Not being able to stop or control worrying 0    3. Worrying too much about different things 0    4. Trouble relaxing 0    5. Being so restless that it is hard to sit still 0    6. Becoming easily annoyed or irritable 0    7. Feeling afraid, as if something awful might happen 0    JOSE ANTONIO-7 Total Score 0        Patient-reported       Health Care Directive:   Patient has a Health Care Directive on file  Advance care planning document is on file and is current.      12/16/2024    General Health:    How would you rate your overall physical health? Good   Feel stress (tense, anxious, or unable to sleep) Not at all            12/16/2024   Nutrition   Three or more servings of calcium each day? Yes   Diet: Regular (no restrictions)   How many servings of fruit and vegetables per day? 4 or more   How many sweetened beverages each day? 0-1            12/16/2024   Exercise   Days per week of moderate/strenous exercise 4 days            12/16/2024   Social Factors   Frequency of gathering with friends or relatives More than three times a week   Worry food won't last until get money to buy more No   Food not last or not have enough money for food? No   Do you have housing? (Housing is defined as stable permanent housing and does not include staying ouside in a car, in a tent, in an abandoned building, in an overnight shelter, or couch-surfing.) Yes   Are you worried about losing your housing? No   Lack of transportation? No   Unable to get utilities (heat,electricity)? Yes   Want help with housing or utility concern? No      (!) FINANCIAL RESOURCE STRAIN CONCERN      12/16/2024   Fall Risk   Fallen 2 or more times in the past year? Yes    Trouble with walking or balance? No    Gait Speed Test (Document in seconds) 4.6   Gait Speed Test Interpretation Less than or equal to 5.00 seconds - PASS       Patient-reported          12/16/2024   Dental   Dentist two times every year? Yes            12/16/2024   TB Screening   Were you born outside of the [place]? No          Today's PHQ-9 Score:       12/16/2024     7:42 AM   PHQ-9 SCORE   PHQ-9 Total Score MyChart 0   PHQ-9 Total Score 0        Patient-reported         12/16/2024   Substance Use   Alcohol more than 3/day or more than 7/wk Not Applicable   Do you use any other substances recreationally? No        Social History     Tobacco Use    Smoking status: Never    Smokeless tobacco: Never   Vaping Use    Vaping status: Never Used   Substance Use Topics     Alcohol use: No    Drug use: No         12/15/2023   LAST FHS-7 RESULTS   1st degree relative breast or ovarian cancer No    Any relative bilateral breast cancer Unknown    Any male have breast cancer No    Any ONE woman have BOTH breast AND ovarian cancer Yes    Any woman with breast cancer before 50yrs No    2 or more relatives with breast AND/OR ovarian cancer No    2 or more relatives with breast AND/OR bowel cancer No        Patient-reported       Mammogram Screening - Mammogram every 1-2 years updated in Health Maintenance based on mutual decision making        12/16/2024   STI Screening   New sexual partner(s) since last STI/HIV test? No        History of abnormal Pap smear: Status post hysterectomy with removal of cervix and no history of CIN2 or greater or cervical cancer. Health Maintenance and Surgical History updated.        6/20/2015    12:00 AM   PAP / HPV   PAP (Historical) Normal           This result is from an external source.     ASCVD Risk   The 10-year ASCVD risk score (Nicola QUIROZ, et al., 2019) is: 3.7%    Values used to calculate the score:      Age: 64 years      Sex: Female      Is Non- : No      Diabetic: No      Tobacco smoker: No      Systolic Blood Pressure: 122 mmHg      Is BP treated: No      HDL Cholesterol: 67 mg/dL      Total Cholesterol: 160 mg/dL    Due for dexa scan.       Reviewed and updated as needed this visit by Provider                    Past Medical History:   Diagnosis Date    Anxiety     on effexor in past. Off since 2010    Cancer (H) 01/2021    SKIN CANCER    CMC DJD(carpometacarpal degenerative joint disease), localized primary     Edema     Fatigue, unspecified type 09/17/2018    Hormone replacement therapy     IBS (irritable bowel syndrome)     Diarrhea predominant    Insomnia, unspecified     On Ambien    Lichen sclerosus     Multiple dysplastic nevi     Going to dermatology for her full body skin exam tomorrow - [facility] - Dr. Carmen Dowling - for her early dysplastic melanoma.    Pap smear of cervix not needed     Skin cancer     Urge incontinence      Past Surgical History:   Procedure Laterality Date     SECTION      COLONOSCOPY  2012    Procedure:COLONOSCOPY; Colonoscopy ; Surgeon:DEANDRE ARRIETA; Location: GI    COLONOSCOPY N/A 2017    Procedure: COLONOSCOPY;  Surgeon: Deandre Arrieta MD;  Location:  GI    COLONOSCOPY N/A 2022    Procedure: COLONOSCOPY;  Surgeon: Pool Cardenas MD;  Location:  GI    COLPOSCOPY, BIOPSY, COMBINED  1991    Mild squamous epithelial dysplasia (SABIHA 1)    FISSURECTOMY RECTUM  01    HYSTERECTOMY TOTAL ABDOMINAL  3/4/1998    Myomatous uterus; pelvic pain. Started HRT right afterward with vasomotor sx.    HYSTERECTOMY, PAP NO LONGER INDICATED N/A     had for large fibroid    LIPOSUCTION (LOCATION)      MOHS MICROGRAPHIC PROCEDURE       OB History    Para Term  AB Living   2 2 2 0 0 2   SAB IAB Ectopic Multiple Live Births   0 0 0 0 2      # Outcome Date GA Lbr Nick/2nd Weight Sex Type Anes PTL Lv   2 Term 89   2.594 kg (5 lb 11.5 oz) M -SEC   TERELL      Birth Comments: Breech      Name: Tj Velazquez Term 87   3.005 kg (6 lb 10 oz) F Vag-Vacuum   TERELL      Name: Sam     Lab work is in process  Labs reviewed in EPIC  BP Readings from Last 3 Encounters:   24 122/70   24 127/84   24 115/70    Wt Readings from Last 3 Encounters:   24 62.7 kg (138 lb 3.2 oz)   24 70.8 kg (156 lb)   24 70.8 kg (156 lb)                  Patient Active Problem List   Diagnosis    Panic disorder without agoraphobia    Anxiety    Overactive bladder    Hyperlipidemia LDL goal <130- on atorvastatin     Gastroesophageal reflux disease without esophagitis    Vasomotor symptoms due to menopause- sometimes drenching sweats/hot flashes = main symptoms - 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    Fibrocystic  breast changes of both breasts    Primary osteoarthritis of both hands    Family history of early CAD    Insomnia, unspecified type-Controlled substance agreement signed -2022 ok for #30 -10mg ambien (takes 1/2 tab) with 1 refill every 6 months or so     Expressive aphasia    Urinary urgency    Fatigue, unspecified type    Night sweats- s/p KRYSTYNA - was on estrace 1mg already, now on 0.1mg estradiol patch  - pt would like to try black cohosh first prior to micronized progesterone 2nd to breast ca risk    Controlled substance agreement signed -redone 12/15/2023 ok for #30 -10mg ambien (takes 1/2 tab) with 1 refill every 6 months or so    Acute recurrent maxillary sinusitis    Multiple dysplastic nevi-Going to dermatology for her full body skin exam annually - just had appt=2022- [facility] - Dr. Carmen Dowling - for her early dysplastic melanoma.      RUQ abdominal pain- comes and goes - not related to foods or fatty foods, worse bending over with or without lifting and getting out of her car- ? muscle spasm - CT and US negative 10/5/2020     Rotator cuff syndrome, left    Other osteoarthritis involving multiple joints- left middle finger s/p fracture /dislocation - causing some chronic pain - pt requests refill of ibuprofen 800mg tabs - takes with food     Lumbosacral dysfunction    Sacral pain    Family history of colonic polyps- precancerous     Basal cell carcinoma (BCC) of skin of nose    Vitamin D deficiency    Overweight (BMI 25.0-29.9)- BMI down from 31 with ZepBound ( generic tirzapetide injection) GLP-1 inhbitor fr [employer] - started 3/2024    Hiatal hernia- seen on Chest CT 2023 - GERD better with not eating after 7pm    Hair loss- diffuse hair thinning over time - most likely familial     Past Surgical History:   Procedure Laterality Date     SECTION      COLONOSCOPY  2012    Procedure:COLONOSCOPY; Colonoscopy ; Surgeon:DEANDRE SHORT; Location: GI     COLONOSCOPY N/A 2017    Procedure: COLONOSCOPY;  Surgeon: Silverio Arrieta MD;  Location:  GI    COLONOSCOPY N/A 2022    Procedure: COLONOSCOPY;  Surgeon: Pool Cardenas MD;  Location:  GI    COLPOSCOPY, BIOPSY, COMBINED  1991    Mild squamous epithelial dysplasia (SABIHA 1)    FISSURECTOMY RECTUM  01    HYSTERECTOMY TOTAL ABDOMINAL  3/4/1998    Myomatous uterus; pelvic pain. Started HRT right afterward with vasomotor sx.    HYSTERECTOMY, PAP NO LONGER INDICATED N/A     had for large fibroid    LIPOSUCTION (LOCATION)      MOHS MICROGRAPHIC PROCEDURE         Social History     Tobacco Use    Smoking status: Never    Smokeless tobacco: Never   Substance Use Topics    Alcohol use: No     Family History   Problem Relation Age of Onset    Hypertension Mother     Hyperlipidemia Mother     Diabetes Mother     Cerebrovascular Disease Mother 89        right hemispheric stroke -  11 days after stroke - 10/2019     C.A.D. Father 45          age 45 massive MI     Heart Failure Father     Hyperlipidemia Father     Coronary Artery Disease Father     C.A.D. Sister 57        MI age 50- CABG - 2 vessels - 4 MI's since age 55 - 3 stents placed 2017     Diabetes Sister     Hypertension Sister     Hypertension Sister     Hyperlipidemia Sister     Coronary Artery Disease Sister     Coronary Artery Disease Brother 55        Heart attack- s/p sudden cardiac arrest survived 2019     Prostate Cancer Brother 66        with mets to bones found at age 70 now- noted 2024 - starting chemo    Testicular cancer Brother 37    C.A.D. Maternal Grandmother     C.A.D. Maternal Grandfather     C.A.D. Paternal Grandmother     C.A.D. Paternal Grandfather     Psoriasis Son 29        after returning from the service - lives in [place]    Psoriatic Arthritis Son 29        on Enbrel     Breast Cancer Maternal Aunt         50s-Mom had 9 sisters. MAunt->50's    Cancer Other         9 members on maternal side. Bone  CA, Breast CA (m. aunt),      Colon Cancer No family hx of          Current Outpatient Medications   Medication Sig Dispense Refill    albuterol (PROAIR HFA/PROVENTIL HFA/VENTOLIN HFA) 108 (90 Base) MCG/ACT inhaler Inhale 2 puffs into the lungs every 6 hours as needed 18 g 1    atorvastatin (LIPITOR) 10 MG tablet Take 1 tablet (10 mg) by mouth daily. 90 tablet 1    azithromycin (ZITHROMAX) 250 MG tablet 2 tabs first day, then 1 tab by mouth daily for 4 additional days 6 tablet 1    calcium-vitamin D (OSCAL) 250-125 MG-UNIT TABS per tablet Take 1 tablet by mouth daily      clobetasol (TEMOVATE) 0.05 % external ointment APPLY TO RIGHT TEMPLE LESION TWICE DAILY UNTIL RESOLUTION      estradiol (VIVELLE-DOT) 0.1 MG/24HR bi-weekly patch Place 1 patch onto the skin twice a week. 24 patch 3    fluticasone (FLONASE) 50 MCG/ACT nasal spray Spray 1 spray into both nostrils daily      gabapentin (NEURONTIN) 100 MG capsule Take 1-3 capsules (100-300 mg) by mouth at bedtime For menopausal symptoms 270 capsule 3    ibuprofen (ADVIL/MOTRIN) 800 MG tablet TAKE 1 TABLET BY MOUTH EVERY 8 HOURS WITH FOOD AS NEEDED FOR MODERATE PAIN 90 tablet 1    metroNIDAZOLE (METROCREAM) 0.75 % external cream APPLY TOPICALLY TO THE AFFECTED AREA TWICE DAILY FOR ROSACEA      Multiple Vitamins-Minerals (MULTIVITAMIN ADULT PO) Take by mouth daily      Omega-3 Fatty Acids (OMEGA 3 PO)       tacrolimus (PROTOPIC) 0.1 % external ointment       tirzepatide-Weight Management (ZEPBOUND) 2.5 MG/0.5ML prefilled pen Inject 0.5 mLs (2.5 mg) Subcutaneous every 7 days      UNKNOWN MED DOSAGE ESTROGEN COMPOUNDED CREAM - estradiol 0.02% in HRT base compounded cream  Apply 1 inch to vagina and vulvar area twice weekly at bedtime 1 each 11    venlafaxine (EFFEXOR XR) 75 MG 24 hr capsule Take 1 capsule (75 mg) by mouth daily. 90 capsule 1    zolpidem (AMBIEN) 10 MG tablet TAKE 1/2 TABLET BY MOUTH EVERY NIGHT AT BEDTIME AS NEEDED FOR SLEEP 30 tablet 3     Allergies  "  Allergen Reactions    Rosamaria Sun Screen Spf 30 [Solbar Pf Spf15] Hives and Rash    Paxlovid [Nirmatrelvir-Ritonavir] Hives, Itching and Rash    Drysol [Aluminum Chloride] Hives    Septra [Sulfamethoxazole-Trimethoprim]     Sulfa Antibiotics Hives    Murine Ear [Carbamide Peroxide] Swelling and Rash     Severe pain , swelling after debrox type ear drops - had to go to the clinic to have ears flushed out emergently.      Recent Labs   Lab Test 07/29/24  1520 12/15/23  1046 06/28/23  1040 02/14/23  0806 12/14/22  0817 11/29/21  1210 05/06/21  1559 10/22/20  1231   A1C  --  5.6  --   --   --   --   --   --    LDL  --  81  --  74 156*   < >  --  109*   HDL  --  67  --  64 63   < >  --  70   TRIG  --  59  --  105 70   < >  --  137   ALT  --  27  --  28 23   < > 25 31   CR 0.77 0.64  --   --  0.76   < > 0.71 0.69   GFRESTIMATED 86 >90  --   --  88   < > >90 >90   GFRESTBLACK  --   --   --   --   --   --  >90 >90   POTASSIUM 3.6 3.8  --   --  4.1   < > 3.7 3.5   TSH 2.73 1.75   < >  --  1.94   < >  --  1.57    < > = values in this interval not displayed.          Review of Systems  Constitutional, HEENT, cardiovascular, pulmonary, GI, , musculoskeletal, neuro, skin, endocrine and psych systems are negative, except as otherwise noted.     Objective    Exam  /70   Pulse 99   Temp 96.9  F (36.1  C) (Tympanic)   Resp 12   Ht 1.615 m (5' 3.58\")   Wt 62.7 kg (138 lb 3.2 oz)   LMP  (LMP Unknown)   SpO2 99%   BMI 24.03 kg/m     Estimated body mass index is 24.03 kg/m  as calculated from the following:    Height as of this encounter: 1.615 m (5' 3.58\").    Weight as of this encounter: 62.7 kg (138 lb 3.2 oz).    Physical Exam  GENERAL: alert and no distress  EYES: Eyes grossly normal to inspection, PERRL and conjunctivae and sclerae normal  HENT: ear canals and TM's normal, nose and mouth without ulcers or lesions  NECK: no adenopathy, no asymmetry, masses, or scars  RESP: lungs clear to auscultation - no rales, " rhonchi or wheezes  BREAST: normal without masses, tenderness or nipple discharge and no palpable axillary masses or adenopathy  CV: regular rate and rhythm, normal S1 S2, no S3 or S4, no murmur, click or rub, no peripheral edema  ABDOMEN: soft, nontender, no hepatosplenomegaly, no masses and bowel sounds normal  MS: no gross musculoskeletal defects noted, no edema  SKIN: no suspicious lesions or rashes  NEURO: Normal strength and tone, mentation intact and speech normal  PSYCH: mentation appears normal, affect normal/bright        Signed Electronically by: Lisbeth Gonzalez MD

## 2024-12-17 ENCOUNTER — PATIENT OUTREACH (OUTPATIENT)
Dept: CARE COORDINATION | Facility: CLINIC | Age: 64
End: 2024-12-17
Payer: COMMERCIAL

## 2024-12-17 ENCOUNTER — TELEPHONE (OUTPATIENT)
Dept: FAMILY MEDICINE | Facility: CLINIC | Age: 64
End: 2024-12-17
Payer: COMMERCIAL

## 2024-12-17 LAB
CREAT UR-MCNC: 116 MG/DL
MICROALBUMIN UR-MCNC: <12 MG/L
MICROALBUMIN/CREAT UR: NORMAL MG/G{CREAT}

## 2024-12-17 NOTE — TELEPHONE ENCOUNTER
Faxed signed forms to [facility] #[phone]    Apply 1  twice weekly     Copied into [facility] / Filed in [place]

## 2024-12-17 NOTE — TELEPHONE ENCOUNTER
Medication Question or Refill        What medication are you calling about (include dose and sig)?:     [facility].[address] - Estradiol 0.2G/G Hrt) cream - Verify Qty to dispense and specify how many grams per application    Preferred Pharmacy:       [facility] - [place], [place] - [address]  [address] [facility] [address]  Phone: [phone] Fax: [phone]      Controlled Substance Agreement on file:   CSA -- Patient Level:     [Media Unavailable] Controlled Substance Agreement - Opioid - Scan on 12/15/2023 10:19 AM   [Media Unavailable] Controlled Substance Agreement - Non - Opioid - Scan on 12/29/2021  7:23 AM: NON-OPIOID CONTROLLED SUBSTANCE AGREEMENT   [Media Unavailable] Controlled Substance Agreement - Non - Opioid - Scan on 10/29/2020  7:43 AM: NON-OPIOID CONTROLLED SUBSTANCE AGREEMENT       Who prescribed the medication?: Dr. Gonzalez    Do you need a refill? Yes    When did you use the medication last? na    Patient offered an appointment? No    Do you have any questions or concerns?  No      Could we send this information to you in [employer] or would you prefer to receive a phone call?:   na    Put in providers in box    Yadira MIRANDA

## 2024-12-19 ENCOUNTER — PATIENT OUTREACH (OUTPATIENT)
Dept: CARE COORDINATION | Facility: CLINIC | Age: 64
End: 2024-12-19
Payer: COMMERCIAL

## 2025-01-03 PROBLEM — M79.641 PAIN OF RIGHT HAND: Status: ACTIVE | Noted: 2025-01-03

## 2025-01-06 ENCOUNTER — ANCILLARY PROCEDURE (OUTPATIENT)
Dept: MAMMOGRAPHY | Facility: CLINIC | Age: 65
End: 2025-01-06
Attending: FAMILY MEDICINE
Payer: COMMERCIAL

## 2025-01-06 DIAGNOSIS — E78.5 HYPERLIPIDEMIA LDL GOAL <130: ICD-10-CM

## 2025-01-06 DIAGNOSIS — M75.102 ROTATOR CUFF SYNDROME, LEFT: ICD-10-CM

## 2025-01-06 DIAGNOSIS — Z82.49 FAMILY HISTORY OF EARLY CAD: ICD-10-CM

## 2025-01-06 DIAGNOSIS — M15.8 OTHER OSTEOARTHRITIS INVOLVING MULTIPLE JOINTS: ICD-10-CM

## 2025-01-06 DIAGNOSIS — Z12.31 VISIT FOR SCREENING MAMMOGRAM: ICD-10-CM

## 2025-01-06 PROCEDURE — 77067 SCR MAMMO BI INCL CAD: CPT | Mod: TC | Performed by: RADIOLOGY

## 2025-01-06 PROCEDURE — 77063 BREAST TOMOSYNTHESIS BI: CPT | Mod: TC | Performed by: RADIOLOGY

## 2025-01-06 RX ORDER — ATORVASTATIN CALCIUM 10 MG/1
10 TABLET, FILM COATED ORAL DAILY
Qty: 90 TABLET | Refills: 1 | OUTPATIENT
Start: 2025-01-06

## 2025-01-06 RX ORDER — IBUPROFEN 800 MG/1
TABLET, FILM COATED ORAL
Qty: 90 TABLET | Refills: 1 | OUTPATIENT
Start: 2025-01-06

## 2025-01-09 ENCOUNTER — THERAPY VISIT (OUTPATIENT)
Dept: OCCUPATIONAL THERAPY | Facility: CLINIC | Age: 65
End: 2025-01-09
Payer: COMMERCIAL

## 2025-01-09 DIAGNOSIS — M79.641 PAIN OF RIGHT HAND: Primary | ICD-10-CM

## 2025-01-09 PROCEDURE — 97112 NEUROMUSCULAR REEDUCATION: CPT | Mod: GO | Performed by: OCCUPATIONAL THERAPIST

## 2025-01-09 PROCEDURE — 97110 THERAPEUTIC EXERCISES: CPT | Mod: GO | Performed by: OCCUPATIONAL THERAPIST

## 2025-01-09 PROCEDURE — 97140 MANUAL THERAPY 1/> REGIONS: CPT | Mod: GO | Performed by: OCCUPATIONAL THERAPIST

## 2025-01-15 ENCOUNTER — THERAPY VISIT (OUTPATIENT)
Dept: OCCUPATIONAL THERAPY | Facility: CLINIC | Age: 65
End: 2025-01-15
Payer: COMMERCIAL

## 2025-01-15 DIAGNOSIS — M79.641 PAIN OF RIGHT HAND: Primary | ICD-10-CM

## 2025-01-15 PROCEDURE — 97140 MANUAL THERAPY 1/> REGIONS: CPT | Mod: GO | Performed by: OCCUPATIONAL THERAPIST

## 2025-01-15 PROCEDURE — 97112 NEUROMUSCULAR REEDUCATION: CPT | Mod: GO | Performed by: OCCUPATIONAL THERAPIST

## 2025-01-15 PROCEDURE — 97110 THERAPEUTIC EXERCISES: CPT | Mod: GO | Performed by: OCCUPATIONAL THERAPIST

## 2025-02-03 NOTE — PROGRESS NOTES
Vein Clinic Sclerotherapy Note     Whitney Aceves is a 64-year-old female who is status post ultrasound-guided sclerotherapy of an asymptomatic varicose vein on her left medial calf and pretibial area followed by a second cosmetic sclerotherapy treatment of spider veins all performed by Dr. Schwab in 2022.  She returns today for a sclerotherapy recheck.  She complains of a left pretibial reticular vein that she considers unsightly when out golfing.  She has some additional clusters of spiders on the lateral aspect of her proximal right calf.      Sclerotherapy    Date/Time: 2/4/2025 1:39 PM    Performed by: Dallas Schuler MD  Authorized by: Dallas Schuler MD    Time out: Immediately prior to the procedure a time out was called    Type:  Cosmetic  Session:  Limited  Procedure side:  Bilateral  Solution/Amount:  .5 POLIDOCANOL and 1% POLIDOCANOL  Syringes:  1 syringe of 0.5% polidocanol; 1 syringe of 1% polidocanol.      DESCRIPTION OF TECHNIQUE  Details of the procedure including risks of infection, ulceration, and hyperpigmentation were discussed.  She verbalized full understanding to the above.  After informed consent was obtained she was placed on the table in a supine position.  I donned a Receptor polarized light system.  I utilized a 27-gauge butterfly needle and successfully accessed the left pretibial spider vein in 2 locations.  I aspirated blood to confirm intraluminal placement of the butterfly needle.  I then injected 3 cc of foamed 1% polidocanol with good result.  Following this I used a single syringe of foamed 0.5% polidocanol and 30-gauge needles to access the cluster of spider veins on the lateral aspect of the proximal right calf and on the bilateral pretibial regions.  There was good uptake of the solution without obvious extravasation.  She tolerated the procedure without incident.  Her legs were cleansed and dried.  She was helped into her compression stockings.  I reviewed  postprocedural care instructions.  Follow-up will be in 6 weeks for a sclerotherapy recheck.    Wan Schuler MD    Dictated using Dragon voice recognition software which may result in transcription errors

## 2025-02-04 ENCOUNTER — OFFICE VISIT (OUTPATIENT)
Dept: VASCULAR SURGERY | Facility: CLINIC | Age: 65
End: 2025-02-04
Payer: COMMERCIAL

## 2025-02-04 DIAGNOSIS — I83.93 SPIDER VEINS OF BOTH LOWER EXTREMITIES: Primary | ICD-10-CM

## 2025-02-04 DIAGNOSIS — I78.1 SPIDER TELANGIECTASIS OF SKIN: ICD-10-CM

## 2025-02-04 PROCEDURE — 36468 NJX SCLRSNT SPIDER VEINS: CPT | Performed by: SURGERY

## 2025-02-04 PROCEDURE — S9999 SALES TAX: HCPCS | Performed by: SURGERY

## 2025-02-04 NOTE — LETTER
2/4/2025      Whitney Aceves  [id number] [address] [id number]      Dear Colleague,    Thank you for referring your patient, Whitney Aceves, to the [facility] [place]. Please see a copy of my visit note below.        Vein Clinic Sclerotherapy Note     Whitney Aceves is a 64-year-old female who is status post ultrasound-guided sclerotherapy of an asymptomatic varicose vein on her left medial calf and pretibial area followed by a second cosmetic sclerotherapy treatment of spider veins all performed by Dr. Schwab in 2022.  She returns today for a sclerotherapy recheck.  She complains of a left pretibial reticular vein that she considers unsightly when out golfing.  She has some additional clusters of spiders on the lateral aspect of her proximal right calf.      Sclerotherapy    Date/Time: 2/4/2025 1:39 PM    Performed by: Dallas Schuler MD  Authorized by: Dallas Schuler MD    Time out: Immediately prior to the procedure a time out was called    Type:  Cosmetic  Session:  Limited  Procedure side:  Bilateral  Solution/Amount:  .5 POLIDOCANOL and 1% POLIDOCANOL  Syringes:  1 syringe of 0.5% polidocanol; 1 syringe of 1% polidocanol.      DESCRIPTION OF TECHNIQUE  Details of the procedure including risks of infection, ulceration, and hyperpigmentation were discussed.  She verbalized full understanding to the above.  After informed consent was obtained she was placed on the table in a supine position.  I donned a State polarized light system.  I utilized a 27-gauge butterfly needle and successfully accessed the left pretibial spider vein in 2 locations.  I aspirated blood to confirm intraluminal placement of the butterfly needle.  I then injected 3 cc of foamed 1% polidocanol with good result.  Following this I used a single syringe of foamed 0.5% polidocanol and 30-gauge needles to access the cluster of spider veins on the lateral aspect of the proximal right calf and on the bilateral  pretibial regions.  There was good uptake of the solution without obvious extravasation.  She tolerated the procedure without incident.  Her legs were cleansed and dried.  She was helped into her compression stockings.  I reviewed postprocedural care instructions.  Follow-up will be in 6 weeks for a sclerotherapy recheck.    Wan Schuler MD    Dictated using Dragon voice recognition software which may result in transcription errors    Again, thank you for allowing me to participate in the care of your patient.        Sincerely,        Dallas Schuler MD    Electronically signed

## 2025-02-06 ENCOUNTER — MYC MEDICAL ADVICE (OUTPATIENT)
Dept: FAMILY MEDICINE | Facility: CLINIC | Age: 65
End: 2025-02-06
Payer: COMMERCIAL

## 2025-02-06 DIAGNOSIS — N95.2 ATROPHIC VAGINITIS: ICD-10-CM

## 2025-02-06 NOTE — TELEPHONE ENCOUNTER
Pt requesting estrogen compounded cream rx to be sent to  compounding pharmacy. Did not receive locate print out of rx

## 2025-02-17 ENCOUNTER — MYC MEDICAL ADVICE (OUTPATIENT)
Dept: FAMILY MEDICINE | Facility: CLINIC | Age: 65
End: 2025-02-17
Payer: COMMERCIAL

## 2025-02-17 NOTE — TELEPHONE ENCOUNTER
Sig: ESTROGEN COMPOUNDED CREAM - estradiol 0.02% in HRT base compounded cream     Ordered 2/6/25 - 1 dose and 11 refills.  Filled at compound pharmacy at

## 2025-02-18 NOTE — TELEPHONE ENCOUNTER
Yadira Goldberg Jessica A, MD; [place] [facility] Refill [facility] - Primary Care19 hours ago (12:22 PM)     PK  Pt has never received it and told it was ordered through [facility].  Never contacted or rec. Medication.  I show 2/6/25 last time ordered.  Pt said never rec.  Please advise.

## 2025-02-26 ENCOUNTER — MYC MEDICAL ADVICE (OUTPATIENT)
Dept: FAMILY MEDICINE | Facility: CLINIC | Age: 65
End: 2025-02-26
Payer: COMMERCIAL

## 2025-03-05 ENCOUNTER — TELEPHONE (OUTPATIENT)
Dept: FAMILY MEDICINE | Facility: CLINIC | Age: 65
End: 2025-03-05
Payer: COMMERCIAL

## 2025-03-05 NOTE — TELEPHONE ENCOUNTER
General Call      Reason for Call:  Follow up on Bone density results    What are your questions or concerns:  Pt is calling to follow up on bone density results. Pt states that pcp did reply but she replied to another question that pt had a long time ago and not directly to the bone density scan     Date of last appointment with provider:     Could we send this information to you in [facility] or would you prefer to receive a phone call?:   No preference ; [employer] or phone call is fine   Okay to leave a detailed message?: Yes at Cell number on file:    Telephone Information:   Mobile [phone]

## 2025-03-06 DIAGNOSIS — M81.6 LOCALIZED OSTEOPOROSIS WITHOUT CURRENT PATHOLOGICAL FRACTURE: Primary | ICD-10-CM

## 2025-03-06 RX ORDER — ALENDRONATE SODIUM 70 MG/1
70 TABLET ORAL
Qty: 12 TABLET | Refills: 4 | Status: SHIPPED | OUTPATIENT
Start: 2025-03-06

## 2025-03-10 PROBLEM — M79.641 PAIN OF RIGHT HAND: Status: RESOLVED | Noted: 2025-01-03 | Resolved: 2025-03-10

## 2025-04-01 ENCOUNTER — OFFICE VISIT (OUTPATIENT)
Dept: VASCULAR SURGERY | Facility: CLINIC | Age: 65
End: 2025-04-01
Payer: COMMERCIAL

## 2025-04-01 DIAGNOSIS — Z09 SURGICAL FOLLOW-UP CARE: Primary | ICD-10-CM

## 2025-04-01 PROCEDURE — 99207 PR VEINSOLUTIONS POST OPERATIVE VISIT: CPT | Performed by: SURGERY

## 2025-04-01 NOTE — PROGRESS NOTES
Whitney Aceves is status post her third session of cosmetic sclerotherapy performed by me on 2/4/2025.  At that session I injected a left pretibial reticular vein with 1% polidocanol along with scattered spider veins on the lateral proximal right calf and bilateral pretibial regions.  She returns today for a sclerotherapy recheck.  She notes some mild tenderness over the left pretibial region.    Exam:  After donning the Sryis polarized headlamp it is obvious that the reticular vein of the left pretibial region is thrombosed.  I did not see entrapped intraluminal thrombus that would warrant evacuation.  I felt it was best left alone.  She has some very small scattered spider veins which also do not warrant intervention.  She is satisfied with improved cosmesis.    IMPRESSION:  6 weeks status post her third session of cosmetic sclerotherapy overall doing quite well.  No treatment today.    PLAN:  I recommended heat and ongoing use of compression stockings to help resolve the thrombosed left pretibial reticular vein.  She may utilize ibuprofen for any discomfort but she is not complaining of any such symptoms.  Follow-up will otherwise be through this office on an as-needed basis for future cosmetic injection sclerotherapy.    Wan Schuler MD

## 2025-04-01 NOTE — LETTER
4/1/2025      Whitney Aceves  [address] [facility] [id number]      Dear Colleague,    Thank you for referring your patient, Whitney Aceves, to the [facility] [place]. Please see a copy of my visit note below.    Whitney Aceves is status post her third session of cosmetic sclerotherapy performed by me on 2/4/2025.  At that session I injected a left pretibial reticular vein with 1% polidocanol along with scattered spider veins on the lateral proximal right calf and bilateral pretibial regions.  She returns today for a sclerotherapy recheck.  She notes some mild tenderness over the left pretibial region.    Exam:  After donning the Sryis polarized headlamp it is obvious that the reticular vein of the left pretibial region is thrombosed.  I did not see entrapped intraluminal thrombus that would warrant evacuation.  I felt it was best left alone.  She has some very small scattered spider veins which also do not warrant intervention.  She is satisfied with improved cosmesis.    IMPRESSION:  6 weeks status post her third session of cosmetic sclerotherapy overall doing quite well.  No treatment today.    PLAN:  I recommended heat and ongoing use of compression stockings to help resolve the thrombosed left pretibial reticular vein.  She may utilize ibuprofen for any discomfort but she is not complaining of any such symptoms.  Follow-up will otherwise be through this office on an as-needed basis for future cosmetic injection sclerotherapy.    Wan Schuler MD      Again, thank you for allowing me to participate in the care of your patient.        Sincerely,        Dallas Schuler MD    Electronically signed

## 2025-06-14 ASSESSMENT — ANXIETY QUESTIONNAIRES
1. FEELING NERVOUS, ANXIOUS, OR ON EDGE: NOT AT ALL
3. WORRYING TOO MUCH ABOUT DIFFERENT THINGS: NOT AT ALL
4. TROUBLE RELAXING: NOT AT ALL
6. BECOMING EASILY ANNOYED OR IRRITABLE: NOT AT ALL
2. NOT BEING ABLE TO STOP OR CONTROL WORRYING: NOT AT ALL
GAD7 TOTAL SCORE: 0
GAD7 TOTAL SCORE: 0
7. FEELING AFRAID AS IF SOMETHING AWFUL MIGHT HAPPEN: NOT AT ALL
GAD7 TOTAL SCORE: 0
5. BEING SO RESTLESS THAT IT IS HARD TO SIT STILL: NOT AT ALL
7. FEELING AFRAID AS IF SOMETHING AWFUL MIGHT HAPPEN: NOT AT ALL

## 2025-06-14 ASSESSMENT — PATIENT HEALTH QUESTIONNAIRE - PHQ9
SUM OF ALL RESPONSES TO PHQ QUESTIONS 1-9: 0
SUM OF ALL RESPONSES TO PHQ QUESTIONS 1-9: 0

## 2025-06-19 ENCOUNTER — OFFICE VISIT (OUTPATIENT)
Dept: FAMILY MEDICINE | Facility: CLINIC | Age: 65
End: 2025-06-19
Payer: COMMERCIAL

## 2025-06-19 ENCOUNTER — RESULTS FOLLOW-UP (OUTPATIENT)
Dept: FAMILY MEDICINE | Facility: CLINIC | Age: 65
End: 2025-06-19

## 2025-06-19 VITALS
RESPIRATION RATE: 14 BRPM | WEIGHT: 131.5 LBS | HEIGHT: 64 IN | DIASTOLIC BLOOD PRESSURE: 62 MMHG | HEART RATE: 71 BPM | OXYGEN SATURATION: 100 % | SYSTOLIC BLOOD PRESSURE: 130 MMHG | TEMPERATURE: 96.9 F | BODY MASS INDEX: 22.45 KG/M2

## 2025-06-19 DIAGNOSIS — M15.8 OTHER OSTEOARTHRITIS INVOLVING MULTIPLE JOINTS: ICD-10-CM

## 2025-06-19 DIAGNOSIS — M81.6 LOCALIZED OSTEOPOROSIS WITHOUT CURRENT PATHOLOGICAL FRACTURE: ICD-10-CM

## 2025-06-19 DIAGNOSIS — K44.9 HIATAL HERNIA: ICD-10-CM

## 2025-06-19 DIAGNOSIS — C44.311 BASAL CELL CARCINOMA (BCC) OF SKIN OF NOSE: ICD-10-CM

## 2025-06-19 DIAGNOSIS — E78.5 HYPERLIPIDEMIA LDL GOAL <130: Primary | ICD-10-CM

## 2025-06-19 DIAGNOSIS — K21.9 GASTROESOPHAGEAL REFLUX DISEASE WITHOUT ESOPHAGITIS: ICD-10-CM

## 2025-06-19 DIAGNOSIS — R06.02 SHORTNESS OF BREATH: ICD-10-CM

## 2025-06-19 DIAGNOSIS — E55.9 VITAMIN D DEFICIENCY: ICD-10-CM

## 2025-06-19 DIAGNOSIS — R61 NIGHT SWEATS: ICD-10-CM

## 2025-06-19 DIAGNOSIS — Z82.49 FAMILY HISTORY OF EARLY CAD: ICD-10-CM

## 2025-06-19 DIAGNOSIS — Z23 NEED FOR VACCINATION AGAINST STREPTOCOCCUS PNEUMONIAE: ICD-10-CM

## 2025-06-19 DIAGNOSIS — N95.1 VASOMOTOR SYMPTOMS DUE TO MENOPAUSE: ICD-10-CM

## 2025-06-19 DIAGNOSIS — N95.1 SYMPTOMATIC MENOPAUSAL OR FEMALE CLIMACTERIC STATES: ICD-10-CM

## 2025-06-19 DIAGNOSIS — E78.5 HYPERLIPIDEMIA LDL GOAL <130: ICD-10-CM

## 2025-06-19 DIAGNOSIS — E66.3 OVERWEIGHT (BMI 25.0-29.9): ICD-10-CM

## 2025-06-19 DIAGNOSIS — G47.00 INSOMNIA, UNSPECIFIED TYPE: ICD-10-CM

## 2025-06-19 LAB
ALBUMIN SERPL BCG-MCNC: 4.3 G/DL (ref 3.5–5.2)
ALP SERPL-CCNC: 61 U/L (ref 40–150)
ALT SERPL W P-5'-P-CCNC: 28 U/L (ref 0–50)
ANION GAP SERPL CALCULATED.3IONS-SCNC: 10 MMOL/L (ref 7–15)
AST SERPL W P-5'-P-CCNC: 26 U/L (ref 0–45)
BILIRUB SERPL-MCNC: 0.8 MG/DL
BUN SERPL-MCNC: 11.3 MG/DL (ref 8–23)
CALCIUM SERPL-MCNC: 9.1 MG/DL (ref 8.8–10.4)
CHLORIDE SERPL-SCNC: 103 MMOL/L (ref 98–107)
CHOLEST SERPL-MCNC: 185 MG/DL
CREAT SERPL-MCNC: 0.73 MG/DL (ref 0.51–0.95)
EGFRCR SERPLBLD CKD-EPI 2021: >90 ML/MIN/1.73M2
ERYTHROCYTE [DISTWIDTH] IN BLOOD BY AUTOMATED COUNT: 13.1 % (ref 10–15)
FASTING STATUS PATIENT QL REPORTED: YES
FASTING STATUS PATIENT QL REPORTED: YES
GLUCOSE SERPL-MCNC: 72 MG/DL (ref 70–99)
HCO3 SERPL-SCNC: 27 MMOL/L (ref 22–29)
HCT VFR BLD AUTO: 38.1 % (ref 35–47)
HDLC SERPL-MCNC: 76 MG/DL
HGB BLD-MCNC: 12.6 G/DL (ref 11.7–15.7)
LDLC SERPL CALC-MCNC: 97 MG/DL
MCH RBC QN AUTO: 29.9 PG (ref 26.5–33)
MCHC RBC AUTO-ENTMCNC: 33.1 G/DL (ref 31.5–36.5)
MCV RBC AUTO: 90 FL (ref 78–100)
NONHDLC SERPL-MCNC: 109 MG/DL
PLATELET # BLD AUTO: 216 10E3/UL (ref 150–450)
POTASSIUM SERPL-SCNC: 3.8 MMOL/L (ref 3.4–5.3)
PROT SERPL-MCNC: 6.8 G/DL (ref 6.4–8.3)
RBC # BLD AUTO: 4.22 10E6/UL (ref 3.8–5.2)
SODIUM SERPL-SCNC: 140 MMOL/L (ref 135–145)
TRIGL SERPL-MCNC: 59 MG/DL
TSH SERPL DL<=0.005 MIU/L-ACNC: 1.74 UIU/ML (ref 0.3–4.2)
WBC # BLD AUTO: 4.8 10E3/UL (ref 4–11)

## 2025-06-19 PROCEDURE — 82306 VITAMIN D 25 HYDROXY: CPT | Performed by: FAMILY MEDICINE

## 2025-06-19 PROCEDURE — 85027 COMPLETE CBC AUTOMATED: CPT | Performed by: FAMILY MEDICINE

## 2025-06-19 PROCEDURE — 80061 LIPID PANEL: CPT | Performed by: FAMILY MEDICINE

## 2025-06-19 PROCEDURE — 84443 ASSAY THYROID STIM HORMONE: CPT | Performed by: FAMILY MEDICINE

## 2025-06-19 PROCEDURE — G2211 COMPLEX E/M VISIT ADD ON: HCPCS | Performed by: FAMILY MEDICINE

## 2025-06-19 PROCEDURE — 3078F DIAST BP <80 MM HG: CPT | Performed by: FAMILY MEDICINE

## 2025-06-19 PROCEDURE — 3075F SYST BP GE 130 - 139MM HG: CPT | Performed by: FAMILY MEDICINE

## 2025-06-19 PROCEDURE — 36415 COLL VENOUS BLD VENIPUNCTURE: CPT | Performed by: FAMILY MEDICINE

## 2025-06-19 PROCEDURE — 80053 COMPREHEN METABOLIC PANEL: CPT | Performed by: FAMILY MEDICINE

## 2025-06-19 PROCEDURE — 90677 PCV20 VACCINE IM: CPT | Performed by: FAMILY MEDICINE

## 2025-06-19 PROCEDURE — 99215 OFFICE O/P EST HI 40 MIN: CPT | Mod: 25 | Performed by: FAMILY MEDICINE

## 2025-06-19 PROCEDURE — 90471 IMMUNIZATION ADMIN: CPT | Performed by: FAMILY MEDICINE

## 2025-06-19 RX ORDER — PROGESTERONE 100 MG/1
100 CAPSULE ORAL DAILY
Qty: 90 CAPSULE | Refills: 1 | Status: SHIPPED | OUTPATIENT
Start: 2025-06-19

## 2025-06-19 RX ORDER — VENLAFAXINE HYDROCHLORIDE 75 MG/1
75 CAPSULE, EXTENDED RELEASE ORAL DAILY
Qty: 90 CAPSULE | Refills: 1 | Status: SHIPPED | OUTPATIENT
Start: 2025-06-19

## 2025-06-19 RX ORDER — CALCIUM LACTATE 100 MG
260 TABLET ORAL DAILY
COMMUNITY
Start: 2025-06-19

## 2025-06-19 RX ORDER — ALBUTEROL SULFATE 90 UG/1
2 INHALANT RESPIRATORY (INHALATION) EVERY 6 HOURS PRN
Qty: 18 G | Refills: 1 | Status: SHIPPED | OUTPATIENT
Start: 2025-06-19

## 2025-06-19 RX ORDER — ATORVASTATIN CALCIUM 10 MG/1
10 TABLET, FILM COATED ORAL DAILY
Qty: 90 TABLET | Refills: 1 | Status: SHIPPED | OUTPATIENT
Start: 2025-06-19

## 2025-06-19 NOTE — PATIENT INSTRUCTIONS
[facility]  [address] [place] [id number]  Office: [phone]   Fax:    [phone]       Recommend calcium 1200mg daily and  vitamin D 1000iu daily in the summer and 2000iu in the fall, winter and spring, and daily exercise with some resistance training to help keep your bones strong. Recent research has shown that daily or every other day weight bearing exercise with resistance training is especially important in maintaining and increasing bone density and preventing osteoporosis.     3 dairy servings/day is recommended to achieve the above if you don't want to supplement.  1 dairy serving = 1-8oz glass of milk, 1-1oz piece of cheese or 1-4oz yogurt.      If you take the above with magnesium 250mg to 400mg daily, you should not get constipation. The magnesium can also help prevent leg cramps and helps the calcium absorb a bit better.     Highly recommend resistance training to help keep bones strong and decrease age -related muscle loss, decreasing insulin resistance and increasing basal metabolic rate to help burn more calories at rest and with exertion.     Look at YouTube for free exercises for basic weight training for biceps, triceps, deltoids, low back, hips, hamstrings, quadriceps, and calf muscles to help strengthen your bones as well as the below exercises.     Also recommend:  a collagen supplement with FORTIGEL , FORTIBONE and /or VERISOL  in it, such as:   Whole Body Collagen by [employer].  It is a little expensive.  About $70 per 13.8oz cannister from [employer].com.  Another is Algae-Terence collagen supplement. = about $59/ cannister.  You can get both  at some [employer]  or on [employer].com.  They  a finely milled collagen supplement that is unflavored. Some to the others such as Vital Proteins Collagen Peptides have a pretty strong beef/cow or pig flavor to them and may not have  FORTIGEL , FORTIBONE and /or VERISOL  in it.  The evidence on the  benefits of whole body collagen on joint health, bone density and overall skin health are still being studied, but the initial research on the Whole Body Collagen and Algae-Terence collagen looks promising.  No significant adverse side effects or drug-to-drug interactions have been found as yet.   Look for one with FORTIGEL , FORTIBONE and /or VERISOL  in it.   I have no financial affiliation with Whole Body Collagen or Algae-Terence.    Based on our discussion, I have outlined the following instructions for you:      - Get your vitamin D levels checked.  - Keep taking alendronate 70 mg once a week.  - Try to eat more foods with calcium.  - Stick to your exercise routine, including Pilates and walking.  - Make sure to refill your ibuprofen 800 mg for pain relief.  - Continue having your skin checked twice a year.  - Use omeprazole when you need it, especially before eating foods that are acidic.  - You might want to try micronized progesterone to help with sleep.  - Adjust your gabapentin dosage if needed.  - Keep following your current hormone therapy routine.  - Think about trying micronized progesterone to help with sleep.  - Keep taking your vitamin D3 supplements.  - Get your vitamin D levels checked.  - Refill your albuterol inhaler and use it when you need it.  - Get your pneumonia vaccine.    Thank you again for your visit, and we look forward to supporting you in your journey to better health.     .Discussed need for resistance training to help keep bones strong and decrease age -related muscle loss, decreasing insulin resistance and increasing basal metabolic rate to help burn more calories at rest and with exertion.     Look at YouTube for free exercises for basic weight training for biceps, triceps, deltoids, low back, hips, hamstrings, quadriceps, and calf muscles to help strengthen your bones as well as the below exercises.       Clinical References    Resistance Training With Free Weights:  Exercises  Introduction  Here are some examples of exercises for resistance training. Start each exercise slowly. Ease off the exercise if you start to have pain.  Your doctor or physical therapist will tell you when you can start these exercises and which ones will work best for you.  How to do the exercises  Chest fly    Lie on a bench or exercise ball, and hold the weights straight up over your chest. Do not lock your elbows. You can keep them slightly bent if that is comfortable for you.  Slowly lower your arms, keeping them extended, until the weights are level with your chest, or slightly lower.  Slowly raise your arms until you are in the starting position.  Repeat 8 to 12 times.  Rest for a minute, and repeat the exercise.  Arm raise to the side (elbows bent)    Stand with your feet shoulder-width apart and your knees slightly bent. Or sit up straight in a chair.  Hold a 1- to 2-pound weight in each hand. The weight may be a dumbbell, a can of food, or a filled water bottle.  Bend your elbows 90 degrees while keeping them at your sides. With your palms facing in, hold the weights straight in front of you.  Slowly lift the weights and your elbows out to the sides to shoulder level, keeping your elbows bent. Keep your shoulders down and relaxed as you lift. If you find that you are shrugging your shoulders up toward your ears, your weights may be too heavy. Try using lighter weights (or even no weights).  Slowly lower the weights and your elbows until your elbows are back at your sides.  Repeat 8 to 12 times.  Biceps curls    Sit leaning forward with your legs slightly spread and your left hand on your left thigh.  Hold the weight in your right hand, and place your right elbow on your right thigh.  Slowly curl the weight up and toward your chest.  Slowly lower the weight to the original position.  Repeat 8 to 12 times.  Rest for a minute, and repeat the exercise.  Do the same exercise with your other  arm.  Follow-up care is a key part of your treatment and safety. Be sure to make and go to all appointments, and call your doctor if you are having problems. It's also a good idea to know your test results and keep a list of the medicines you take.  Current as of: June 6, 2023               Content Version: 13.8    3601-9094 [employer].   Care instructions adapted under license by your healthcare professional. If you have questions about a medical condition or this instruction, always ask your healthcare professional. [employer] disclaims any warranty or liability for your use of this information.        Resistance Training With Surgical Tubing: Exercises  Introduction  Here are some examples of exercises for resistance training. Start each exercise slowly. Ease off the exercise if you start to have pain.  Your doctor or physical therapist will tell you when you can start these exercises and which ones will work best for you.  How to do the exercises  Side pull    Sit or stand up straight. Grasp an exercise band with your hands about shoulder-width apart.  Raise both arms overhead, palms of your hands facing forward.  Slowly pull one arm down and to the side, bending your elbow and stretching the band until your elbow is at shoulder height. Hold for 1 to 2 seconds.  Slowly return to the starting position with your arms straight up.  Repeat with the other arm.  Repeat 8 to 12 times with each arm.  Overhead pull    Sit or stand up straight. Grasp an exercise band with your hands about shoulder-width apart.  Raise both arms overhead, palms of your hands facing forward.  Slowly pull your hands apart, stretching the band. Hold for 1 to 2 seconds.  Slowly return to the starting position with your arms straight up.  Repeat 8 to 12 times.  Up-down pull    Sit or stand up straight. Grasp an exercise band with your hands about shoulder width apart.  Raise both arms overhead.  Bend your elbows until  they are at shoulder height, with the stretched band either behind or in front of your head. Hold for 1 to 2 seconds.  Slowly return to the starting position with your arms straight up.  Repeat 8 to 12 times.  Chest-level pull    Sit or stand up straight. Grasp an exercise band with your hands about shoulder-width apart.  Raise your arms to chest level and bend your elbows.  Slowly pull your hands apart and your shoulder blades together, stretching the band. Hold for 1 to 2 seconds. Try to keep your hands up at your chest level, and do not pull your shoulders up toward your ears.  Slowly return to your starting position.  Repeat 8 to 12 times.  Hip-level pull    Stand or sit up straight in a chair without arms. Grasp an exercise band with your hands about shoulder-width apart.  Hold your hands at the level of your hips, or near your lap if you are sitting down.  Slowly pull your hands apart, stretching the band. Hold for 1 or 2 seconds.  Slowly return to your starting position.  Repeat 8 to 12 times.  Follow-up care is a key part of your treatment and safety. Be sure to make and go to all appointments, and call your doctor if you are having problems. It's also a good idea to know your test results and keep a list of the medicines you take.  Current as of: June 6, 2023               Content Version: 13.8    7994-5007 [employer].   Care instructions adapted under license by your healthcare professional. If you have questions about a medical condition or this instruction, always ask your healthcare professional. [employer] disclaims any warranty or liability for your use of this information.         Fitness: Increasing Your Core Stability (02:03)  Your health professional recommends that you watch this short online health video.  Learn how to keep your core strong and prevent injury with two simple exercises.  Purpose:  Shows belly tightening and bridging to improve core stability.  Emphasizes how a strong core helps prevent injury.  Goal:  The user will learn belly tightening and bridging to improve core stability.      How to watch the video     Scan the QR code   OR Visit the website    [email or web address]   Current as of: July 18, 2023               Content Version: 13.8    1434-8856 [employer].   Care instructions adapted under license by your healthcare professional. If you have questions about a medical condition or this instruction, always ask your healthcare professional. [employer] disclaims any warranty or liability for your use of this information.          Muscle Conditioning: Exercises  Introduction  Here are some examples of exercises for muscle conditioning. Start each exercise slowly. Ease off the exercise if you start to have pain.  Your doctor or physical therapist will tell you when you can start these exercises and which ones will work best for you.  How to do the exercises  Wall push-ups    When you can do this exercise against a wall comfortably (without your muscles feeling tired), you can try it against a counter. Start with 5 repetitions again and work up to 8 to 12. You can then slowly progress to the end of a couch or a sturdy chair, and finally to the floor.  Stand facing a wall, about 12 to 18 inches away.  Place your hands on the wall at shoulder height.  Slowly bend your elbows and bring your face toward the wall, moving your hips and shoulders forward together.  Push slowly back to the starting position.  Start with 5 repetitions and work up to 8 to 12.  Rest for a minute, and repeat the exercise.    Side-lying leg lift    If this exercise becomes easy, you can add a light weight around your ankle or tie an elastic resistance band to both ankles.  Lie on your side, with your legs extended. Keep your hips straight up and down during this exercise. Do not let your top hip rock toward the back. Support your head  with your hand, and place the other hand on the floor near your waist.  Slowly raise your upper leg until it is about in line with your shoulder. Keep your toes pointed forward.  Slowly lower your leg to the starting position.  Repeat 8 to 12 times.  Rest for a minute, and repeat the exercise.  Turn to your other side and do the same exercise with your other leg.  Shallow standing knee bends    Stand with your hands lightly resting on a counter or chair in front of you with your feet shoulder-width apart.  Slowly bend your knees so that you squat down just like you were going to sit in a chair. Make sure your knees do not go in front of your toes.  Lower yourself about 6 inches. Your heels should remain on the floor at all times.  Rise slowly to a standing position.  Repeat 8 to 12 times.  Rest for a minute, and repeat the exercise.  Follow-up care is a key part of your treatment and safety. Be sure to make and go to all appointments, and call your doctor if you are having problems. It's also a good idea to know your test results and keep a list of the medicines you take.  Current as of: June 6, 2023               Content Version: 13.8    3304-3540 [employer].   Care instructions adapted under license by your healthcare professional. If you have questions about a medical condition or this instruction, always ask your healthcare professional. [employer] disclaims any warranty or liability for your use of this information.         How to Do the Wall Sit Exercise (00:46)  Your health professional recommends that you watch this short online health video.  Learn how to do the wall sit exercise to increase strength and stability in your core and your legs.  Purpose:  Demonstrates the steps required to perform the wall sit exercise to increase strength and stability in the core and legs.  Goal:  The user will learn how to do the wall sit exercise to increase strength and stability in the core  "and legs.      How to watch the video     Scan the QR code   OR Visit the website    [email or web address]   Current as of: July 18, 2023               Content Version: 13.8 2006-2023 [employer].   Care instructions adapted under license by your healthcare professional. If you have questions about a medical condition or this instruction, always ask your healthcare professional. [employer] disclaims any warranty or liability for your use of this information.    For your chronic constipation:   DAILY FIBER THERAPY MIX:  Can try just 1 teaspoon of psyillium fiber /day in 8 oz of water, then follow with additional 8 oz of water. Work up to 1 tablespoon in 8 oz of water daily      If that doesn't work, then try:     1/2 cup Konsyl (psyllium fiber)  from [employer] (if available - otherwise use [employer] 1/2 cup), 1 cup of oat bran, 1 cup of wheat bran and 1 cup of flax seed meal - ground - = mix it together in a container or zip-loc bag and take 1 teaspoon in 1 cup of warm water daily, , follow with 1 -8 oz of water.       Return in about 6 months (around 12/23/2025) for annual preventative with recheck cholesterol, mood, menopause, etc.    Thank you so much or choosing [facility]  for your Health Care. It was a pleasure seeing you at your visit today! Please contact us with any questions or concerns you may have.                   Lisbeth Gonzalez MD                              To reach your [facility] care team after hours call:   [phone] press #2 \"to speak with your care team\".  This will get you to our clinic instead of routing to central [facility]  scheduling.     PLEASE NOTE OUR HOURS HAVE CHANGED secondary to COVID-19 coronavirus pandemic, as we are trying to minimize patient exposure to the virus,  which is now widespread in the [facility].  These hours may change with very little notice.  We " apologize for any inconvenience.       Our current clinic hours are:          Monday- Thursday   7:00am - 6:00pm  in person.      Friday  7:00am- 5:00pm                       Saturday and Sunday : Closed to in person and virtual visits        We have telephone and virtual visit times available between    7:00am - 6pm on Monday-Friday as well.                                                Phone:  [phone]      Our pharmacy hours: Monday through Friday 8:00am to 5:00pm                        Saturday - 9:00 am to 12 noon       Sunday : Closed.              Phone:  [phone]              ###  Please note: at this time we are not accepting any walk-in visits. ###      There is also information available at our web site:  [email or web address]    If your provider ordered any lab tests and you do not receive the results within 10 business days, please call the clinic.    If you need a medication refill please contact your pharmacy.  Please allow 3 business days for your refill to be completed.    Our clinic offers telephone visits and e visits.  Please ask one of your team members to explain more.      Use [employer] (secure email communication and access to your chart) to send your primary care provider a message or make an appointment. Ask someone on your Team how to sign up for [employer].
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
## 2025-06-20 PROCEDURE — 82043 UR ALBUMIN QUANTITATIVE: CPT | Performed by: FAMILY MEDICINE

## 2025-06-20 PROCEDURE — 82570 ASSAY OF URINE CREATININE: CPT | Performed by: FAMILY MEDICINE

## 2025-06-21 LAB
CREAT UR-MCNC: 51.1 MG/DL
MICROALBUMIN UR-MCNC: <12 MG/L
MICROALBUMIN/CREAT UR: NORMAL MG/G{CREAT}

## 2025-06-23 LAB
DEPRECATED CALCIDIOL+CALCIFEROL SERPL-MC: <68 UG/L (ref 20–75)
VITAMIN D2 SERPL-MCNC: <5 UG/L
VITAMIN D3 SERPL-MCNC: 63 UG/L

## (undated) DEVICE — KIT ENDO TURNOVER/PROCEDURE W/CLEAN A SCOPE LINERS 103888

## (undated) RX ORDER — ONDANSETRON 2 MG/ML
INJECTION INTRAMUSCULAR; INTRAVENOUS
Status: DISPENSED
Start: 2022-02-21

## (undated) RX ORDER — NITROGLYCERIN 0.4 MG/1
TABLET SUBLINGUAL
Status: DISPENSED
Start: 2023-12-22

## (undated) RX ORDER — FENTANYL CITRATE 50 UG/ML
INJECTION, SOLUTION INTRAMUSCULAR; INTRAVENOUS
Status: DISPENSED
Start: 2017-02-21

## (undated) RX ORDER — FENTANYL CITRATE 0.05 MG/ML
INJECTION, SOLUTION INTRAMUSCULAR; INTRAVENOUS
Status: DISPENSED
Start: 2022-02-21